# Patient Record
Sex: FEMALE | Race: WHITE | NOT HISPANIC OR LATINO | Employment: FULL TIME | URBAN - METROPOLITAN AREA
[De-identification: names, ages, dates, MRNs, and addresses within clinical notes are randomized per-mention and may not be internally consistent; named-entity substitution may affect disease eponyms.]

---

## 2022-10-24 ENCOUNTER — TRANSFERRED RECORDS (OUTPATIENT)
Dept: HEALTH INFORMATION MANAGEMENT | Facility: CLINIC | Age: 32
End: 2022-10-24

## 2022-11-18 ENCOUNTER — TRANSFERRED RECORDS (OUTPATIENT)
Dept: HEALTH INFORMATION MANAGEMENT | Facility: CLINIC | Age: 32
End: 2022-11-18

## 2022-11-18 LAB — EJECTION FRACTION: 57 %

## 2022-12-19 ENCOUNTER — TRANSFERRED RECORDS (OUTPATIENT)
Dept: HEALTH INFORMATION MANAGEMENT | Facility: CLINIC | Age: 32
End: 2022-12-19

## 2023-01-04 ENCOUNTER — MEDICAL CORRESPONDENCE (OUTPATIENT)
Dept: HEALTH INFORMATION MANAGEMENT | Facility: CLINIC | Age: 33
End: 2023-01-04

## 2023-01-04 ENCOUNTER — TRANSFERRED RECORDS (OUTPATIENT)
Dept: HEALTH INFORMATION MANAGEMENT | Facility: CLINIC | Age: 33
End: 2023-01-04
Payer: COMMERCIAL

## 2023-01-04 LAB
CHOLESTEROL (EXTERNAL): 117 MG/DL (ref 0–200)
CREATININE (EXTERNAL): 0.77 MG/DL (ref 0.6–1.2)
GFR ESTIMATED (EXTERNAL): 88 ML/MIN/1.73M2
GFR ESTIMATED (IF AFRICAN AMERICAN) (EXTERNAL): >90 ML/MIN/1.73M2
GLUCOSE (EXTERNAL): 78 MG/DL (ref 70–105)
HDLC SERPL-MCNC: 43 MG/DL (ref 40–60)
INR (EXTERNAL): 1.2 (ref 0.8–1.1)
LDL CHOLESTEROL (EXTERNAL): 55 MG/DL (ref 0–99)
POTASSIUM (EXTERNAL): 3.9 MEQ/L (ref 3.5–5.1)
TRIGLYCERIDES (EXTERNAL): 95 MG/DL (ref 24–149)

## 2023-01-05 ENCOUNTER — TELEPHONE (OUTPATIENT)
Dept: CARDIOLOGY | Facility: CLINIC | Age: 33
End: 2023-01-05

## 2023-01-05 ENCOUNTER — TRANSCRIBE ORDERS (OUTPATIENT)
Dept: OTHER | Age: 33
End: 2023-01-05

## 2023-01-05 DIAGNOSIS — I27.21 PULMONARY ARTERIAL HYPERTENSION (H): Primary | ICD-10-CM

## 2023-01-05 NOTE — TELEPHONE ENCOUNTER
[facility] Call Center    Phone Message    May a detailed message be left on voicemail: yes     Reason for Call: Appointment Intake    Referring Provider Name: Dr Shirley Chandler  Diagnosis and/or Symptoms: Per referral - Pulmonary Hypertension to see Dr Frazier. Please review and reach out to patient to coordinate.     Action Taken: Message routed to:  [facility] Cardio     Travel Screening: Not Applicable

## 2023-01-06 ENCOUNTER — TRANSFERRED RECORDS (OUTPATIENT)
Dept: HEALTH INFORMATION MANAGEMENT | Facility: CLINIC | Age: 33
End: 2023-01-06

## 2023-01-06 NOTE — TELEPHONE ENCOUNTER
Called patient and spot on hold on 1/24 virtually with Dr Carroll for new PH. Intake to update and schedule appointment. [place] is aware of [place] location for [place] visit.     Ora Do RN on 1/6/2023 at 4:44 PM

## 2023-01-11 ENCOUNTER — PRE VISIT (OUTPATIENT)
Dept: CARDIOLOGY | Facility: CLINIC | Age: 33
End: 2023-01-11

## 2023-01-11 ENCOUNTER — DOCUMENTATION ONLY (OUTPATIENT)
Dept: CARDIOLOGY | Facility: CLINIC | Age: 33
End: 2023-01-11
Payer: COMMERCIAL

## 2023-01-11 NOTE — TELEPHONE ENCOUNTER
Patient Name: Chelsea Bejarano Age: 32 year old, female, 1990 MRN: [id number]   Referring Provider:  Dr. Kirk Sainz ND Appt:  1/17/23       PH Medications:  NA      Patient History: Pt has a history of HTN, Hypothyroidism    Pt referred from Emeli PETERS. Pt had edema and dyspnea following her 3rd childbirth.     Workup testing completed: RHC, VQ, Echo, labs. Images requested on 1/11/23      Recent Testing:       Date Test Epic Media/Scan Care Everywhere    1/5/23 RHC           RA   8        PA:    55      PVR:  9.9                     RV             PAW:   8    COI:  5.5 []  [x]   []     1/5/23 Echo           RVSP    63                     LVEF  57%                      RV                       []  [x]   []     1/6/23 V/Q Scan []   [x]   []     1/4/23 Misc: Labs  []   [x]   []         []   []   []

## 2023-01-11 NOTE — PROGRESS NOTES
DIAGNOSIS: N/A   DATE REFERRAL RECEIVED: 1/11/23   NOTES STATUS DETAILS   LABS   Scanned    TESTS     EKG   Scanned 12.19.22 [facility]   STRESS TEST N/A    6 MINUTE WALK TEST N/A    SLEEP STUDY/Overnight Oximetry  N/A    PULMONARY FUNCTION TEST N/A    IMAGES      ECHO   In process 11.18.22 [facility]   RIGHT HEART CATH   PACS 1.4.23 [facility]   ANGIOGRAM N/A    ULTRASOUND (liver) N/A    CT/CTA (chest) N/A    V/Q Scan N/A      Action 1.11.23 8:49 AM JUAN   Action Taken Faxed request to [facility] for more records and images [phone]     Action 1.19.23 8:23 AM JUAN   Action Taken Missing ECHO images. Faxed request for ECHO [phone]

## 2023-01-24 ENCOUNTER — VIRTUAL VISIT (OUTPATIENT)
Dept: CARDIOLOGY | Facility: CLINIC | Age: 33
End: 2023-01-24
Attending: INTERNAL MEDICINE
Payer: COMMERCIAL

## 2023-01-24 DIAGNOSIS — I27.21 PULMONARY ARTERIAL HYPERTENSION (H): ICD-10-CM

## 2023-01-24 DIAGNOSIS — R06.02 SOB (SHORTNESS OF BREATH): Primary | ICD-10-CM

## 2023-01-24 PROCEDURE — 99205 OFFICE O/P NEW HI 60 MIN: CPT | Mod: 95 | Performed by: INTERNAL MEDICINE

## 2023-01-24 RX ORDER — ALBUTEROL SULFATE 90 UG/1
1 AEROSOL, METERED RESPIRATORY (INHALATION) EVERY 4 HOURS PRN
COMMUNITY
Start: 2022-08-19 | End: 2023-08-06

## 2023-01-24 RX ORDER — ALBUTEROL SULFATE 0.83 MG/ML
SOLUTION RESPIRATORY (INHALATION)
COMMUNITY
Start: 2022-10-24 | End: 2023-08-06

## 2023-01-24 RX ORDER — LIDOCAINE 40 MG/G
CREAM TOPICAL
Status: CANCELLED | OUTPATIENT
Start: 2023-01-24

## 2023-01-24 RX ORDER — DILTIAZEM HCL 60 MG
60 TABLET ORAL 2 TIMES DAILY
Status: ON HOLD | COMMUNITY
Start: 2023-01-05 | End: 2023-02-18

## 2023-01-24 NOTE — NURSING NOTE
Chief Complaint   Patient presents with     Consult       Patient confirms medications and allergies are accurate via patients echeck in completion, and or denies any changes since last reviewed/verified.     Simi Vance, Virtual Facilitator

## 2023-01-24 NOTE — PROGRESS NOTES
Chelsea is a 32 year old who is being evaluated via a billable video visit.  Video not performed secondary to lack of HIPPA compliant space.

## 2023-01-24 NOTE — PATIENT INSTRUCTIONS
Medication Changes:   - None    Patient Instructions:  1. Continue staying active and eat a heart healthy diet.    2. Please keep current list of medications with you at all times.    3. Remember to weigh yourself daily after voiding and before you consume any food or beverages and log the numbers.  If you have gained 2 pounds overnight or 5 pounds in a week contact us immediately for medication adjustments or further instructions.    4. **Please call us immediately if you have any syncope (fainting or passing out), chest pain, edema (swelling or weight gain), or decline in your functional status (general decline in how you are feeling).    5. Patients on Remodulin (treprostinil) or Veletri (epoprostenol): Please make sure that you have your backup pump and supplies with you at all times, your mixing instructions, and contact information for your specialty pharmacy.    Follow up Appointment Information:  - We will call you tomorrow to schedule a chest CT and Right Heart Catheterization (with labs) for February   - Follow up with Dr. Kumar or Dr. Frazier after testing  - Will resume following with Dr. Dalton in March when he returns from vacation    Pre-procedure instructions - Right hear catheterization  Patient Education    Your arrival time is TBD.  Location is [address] Waiting Room  Please plan on being at the hospital all day.  At any time, emergencies and/or urgent cases may come up which could delay the start of your procedure.    Pre-procedure instructions - Right heart catheterization  Nothing to eat for 6 hours   You may have clear liquids up until the time of your procedure  You can take your morning medications (except diabetic and blood thinners) with sips of water  We recommend you arrange for a ride to drop you off and pick you up, in the instance, you are unable to drive home, however you should be able to  function as you normally would after the procedure    We are located on the third floor of the [facility] on the [facility].  Our address is     [address] [place] [address] on 3rd Floor   [place] [phone]      Thank you for allowing us to be a part of your care here at the [facility]    If you have questions or concerns please contact us at:    Ora Do RN, BSN      Nurse Coordinator         Pulmonary Hypertension       [facility]     (Phone)[phone]         JESUS Carlos   (Prior Authorizations)   ()  Clinic   Clinic   Pulmonary Hypertension   Pulmonary Hypertension  [facility]  (P)[phone]    (P) [phone]  (F) [phone]          ** Please note that you will NOT receive a reminder call regarding your scheduled testing, reminder calls are for provider appointments only.  If you are scheduled for testing within the [facility] system you may receive a call regarding pre-registration for billing purposes only.**     Support Group:  Pulmonary Hypertension Association  Https://www.phassociation.org/  **Look at the Events Tab** They even have Support Groups that you can call into    [facility] PH Support Group  Second Saturday of the Month from 1-3 PM   Location: [address] [place] [id number]  Leader: Martina Nichole  Phone: [phone]  Email: [email or web address]     Great Videos about Pulmonary Hypertension!!  Scan ME!    Website: [email or web address]

## 2023-01-24 NOTE — NURSING NOTE
Orders placed and staff message sent to [facility] for follow up. Mary Guerrero RN on 1/24/2023 at 9:23 AM

## 2023-01-24 NOTE — LETTER
2023      RE: Chelsea Flower  [id number]  [place] [address]       Dear Colleague,    Thank you for the opportunity to participate in the care of your patient, Chelsea Flower, at the [facility]. Please see a copy of my visit note below.    Chelsea is a 32 year old who is being evaluated via a billable video visit.  Video not performed secondary to lack of HIPPA compliant space.      Alex Carroll M.D.  Cardiovascular Medicine  [phone]  Telephone visit x 60 minutes (no HIPPA compliant space available)        Kirk Newman M.D.  Shirley Chandler M.D.    Dear Doctors:    Thank you for allowing us to visit with your patient, Chelsea Flower, for evaluation of pulmonary hypertension of uncertain etiology though provocative laboratory testing with elevated CLINTON, +DS-DNA, elevated hemoglobin.    Problem List  1. Exertional shortness of breath   2. Pulmonary hypertension x 33. Childbirth x 3 by   3. Hypertension treated with recently started diltiazem  4. Hypothyroidism    Discussion Plan:  The patient clearly has at least moderate pulmonary arterial hypertension that appears to be idiopathic.  We discussed the nature of pulmonary hypertension, various treatment modalities, and further diagnostic evaluation including inhaled NO.      Suggest:   1. repeat right heart catheterization with inhaled NO to assess vaso-esponsiveness  2. Live US to assess for intrinsic liver disease  3. Weight loss  4. Seek approval for two drug therapy for PAH   5. Probably switch from diltiazem to losartan to control systemic blood pressure  6. Incremental laboratories in addition to standard include HgbA1C, IL-6, Free T4/TSH, collagen vascular  7. Evaluation for sleep disordered breathing  History    32 year old female interviewed by telephone for evaluation of pulmonary hypertension with mean PA 55, ventricular mechanical  remodeling as shown by echocardiogram, and no evidence or history of pulmonary emboli.    BMI: 30 height 5.3. weight 179    The patient has symptoms of exertional dyspnea starting shortly after her last pregnancy.  She has has progressive shortness of breath and exercise tolerance but no definite exertional syncope though does become somewhat lightheaded with moderate exertion for her age.  She has no history of drug or alcohol usage, pulmonary emboli, liver disease, congenital heart disease, myeloproliferative syndrome, family history of premature death or pulmonary hypertension, chemical exposure, thyroid dysfunction.  She does take diltiazem.     Constitutional: weight loss, fever, chills, night sweats  HEENT: without visual changes, swallow difficulties  Pulmonary: with shortness of breath, cough, wheeze, hemoptysis  Cardiac: without chest pain, MALAVE, PND, orthopnea, edema, palpitation, pre-syncope, syncope,  GI: without diarrhea, constipation, jaundice, melena, GERD, hematemesis  : without frequency, urgency, dysuria, hematuria  Skin: rash, bruise, open lesions  Neuro: without TIA, focal neurologic complaints, seizure, trauma  Ortho: without pain, swelling, mobility impairment  Endocrine: diabetes, thyroid, heat/cold intolerance, polyuria, polyphagia, change bowel habits.  Sleep:no ASAD, periodic breathing, fatigue      Allergies: none  ETOH: none  Smoking: none  Family history: negative  Surgery: C-Sections (first child presented breach    Right heart catheterization  RA 8, PCP 8 PA pressure PA 62/48 mean 55      Echocardiogram      VQ Lung Scan      Meds  Current Outpatient Medications   Medication     albuterol (PROAIR HFA/PROVENTIL HFA/VENTOLIN HFA) 108 (90 Base) MCG/ACT inhaler     albuterol (PROVENTIL) (2.5 MG/3ML) 0.083% neb solution     diltiazem (CARDIZEM) 60 MG tablet     No current facility-administered medications for this visit.       Labs    Outside laboratories reviewed and abnormal include:  hemoglobin 18, , , CLINTON +, DS-DNA +        Please do not hesitate to contact me if you have any questions/concerns.     Sincerely,     Alex Carroll MD

## 2023-01-26 NOTE — PROGRESS NOTES
Alex Carroll M.D.  Cardiovascular Medicine  [phone]  Telephone visit x 60 minutes (no HIPPA compliant space available)        Kirk Newman M.D.  Shirley Chandler M.D.    Dear Doctors:    Thank you for allowing us to visit with your patient, Chelsea Flower, for evaluation of pulmonary hypertension of uncertain etiology though provocative laboratory testing with elevated CLINTON, +DS-DNA, elevated hemoglobin.    Problem List  1. Exertional shortness of breath   2. Pulmonary hypertension x 33. Childbirth x 3 by   3. Hypertension treated with recently started diltiazem  4. Hypothyroidism    Discussion Plan:  The patient clearly has at least moderate pulmonary arterial hypertension that appears to be idiopathic.  We discussed the nature of pulmonary hypertension, various treatment modalities, and further diagnostic evaluation including inhaled NO.      Suggest:   1. repeat right heart catheterization with inhaled NO to assess vaso-esponsiveness  2. Live US to assess for intrinsic liver disease  3. Weight loss  4. Seek approval for two drug therapy for PAH   5. Probably switch from diltiazem to losartan to control systemic blood pressure  6. Incremental laboratories in addition to standard include HgbA1C, IL-6, Free T4/TSH, collagen vascular  7. Evaluation for sleep disordered breathing  History    32 year old female interviewed by telephone for evaluation of pulmonary hypertension with mean PA 55, ventricular mechanical remodeling as shown by echocardiogram, and no evidence or history of pulmonary emboli.    BMI: 30 height 5.3. weight 179    The patient has symptoms of exertional dyspnea starting shortly after her last pregnancy.  She has has progressive shortness of breath and exercise tolerance but no definite exertional syncope though does become somewhat lightheaded with moderate exertion for her age.  She has no history of drug or alcohol usage, pulmonary emboli, liver disease, congenital heart  disease, myeloproliferative syndrome, family history of premature death or pulmonary hypertension, chemical exposure, thyroid dysfunction.  She does take diltiazem.     Constitutional: weight loss, fever, chills, night sweats  HEENT: without visual changes, swallow difficulties  Pulmonary: with shortness of breath, cough, wheeze, hemoptysis  Cardiac: without chest pain, MALAVE, PND, orthopnea, edema, palpitation, pre-syncope, syncope,  GI: without diarrhea, constipation, jaundice, melena, GERD, hematemesis  : without frequency, urgency, dysuria, hematuria  Skin: rash, bruise, open lesions  Neuro: without TIA, focal neurologic complaints, seizure, trauma  Ortho: without pain, swelling, mobility impairment  Endocrine: diabetes, thyroid, heat/cold intolerance, polyuria, polyphagia, change bowel habits.  Sleep:no ASAD, periodic breathing, fatigue      Allergies: none  ETOH: none  Smoking: none  Family history: negative  Surgery: C-Sections (first child presented breach    Right heart catheterization  RA 8, PCP 8 PA pressure PA 62/48 mean 55      Echocardiogram      VQ Lung Scan      Meds  Current Outpatient Medications   Medication     albuterol (PROAIR HFA/PROVENTIL HFA/VENTOLIN HFA) 108 (90 Base) MCG/ACT inhaler     albuterol (PROVENTIL) (2.5 MG/3ML) 0.083% neb solution     diltiazem (CARDIZEM) 60 MG tablet     No current facility-administered medications for this visit.       Labs    Outside laboratories reviewed and abnormal include: hemoglobin 18, , , CLINTON +, DS-DNA +

## 2023-02-03 ENCOUNTER — TELEPHONE (OUTPATIENT)
Dept: CARDIOLOGY | Facility: CLINIC | Age: 33
End: 2023-02-03
Payer: COMMERCIAL

## 2023-02-03 NOTE — TELEPHONE ENCOUNTER
Patient called to report the following:  since VV with Dr. Carroll patient is having worsening  with exertion during the day and difficulty sleeping through the night. She is waking up with dyspnea. Dizziness/nausea/occasional vomiting. She has had to miss a few days of work. Spotty vision with the dizziness, however denies syncope.    Called to notify Dr. Carroll for further plan for worsening s.s. Patient should be admitted to [facility] on 23. Patient should have testing completed RHC with vasodilator study during admission for further management.     Staff message sent to attending team, scheduled admission completed.         Ora Do RN on 2/3/2023 at 11:05 AM

## 2023-02-06 RX ORDER — MAGNESIUM HYDROXIDE/ALUMINUM HYDROXICE/SIMETHICONE 120; 1200; 1200 MG/30ML; MG/30ML; MG/30ML
30 SUSPENSION ORAL EVERY 4 HOURS PRN
Status: CANCELLED | OUTPATIENT
Start: 2023-02-06

## 2023-02-06 RX ORDER — LIDOCAINE 40 MG/G
CREAM TOPICAL
Status: CANCELLED | OUTPATIENT
Start: 2023-02-06

## 2023-02-06 RX ORDER — ACETAMINOPHEN 650 MG/1
650 SUPPOSITORY RECTAL EVERY 4 HOURS PRN
Status: CANCELLED | OUTPATIENT
Start: 2023-02-06

## 2023-02-06 RX ORDER — ENOXAPARIN SODIUM 100 MG/ML
40 INJECTION SUBCUTANEOUS EVERY 24 HOURS
Status: CANCELLED | OUTPATIENT
Start: 2023-02-06

## 2023-02-06 RX ORDER — AMOXICILLIN 250 MG
2 CAPSULE ORAL 2 TIMES DAILY
Status: CANCELLED | OUTPATIENT
Start: 2023-02-06

## 2023-02-06 RX ORDER — AMOXICILLIN 250 MG
1 CAPSULE ORAL 2 TIMES DAILY
Status: CANCELLED | OUTPATIENT
Start: 2023-02-06

## 2023-02-06 RX ORDER — ACETAMINOPHEN 325 MG/1
650 TABLET ORAL EVERY 4 HOURS PRN
Status: CANCELLED | OUTPATIENT
Start: 2023-02-06

## 2023-02-06 RX ORDER — POLYETHYLENE GLYCOL 3350 17 G/17G
17 POWDER, FOR SOLUTION ORAL DAILY PRN
Status: CANCELLED | OUTPATIENT
Start: 2023-02-06

## 2023-02-06 NOTE — H&P
[facility]    Cardiology History and Physical - Cardiology         Date of Admission:  2/7/2023    Assessment & Plan: HVSL    Ms Chelsea Flower is a 32 year old lady with a history of obesity and hypothyroidism who presents for pulmonary hypertension evaluation in the setting of progressive dyspnea. The patient is hemodynamically stable, on room air, and in no distress.    #Pulmonary Hypertension  Workup to date includes RHC with RA 8, PA 62/48/55, PCWP 8; V/Q scan with low probability of PE; and TTE with LVEF 57% and PASP estimated 63 with no comment on RV function (all performed at OSH, not in Care Everywhere).   The patient's outpatient pulmonary hypertension provider, Dr Carroll, recommended admission for RHC with Vasodilator study to further evaluate her pulmonary hypertension.   In terms of the etiology of her pulmonary hypertension, the patient has no history or evidence of cardiac disease to suggest WHO group II and a distant history of reactive airways disease but no apparent severe lung diseases to suggest WHO group III. Additionally, her V/Q scan is not suggestive of a group IV etiology. She has a positive CLINTON and DS-DNA however without additional clinical evidence of connective tissue or autoimmune disease but this raises the possibility of PAH vs other WHO group V etiology.   The patient remains hemodynamically stable and on room air. She is not currently on any pulmonary hypertension medications.   - Case request for RHC with vasodilator study  - Complete TTE  - CMP and BNP    #Hypothyroidism  -F/U TSH with reflex    Diet: Regular diet, 2L restriction NPO at [place] for RHC with vasodilator tomorrow   PPX: LMWH, hold for procedure once scheduled   GI: Pantoprazole  BM: PRN  Code: FCFT    ______________________________________________________________________    Chief Complaint   Dyspnea    History is obtained from the patient    History of Present  Illness   Chelsea Flower is a 32 year old female with a history of asthma and obesity who presents for inpatient evaluation for pHTN.     The patient developed onset of dyspnea on exertion and lower extremity edema within one year following delivery of her 3rd child by  section. She had stable exercise capacity of one block walking and intermittent lower extremity edema that did not require diuretics. Her symptoms prompted a virtual evaluation by the [facility] pulmonary hypertension clinic and she was recommended for inpatient workup. The patient noted progression in her symptoms and was referred for earlier admission and evaluation.       Past Medical History    No past medical history on file.    Past Surgical History   No past surgical history on file.    Prior to Admission Medications   Prior to Admission Medications   Prescriptions Last Dose Informant Patient Reported? Taking?   albuterol (PROAIR HFA/PROVENTIL HFA/VENTOLIN HFA) 108 (90 Base) MCG/ACT inhaler   Yes No   albuterol (PROVENTIL) (2.5 MG/3ML) 0.083% neb solution   Yes No   Sig: INHALE 1 VIAL VIA NEBULIZATION EVERY 6 HOURS AS NEEDED FOR WHEEZING   diltiazem (CARDIZEM) 60 MG tablet   Yes No      Facility-Administered Medications: None        Review of Systems    The 10 point Review of Systems is negative other than noted in the HPI or here.      Physical Exam   Vital Signs: Temp: 97.6  F (36.4  C) Temp src: Oral BP: (!) 147/98 Pulse: 103   Resp: 18 SpO2: 92 % O2 Device: None (Room air)    Weight: 0 lbs 0 oz  Constitutional: awake, alert, cooperative, no apparent distress, and appears stated age  Eyes: sclera clear, conjunctiva normal  Respiratory: No increased work of breathing, good air exchange, no wheezing  Cardiovascular: Normal apical impulse, regular rate and rhythm  GI: soft, non-distended, non-tender, no masses palpated  Skin: no bruising or bleeding  Neurologic: Awake, alert, oriented to name, place and time.  Cranial nerves II-XII are  grossly intact.        Medical Decision Making       Please see A&P for additional details of medical decision making.      Data      I have reviewed today's vital signs, notes, medications, labs and imaging.  I have also seen and examined the patient and agree with the findings and plan as outlined above.  Pt is 31 yo WF with hx of pulmonary hypertension (probable WHO Group III) who has had progressive symptoms and difficulty climbing stairs, showering, vacuuming and other household duties.  Denies hemoptysis, fevers, chills, NS or CP.  Does report MALAVE (feet), SOB, orthopnea and PND.  Denies syncopal episodes.  On exam pt with right sided S3 and holosystolic murmer at LSB.  Assessment/Plan:  Pt with pulm htn.  Plan to obtain TTE and then RHC/vasodilation challenge.  Pending these results we will consider additional therapies including iv remodulin.      Eliel Mendoza MD, PhD  Professor, Heart Failure and Cardiac Transplantation  [facility]

## 2023-02-07 ENCOUNTER — APPOINTMENT (OUTPATIENT)
Dept: CARDIOLOGY | Facility: CLINIC | Age: 33
End: 2023-02-07
Attending: STUDENT IN AN ORGANIZED HEALTH CARE EDUCATION/TRAINING PROGRAM
Payer: COMMERCIAL

## 2023-02-07 ENCOUNTER — HOSPITAL ENCOUNTER (INPATIENT)
Facility: CLINIC | Age: 33
LOS: 10 days | Discharge: HOME | End: 2023-02-18
Attending: INTERNAL MEDICINE | Admitting: INTERNAL MEDICINE
Payer: COMMERCIAL

## 2023-02-07 DIAGNOSIS — I27.20 PULMONARY HYPERTENSION (H): Primary | ICD-10-CM

## 2023-02-07 LAB
ALBUMIN SERPL BCG-MCNC: 3.9 G/DL (ref 3.5–5.2)
ALP SERPL-CCNC: 85 U/L (ref 35–104)
ALT SERPL W P-5'-P-CCNC: 24 U/L (ref 10–35)
ANION GAP SERPL CALCULATED.3IONS-SCNC: 14 MMOL/L (ref 7–15)
APTT PPP: 27 SECONDS (ref 22–38)
AST SERPL W P-5'-P-CCNC: 32 U/L (ref 10–35)
BASOPHILS # BLD AUTO: 0.1 10E3/UL (ref 0–0.2)
BASOPHILS NFR BLD AUTO: 1 %
BILIRUB DIRECT SERPL-MCNC: 0.25 MG/DL (ref 0–0.3)
BILIRUB SERPL-MCNC: 0.9 MG/DL
BUN SERPL-MCNC: 10 MG/DL (ref 6–20)
CALCIUM SERPL-MCNC: 9 MG/DL (ref 8.6–10)
CHLORIDE SERPL-SCNC: 106 MMOL/L (ref 98–107)
CREAT SERPL-MCNC: 0.71 MG/DL (ref 0.51–0.95)
DEPRECATED HCO3 PLAS-SCNC: 19 MMOL/L (ref 22–29)
EOSINOPHIL # BLD AUTO: 0.1 10E3/UL (ref 0–0.7)
EOSINOPHIL NFR BLD AUTO: 1 %
ERYTHROCYTE [DISTWIDTH] IN BLOOD BY AUTOMATED COUNT: 14.2 % (ref 10–15)
GFR SERPL CREATININE-BSD FRML MDRD: >90 ML/MIN/1.73M2
GLUCOSE SERPL-MCNC: 79 MG/DL (ref 70–99)
HCT VFR BLD AUTO: 47.2 % (ref 35–47)
HGB BLD-MCNC: 15.8 G/DL (ref 11.7–15.7)
IMM GRANULOCYTES # BLD: 0.1 10E3/UL
IMM GRANULOCYTES NFR BLD: 1 %
INR PPP: 1.22 (ref 0.85–1.15)
LVEF ECHO: NORMAL
LYMPHOCYTES # BLD AUTO: 1.9 10E3/UL (ref 0.8–5.3)
LYMPHOCYTES NFR BLD AUTO: 20 %
MCH RBC QN AUTO: 33.8 PG (ref 26.5–33)
MCHC RBC AUTO-ENTMCNC: 33.5 G/DL (ref 31.5–36.5)
MCV RBC AUTO: 101 FL (ref 78–100)
MONOCYTES # BLD AUTO: 0.6 10E3/UL (ref 0–1.3)
MONOCYTES NFR BLD AUTO: 6 %
NEUTROPHILS # BLD AUTO: 6.7 10E3/UL (ref 1.6–8.3)
NEUTROPHILS NFR BLD AUTO: 71 %
NRBC # BLD AUTO: 0 10E3/UL
NRBC BLD AUTO-RTO: 0 /100
PLATELET # BLD AUTO: 200 10E3/UL (ref 150–450)
POTASSIUM SERPL-SCNC: 3.9 MMOL/L (ref 3.4–5.3)
PROT SERPL-MCNC: 6.2 G/DL (ref 6.4–8.3)
RBC # BLD AUTO: 4.67 10E6/UL (ref 3.8–5.2)
SARS-COV-2 RNA RESP QL NAA+PROBE: NEGATIVE
SODIUM SERPL-SCNC: 139 MMOL/L (ref 136–145)
T4 FREE SERPL-MCNC: 1.26 NG/DL (ref 0.9–1.7)
TSH SERPL DL<=0.005 MIU/L-ACNC: 5.24 UIU/ML (ref 0.3–4.2)
WBC # BLD AUTO: 9.3 10E3/UL (ref 4–11)

## 2023-02-07 PROCEDURE — 86704 HEP B CORE ANTIBODY TOTAL: CPT | Performed by: STUDENT IN AN ORGANIZED HEALTH CARE EDUCATION/TRAINING PROGRAM

## 2023-02-07 PROCEDURE — 82248 BILIRUBIN DIRECT: CPT | Performed by: STUDENT IN AN ORGANIZED HEALTH CARE EDUCATION/TRAINING PROGRAM

## 2023-02-07 PROCEDURE — 999N000128 HC STATISTIC PERIPHERAL IV START W/O US GUIDANCE

## 2023-02-07 PROCEDURE — 84443 ASSAY THYROID STIM HORMONE: CPT | Performed by: STUDENT IN AN ORGANIZED HEALTH CARE EDUCATION/TRAINING PROGRAM

## 2023-02-07 PROCEDURE — 93005 ELECTROCARDIOGRAM TRACING: CPT

## 2023-02-07 PROCEDURE — 93306 TTE W/DOPPLER COMPLETE: CPT | Mod: 26 | Performed by: INTERNAL MEDICINE

## 2023-02-07 PROCEDURE — 999N000208 ECHOCARDIOGRAM COMPLETE

## 2023-02-07 PROCEDURE — 82374 ASSAY BLOOD CARBON DIOXIDE: CPT | Performed by: STUDENT IN AN ORGANIZED HEALTH CARE EDUCATION/TRAINING PROGRAM

## 2023-02-07 PROCEDURE — 250N000011 HC RX IP 250 OP 636

## 2023-02-07 PROCEDURE — 82310 ASSAY OF CALCIUM: CPT | Performed by: STUDENT IN AN ORGANIZED HEALTH CARE EDUCATION/TRAINING PROGRAM

## 2023-02-07 PROCEDURE — 83735 ASSAY OF MAGNESIUM: CPT | Performed by: INTERNAL MEDICINE

## 2023-02-07 PROCEDURE — 85025 COMPLETE CBC W/AUTO DIFF WBC: CPT | Performed by: STUDENT IN AN ORGANIZED HEALTH CARE EDUCATION/TRAINING PROGRAM

## 2023-02-07 PROCEDURE — 99222 1ST HOSP IP/OBS MODERATE 55: CPT | Mod: 25 | Performed by: INTERNAL MEDICINE

## 2023-02-07 PROCEDURE — 87389 HIV-1 AG W/HIV-1&-2 AB AG IA: CPT | Performed by: STUDENT IN AN ORGANIZED HEALTH CARE EDUCATION/TRAINING PROGRAM

## 2023-02-07 PROCEDURE — 87350 HEPATITIS BE AG IA: CPT | Performed by: STUDENT IN AN ORGANIZED HEALTH CARE EDUCATION/TRAINING PROGRAM

## 2023-02-07 PROCEDURE — 86803 HEPATITIS C AB TEST: CPT | Performed by: STUDENT IN AN ORGANIZED HEALTH CARE EDUCATION/TRAINING PROGRAM

## 2023-02-07 PROCEDURE — 84439 ASSAY OF FREE THYROXINE: CPT | Performed by: STUDENT IN AN ORGANIZED HEALTH CARE EDUCATION/TRAINING PROGRAM

## 2023-02-07 PROCEDURE — 85730 THROMBOPLASTIN TIME PARTIAL: CPT | Performed by: STUDENT IN AN ORGANIZED HEALTH CARE EDUCATION/TRAINING PROGRAM

## 2023-02-07 PROCEDURE — 255N000002 HC RX 255 OP 636: Performed by: INTERNAL MEDICINE

## 2023-02-07 PROCEDURE — 85610 PROTHROMBIN TIME: CPT | Performed by: STUDENT IN AN ORGANIZED HEALTH CARE EDUCATION/TRAINING PROGRAM

## 2023-02-07 PROCEDURE — 86706 HEP B SURFACE ANTIBODY: CPT | Performed by: STUDENT IN AN ORGANIZED HEALTH CARE EDUCATION/TRAINING PROGRAM

## 2023-02-07 PROCEDURE — 93010 ELECTROCARDIOGRAM REPORT: CPT | Performed by: INTERNAL MEDICINE

## 2023-02-07 PROCEDURE — U0003 INFECTIOUS AGENT DETECTION BY NUCLEIC ACID (DNA OR RNA); SEVERE ACUTE RESPIRATORY SYNDROME CORONAVIRUS 2 (SARS-COV-2) (CORONAVIRUS DISEASE [COVID-19]), AMPLIFIED PROBE TECHNIQUE, MAKING USE OF HIGH THROUGHPUT TECHNOLOGIES AS DESCRIBED BY CMS-2020-01-R: HCPCS | Performed by: STUDENT IN AN ORGANIZED HEALTH CARE EDUCATION/TRAINING PROGRAM

## 2023-02-07 PROCEDURE — 36415 COLL VENOUS BLD VENIPUNCTURE: CPT | Performed by: STUDENT IN AN ORGANIZED HEALTH CARE EDUCATION/TRAINING PROGRAM

## 2023-02-07 RX ORDER — ONDANSETRON 4 MG/1
4 TABLET, FILM COATED ORAL EVERY 6 HOURS PRN
Status: DISCONTINUED | OUTPATIENT
Start: 2023-02-07 | End: 2023-02-10

## 2023-02-07 RX ORDER — CHLORHEXIDINE GLUCONATE 4 %
1 LIQUID (ML) TOPICAL DAILY
Status: ON HOLD | COMMUNITY
End: 2024-06-15

## 2023-02-07 RX ORDER — ENOXAPARIN SODIUM 100 MG/ML
40 INJECTION SUBCUTANEOUS EVERY 24 HOURS
Status: DISCONTINUED | OUTPATIENT
Start: 2023-02-07 | End: 2023-02-18 | Stop reason: HOSPADM

## 2023-02-07 RX ORDER — ALBUTEROL SULFATE 90 UG/1
2 AEROSOL, METERED RESPIRATORY (INHALATION)
Status: DISCONTINUED | OUTPATIENT
Start: 2023-02-07 | End: 2023-02-18 | Stop reason: HOSPADM

## 2023-02-07 RX ORDER — PANTOPRAZOLE SODIUM 40 MG/1
40 TABLET, DELAYED RELEASE ORAL
Status: DISCONTINUED | OUTPATIENT
Start: 2023-02-08 | End: 2023-02-18 | Stop reason: HOSPADM

## 2023-02-07 RX ORDER — LIDOCAINE 40 MG/G
CREAM TOPICAL
Status: DISCONTINUED | OUTPATIENT
Start: 2023-02-07 | End: 2023-02-18 | Stop reason: HOSPADM

## 2023-02-07 RX ADMIN — ONDANSETRON HYDROCHLORIDE 4 MG: 4 TABLET, FILM COATED ORAL at 19:02

## 2023-02-07 RX ADMIN — HUMAN ALBUMIN MICROSPHERES AND PERFLUTREN 6 ML: 10; .22 INJECTION, SOLUTION INTRAVENOUS at 16:17

## 2023-02-07 ASSESSMENT — ACTIVITIES OF DAILY LIVING (ADL)
ADLS_ACUITY_SCORE: 25
ADLS_ACUITY_SCORE: 35
ADLS_ACUITY_SCORE: 25

## 2023-02-07 NOTE — PHARMACY-ADMISSION MEDICATION HISTORY
Admission Medication History Completed by Pharmacy    See [facility] Admission Navigator for allergy information, preferred outpatient pharmacy, prior to admission medications and immunization status.     Medication History Sources:     Patient, chart review    Changes made to PTA medication list (reason):    Added: multivitamin    Deleted: None    Changed: added directions to diltiazem     Additional Information:    None    Prior to Admission medications    Medication Sig Last Dose Taking? Auth Provider Long Term End Date   albuterol (PROAIR HFA/PROVENTIL HFA/VENTOLIN HFA) 108 (90 Base) MCG/ACT inhaler Inhale 1 puff into the lungs every 4 hours as needed for shortness of breath More than a month Yes Reported, Patient Yes    albuterol (PROVENTIL) (2.5 MG/3ML) 0.083% neb solution INHALE 1 VIAL VIA NEBULIZATION EVERY 6 HOURS AS NEEDED FOR WHEEZING More than a month Yes Reported, Patient Yes    diltiazem (CARDIZEM) 60 MG tablet Take 60 mg by mouth 2 times daily 2/7/2023 at 0430 Yes Reported, Patient Yes    Multiple Vitamins-Minerals (WOMENS MULTIVITAMIN) TABS Take 1 tablet by mouth daily 2/7/2023 at am Yes Unknown, Entered By History         Date completed: 02/07/23    Medication history completed by: Jacqui Patel RPH

## 2023-02-08 ENCOUNTER — TELEPHONE (OUTPATIENT)
Dept: CARDIOLOGY | Facility: CLINIC | Age: 33
End: 2023-02-08
Payer: COMMERCIAL

## 2023-02-08 PROBLEM — I27.20 PULMONARY HYPERTENSION (H): Status: ACTIVE | Noted: 2023-02-08

## 2023-02-08 LAB
ANION GAP SERPL CALCULATED.3IONS-SCNC: 9 MMOL/L (ref 7–15)
ATRIAL RATE - MUSE: 84 BPM
BASOPHILS # BLD AUTO: 0.1 10E3/UL (ref 0–0.2)
BASOPHILS NFR BLD AUTO: 1 %
BUN SERPL-MCNC: 10.9 MG/DL (ref 6–20)
CALCIUM SERPL-MCNC: 8.8 MG/DL (ref 8.6–10)
CHLORIDE SERPL-SCNC: 107 MMOL/L (ref 98–107)
CREAT SERPL-MCNC: 0.95 MG/DL (ref 0.51–0.95)
DEPRECATED HCO3 PLAS-SCNC: 24 MMOL/L (ref 22–29)
DIASTOLIC BLOOD PRESSURE - MUSE: NORMAL MMHG
EOSINOPHIL # BLD AUTO: 0.1 10E3/UL (ref 0–0.7)
EOSINOPHIL NFR BLD AUTO: 2 %
ERYTHROCYTE [DISTWIDTH] IN BLOOD BY AUTOMATED COUNT: 14.4 % (ref 10–15)
GFR SERPL CREATININE-BSD FRML MDRD: 82 ML/MIN/1.73M2
GLUCOSE SERPL-MCNC: 81 MG/DL (ref 70–99)
HCG INTACT+B SERPL-ACNC: <1 MIU/ML
HCT VFR BLD AUTO: 47.7 % (ref 35–47)
HGB BLD-MCNC: 14.3 G/DL (ref 11.7–15.7)
HGB BLD-MCNC: 15.5 G/DL (ref 11.7–15.7)
HGB BLD-MCNC: 15.7 G/DL (ref 11.7–15.7)
IMM GRANULOCYTES # BLD: 0.1 10E3/UL
IMM GRANULOCYTES NFR BLD: 1 %
INTERPRETATION ECG - MUSE: NORMAL
LYMPHOCYTES # BLD AUTO: 2.2 10E3/UL (ref 0.8–5.3)
LYMPHOCYTES NFR BLD AUTO: 24 %
MAGNESIUM SERPL-MCNC: 1.7 MG/DL (ref 1.7–2.3)
MAGNESIUM SERPL-MCNC: 1.8 MG/DL (ref 1.7–2.3)
MCH RBC QN AUTO: 34.4 PG (ref 26.5–33)
MCHC RBC AUTO-ENTMCNC: 32.9 G/DL (ref 31.5–36.5)
MCV RBC AUTO: 104 FL (ref 78–100)
MONOCYTES # BLD AUTO: 0.5 10E3/UL (ref 0–1.3)
MONOCYTES NFR BLD AUTO: 6 %
NEUTROPHILS # BLD AUTO: 6.1 10E3/UL (ref 1.6–8.3)
NEUTROPHILS NFR BLD AUTO: 66 %
NRBC # BLD AUTO: 0 10E3/UL
NRBC BLD AUTO-RTO: 0 /100
OXYHGB MFR BLDV: 49 % (ref 92–100)
OXYHGB MFR BLDV: 60 % (ref 92–100)
P AXIS - MUSE: 64 DEGREES
PLATELET # BLD AUTO: 196 10E3/UL (ref 150–450)
POTASSIUM SERPL-SCNC: 4.7 MMOL/L (ref 3.4–5.3)
PR INTERVAL - MUSE: 152 MS
QRS DURATION - MUSE: 96 MS
QT - MUSE: 382 MS
QTC - MUSE: 451 MS
R AXIS - MUSE: 156 DEGREES
RBC # BLD AUTO: 4.57 10E6/UL (ref 3.8–5.2)
SODIUM SERPL-SCNC: 140 MMOL/L (ref 136–145)
SYSTOLIC BLOOD PRESSURE - MUSE: NORMAL MMHG
T AXIS - MUSE: -37 DEGREES
VENTRICULAR RATE- MUSE: 84 BPM
WBC # BLD AUTO: 9 10E3/UL (ref 4–11)

## 2023-02-08 PROCEDURE — 82310 ASSAY OF CALCIUM: CPT | Performed by: STUDENT IN AN ORGANIZED HEALTH CARE EDUCATION/TRAINING PROGRAM

## 2023-02-08 PROCEDURE — 99233 SBSQ HOSP IP/OBS HIGH 50: CPT | Mod: 25 | Performed by: INTERNAL MEDICINE

## 2023-02-08 PROCEDURE — 93005 ELECTROCARDIOGRAM TRACING: CPT

## 2023-02-08 PROCEDURE — 82810 BLOOD GASES O2 SAT ONLY: CPT

## 2023-02-08 PROCEDURE — 250N000013 HC RX MED GY IP 250 OP 250 PS 637

## 2023-02-08 PROCEDURE — 93463 DRUG ADMIN & HEMODYNMIC MEAS: CPT

## 2023-02-08 PROCEDURE — 250N000009 HC RX 250: Performed by: STUDENT IN AN ORGANIZED HEALTH CARE EDUCATION/TRAINING PROGRAM

## 2023-02-08 PROCEDURE — 272N000450 HC KIT 4FR POWER PICC SINGLE LUMEN

## 2023-02-08 PROCEDURE — 93010 ELECTROCARDIOGRAM REPORT: CPT | Performed by: INTERNAL MEDICINE

## 2023-02-08 PROCEDURE — 99221 1ST HOSP IP/OBS SF/LOW 40: CPT | Mod: GC | Performed by: INTERNAL MEDICINE

## 2023-02-08 PROCEDURE — C1894 INTRO/SHEATH, NON-LASER: HCPCS | Performed by: INTERNAL MEDICINE

## 2023-02-08 PROCEDURE — 85018 HEMOGLOBIN: CPT

## 2023-02-08 PROCEDURE — 36415 COLL VENOUS BLD VENIPUNCTURE: CPT | Performed by: STUDENT IN AN ORGANIZED HEALTH CARE EDUCATION/TRAINING PROGRAM

## 2023-02-08 PROCEDURE — 84702 CHORIONIC GONADOTROPIN TEST: CPT | Performed by: INTERNAL MEDICINE

## 2023-02-08 PROCEDURE — 214N000001 HC R&B CCU UMMC

## 2023-02-08 PROCEDURE — 4A023N6 MEASUREMENT OF CARDIAC SAMPLING AND PRESSURE, RIGHT HEART, PERCUTANEOUS APPROACH: ICD-10-PCS | Performed by: INTERNAL MEDICINE

## 2023-02-08 PROCEDURE — 36569 INSJ PICC 5 YR+ W/O IMAGING: CPT

## 2023-02-08 PROCEDURE — B211YZZ FLUOROSCOPY OF MULTIPLE CORONARY ARTERIES USING OTHER CONTRAST: ICD-10-PCS | Performed by: INTERNAL MEDICINE

## 2023-02-08 PROCEDURE — 93451 RIGHT HEART CATH: CPT | Performed by: INTERNAL MEDICINE

## 2023-02-08 PROCEDURE — 83735 ASSAY OF MAGNESIUM: CPT | Performed by: STUDENT IN AN ORGANIZED HEALTH CARE EDUCATION/TRAINING PROGRAM

## 2023-02-08 PROCEDURE — 85025 COMPLETE CBC W/AUTO DIFF WBC: CPT | Performed by: STUDENT IN AN ORGANIZED HEALTH CARE EDUCATION/TRAINING PROGRAM

## 2023-02-08 PROCEDURE — 93463 DRUG ADMIN & HEMODYNMIC MEAS: CPT | Mod: GC | Performed by: INTERNAL MEDICINE

## 2023-02-08 PROCEDURE — 272N000001 HC OR GENERAL SUPPLY STERILE: Performed by: INTERNAL MEDICINE

## 2023-02-08 PROCEDURE — G0378 HOSPITAL OBSERVATION PER HR: HCPCS

## 2023-02-08 PROCEDURE — 93451 RIGHT HEART CATH: CPT | Mod: 26 | Performed by: INTERNAL MEDICINE

## 2023-02-08 PROCEDURE — 250N000009 HC RX 250: Performed by: INTERNAL MEDICINE

## 2023-02-08 RX ORDER — HEPARIN SODIUM,PORCINE 10 UNIT/ML
5-20 VIAL (ML) INTRAVENOUS EVERY 24 HOURS
Status: DISCONTINUED | OUTPATIENT
Start: 2023-02-08 | End: 2023-02-18 | Stop reason: HOSPADM

## 2023-02-08 RX ORDER — HEPARIN SODIUM,PORCINE 10 UNIT/ML
5-20 VIAL (ML) INTRAVENOUS
Status: DISCONTINUED | OUTPATIENT
Start: 2023-02-08 | End: 2023-02-18 | Stop reason: HOSPADM

## 2023-02-08 RX ORDER — ACETAMINOPHEN 325 MG/1
650 TABLET ORAL EVERY 4 HOURS PRN
Status: DISCONTINUED | OUTPATIENT
Start: 2023-02-08 | End: 2023-02-10

## 2023-02-08 RX ORDER — LIDOCAINE 40 MG/G
CREAM TOPICAL
Status: ACTIVE | OUTPATIENT
Start: 2023-02-08 | End: 2023-02-11

## 2023-02-08 RX ADMIN — ACETAMINOPHEN 650 MG: 325 TABLET ORAL at 17:04

## 2023-02-08 RX ADMIN — LIDOCAINE HYDROCHLORIDE 4 ML: 10 INJECTION, SOLUTION EPIDURAL; INFILTRATION; INTRACAUDAL; PERINEURAL at 19:03

## 2023-02-08 RX ADMIN — ACETAMINOPHEN 650 MG: 325 TABLET ORAL at 22:33

## 2023-02-08 ASSESSMENT — ACTIVITIES OF DAILY LIVING (ADL)
ADLS_ACUITY_SCORE: 29
ADLS_ACUITY_SCORE: 25
ADLS_ACUITY_SCORE: 29

## 2023-02-08 NOTE — UTILIZATION REVIEW
[facility]  Admission Status; Secondary Review Determination   Admission date:  2/7/2023  2:42 PM    Under the authority of the Utilization Management Committee, the utilization review process indicated a secondary review on the above patient. The review outcome is based on review of the medical records, discussions with staff, and applying clinical experience noted on the date of the review.     (x) Inpatient Status Appropriate - This patient's medical care is consistent with medical management for inpatient care and reasonable inpatient medical practice.     RATIONALE FOR DETERMINATION   32-year-old female with severe obesity and hypothyroidism had been experiencing progressive dyspnea and as an outpatient had an echocardiogram showing severe RV dysfunction with dilation so she was admitted to the hospital yesterday and plans are for right heart catheterization with vasodilator study.  She also has been undergoing some autoimmune evaluation and endocrine evaluation.  This patient is complicated, young with severe cardiopulmonary abnormalities, requires extensive evaluation, is at risk for rapid clinical deterioration, is expected to require a several day hospitalization, and is appropriate for inpatient hospitalization at the time of this review.  This recommendation was paged to the primary team.  The severity of illness, intensity of service provided, expected LOS and risk for adverse outcome make the care complex, high risk and appropriate for hospital admission.    At the time of admission with the information available to the attending physician more than 2 nights Hospital complex care was anticipated, based on patient risk of adverse outcome if treated as outpatient and complex care required.  Inpatient admission is appropriate based on the Medicare guidelines.      The information on this document is developed by the utilization review team in order for the business office to ensure compliance. This only denotes  the appropriateness of proper admission status and does not reflect the quality of care rendered.   The definitions of Inpatient Status and Observation Status used in making the determination above are those provided in the CMS Coverage Manual, Chapter 1 and Chapter 6, section 70.4.     Sincerely,   Yannick Carter DO MPH   Physician Advisor  Utilization Review  [facility]

## 2023-02-08 NOTE — PROGRESS NOTES
ICU End of Shift Summary. See flowsheets for vital signs and detailed assessment.    Changes this shift: Pt alert/oriented x4, denies pain. Remains in SR, on 3L NC with reports of dyspnea and dizziness on exertion. NPO at midnight for probable RHC.      Plan: RHC today

## 2023-02-08 NOTE — TELEPHONE ENCOUNTER
2/8/2023 5:05 PM -   Opsumit -     ----------------------------  Insurance: [employer]  BIN: [id number]  PCN: [id number]  ID#: [id number]  GRP#: [id number]         PA Initiation    Medication: Opsumit 10 mg - new start   Insurance Company: [employer] - Phone [phone] Fax [phone]  Pharmacy Filling the Rx: [facility] - [place], [place] - [address]  Filling Pharmacy Phone:    Filling Pharmacy Fax:    Start Date: 2/10/2023 VIA [facility] hospital, STACY:  [id number] W/ RHC DATED 2/7/23     Gilma ARGUETA CMA  Clinical   Cardiology/Pulmonary Hypertension   [facility]  PH: [phone]

## 2023-02-08 NOTE — PROGRESS NOTES
"Cardiology Progress Note       Changes Today:   Pending RHC today   Endocrinology consultation for subclinical hypothyroidism    Physical Exam   Temp: 97.5  F (36.4  C) Temp src: Oral BP: (!) 134/95 Pulse: 70   Resp: 12 SpO2: 96 % O2 Device: Nasal cannula Oxygen Delivery: 3 LPM    Vital Signs with Ranges  Temp:  [97.5  F (36.4  C)-98.4  F (36.9  C)] 97.5  F (36.4  C)  Pulse:  [] 70  Resp:  [12-18] 12  BP: (105-161)/() 134/95  SpO2:  [88 %-97 %] 96 %    Intake/Output    Intake/Output Summary (Last 24 hours) at 2/8/2023 0933  Last data filed at 2/8/2023 0700  Gross per 24 hour   Intake 600 ml   Output 1250 ml   Net -650 ml       Weight  Vitals:    02/07/23 1638   Weight: 76.7 kg (169 lb)       Resp: 12        - MEDICATION INSTRUCTIONS -           [Held by provider] enoxaparin ANTICOAGULANT  40 mg Subcutaneous Q24H     pantoprazole  40 mg Oral QAM AC     sodium chloride (PF)  3 mL Intracatheter Q8H        , Blood pressure (!) 134/95, pulse 70, temperature 97.5  F (36.4  C), temperature source Oral, resp. rate 12, height 1.6 m (5' 3\"), weight 76.7 kg (169 lb), SpO2 96 %.  Constitutional: awake, alert, cooperative, no apparent distress, and appears stated age  Eyes: sclera clear, conjunctiva normal  Respiratory: No increased work of breathing, good air exchange, no wheezing  Cardiovascular: Normal apical impulse, regular rate and rhythm  GI: soft, non-distended, non-tender, no masses palpated  Skin: no bruising or bleeding  Neurologic: Awake, alert, oriented to name, place and time.  Cranial nerves II-XII are grossly intact.       Data   Recent Labs   Lab Test 02/08/23  0715 02/07/23  1552    139   POTASSIUM 4.7 3.9   CHLORIDE 107 106   CO2 24 19*   ANIONGAP 9 14   GLC 81 79   BUN 10.9 10.0   CR 0.95 0.71   RIGO 8.8 9.0       Lab Results   Component Value Date    WBC 9.0 02/08/2023     Lab Results   Component Value Date    RBC 4.57 02/08/2023     Lab Results   Component Value Date    HGB 15.7 02/08/2023 "     Lab Results   Component Value Date    HCT 47.7 2023     No components found for: MCT  Lab Results   Component Value Date     2023     Lab Results   Component Value Date    MCH 34.4 2023     Lab Results   Component Value Date    MCHC 32.9 2023     Lab Results   Component Value Date    RDW 14.4 2023     Lab Results   Component Value Date     2023        Liver Function Studies -   Recent Labs   Lab Test 23  1552   PROTTOTAL 6.2*   ALBUMIN 3.9   BILITOTAL 0.9   ALKPHOS 85   AST 32   ALT 24       Venous Blood Gas  No lab results found in last 7 days.    Arterial Blood Gas  No lab results found in last 7 days.       Recent Results (from the past 24 hour(s))   Echo Complete   Result Value    LVEF  55-60%    Narrative    [id number]  [id number]  [id number]  [id number]^CASSIDY^ENRIKE     [facility]  Echocardiography Laboratory  [address] [place] [id number]     Name: RONALD LEBLANC  MRN: [id number]  : 1990  Study Date: 2023 03:23 PM  Age: 32 yrs  Gender: Female  Patient Location: [facility]  Reason For Study: Heart Failure  Ordering Physician: ENRIKE BENJAMIN  Performed By: Noreen Atkinson     BSA: 1.8 m2  Height: 63 in  Weight: 179 lb  BP: 147/98 mmHg  ______________________________________________________________________________  Procedure  Echocardiogram with two-dimensional, color and spectral Doppler performed.  Contrast Optison. Patient was given 6 ml mixture of 3 ml Optison and 6 ml  saline. 3 ml wasted.  ______________________________________________________________________________  Interpretation Summary  Left ventricular cavity size is small. Left ventricular function is normal.The  ejection fraction is 55-60%. Flattened septum is consistent with right  ventricular pressure and volume overload.     Severe right ventricular dilation is present. Global right ventricular  function is severely reduced.      Moderate to severe tricuspid insufficiency is present.     Estimated pulmonary artery systolic pressure is 72 mmHg. IVC diameter and  respiratory changes fall into an intermediate range suggesting an RA pressure  of 8 mmHg.     Trivial pericardial effusion is present.  ______________________________________________________________________________  Left Ventricle  Left ventricular function is normal.The ejection fraction is 55-60%. Left  ventricular cavity size is small. Flattened septum is consistent with right  ventricular pressure and volume overload.     Right Ventricle  Severe right ventricular dilation is present. Global right ventricular  function is severely reduced.     Atria  The left atrium appears normal. Severe right atrial enlargement is present.     Mitral Valve  The mitral valve is normal.     Aortic Valve  Aortic valve is normal in structure and function.     Tricuspid Valve  Moderate to severe tricuspid insufficiency is present. Estimated pulmonary  artery systolic pressure is 64 mmHg plus right atrial pressure.     Pulmonic Valve  Mild pulmonic insufficiency is present.     Vessels  IVC diameter and respiratory changes fall into an intermediate range  suggesting an RA pressure of 8 mmHg.     Pericardium  Trivial pericardial effusion is present.     Compared to Previous Study  There is no prior study for direct comparison.  ______________________________________________________________________________  MMode/2D Measurements & Calculations  IVSd: 0.68 cm  LVIDd: 4.1 cm  LVIDs: 2.8 cm  LVPWd: 0.69 cm  FS: 32.1 %  LV mass(C)d: 79.7 grams  LV mass(C)dI: 43.2 grams/m2  Ao root diam: 2.6 cm  asc Aorta Diam: 2.6 cm  LVOT diam: 2.1 cm  LVOT area: 3.4 cm2  LA Volume (BP): 18.9 ml  LA Volume Index (BP): 10.3 ml/m2     RWT: 0.33     Doppler Measurements & Calculations  MV E max gabino: 55.0 cm/sec  MV A max gabino: 41.4 cm/sec  MV E/A: 1.3  MV dec slope: 233.2 cm/sec2  MV dec time: 0.24 sec  PA acc time: 0.07  sec  TR max gabino: 399.4 cm/sec  TR max P.8 mmHg  RVSP(TR): 66.8 mmHg  E/E' av.1  Lateral E/e': 4.6  Medial E/e': 9.7     ______________________________________________________________________________  Report approved by: Cohn Ambrocio 2023 04:41 PM             Blood culture:  No results found for this or any previous visit.   Urine culture:  No results found for this or any previous visit.    Assessment & Plan    Ms Chelsea Flower is a 32 year old lady with a history of obesity and hypothyroidism who presents for pulmonary hypertension evaluation in the setting of progressive dyspnea. The patient is hemodynamically stable, on room air, and in no distress. Her TTE demonstrates that she has severe RV dysfunction and dilation. Her RHC with vasodilator study is pending for this afternoon     #Pulmonary Hypertension  Workup to date includes RHC with RA 8, PA 62/48/55, PCWP 8; V/Q scan with low probability of PE; and TTE with LVEF 57% and PASP estimated 63 with no comment on RV function (all performed at OSH, not in Care Everywhere).   The patient's outpatient pulmonary hypertension provider, Dr Carroll, recommended admission for RHC with Vasodilator study to further evaluate her pulmonary hypertension.   In terms of the etiology of her pulmonary hypertension, the patient has no history or evidence of cardiac disease to suggest WHO group II and a distant history of reactive airways disease but no apparent severe lung diseases to suggest WHO group III. Additionally, her V/Q scan is not suggestive of a group IV etiology. She has a positive CLINTON and DS-DNA however without additional clinical evidence of connective tissue or autoimmune disease but this raises the possibility of PAH vs other WHO group V etiology.   The patient remains hemodynamically stable and on room air. She is not currently on any pulmonary hypertension medications.   - Case request for RHC with vasodilator study  pending    #Hypothyroidism  -TSH 5.74 appreciate endocrinology consultation     Diet: Ok for breakfast this AM per Dr Frazier and Cath lab, NPO afterwards  PPX: LMWH, hold for procedure once scheduled   GI: Pantoprazole  BM: PRN  Code: FCFT    Jori Mcgrath MD  Cardiology Fellow  [phone]    I have reviewed today's vital signs, notes, medications, labs and imaging.  I have also seen and examined the patient and agree with the findings and plan as outlined above.  Pt with TTE revealing severe RV dilation and severe dysfn.  RHC revealed no response to vasodilator challenge and PVR 15 Wood Units.  Plan is to initiate iv remodulin and add tedalafil with digoxin and lasix.     Eliel Mendoza MD, PhD  Professor, Heart Failure and Cardiac Transplantation  [facility]

## 2023-02-08 NOTE — PROGRESS NOTES
Down to cathlab with transport via wheelchair at 1415.  Belongings packed up and at charge desk as pt is changing rooms while gone.   Pt aware

## 2023-02-08 NOTE — TELEPHONE ENCOUNTER
----- Message from Ora Do RN sent at 2/8/2023  3:23 PM CST -----  Regarding: Urgent inpatient PA  Patient is inpatient:     Group 1 PAH autoimmune  FC 4    We need a PA for   Opsumit  Tadalafil  Remodulin IV start

## 2023-02-08 NOTE — TELEPHONE ENCOUNTER
Date: 2/8/2023    Time of Call: 3:29 PM     Diagnosis:  Pulmonary hypertension     [ VORB ] Ordering provider: Dr. Frazier  Order:     Patient is inpatient: Medications will be started inpatient    Group 1 PAH autoimmune  FC 4    We need a PA for   Opsumit 10mg po daily  Tadalafil 20mg po daily  Remodulin IV start: Starting dose: 2 ng/kg/min?with titrations of?1-2 ng/kg/min?every?3-7 days     Order received by: Ora Do RN       Follow-up/additional notes: notified PH team for prior authorization needed. Inpatient team to update outpatient PH team.     Nurse will visit patient inpatient on 2/9/23 to obtain forms for medications

## 2023-02-08 NOTE — PROGRESS NOTES
Brief Social Work Progress Note    TRACY got a message (via sticky note on the TRACY desk on 6C) from the pt's bedside RN that the pt was needing a parking pass. TRACY called Cards 2 (*[id number]) at 1:23pm and asked if the pt has had her RHC yet and how long they're thinking the pt would stay. Per Cards 2, the pt hasn't had her RHC yet and pending those results, the pt will probably stay here for a couple of days. TRACY got the pt a weekly parking pass from the main Care Management office and TRACY met with the pt at bedside. TRACY gave the pt the parking pass and the pt thanked the SW for the pass.     ___________________    DALLIN Roche, LGSW  6C   [facility]  Phone: [phone]  Pager: [phone]

## 2023-02-08 NOTE — CONSULTS
"  Endocrinology Consult     Chelsea Flower MRN:[id number] YOB: 1990  Date of Admission:2/7/2023   Primary care provider: Kirk Aguilar     Reason for visit: pulmonary hypertension   Reason for Endocrine consult:  \" hypothyroidism\"    HPI:  Chelsea Flower is a 32 year old female with PMHx of obesity  who was admitted for pulmonary hypertension in the setting of progressive dyspnea. Endocrine was consulted for hypothyroidism.    Pt has chronic fatigue, weight gain, cold intolerance. She attributed it to her heart condition, starting since 1 year ago. She has 3 kids. She is not planning to get pregnant anymore.  She had heart cath done 1 month ago..    Relevant labs  2/7/23 TSH 5.24, FT4 1.26  No previous labs from care everywhere        ROS:  All 12 systems were reviewed and negative except as mentioned in HPI          Past Medical/Surgical History:       No past medical history on file.  No past surgical history on file.          Allergies:     No Known Allergies          PTA Meds:     Prior to Admission medications    Medication Sig Last Dose Taking? Auth Provider Long Term End Date   albuterol (PROAIR HFA/PROVENTIL HFA/VENTOLIN HFA) 108 (90 Base) MCG/ACT inhaler Inhale 1 puff into the lungs every 4 hours as needed for shortness of breath More than a month Yes Reported, Patient Yes    albuterol (PROVENTIL) (2.5 MG/3ML) 0.083% neb solution INHALE 1 VIAL VIA NEBULIZATION EVERY 6 HOURS AS NEEDED FOR WHEEZING More than a month Yes Reported, Patient Yes    diltiazem (CARDIZEM) 60 MG tablet Take 60 mg by mouth 2 times daily 2/7/2023 at 0430 Yes Reported, Patient Yes    Multiple Vitamins-Minerals (WOMENS MULTIVITAMIN) TABS Take 1 tablet by mouth daily 2/7/2023 at am Yes Unknown, Entered By History                Current Medications:     Current Facility-Administered Medications   Medication     albuterol (PROVENTIL HFA/VENTOLIN HFA) inhaler     [Held by provider] enoxaparin ANTICOAGULANT (LOVENOX) " "injection 40 mg     lidocaine (LMX4) cream     lidocaine 1 % 0.1-1 mL     medication instruction     ondansetron (ZOFRAN) tablet 4 mg     pantoprazole (PROTONIX) EC tablet 40 mg     sodium chloride (PF) 0.9% PF flush 3 mL     sodium chloride (PF) 0.9% PF flush 3 mL             Family History:     No family history on file.  Mother has thyroid disorder        Social History:     Social History     Tobacco Use     Smoking status: Not on file     Smokeless tobacco: Not on file   Substance Use Topics     Alcohol use: Not on file               Physical Exam:     BP (!) 134/95 (BP Location: Left arm)   Pulse 70   Temp 97.5  F (36.4  C) (Oral)   Resp 12   Ht 1.6 m (5' 3\")   Wt 76.7 kg (169 lb)   SpO2 96%   BMI 29.94 kg/m      General: NAD  HEENT: EOMI, nonicteric  Chest: Clear to auscultation bilaterally  Cardio: S1-S2 heard, no M/R/G  Abdomen: Soft, nontender, BS +  MSK: No pedal edema  Skin: No rash noted  Neuro: AAOx3, neuro exam grossly nonfocal  Psych: Calm        Labs:     Recent Results (from the past 24 hour(s))   Basic metabolic panel    Collection Time: 02/07/23  3:52 PM   Result Value Ref Range    Sodium 139 136 - 145 mmol/L    Potassium 3.9 3.4 - 5.3 mmol/L    Chloride 106 98 - 107 mmol/L    Carbon Dioxide (CO2) 19 (L) 22 - 29 mmol/L    Anion Gap 14 7 - 15 mmol/L    Urea Nitrogen 10.0 6.0 - 20.0 mg/dL    Creatinine 0.71 0.51 - 0.95 mg/dL    Calcium 9.0 8.6 - 10.0 mg/dL    Glucose 79 70 - 99 mg/dL    GFR Estimate >90 >60 mL/min/1.73m2   INR    Collection Time: 02/07/23  3:52 PM   Result Value Ref Range    INR 1.22 (H) 0.85 - 1.15   TSH with free T4 reflex    Collection Time: 02/07/23  3:52 PM   Result Value Ref Range    TSH 5.24 (H) 0.30 - 4.20 uIU/mL   Hepatic panel    Collection Time: 02/07/23  3:52 PM   Result Value Ref Range    Protein Total 6.2 (L) 6.4 - 8.3 g/dL    Albumin 3.9 3.5 - 5.2 g/dL    Bilirubin Total 0.9 <=1.2 mg/dL    Alkaline Phosphatase 85 35 - 104 U/L    AST 32 10 - 35 U/L    ALT 24 10 " - 35 U/L    Bilirubin Direct 0.25 0.00 - 0.30 mg/dL   Partial thromboplastin time    Collection Time: 02/07/23  3:52 PM   Result Value Ref Range    aPTT 27 22 - 38 Seconds   CBC with platelets and differential    Collection Time: 02/07/23  3:52 PM   Result Value Ref Range    WBC Count 9.3 4.0 - 11.0 10e3/uL    RBC Count 4.67 3.80 - 5.20 10e6/uL    Hemoglobin 15.8 (H) 11.7 - 15.7 g/dL    Hematocrit 47.2 (H) 35.0 - 47.0 %     (H) 78 - 100 fL    MCH 33.8 (H) 26.5 - 33.0 pg    MCHC 33.5 31.5 - 36.5 g/dL    RDW 14.2 10.0 - 15.0 %    Platelet Count 200 150 - 450 10e3/uL    % Neutrophils 71 %    % Lymphocytes 20 %    % Monocytes 6 %    % Eosinophils 1 %    % Basophils 1 %    % Immature Granulocytes 1 %    NRBCs per 100 WBC 0 <1 /100    Absolute Neutrophils 6.7 1.6 - 8.3 10e3/uL    Absolute Lymphocytes 1.9 0.8 - 5.3 10e3/uL    Absolute Monocytes 0.6 0.0 - 1.3 10e3/uL    Absolute Eosinophils 0.1 0.0 - 0.7 10e3/uL    Absolute Basophils 0.1 0.0 - 0.2 10e3/uL    Absolute Immature Granulocytes 0.1 <=0.4 10e3/uL    Absolute NRBCs 0.0 10e3/uL   T4 free    Collection Time: 02/07/23  3:52 PM   Result Value Ref Range    Free T4 1.26 0.90 - 1.70 ng/dL   Magnesium    Collection Time: 02/07/23  3:52 PM   Result Value Ref Range    Magnesium 1.7 1.7 - 2.3 mg/dL   Asymptomatic COVID-19 Virus (Coronavirus) by PCR Nose    Collection Time: 02/07/23  4:12 PM    Specimen: Nose; Swab   Result Value Ref Range    SARS CoV2 PCR Negative Negative   Echo Complete    Collection Time: 02/07/23  4:18 PM   Result Value Ref Range    LVEF  55-60%    EKG 12-lead, complete    Collection Time: 02/07/23  5:18 PM   Result Value Ref Range    Systolic Blood Pressure  mmHg    Diastolic Blood Pressure  mmHg    Ventricular Rate 84 BPM    Atrial Rate 84 BPM    KY Interval 152 ms    QRS Duration 96 ms     ms    QTc 451 ms    P Axis 64 degrees    R AXIS 156 degrees    T Axis -37 degrees    Interpretation ECG       Sinus rhythm  Possible Right ventricular  hypertrophy  Septal infarct , age undetermined  Possible Inferior infarct , age undetermined  T wave abnormality, consider anterolateral ischemia  Abnormal ECG  No previous ECGs available     Basic metabolic panel    Collection Time: 02/08/23  7:15 AM   Result Value Ref Range    Sodium 140 136 - 145 mmol/L    Potassium 4.7 3.4 - 5.3 mmol/L    Chloride 107 98 - 107 mmol/L    Carbon Dioxide (CO2) 24 22 - 29 mmol/L    Anion Gap 9 7 - 15 mmol/L    Urea Nitrogen 10.9 6.0 - 20.0 mg/dL    Creatinine 0.95 0.51 - 0.95 mg/dL    Calcium 8.8 8.6 - 10.0 mg/dL    Glucose 81 70 - 99 mg/dL    GFR Estimate 82 >60 mL/min/1.73m2   Magnesium    Collection Time: 02/08/23  7:15 AM   Result Value Ref Range    Magnesium 1.8 1.7 - 2.3 mg/dL   CBC with platelets and differential    Collection Time: 02/08/23  7:15 AM   Result Value Ref Range    WBC Count 9.0 4.0 - 11.0 10e3/uL    RBC Count 4.57 3.80 - 5.20 10e6/uL    Hemoglobin 15.7 11.7 - 15.7 g/dL    Hematocrit 47.7 (H) 35.0 - 47.0 %     (H) 78 - 100 fL    MCH 34.4 (H) 26.5 - 33.0 pg    MCHC 32.9 31.5 - 36.5 g/dL    RDW 14.4 10.0 - 15.0 %    Platelet Count 196 150 - 450 10e3/uL    % Neutrophils 66 %    % Lymphocytes 24 %    % Monocytes 6 %    % Eosinophils 2 %    % Basophils 1 %    % Immature Granulocytes 1 %    NRBCs per 100 WBC 0 <1 /100    Absolute Neutrophils 6.1 1.6 - 8.3 10e3/uL    Absolute Lymphocytes 2.2 0.8 - 5.3 10e3/uL    Absolute Monocytes 0.5 0.0 - 1.3 10e3/uL    Absolute Eosinophils 0.1 0.0 - 0.7 10e3/uL    Absolute Basophils 0.1 0.0 - 0.2 10e3/uL    Absolute Immature Granulocytes 0.1 <=0.4 10e3/uL    Absolute NRBCs 0.0 10e3/uL   EKG 12-lead, complete    Collection Time: 02/08/23  8:07 AM   Result Value Ref Range    Systolic Blood Pressure  mmHg    Diastolic Blood Pressure  mmHg    Ventricular Rate 64 BPM    Atrial Rate 64 BPM    UT Interval 158 ms    QRS Duration 92 ms     ms    QTc 439 ms    P Axis 63 degrees    R AXIS 155 degrees    T Axis -58 degrees     Interpretation ECG       Sinus rhythm  Possible Left atrial enlargement  Right axis deviation  Incomplete right bundle branch block  Right ventricular hypertrophy  T wave abnormality, consider inferior ischemia  T wave abnormality, consider anterolateral ischemia  Abnormal ECG  When compared with ECG of 07-FEB-2023 17:18, (unconfirmed)  Criteria for Septal infarct are no longer Present  No significant change was found                  Assessment and Plan:   1. Subclinical hypothyroidism  -normal FT4 with mildly elevated TSH indicated that the pt has subclinical hypothyroidism. Symptoms are not really clear if they are from the thyroid and likely due to her heart condition.  -This could be also due to nonthyroidal illness or iodine /contrast induced.  -We don't feel at this time the pt need thyroid hormone replacement.  -Do not feel that checking anti-TPO will change our opinion on replacing the thyroid hormone at this moment      PLAN:  -repeat TFT in 6-8 weeks during outpt    Endocrine team will sign off. Please reconsult if pt condition for any questions or pt deteriorate       Discussed with attending  Bc Ramachandran MD  Endocrine Fellow  Pager # [phone]

## 2023-02-08 NOTE — PLAN OF CARE
Neuro: A&Ox4.   Cardiac: Afebrile, VSS.   Respiratory: 3L NC, lung sounds clear, dyspnea on exertion  GI/: Voiding spontaneously. No BM this shift.  Diet/appetite: Tolerating regular diet . Denies nausea.   Activity: Up with standby assist    Pain: Denies   Skin: No new deficits noted.  Lines: L PIV, SL  Drains: none  Replacements: none    Plan: Pt had a right heart cath today. Getting PICC placed this evening. Continue with current POC. Report changes to primary team.

## 2023-02-08 NOTE — PROGRESS NOTES
Major Shift Events:  Plan for RHC    Neuro: MANJARREZ, A/Ox2  CV: SR, BP stable, afebirle  Respi: MALAVE, on 3L NC desats quickl without o2,   Gi/Gu: voidng spontaneously, no BM, did get small breakfast now NPO for RHC    Plan: RHC  For vital signs and complete assessments, please see documentation flowsheets.     Observation paper work given to patient, awaiting for doctor to give observation goals.

## 2023-02-08 NOTE — PROGRESS NOTES
Called from cath lab RN- per MD Frazier- pt will be an add-on RHC case later today and MD would like pt to eat a light breakfast so she is not dry later-   Order changed for pt to order meal and then pt will be NPO for procedure after eating breakfast.  Team aware

## 2023-02-08 NOTE — TELEPHONE ENCOUNTER
2/8/2023 4:46 PM -   Enrollment - FAXED to Jessenia @ [phone] @ 550 pm and forwarded to Karly Barton/ Pharmacist per her request.                Gilma ARGUETA CMA  Clinical   Cardiology/Pulmonary Hypertension   [facility]  PH: [phone]

## 2023-02-08 NOTE — PLAN OF CARE
Admission  D: Patient with progressive dyspnea and intermittent dizziness doing every day activities admitted for pulmonary hypertension workup.  I: Settled patient and completed admission profile.  A: AxOx4, pleasant, cooperative. Appropriately overwhelmed. Afebrile, SR. Supplemental oxygen given for O2 sats 88/89%. Electronic blood pressures normal to slightly elevated. Up ad jess but asked she calls for staff when OOB.  P: NPO after MN for RHC.    Goal Outcome Evaluation:

## 2023-02-09 ENCOUNTER — TELEPHONE (OUTPATIENT)
Dept: CARDIOLOGY | Facility: CLINIC | Age: 33
End: 2023-02-09
Payer: COMMERCIAL

## 2023-02-09 ENCOUNTER — MEDICAL CORRESPONDENCE (OUTPATIENT)
Dept: HEALTH INFORMATION MANAGEMENT | Facility: CLINIC | Age: 33
End: 2023-02-09
Payer: COMMERCIAL

## 2023-02-09 DIAGNOSIS — I27.20 PULMONARY HYPERTENSION (H): Primary | ICD-10-CM

## 2023-02-09 LAB
ANION GAP SERPL CALCULATED.3IONS-SCNC: 12 MMOL/L (ref 7–15)
BASOPHILS # BLD AUTO: 0.1 10E3/UL (ref 0–0.2)
BASOPHILS NFR BLD AUTO: 1 %
BUN SERPL-MCNC: 12.3 MG/DL (ref 6–20)
CALCIUM SERPL-MCNC: 8.7 MG/DL (ref 8.6–10)
CHLORIDE SERPL-SCNC: 105 MMOL/L (ref 98–107)
CREAT SERPL-MCNC: 0.71 MG/DL (ref 0.51–0.95)
DEPRECATED HCO3 PLAS-SCNC: 22 MMOL/L (ref 22–29)
EOSINOPHIL # BLD AUTO: 0.1 10E3/UL (ref 0–0.7)
EOSINOPHIL NFR BLD AUTO: 2 %
ERYTHROCYTE [DISTWIDTH] IN BLOOD BY AUTOMATED COUNT: 14 % (ref 10–15)
GFR SERPL CREATININE-BSD FRML MDRD: >90 ML/MIN/1.73M2
GLUCOSE SERPL-MCNC: 95 MG/DL (ref 70–99)
HBV CORE AB SERPL QL IA: NONREACTIVE
HBV E AG SERPL QL IA: NEGATIVE
HBV SURFACE AB SERPL IA-ACNC: 7.21 M[IU]/ML
HBV SURFACE AB SERPL IA-ACNC: NONREACTIVE M[IU]/ML
HCT VFR BLD AUTO: 46 % (ref 35–47)
HCV AB SERPL QL IA: NONREACTIVE
HGB BLD-MCNC: 14.8 G/DL (ref 11.7–15.7)
HIV 1+2 AB+HIV1 P24 AG SERPL QL IA: NONREACTIVE
IMM GRANULOCYTES # BLD: 0 10E3/UL
IMM GRANULOCYTES NFR BLD: 1 %
LYMPHOCYTES # BLD AUTO: 1.8 10E3/UL (ref 0.8–5.3)
LYMPHOCYTES NFR BLD AUTO: 25 %
MAGNESIUM SERPL-MCNC: 2 MG/DL (ref 1.7–2.3)
MCH RBC QN AUTO: 34.1 PG (ref 26.5–33)
MCHC RBC AUTO-ENTMCNC: 32.2 G/DL (ref 31.5–36.5)
MCV RBC AUTO: 106 FL (ref 78–100)
MONOCYTES # BLD AUTO: 0.5 10E3/UL (ref 0–1.3)
MONOCYTES NFR BLD AUTO: 7 %
NEUTROPHILS # BLD AUTO: 4.7 10E3/UL (ref 1.6–8.3)
NEUTROPHILS NFR BLD AUTO: 64 %
NRBC # BLD AUTO: 0 10E3/UL
NRBC BLD AUTO-RTO: 0 /100
PLATELET # BLD AUTO: 176 10E3/UL (ref 150–450)
POTASSIUM SERPL-SCNC: 4.2 MMOL/L (ref 3.4–5.3)
RBC # BLD AUTO: 4.34 10E6/UL (ref 3.8–5.2)
SODIUM SERPL-SCNC: 139 MMOL/L (ref 136–145)
WBC # BLD AUTO: 7.3 10E3/UL (ref 4–11)

## 2023-02-09 PROCEDURE — 214N000001 HC R&B CCU UMMC

## 2023-02-09 PROCEDURE — 250N000011 HC RX IP 250 OP 636: Performed by: STUDENT IN AN ORGANIZED HEALTH CARE EDUCATION/TRAINING PROGRAM

## 2023-02-09 PROCEDURE — 99232 SBSQ HOSP IP/OBS MODERATE 35: CPT | Mod: GC | Performed by: INTERNAL MEDICINE

## 2023-02-09 PROCEDURE — 250N000013 HC RX MED GY IP 250 OP 250 PS 637: Performed by: STUDENT IN AN ORGANIZED HEALTH CARE EDUCATION/TRAINING PROGRAM

## 2023-02-09 PROCEDURE — 36592 COLLECT BLOOD FROM PICC: CPT | Performed by: STUDENT IN AN ORGANIZED HEALTH CARE EDUCATION/TRAINING PROGRAM

## 2023-02-09 PROCEDURE — 80048 BASIC METABOLIC PNL TOTAL CA: CPT | Performed by: STUDENT IN AN ORGANIZED HEALTH CARE EDUCATION/TRAINING PROGRAM

## 2023-02-09 PROCEDURE — 85004 AUTOMATED DIFF WBC COUNT: CPT | Performed by: STUDENT IN AN ORGANIZED HEALTH CARE EDUCATION/TRAINING PROGRAM

## 2023-02-09 PROCEDURE — 272N000004 HC RX 272: Performed by: STUDENT IN AN ORGANIZED HEALTH CARE EDUCATION/TRAINING PROGRAM

## 2023-02-09 PROCEDURE — 250N000013 HC RX MED GY IP 250 OP 250 PS 637

## 2023-02-09 PROCEDURE — 83735 ASSAY OF MAGNESIUM: CPT | Performed by: STUDENT IN AN ORGANIZED HEALTH CARE EDUCATION/TRAINING PROGRAM

## 2023-02-09 RX ORDER — NALOXONE HYDROCHLORIDE 0.4 MG/ML
0.4 INJECTION, SOLUTION INTRAMUSCULAR; INTRAVENOUS; SUBCUTANEOUS
Status: DISCONTINUED | OUTPATIENT
Start: 2023-02-09 | End: 2023-02-18 | Stop reason: HOSPADM

## 2023-02-09 RX ORDER — FUROSEMIDE 20 MG
20 TABLET ORAL DAILY
Status: DISCONTINUED | OUTPATIENT
Start: 2023-02-09 | End: 2023-02-18 | Stop reason: HOSPADM

## 2023-02-09 RX ORDER — TRAMADOL HYDROCHLORIDE 50 MG/1
50 TABLET ORAL EVERY 8 HOURS PRN
Status: DISCONTINUED | OUTPATIENT
Start: 2023-02-09 | End: 2023-02-18 | Stop reason: HOSPADM

## 2023-02-09 RX ORDER — NALOXONE HYDROCHLORIDE 0.4 MG/ML
0.2 INJECTION, SOLUTION INTRAMUSCULAR; INTRAVENOUS; SUBCUTANEOUS
Status: DISCONTINUED | OUTPATIENT
Start: 2023-02-09 | End: 2023-02-18 | Stop reason: HOSPADM

## 2023-02-09 RX ORDER — DIGOXIN 125 MCG
125 TABLET ORAL DAILY
Status: DISCONTINUED | OUTPATIENT
Start: 2023-02-09 | End: 2023-02-18 | Stop reason: HOSPADM

## 2023-02-09 RX ORDER — GRANISETRON HYDROCHLORIDE 1 MG/ML
0.5 INJECTION INTRAVENOUS EVERY 12 HOURS
Status: DISCONTINUED | OUTPATIENT
Start: 2023-02-09 | End: 2023-02-11

## 2023-02-09 RX ORDER — TADALAFIL 20 MG/1
20 TABLET ORAL EVERY 24 HOURS
Status: DISCONTINUED | OUTPATIENT
Start: 2023-02-10 | End: 2023-02-15

## 2023-02-09 RX ADMIN — Medication 5 ML: at 06:01

## 2023-02-09 RX ADMIN — GRANISETRON HYDROCHLORIDE 0.5 MG: 1 INJECTION, SOLUTION INTRAVENOUS at 10:38

## 2023-02-09 RX ADMIN — TREPROSTINIL 2 NG/KG/MIN: 20 INJECTION, SOLUTION INTRAVENOUS; SUBCUTANEOUS at 11:12

## 2023-02-09 RX ADMIN — ENOXAPARIN SODIUM 40 MG: 40 INJECTION SUBCUTANEOUS at 19:36

## 2023-02-09 RX ADMIN — GRANISETRON HYDROCHLORIDE 0.5 MG: 1 INJECTION, SOLUTION INTRAVENOUS at 23:48

## 2023-02-09 RX ADMIN — FUROSEMIDE 20 MG: 20 TABLET ORAL at 08:50

## 2023-02-09 RX ADMIN — ACETAMINOPHEN 650 MG: 325 TABLET ORAL at 06:15

## 2023-02-09 RX ADMIN — ACETAMINOPHEN 650 MG: 325 TABLET ORAL at 19:36

## 2023-02-09 RX ADMIN — PANTOPRAZOLE SODIUM 40 MG: 40 TABLET, DELAYED RELEASE ORAL at 08:50

## 2023-02-09 RX ADMIN — DIGOXIN 125 MCG: 125 TABLET ORAL at 08:50

## 2023-02-09 ASSESSMENT — ACTIVITIES OF DAILY LIVING (ADL)
ADLS_ACUITY_SCORE: 25
ADLS_ACUITY_SCORE: 25
ADLS_ACUITY_SCORE: 29
ADLS_ACUITY_SCORE: 25
ADLS_ACUITY_SCORE: 25
ADLS_ACUITY_SCORE: 29
ADLS_ACUITY_SCORE: 25
ADLS_ACUITY_SCORE: 29
ADLS_ACUITY_SCORE: 25
ADLS_ACUITY_SCORE: 29
ADLS_ACUITY_SCORE: 25
ADLS_ACUITY_SCORE: 29

## 2023-02-09 NOTE — PROGRESS NOTES
6C Shift Note [id number]    Neuro: A&Ox4.   Cardiac: SR. VSS.   Respiratory: On 3L nasal cannula. SOB on exertion.  GI/: Adequate urine output. No BM overnight  Diet/appetite: Tolerating regular diet. Eating well.  Activity:  Standby assist, calls appropriately  Pain: Soreness at new PICC line site, PRN tylenol given with relief. Sore breasts, relieved by pumping breastmilk (electric pump and supplies in room).  Skin: No new deficits noted.  LDA's: R single lumen PICC SL. L PIV SL.    Plan: To start IV Remodulin today. Continue with POC. Notify primary team with changes.

## 2023-02-09 NOTE — PHARMACY-CONSULT NOTE
Parenteral Prostacyclin Therapy Initiation     This patient has been initiated on a continuous IV  Treprostinil (Remodulin) infusion for the treatment of pulmonary hypertension.  The patient's current prostacyclin dosing parameters are as follows:    1.  Treprostinil (Remodulin) Dosing Weight = 75.6 kg.  (Please note: the prostacyclin dosing weight for therapy initiation is the most recent actual body weight).  This weight will remain the dosing weight for the duration of therapy.    2.  Prostacyclin Concentration = 5 Micrograms/mL  3.  Prostacyclin Dose = start at 2 Nanograms/kg/min and titrate by 1 Nanograms/kg/min every 8 hours to a goal of 8 Nanograms/kg/min. ADDENDUM: service electing to change titration to every 12 hours.   4. Line access for prostacyclin administration: PICC in place    Karly Marin, Ellie, BCCP, BCPS

## 2023-02-09 NOTE — PROCEDURES
[facility]    Single Lumen PICC Placement    Date/Time: 2/8/2023 6:56 PM  Performed by: Kera Morris RN  Authorized by: Tg Heart MD   Indications: vascular access      UNIVERSAL PROTOCOL   Site Marked: Yes  Prior Images Obtained and Reviewed:  Yes  Required items: Required blood products, implants, devices and special equipment available    Patient identity confirmed:  Verbally with patient, arm band and hospital-assigned identification number  NA - No sedation, light sedation, or local anesthesia (lidocaine was given-local anesthesia)  Confirmation Checklist:  Patient's identity using two indicators, procedure was appropriate and matched the consent or emergent situation, correct equipment/implants were available and relevant allergies  Time out: Immediately prior to the procedure a time out was called    Universal Protocol: the Joint Commission Universal Protocol was followed    Preparation: Patient was prepped and draped in usual sterile fashion       ANESTHESIA    Anesthesia: Local infiltration  Local Anesthetic:  Lidocaine 1% without epinephrine  Anesthetic Total (mL):  4      SEDATION    Patient Sedated: No        Preparation: skin prepped with ChloraPrep  Skin prep agent: skin prep agent completely dried prior to procedure  Sterile barriers: maximum sterile barriers were used: cap, mask, sterile gown, sterile gloves, and large sterile sheet  Hand hygiene: hand hygiene performed prior to central venous catheter insertion  Type of line used: PICC  Catheter type: single lumen  Lumen type: power PICC and non-valved  Catheter size: 4 Fr  Brand: Bard  Lot number: [id number]  Placement method: venipuncture, MST, ultrasound and tip navigation system  Number of attempts: 1  Difficulty threading catheter: no  Successful placement: yes  Orientation: right    Location: basilic vein  Tip Location: SVC  Arm circumference: adults 10 cm  Extremity circumference:  27  Visible catheter length: 4  Total catheter length: 43  Dressing and securement: adhesive securement device, alcohol impregnated caps, blood cleaned with CHG, chlorhexidine disc applied, dressing applied, glue, site cleansed, securement device, statlock, sterile dressing applied and transparent dressing  Post procedure assessment: blood return through all ports, placement verified by 3CG technology and free fluid flow  PROCEDURE   Patient Tolerance:  Patient tolerated the procedure well with no immediate complicationsDescribe Procedure: PICC placement verified by beqom 3CG technology. PICC okay to use.   Disposal: sharps and needle count correct at the end of procedure, needles and guidewire disposed in sharps container

## 2023-02-09 NOTE — TELEPHONE ENCOUNTER
2/9/2023 5:57 PM -   Tadalafil 20 mg PAH - new Start     ----------------------------  Insurance: [employer]  BIN: [id number]  PCN: [id number]  ID#: [id number]  GRP#: [id number]        PA Initiation    Medication: Tadalalfil 20 mg PAH - new start   Insurance Company: [employer] - Phone [phone] Fax [phone]  Pharmacy Filling the Rx: [facility] [place] [place] - [address] [place] [address]  Filling Pharmacy Phone:    Filling Pharmacy Fax:    Start Date: 2/8/2023 via [facility], key : [id number] - W/ rhc **PENDING - DR Mcgrath needs to sign RHC.             Gilma ARGUETA CMA  Clinical   Cardiology/Pulmonary Hypertension   [facility]  PH: [phone]

## 2023-02-09 NOTE — PROGRESS NOTES
Care Coordinator  D: 2/8/23 note:   I have reviewed today's vital signs, notes, medications, labs and imaging.  I have also seen and examined the patient and agree with the findings and plan as outlined above.  Pt with TTE revealing severe RV dilation and severe dysfn.  RHC revealed no response to vasodilator challenge and PVR 15 Wood Units.  Plan is to initiate iv remodulin and add tedalafil with digoxin and lasix.      Eliel Mendoza MD, PhD  Professor, Heart Failure and Cardiac Transplantation  [facility]  I: Per chart review: Prior Auth for IV Remodulin and oral Opsumit and Tadalafil has been sent approx 3:30pm to [facility]. Wait for auth____.  A: RHC 2/8 and  PICC placement and begin IV Remodulin 2/9 @ 10am.  Dosing weight: 75.6 kg  Goal 8ng/kg/min  P: Wait for auth____[facility] to send RN for teaching and provide all supplies____.  I called [facility] RN conrado [phone] and left her a message, wait for her call back --requested RN teachor call main # [phone]____. Will follow.

## 2023-02-09 NOTE — PROGRESS NOTES
"Cardiology Progress Note       Changes Today:   -Continue increasing IV remodulin, to start tadalafil 20mg on 2/10/23  -Sending RF and CLINTON, if positive will have rheum eval for CTD    Physical Exam   Temp: 98  F (36.7  C) Temp src: Oral BP: 129/83 Pulse: 74   Resp: 16 SpO2: 94 % O2 Device: Nasal cannula Oxygen Delivery: 3 LPM    Vital Signs with Ranges  Temp:  [97.5  F (36.4  C)-98  F (36.7  C)] 98  F (36.7  C)  Pulse:  [61-77] 74  Resp:  [10-18] 16  BP: (121-138)/(80-95) 129/83  SpO2:  [85 %-97 %] 94 %    Intake/Output    Intake/Output Summary (Last 24 hours) at 2/9/2023 0828  Last data filed at 2/9/2023 0600  Gross per 24 hour   Intake 1480 ml   Output 1600 ml   Net -120 ml       Weight  Vitals:    02/07/23 1638 02/09/23 0615   Weight: 76.7 kg (169 lb) 75.6 kg (166 lb 11.2 oz)       Resp: 16        - MEDICATION INSTRUCTIONS -       - MEDICATION INSTRUCTIONS -       treprostinil (REMODULIN) intravenous infusion (LESS than or EQUAL to 100 mcg/mL)           digoxin  125 mcg Oral Daily     [Held by provider] enoxaparin ANTICOAGULANT  40 mg Subcutaneous Q24H     furosemide  20 mg Oral Daily     granisetron  0.5 mg Intravenous Q12H     heparin lock flush  5-20 mL Intracatheter Q24H     pantoprazole  40 mg Oral QAM AC     sodium chloride (PF)  10-40 mL Intracatheter Q8H     sodium chloride (PF)  3 mL Intracatheter Q8H        , Blood pressure 129/83, pulse 74, temperature 98  F (36.7  C), temperature source Oral, resp. rate 16, height 1.6 m (5' 3\"), weight 75.6 kg (166 lb 11.2 oz), SpO2 94 %.  Constitutional: awake, alert, cooperative, no apparent distress, and appears stated age  Eyes: sclera clear, conjunctiva normal  Respiratory: No increased work of breathing, good air exchange, no wheezing  Cardiovascular: Normal apical impulse, regular rate and rhythm  GI: soft, non-distended, non-tender, no masses palpated  Skin: no bruising or bleeding  Neurologic: Awake, alert, oriented to name, place and time.  Cranial nerves " II-XII are grossly intact.       Data   Recent Labs   Lab Test 02/09/23  0606 02/08/23  0715    140   POTASSIUM 4.2 4.7   CHLORIDE 105 107   CO2 22 24   ANIONGAP 12 9   GLC 95 81   BUN 12.3 10.9   CR 0.71 0.95   RIGO 8.7 8.8       Lab Results   Component Value Date    WBC 7.3 02/09/2023     Lab Results   Component Value Date    RBC 4.34 02/09/2023     Lab Results   Component Value Date    HGB 14.8 02/09/2023     Lab Results   Component Value Date    HCT 46.0 02/09/2023     No components found for: MCT  Lab Results   Component Value Date     02/09/2023     Lab Results   Component Value Date    MCH 34.1 02/09/2023     Lab Results   Component Value Date    MCHC 32.2 02/09/2023     Lab Results   Component Value Date    RDW 14.0 02/09/2023     Lab Results   Component Value Date     02/09/2023        Liver Function Studies -   Recent Labs   Lab Test 02/07/23  1552   PROTTOTAL 6.2*   ALBUMIN 3.9   BILITOTAL 0.9   ALKPHOS 85   AST 32   ALT 24       Venous Blood Gas  No lab results found in last 7 days.    Arterial Blood Gas  No lab results found in last 7 days.       No results found for this or any previous visit (from the past 24 hour(s)).    Blood culture:  No results found for this or any previous visit.   Urine culture:  No results found for this or any previous visit.    Assessment & Plan     Ms Chelsea Flower is a 32 year old lady with a history of obesity and hypothyroidism who presents for pulmonary hypertension evaluation in the setting of progressive dyspnea. The patient is hemodynamically stable, on room air, and in no distress. Her TTE demonstrates that she has severe RV dysfunction and dilation now s/p RHC with negative vasodilator study, now initiating IV remodulin and oral tadalafil for severe PAH.      #Pulmonary Hypertension  Workup to date includes RHC with RA 8, PA 62/48/55, PCWP 8; V/Q scan with low probability of PE; and TTE with LVEF 57% and PASP estimated 63 with no comment on  RV function (all performed at OSH, not in Care Everywhere).   The patient's outpatient pulmonary hypertension provider, Dr Carroll, recommended admission for RHC with Vasodilator study to further evaluate her pulmonary hypertension.   In terms of the etiology of her pulmonary hypertension, the patient has no history or evidence of cardiac disease to suggest WHO group II and a distant history of reactive airways disease but no apparent severe lung diseases to suggest WHO group III. Additionally, her V/Q scan is not suggestive of a group IV etiology. She has a positive CLINTON and DS-DNA however without additional clinical evidence of connective tissue or autoimmune disease but this raises the possibility of PAH vs other WHO group V etiology.   The patient remains hemodynamically stable and on room air. She is not currently on any pulmonary hypertension medications.   - Initiate IV Remodulin and uptitrate as tolerated  -Tadalafil 20mg to start 2/10/23  -F/U Repeat CLINTON and RF, if positive, will consult rheum for evaluation for possible CTD     #Hypothyroidism  -TSH 5.74 appreciate endocrinology consultation  -repeat TFT in 6-8 weeks during outpt     Diet: Regular  PPX: LMWH  GI: Pantoprazole  BM: PRN  Code: FCFT    Jori Mcgrath MD  Cardiology Fellow  [phone]    I have reviewed today's vital signs, notes, medications, labs and imaging.  I have also seen and examined the patient and agree with the findings and plan as outlined above.  Pt tolerating remodulin at lower dose.  Plan is to continue uptitration every 12 hrs pending symptoms.     Eliel Mendoza MD, PhD  Professor, Heart Failure and Cardiac Transplantation  [facility]    Eliel Mendoza MD, PhD  Professor, Heart Failure and Cardiac Transplantation  [facility]

## 2023-02-09 NOTE — PLAN OF CARE
Neuro: A&Ox4.   Cardiac: SR. VSS.   Respiratory: Sating >92% 3L NC. LS diminished in bases  GI/: Adequate urine output. No BM this shift  Diet/appetite: Tolerating regular diet. Fair appetite.  Activity:  SBA, up to bathroom and shower  Pain: mild intermittent headache, PRN tylenol available.  Skin: No new deficits noted.  LDA's:  RUE PICC - Remodulin infusing  Left PIV - SL    Plan: Continue with POC. Remodulin initiated at 11:15am at 2ng/kg/min, pt tolerating well. Next dose increase scheduled for midnight. Notify primary team with changes.

## 2023-02-09 NOTE — PROGRESS NOTES
"CLINICAL NUTRITION SERVICES - ASSESSMENT NOTE     Nutrition Prescription    RECOMMENDATIONS FOR MDs/PROVIDERS TO ORDER:  1. Multivitamin with minerals to help ensure micronutrient needs are met  2. Order kcal counts if eating less than 50% of meals.     Malnutrition Status:    Unable to determine due to pt sleeping during attempts x3    Recommendations already ordered by Registered Dietitian (RD):  Prn snack/supplement order    Future/Additional Recommendations:  1. Rec continue current diet, as ordered. Encourage high protein options. Prn oral supplements are available to provide additional kcals/protein.   2. Monitor need for a bowel regimen.   3. Consider checking folic acid lab and vitamin B12 status.      REASON FOR ASSESSMENT  Chelsea Flower is a/an 32 year old female assessed by the dietitian for malnutrition score of two or greater (Have you recently lost weight without trying?: unsure.    Have you been eating poorly because of a decreased appetite?: yes ).     NUTRITION/ADDITIONAL HISTORY  Pt not known to this service PTA.   Per OSH records, \"Appetite is maintained.\" Currently breast-feeding per provider note 2023. Allergy to cantaloupe.   Per H & P, \"History of obesity and hypothyroidism who presents for pulmonary hypertension evaluation in the setting of progressive dyspnea. The patient is hemodynamically stable, on room air, and in no distress. The patient developed onset of dyspnea on exertion and lower extremity edema within one year following delivery of her 3rd child by  section.\" Chronic fatigue. Pt was ordered to take, noting, multivitamin with minerals PTA.   Attempts x 3 to see pt. Pt was sleeping during all attempts.     CURRENT NUTRITION ORDERS  Diet: Regular (when not NPO for tests/procedures)  Intake/Tolerance: Tolerating diet. Per nursing flowsheets (% intake), pt consuming 75% with a good appetite 2/7 and 100% with a good appetite 2/8. "[employer]Touch (meal ordering system) " "indicates food/beverages sent 2/9 totaled 1724 kcals and 59 g protein.    LABS  Labs reviewed    MEDICATIONS  Medications reviewed    ANTHROPOMETRICS  Height: 160 cm (5' 3\")  Most Recent Weight: 75.6 kg (166 lb 11.2 oz)    IBW: 52.3 kg   BMI: Overweight BMI 25-29.9  Weight History: 85 kg (12/19/2022), 78 kg (1/5/2022) - Lack of wt history in EHR. Diuresis this admission. Overall, difficult to assess with changes in fluid status and diuresis.   Dosing Weight: 58 kg (adjusted, based on lowest wt so far this admission of 75.6 kg on 2/9)    ASSESSED NUTRITION NEEDS (for inpatient hospital stay)  Estimated Energy Needs: 1124-9180 kcals/day (25 - 30 kcals/kg) + 400 kcals/day =1514-5413 kcals/day    Justification: Increased needs for lactation  Estimated Protein Needs: 64-81 grams protein/day (1.1 - 1.4 grams of pro/kg)  Justification: Increased needs with cardiac status, lactating  Estimated Fluid Needs: 7567-3862+ mL/day (1+ mL/kcal)   Justification: Maintenance needs or per provider, pending fluid status and lactation    PHYSICAL FINDINGS/OTHER FINDINGS  See malnutrition section below.  Pain: Per RN 2/9, \"Sore breasts, relieved by pumping breastmilk (electric pump and supplies in room).\"  GI: Last stool on 2/6. Zero stools, on average, per Is/Os.     MALNUTRITION  % Intake: Unable to assess, pt sleeping during attempts x3  % Weight Loss: Difficult to assess with changes in fluid status and diuresis.   Subcutaneous Fat Loss: Unable to assess, pt sleeping during attempts x3  Muscle Loss: Unable to assess, pt sleeping during attempts x3  Fluid Accumulation/Edema: None noted  Malnutrition Diagnosis: Unable to determine due to pt sleeping during attempts x3    NUTRITION DIAGNOSIS  Predicted inadequate nutrient intake (protein-energy) related to increased needs and cardiac status along with report of decreased appetite on admit.     INTERVENTIONS  Implementation  Medical food supplement therapy: Placed prn snack/supplement " order. Pt may request snacks/oral supplements prn.   Modify composition of meals/snacks: Entered cantaloupe allergy.    Goals  Patient to consume % of nutritionally adequate meal trays TID, or the equivalent with supplements/snacks.     Monitoring/Evaluation  Progress toward goals will be monitored and evaluated per protocol.       Nutrition will continue to follow.     Liz Blount MS, RD, LD, [facility]   6C Pgr: [phone]

## 2023-02-10 LAB
ANION GAP SERPL CALCULATED.3IONS-SCNC: 10 MMOL/L (ref 7–15)
ATRIAL RATE - MUSE: 64 BPM
BASOPHILS # BLD AUTO: 0.1 10E3/UL (ref 0–0.2)
BASOPHILS NFR BLD AUTO: 1 %
BUN SERPL-MCNC: 10.3 MG/DL (ref 6–20)
CALCIUM SERPL-MCNC: 9 MG/DL (ref 8.6–10)
CHLORIDE SERPL-SCNC: 104 MMOL/L (ref 98–107)
CREAT SERPL-MCNC: 0.67 MG/DL (ref 0.51–0.95)
DEPRECATED HCO3 PLAS-SCNC: 25 MMOL/L (ref 22–29)
DIASTOLIC BLOOD PRESSURE - MUSE: NORMAL MMHG
EOSINOPHIL # BLD AUTO: 0.2 10E3/UL (ref 0–0.7)
EOSINOPHIL NFR BLD AUTO: 3 %
ERYTHROCYTE [DISTWIDTH] IN BLOOD BY AUTOMATED COUNT: 13.9 % (ref 10–15)
GFR SERPL CREATININE-BSD FRML MDRD: >90 ML/MIN/1.73M2
GLUCOSE SERPL-MCNC: 87 MG/DL (ref 70–99)
HCT VFR BLD AUTO: 46.1 % (ref 35–47)
HGB BLD-MCNC: 15.1 G/DL (ref 11.7–15.7)
IMM GRANULOCYTES # BLD: 0 10E3/UL
IMM GRANULOCYTES NFR BLD: 1 %
INTERPRETATION ECG - MUSE: NORMAL
LYMPHOCYTES # BLD AUTO: 2 10E3/UL (ref 0.8–5.3)
LYMPHOCYTES NFR BLD AUTO: 30 %
MAGNESIUM SERPL-MCNC: 1.7 MG/DL (ref 1.7–2.3)
MCH RBC QN AUTO: 33.9 PG (ref 26.5–33)
MCHC RBC AUTO-ENTMCNC: 32.8 G/DL (ref 31.5–36.5)
MCV RBC AUTO: 104 FL (ref 78–100)
MONOCYTES # BLD AUTO: 0.5 10E3/UL (ref 0–1.3)
MONOCYTES NFR BLD AUTO: 8 %
NEUTROPHILS # BLD AUTO: 3.9 10E3/UL (ref 1.6–8.3)
NEUTROPHILS NFR BLD AUTO: 57 %
NRBC # BLD AUTO: 0 10E3/UL
NRBC BLD AUTO-RTO: 0 /100
P AXIS - MUSE: 63 DEGREES
PLATELET # BLD AUTO: 170 10E3/UL (ref 150–450)
POTASSIUM SERPL-SCNC: 4.1 MMOL/L (ref 3.4–5.3)
PR INTERVAL - MUSE: 158 MS
QRS DURATION - MUSE: 92 MS
QT - MUSE: 426 MS
QTC - MUSE: 439 MS
R AXIS - MUSE: 155 DEGREES
RBC # BLD AUTO: 4.45 10E6/UL (ref 3.8–5.2)
RHEUMATOID FACT SER NEPH-ACNC: <7 IU/ML
SARS-COV-2 RNA RESP QL NAA+PROBE: NEGATIVE
SODIUM SERPL-SCNC: 139 MMOL/L (ref 136–145)
SYSTOLIC BLOOD PRESSURE - MUSE: NORMAL MMHG
T AXIS - MUSE: -58 DEGREES
VENTRICULAR RATE- MUSE: 64 BPM
WBC # BLD AUTO: 6.7 10E3/UL (ref 4–11)

## 2023-02-10 PROCEDURE — 86038 ANTINUCLEAR ANTIBODIES: CPT | Performed by: STUDENT IN AN ORGANIZED HEALTH CARE EDUCATION/TRAINING PROGRAM

## 2023-02-10 PROCEDURE — 214N000001 HC R&B CCU UMMC

## 2023-02-10 PROCEDURE — 250N000011 HC RX IP 250 OP 636: Performed by: STUDENT IN AN ORGANIZED HEALTH CARE EDUCATION/TRAINING PROGRAM

## 2023-02-10 PROCEDURE — 99233 SBSQ HOSP IP/OBS HIGH 50: CPT | Mod: GC | Performed by: INTERNAL MEDICINE

## 2023-02-10 PROCEDURE — 250N000011 HC RX IP 250 OP 636

## 2023-02-10 PROCEDURE — 83735 ASSAY OF MAGNESIUM: CPT | Performed by: STUDENT IN AN ORGANIZED HEALTH CARE EDUCATION/TRAINING PROGRAM

## 2023-02-10 PROCEDURE — 85025 COMPLETE CBC W/AUTO DIFF WBC: CPT | Performed by: STUDENT IN AN ORGANIZED HEALTH CARE EDUCATION/TRAINING PROGRAM

## 2023-02-10 PROCEDURE — 272N000004 HC RX 272: Performed by: STUDENT IN AN ORGANIZED HEALTH CARE EDUCATION/TRAINING PROGRAM

## 2023-02-10 PROCEDURE — 250N000013 HC RX MED GY IP 250 OP 250 PS 637: Performed by: STUDENT IN AN ORGANIZED HEALTH CARE EDUCATION/TRAINING PROGRAM

## 2023-02-10 PROCEDURE — 86431 RHEUMATOID FACTOR QUANT: CPT | Performed by: STUDENT IN AN ORGANIZED HEALTH CARE EDUCATION/TRAINING PROGRAM

## 2023-02-10 PROCEDURE — 36415 COLL VENOUS BLD VENIPUNCTURE: CPT | Performed by: STUDENT IN AN ORGANIZED HEALTH CARE EDUCATION/TRAINING PROGRAM

## 2023-02-10 PROCEDURE — 250N000013 HC RX MED GY IP 250 OP 250 PS 637

## 2023-02-10 PROCEDURE — U0005 INFEC AGEN DETEC AMPLI PROBE: HCPCS | Performed by: INTERNAL MEDICINE

## 2023-02-10 PROCEDURE — 80048 BASIC METABOLIC PNL TOTAL CA: CPT | Performed by: STUDENT IN AN ORGANIZED HEALTH CARE EDUCATION/TRAINING PROGRAM

## 2023-02-10 RX ORDER — HYDROMORPHONE HYDROCHLORIDE 1 MG/ML
0.5 INJECTION, SOLUTION INTRAMUSCULAR; INTRAVENOUS; SUBCUTANEOUS
Status: DISCONTINUED | OUTPATIENT
Start: 2023-02-10 | End: 2023-02-15

## 2023-02-10 RX ORDER — ACETAMINOPHEN 325 MG/1
975 TABLET ORAL EVERY 6 HOURS PRN
Status: DISCONTINUED | OUTPATIENT
Start: 2023-02-10 | End: 2023-02-15

## 2023-02-10 RX ORDER — PROCHLORPERAZINE MALEATE 5 MG
5 TABLET ORAL EVERY 6 HOURS PRN
Status: DISCONTINUED | OUTPATIENT
Start: 2023-02-10 | End: 2023-02-18 | Stop reason: HOSPADM

## 2023-02-10 RX ORDER — ONDANSETRON 2 MG/ML
4 INJECTION INTRAMUSCULAR; INTRAVENOUS EVERY 6 HOURS PRN
Status: DISCONTINUED | OUTPATIENT
Start: 2023-02-10 | End: 2023-02-10

## 2023-02-10 RX ORDER — PROCHLORPERAZINE 25 MG
25 SUPPOSITORY, RECTAL RECTAL EVERY 12 HOURS PRN
Status: DISCONTINUED | OUTPATIENT
Start: 2023-02-10 | End: 2023-02-18 | Stop reason: HOSPADM

## 2023-02-10 RX ADMIN — PROCHLORPERAZINE EDISYLATE 5 MG: 5 INJECTION INTRAMUSCULAR; INTRAVENOUS at 20:05

## 2023-02-10 RX ADMIN — ACETAMINOPHEN 650 MG: 325 TABLET ORAL at 10:51

## 2023-02-10 RX ADMIN — TREPROSTINIL 4 NG/KG/MIN: 20 INJECTION, SOLUTION INTRAVENOUS; SUBCUTANEOUS at 22:12

## 2023-02-10 RX ADMIN — GRANISETRON HYDROCHLORIDE 0.5 MG: 1 INJECTION, SOLUTION INTRAVENOUS at 10:56

## 2023-02-10 RX ADMIN — HYDROMORPHONE HYDROCHLORIDE 1 MG: 1 INJECTION, SOLUTION INTRAMUSCULAR; INTRAVENOUS; SUBCUTANEOUS at 11:25

## 2023-02-10 RX ADMIN — TADALAFIL 20 MG: 20 TABLET, FILM COATED ORAL at 08:27

## 2023-02-10 RX ADMIN — ENOXAPARIN SODIUM 40 MG: 40 INJECTION SUBCUTANEOUS at 20:07

## 2023-02-10 RX ADMIN — FUROSEMIDE 20 MG: 20 TABLET ORAL at 08:27

## 2023-02-10 RX ADMIN — PROCHLORPERAZINE EDISYLATE 5 MG: 5 INJECTION INTRAMUSCULAR; INTRAVENOUS at 14:09

## 2023-02-10 RX ADMIN — DIGOXIN 125 MCG: 125 TABLET ORAL at 08:26

## 2023-02-10 RX ADMIN — HYDROMORPHONE HYDROCHLORIDE 1 MG: 1 INJECTION, SOLUTION INTRAMUSCULAR; INTRAVENOUS; SUBCUTANEOUS at 20:05

## 2023-02-10 RX ADMIN — TREPROSTINIL 3 NG/KG/MIN: 20 INJECTION, SOLUTION INTRAVENOUS; SUBCUTANEOUS at 08:17

## 2023-02-10 RX ADMIN — PANTOPRAZOLE SODIUM 40 MG: 40 TABLET, DELAYED RELEASE ORAL at 08:27

## 2023-02-10 ASSESSMENT — ACTIVITIES OF DAILY LIVING (ADL)
ADLS_ACUITY_SCORE: 25

## 2023-02-10 NOTE — PROGRESS NOTES
Infection Prevention Medical Director Note:    This patient resides on a unit that has demonstrated an increased incidence of hospital-onset COVID-19. The response to this increase includes COVID testing of patients on the same unit that are not known to have had COVID-19 in the last 90 days. I am placing the order for this COVID test.    Please reach out to me with any questions.    Rosa Harley MD   of Medicine, Division of Infectious Diseases  Contact me on the [employer] Access raz or console  pgr [phone]

## 2023-02-10 NOTE — TELEPHONE ENCOUNTER
2/10/2023 8:48 AM -  Enrollment on hold for sig on RHC and ECHO dated 2/7/23, and progress note by Dr. Mendoza/ Cardiology dated 2/9/23 sent to [facility] @ [phone]                     Gilma ARGUETA CMA  Clinical   Cardiology/Pulmonary Hypertension   [facility]  PH: [phone]

## 2023-02-10 NOTE — PROGRESS NOTES
Care Coordinator  D/I: Wait for auth____[facility] for IV Remodulin.  P: Here until next week--need auth then [facility] RN will need to come teach pt.Tadalafil approved.  Will follow.

## 2023-02-10 NOTE — TELEPHONE ENCOUNTER
Demonstrated with patient inpatient setting the pumps for [employer] and discussed information on medications.   Patient is still breast feeding and therefor will not be started on opsumit at this time.   Plan to proceed with sildenafil and remodulin.   Pa still pending.     Ora Do RN on 2/10/2023 at 12:10 PM

## 2023-02-10 NOTE — PLAN OF CARE
D/I/A: Pt up ad jess in room and tolerating activity w/ mild MALAVE.  VSS, afebrile.  A+O x4 and making needs known.  Medicated for headache with Tylenol.  Tolerating Remodulin at this time.  Pleasant and cooperative.    P: Continue to monitor status.  Increase to Remodulin scheduled for MN w/ premeds.

## 2023-02-10 NOTE — PROGRESS NOTES
"Cardiology Progress Note       Changes Today:   Tolerating up titration well  Mild headaches, but otherwise doing well  Lactation consultation for breast feeding recommendations given new medication therapies    Physical Exam   Temp: 97.8  F (36.6  C) Temp src: Oral BP: 120/76 Pulse: 76   Resp: 16 SpO2: 95 % O2 Device: Nasal cannula Oxygen Delivery: 3 LPM    Vital Signs with Ranges  Temp:  [97.6  F (36.4  C)-98.3  F (36.8  C)] 97.8  F (36.6  C)  Pulse:  [66-84] 76  Resp:  [16-18] 16  BP: (117-132)/(76-89) 120/76  SpO2:  [94 %-97 %] 95 %      Intake/Output Summary (Last 24 hours) at 2/10/2023 0753  Last data filed at 2/10/2023 0659  Gross per 24 hour   Intake 1237.89 ml   Output 2900 ml   Net -1662.11 ml       Weight  Vitals:    02/07/23 1638 02/09/23 0615 02/10/23 0513   Weight: 76.7 kg (169 lb) 75.6 kg (166 lb 11.2 oz) 74.8 kg (165 lb)       Resp: 16        - MEDICATION INSTRUCTIONS -       - MEDICATION INSTRUCTIONS -       treprostinil (REMODULIN) intravenous infusion (LESS than or EQUAL to 100 mcg/mL) 3 ng/kg/min (02/10/23 0018)         digoxin  125 mcg Oral Daily     enoxaparin ANTICOAGULANT  40 mg Subcutaneous Q24H     furosemide  20 mg Oral Daily     granisetron  0.5 mg Intravenous Q12H     heparin lock flush  5-20 mL Intracatheter Q24H     pantoprazole  40 mg Oral QAM AC     sodium chloride (PF)  10-40 mL Intracatheter Q8H     sodium chloride (PF)  3 mL Intracatheter Q8H     tadalafil  20 mg Oral Q24H        , Blood pressure 120/76, pulse 76, temperature 97.8  F (36.6  C), temperature source Oral, resp. rate 16, height 1.6 m (5' 3\"), weight 74.8 kg (165 lb), SpO2 95 %.  Constitutional: awake, alert, cooperative, no apparent distress, and appears stated age  Eyes: sclera clear, conjunctiva normal  Respiratory: No increased work of breathing, good air exchange, no wheezing  Cardiovascular: Normal apical impulse, regular rate and rhythm  GI: soft, non-distended, non-tender, no masses palpated  Skin: no bruising " or bleeding  Neurologic: Awake, alert, oriented to name, place and time.  Cranial nerves II-XII are grossly intact.       Data   Recent Labs   Lab Test 02/10/23  0622 02/09/23  0606    139   POTASSIUM 4.1 4.2   CHLORIDE 104 105   CO2 25 22   ANIONGAP 10 12   GLC 87 95   BUN 10.3 12.3   CR 0.67 0.71   RIGO 9.0 8.7       Lab Results   Component Value Date    WBC 6.7 02/10/2023     Lab Results   Component Value Date    RBC 4.45 02/10/2023     Lab Results   Component Value Date    HGB 15.1 02/10/2023     Lab Results   Component Value Date    HCT 46.1 02/10/2023     No components found for: MCT  Lab Results   Component Value Date     02/10/2023     Lab Results   Component Value Date    MCH 33.9 02/10/2023     Lab Results   Component Value Date    MCHC 32.8 02/10/2023     Lab Results   Component Value Date    RDW 13.9 02/10/2023     Lab Results   Component Value Date     02/10/2023        Liver Function Studies -   Recent Labs   Lab Test 02/07/23  1552   PROTTOTAL 6.2*   ALBUMIN 3.9   BILITOTAL 0.9   ALKPHOS 85   AST 32   ALT 24       Venous Blood Gas  No lab results found in last 7 days.    Arterial Blood Gas  No lab results found in last 7 days.       No results found for this or any previous visit (from the past 24 hour(s)).    Blood culture:  No results found for this or any previous visit.   Urine culture:  No results found for this or any previous visit.    Assessment & Plan      Ms Chelsea Flower is a 32 year old lady with a history of obesity and hypothyroidism who presents for pulmonary hypertension evaluation in the setting of progressive dyspnea. The patient is hemodynamically stable, on room air, and in no distress. Her TTE demonstrates that she has severe RV dysfunction and dilation now s/p RHC with negative vasodilator study, now initiating IV remodulin and oral tadalafil for severe PAH.      #Pulmonary Hypertension  Workup to date includes RHC with RA 8, PA 62/48/55, PCWP 8; V/Q scan  with low probability of PE; and TTE with LVEF 57% and PASP estimated 63 with no comment on RV function (all performed at OSH, not in Care Everywhere).   The patient's outpatient pulmonary hypertension provider, Dr Carroll, recommended admission for RHC with Vasodilator study to further evaluate her pulmonary hypertension.   In terms of the etiology of her pulmonary hypertension, the patient has no history or evidence of cardiac disease to suggest WHO group II and a distant history of reactive airways disease but no apparent severe lung diseases to suggest WHO group III. Additionally, her V/Q scan is not suggestive of a group IV etiology. She has a positive CLINTON and DS-DNA however without additional clinical evidence of connective tissue or autoimmune disease but this raises the possibility of PAH vs other WHO group V etiology.   The patient remains hemodynamically stable and on room air. She is not currently on any pulmonary hypertension medications.   - Initiate IV Remodulin and uptitrate as tolerated  -Patient has been instructed to not breast feed while on this medication and will be pumping and dumping breast milk   -Tadalafil 20mg to start 2/10/23  -F/U Repeat CLINTON and RF, if positive, will consult rheum for evaluation for possible CTD     #Hypothyroidism  -TSH 5.74   -repeat TFT in 6-8 weeks during outpt      Diet: Regular  PPX: LMWH  GI: Pantoprazole  BM: PRN  Code: FCFT    This patient was seen and discussed with Dr. Falcon who agrees with the above assessment and plan.     Jori Mcgrath MD  Cardiology Fellow  [phone]

## 2023-02-10 NOTE — PLAN OF CARE
Temp: 97.8  F (36.6  C) Temp src: Oral BP: 120/76 Pulse: 76   Resp: 16 SpO2: 95 % O2 Device: Nasal cannula Oxygen Delivery: 3 LPM     Diagnosis: progressive dyspnea, pulmonary hypertension evaluation, remodulin initiation, severe RV dysfunction s/p RHC  History of obesity, hypothyroidism    Neuro: A&Ox 4, c/o 3/10 headache & mild dizziness, declined interventions  Cardiac: Tele in place, SR.   Respiratory: O2 sating >95% on 3L NC.  Vitals: VSS, afebrile  GI/: Denied nausea, no BM overnight, voiding adequately  Skin: Left PIV in place, SL. Right single lumen PICC in place infusing remodulin.   Mobility: Up independently to bathroom x2.   Rest: Slept well overnight.    Changed: remodulin up titrated per MAR  Running: remodulin @ 3 ng/kg/min    P: Continue to monitor and follow POC. Notify CARDS 2 with changes.

## 2023-02-10 NOTE — TELEPHONE ENCOUNTER
2/10/2023 12:46 PM -   Opsumit Approved:  2/10/23 - 2/10/24 - copy sent to Gillian/ELIANA ARGUETA CMA  Clinical   Cardiology/Pulmonary Hypertension   [facility]  PH: [phone]

## 2023-02-10 NOTE — TELEPHONE ENCOUNTER
2/10/2023 1:05 PM -   Tadalafil APPROVED:   2/10/23 - 2/10/24    Prior Authorization Approval    Authorization Effective Date: 2/10/2023  Authorization Expiration Date: 2/10/2024  Medication: Tadalalfil 20 mg PAH - new start   Approved Dose/Quantity: 60/30  Reference #:     Insurance Company: [employer] - Phone [phone] Fax [phone]  Expected CoPay:       CoPay Card Available:      Foundation Assistance Needed:    Which Pharmacy is filling the prescription (Not needed for infusion/clinic administered):      Updated Snapshot, added to PA Calendar, Routed to PH Nursing :  Ora Do RN to send rx to local pharmacy or [facility] if pt prefers.                Gilma ARGUETA CMA  Clinical   Cardiology/Pulmonary Hypertension   [facility]  PH: [phone]

## 2023-02-11 ENCOUNTER — APPOINTMENT (OUTPATIENT)
Dept: GENERAL RADIOLOGY | Facility: CLINIC | Age: 33
End: 2023-02-11
Payer: COMMERCIAL

## 2023-02-11 LAB
ANION GAP SERPL CALCULATED.3IONS-SCNC: 13 MMOL/L (ref 7–15)
BASOPHILS # BLD AUTO: 0.1 10E3/UL (ref 0–0.2)
BASOPHILS NFR BLD AUTO: 1 %
BUN SERPL-MCNC: 8.2 MG/DL (ref 6–20)
CALCIUM SERPL-MCNC: 8.9 MG/DL (ref 8.6–10)
CHLORIDE SERPL-SCNC: 100 MMOL/L (ref 98–107)
CREAT SERPL-MCNC: 0.62 MG/DL (ref 0.51–0.95)
DEPRECATED HCO3 PLAS-SCNC: 24 MMOL/L (ref 22–29)
EOSINOPHIL # BLD AUTO: 0.1 10E3/UL (ref 0–0.7)
EOSINOPHIL NFR BLD AUTO: 1 %
ERYTHROCYTE [DISTWIDTH] IN BLOOD BY AUTOMATED COUNT: 13.6 % (ref 10–15)
GFR SERPL CREATININE-BSD FRML MDRD: >90 ML/MIN/1.73M2
GLUCOSE SERPL-MCNC: 92 MG/DL (ref 70–99)
HCT VFR BLD AUTO: 50.7 % (ref 35–47)
HGB BLD-MCNC: 17 G/DL (ref 11.7–15.7)
IMM GRANULOCYTES # BLD: 0.1 10E3/UL
IMM GRANULOCYTES NFR BLD: 1 %
LYMPHOCYTES # BLD AUTO: 1.5 10E3/UL (ref 0.8–5.3)
LYMPHOCYTES NFR BLD AUTO: 14 %
MAGNESIUM SERPL-MCNC: 2 MG/DL (ref 1.7–2.3)
MCH RBC QN AUTO: 34.1 PG (ref 26.5–33)
MCHC RBC AUTO-ENTMCNC: 33.5 G/DL (ref 31.5–36.5)
MCV RBC AUTO: 102 FL (ref 78–100)
MONOCYTES # BLD AUTO: 0.7 10E3/UL (ref 0–1.3)
MONOCYTES NFR BLD AUTO: 6 %
NEUTROPHILS # BLD AUTO: 8.4 10E3/UL (ref 1.6–8.3)
NEUTROPHILS NFR BLD AUTO: 77 %
NRBC # BLD AUTO: 0 10E3/UL
NRBC BLD AUTO-RTO: 0 /100
PLATELET # BLD AUTO: 185 10E3/UL (ref 150–450)
POTASSIUM SERPL-SCNC: 4.7 MMOL/L (ref 3.4–5.3)
RBC # BLD AUTO: 4.98 10E6/UL (ref 3.8–5.2)
SODIUM SERPL-SCNC: 137 MMOL/L (ref 136–145)
WBC # BLD AUTO: 10.8 10E3/UL (ref 4–11)

## 2023-02-11 PROCEDURE — 272N000004 HC RX 272

## 2023-02-11 PROCEDURE — 250N000013 HC RX MED GY IP 250 OP 250 PS 637

## 2023-02-11 PROCEDURE — 71045 X-RAY EXAM CHEST 1 VIEW: CPT | Mod: 26 | Performed by: STUDENT IN AN ORGANIZED HEALTH CARE EDUCATION/TRAINING PROGRAM

## 2023-02-11 PROCEDURE — 36415 COLL VENOUS BLD VENIPUNCTURE: CPT | Performed by: STUDENT IN AN ORGANIZED HEALTH CARE EDUCATION/TRAINING PROGRAM

## 2023-02-11 PROCEDURE — 83735 ASSAY OF MAGNESIUM: CPT | Performed by: STUDENT IN AN ORGANIZED HEALTH CARE EDUCATION/TRAINING PROGRAM

## 2023-02-11 PROCEDURE — 250N000013 HC RX MED GY IP 250 OP 250 PS 637: Performed by: STUDENT IN AN ORGANIZED HEALTH CARE EDUCATION/TRAINING PROGRAM

## 2023-02-11 PROCEDURE — 250N000011 HC RX IP 250 OP 636: Performed by: STUDENT IN AN ORGANIZED HEALTH CARE EDUCATION/TRAINING PROGRAM

## 2023-02-11 PROCEDURE — 80048 BASIC METABOLIC PNL TOTAL CA: CPT | Performed by: STUDENT IN AN ORGANIZED HEALTH CARE EDUCATION/TRAINING PROGRAM

## 2023-02-11 PROCEDURE — 272N000004 HC RX 272: Performed by: STUDENT IN AN ORGANIZED HEALTH CARE EDUCATION/TRAINING PROGRAM

## 2023-02-11 PROCEDURE — 250N000011 HC RX IP 250 OP 636

## 2023-02-11 PROCEDURE — 99233 SBSQ HOSP IP/OBS HIGH 50: CPT | Mod: GC | Performed by: INTERNAL MEDICINE

## 2023-02-11 PROCEDURE — 71045 X-RAY EXAM CHEST 1 VIEW: CPT

## 2023-02-11 PROCEDURE — 85025 COMPLETE CBC W/AUTO DIFF WBC: CPT | Performed by: STUDENT IN AN ORGANIZED HEALTH CARE EDUCATION/TRAINING PROGRAM

## 2023-02-11 PROCEDURE — 999N000128 HC STATISTIC PERIPHERAL IV START W/O US GUIDANCE

## 2023-02-11 PROCEDURE — 214N000001 HC R&B CCU UMMC

## 2023-02-11 RX ORDER — AMOXICILLIN 250 MG
1 CAPSULE ORAL
Status: DISCONTINUED | OUTPATIENT
Start: 2023-02-11 | End: 2023-02-18 | Stop reason: HOSPADM

## 2023-02-11 RX ORDER — GRANISETRON HYDROCHLORIDE 1 MG/ML
0.5 INJECTION INTRAVENOUS EVERY 12 HOURS
Status: DISCONTINUED | OUTPATIENT
Start: 2023-02-11 | End: 2023-02-15

## 2023-02-11 RX ORDER — POLYETHYLENE GLYCOL 3350 17 G/17G
17 POWDER, FOR SOLUTION ORAL DAILY PRN
Status: DISCONTINUED | OUTPATIENT
Start: 2023-02-11 | End: 2023-02-18 | Stop reason: HOSPADM

## 2023-02-11 RX ADMIN — GRANISETRON HYDROCHLORIDE 0.5 MG: 1 INJECTION, SOLUTION INTRAVENOUS at 17:31

## 2023-02-11 RX ADMIN — TREPROSTINIL 4 NG/KG/MIN: 20 INJECTION, SOLUTION INTRAVENOUS; SUBCUTANEOUS at 11:42

## 2023-02-11 RX ADMIN — PROCHLORPERAZINE EDISYLATE 5 MG: 5 INJECTION INTRAMUSCULAR; INTRAVENOUS at 21:54

## 2023-02-11 RX ADMIN — PROCHLORPERAZINE EDISYLATE 5 MG: 5 INJECTION INTRAMUSCULAR; INTRAVENOUS at 12:56

## 2023-02-11 RX ADMIN — TREPROSTINIL 5 NG/KG/MIN: 20 INJECTION, SOLUTION INTRAVENOUS; SUBCUTANEOUS at 23:33

## 2023-02-11 RX ADMIN — HYDROMORPHONE HYDROCHLORIDE 1 MG: 1 INJECTION, SOLUTION INTRAMUSCULAR; INTRAVENOUS; SUBCUTANEOUS at 14:11

## 2023-02-11 RX ADMIN — TADALAFIL 20 MG: 20 TABLET, FILM COATED ORAL at 07:48

## 2023-02-11 RX ADMIN — HYDROMORPHONE HYDROCHLORIDE 1 MG: 1 INJECTION, SOLUTION INTRAMUSCULAR; INTRAVENOUS; SUBCUTANEOUS at 09:46

## 2023-02-11 RX ADMIN — PANTOPRAZOLE SODIUM 40 MG: 40 TABLET, DELAYED RELEASE ORAL at 07:45

## 2023-02-11 RX ADMIN — FUROSEMIDE 20 MG: 20 TABLET ORAL at 07:45

## 2023-02-11 RX ADMIN — HYDROMORPHONE HYDROCHLORIDE 1 MG: 1 INJECTION, SOLUTION INTRAMUSCULAR; INTRAVENOUS; SUBCUTANEOUS at 21:54

## 2023-02-11 RX ADMIN — DIGOXIN 125 MCG: 125 TABLET ORAL at 07:45

## 2023-02-11 RX ADMIN — SENNOSIDES AND DOCUSATE SODIUM 1 TABLET: 50; 8.6 TABLET ORAL at 09:55

## 2023-02-11 RX ADMIN — ENOXAPARIN SODIUM 40 MG: 40 INJECTION SUBCUTANEOUS at 20:43

## 2023-02-11 ASSESSMENT — ACTIVITIES OF DAILY LIVING (ADL)
ADLS_ACUITY_SCORE: 25

## 2023-02-11 NOTE — PLAN OF CARE
D: Pt admitted 2/7 for pulmonary hypertension evaluation in the setting of progressive dyspnea. Started on IV Remodulin and oral Tadalafil.      I: Monitored vitals and assessed pt status. Performed care and interventions as charted.      A: Pt alert, appropriate. Slept well.     Tele - SR with ITW (unchanged from recent ECGs). BP stable. On 4L NP. Attempted to titrate O2 but desat with minimal movement or when in deep rest. Pt reports SOBOE.     On IV Remodulin via Rt SL PICC at 4ng/kg/min. Due to persistent O2 needs, night Cards team paged for direction on Remodulin titration at midnight (as directed by day RN). Order to hold titration for tonight and have team R/A in a.m. Scheduled for q12h titration - next due at 1200 with goal rate of 8ng/kg/min. Pt continues to have intermittent nauseous - PRN compazine given x1 and Kytril ordered prior to increasing Remodulin dose. Nausea worse with movement. No vomiting. Headache also persists but improved - given Dilaudid PRN x1. Dizziness with activity. Standby assist.     P: Continue to monitor pt status and report changes to care team. Titrate Remodulin as tolerated.

## 2023-02-11 NOTE — PLAN OF CARE
D: presents for pulmonary hypertension evaluation in the setting of progressive dyspnea. Initiated IV remodulin and oral tadalafil for severe PAH.      PMHx:obesity and hypothyroidism      I: Monitored vitals and assessed pt status. Encouraged activity.      Changed: Still experiencing N/V and headache . Compazine and dilaudid PRNs. Notified team that pt still on 4L at 1130 with symptoms. Team chose to not increase remodulin at noon. At 1300 patient still reporting nausea and slight headache but O2 needs decreased to 3L. Patient receiving pump education today. Patient given senna hasnt had BM since Monday. Large emesis 1420. 200 mL. Patient when sleeping needs 4L. Spoke with team about concerns put in chest Xray to eliminate other potential issues. Xray looks fine. Spoke with team. Plan to titrate at 6PM daily 1 ng/kg  IV Access: PIV and PICC  Running:  remodulin @5 ng/kg  PRN: compazine and dilaudid.  Tele: SR 60s-70s  O2: 4L NC  Mobility: Independent/ SBA (calls to go to bathroom under observation when she takes the dilaudid. Otherwise Independent in room)  Skin: WDL. Right neck site from [facility]     A:  A&Ox4. VSS. Afebrile. Nausea and vomitted x1 this AM. Of off nausea and headache using PRNs. Able to decrease O2 when awake to 3L but whenever napping or sleeping pt needs 4L. Patient reports mild dizziness on and off. Overall patient looks slightly better and more awake today. Gets up for BM in bathroom and urine. No BM yet. Calls approp. Patient had some toast this AM and ordered lunch (first meal in a day and a half).      P: Continue to monitor pt status and report changes to treatment team. will receive additional education on pump tomorrow. Titrate at 6PM daily . Report to team if increased O2 is needed

## 2023-02-12 ENCOUNTER — HEALTH MAINTENANCE LETTER (OUTPATIENT)
Age: 33
End: 2023-02-12

## 2023-02-12 LAB
ANION GAP SERPL CALCULATED.3IONS-SCNC: 12 MMOL/L (ref 7–15)
BASOPHILS # BLD AUTO: 0.1 10E3/UL (ref 0–0.2)
BASOPHILS NFR BLD AUTO: 1 %
BUN SERPL-MCNC: 9.6 MG/DL (ref 6–20)
CALCIUM SERPL-MCNC: 8.8 MG/DL (ref 8.6–10)
CHLORIDE SERPL-SCNC: 97 MMOL/L (ref 98–107)
CREAT SERPL-MCNC: 0.59 MG/DL (ref 0.51–0.95)
DEPRECATED HCO3 PLAS-SCNC: 24 MMOL/L (ref 22–29)
EOSINOPHIL # BLD AUTO: 0.1 10E3/UL (ref 0–0.7)
EOSINOPHIL NFR BLD AUTO: 1 %
ERYTHROCYTE [DISTWIDTH] IN BLOOD BY AUTOMATED COUNT: 13.6 % (ref 10–15)
GFR SERPL CREATININE-BSD FRML MDRD: >90 ML/MIN/1.73M2
GLUCOSE SERPL-MCNC: 77 MG/DL (ref 70–99)
HCT VFR BLD AUTO: 49.7 % (ref 35–47)
HGB BLD-MCNC: 16.4 G/DL (ref 11.7–15.7)
IMM GRANULOCYTES # BLD: 0.1 10E3/UL
IMM GRANULOCYTES NFR BLD: 1 %
LYMPHOCYTES # BLD AUTO: 1.6 10E3/UL (ref 0.8–5.3)
LYMPHOCYTES NFR BLD AUTO: 17 %
MAGNESIUM SERPL-MCNC: 1.9 MG/DL (ref 1.7–2.3)
MCH RBC QN AUTO: 33.7 PG (ref 26.5–33)
MCHC RBC AUTO-ENTMCNC: 33 G/DL (ref 31.5–36.5)
MCV RBC AUTO: 102 FL (ref 78–100)
MONOCYTES # BLD AUTO: 0.7 10E3/UL (ref 0–1.3)
MONOCYTES NFR BLD AUTO: 7 %
NEUTROPHILS # BLD AUTO: 7.1 10E3/UL (ref 1.6–8.3)
NEUTROPHILS NFR BLD AUTO: 73 %
NRBC # BLD AUTO: 0 10E3/UL
NRBC BLD AUTO-RTO: 0 /100
PLATELET # BLD AUTO: 165 10E3/UL (ref 150–450)
POTASSIUM SERPL-SCNC: 4 MMOL/L (ref 3.4–5.3)
RBC # BLD AUTO: 4.86 10E6/UL (ref 3.8–5.2)
SODIUM SERPL-SCNC: 133 MMOL/L (ref 136–145)
WBC # BLD AUTO: 9.6 10E3/UL (ref 4–11)

## 2023-02-12 PROCEDURE — 250N000013 HC RX MED GY IP 250 OP 250 PS 637: Performed by: STUDENT IN AN ORGANIZED HEALTH CARE EDUCATION/TRAINING PROGRAM

## 2023-02-12 PROCEDURE — 80048 BASIC METABOLIC PNL TOTAL CA: CPT | Performed by: STUDENT IN AN ORGANIZED HEALTH CARE EDUCATION/TRAINING PROGRAM

## 2023-02-12 PROCEDURE — 250N000013 HC RX MED GY IP 250 OP 250 PS 637

## 2023-02-12 PROCEDURE — 83735 ASSAY OF MAGNESIUM: CPT | Performed by: STUDENT IN AN ORGANIZED HEALTH CARE EDUCATION/TRAINING PROGRAM

## 2023-02-12 PROCEDURE — 250N000011 HC RX IP 250 OP 636: Performed by: STUDENT IN AN ORGANIZED HEALTH CARE EDUCATION/TRAINING PROGRAM

## 2023-02-12 PROCEDURE — 250N000011 HC RX IP 250 OP 636

## 2023-02-12 PROCEDURE — 99233 SBSQ HOSP IP/OBS HIGH 50: CPT | Mod: GC | Performed by: INTERNAL MEDICINE

## 2023-02-12 PROCEDURE — 272N000004 HC RX 272

## 2023-02-12 PROCEDURE — 85025 COMPLETE CBC W/AUTO DIFF WBC: CPT | Performed by: STUDENT IN AN ORGANIZED HEALTH CARE EDUCATION/TRAINING PROGRAM

## 2023-02-12 PROCEDURE — 36415 COLL VENOUS BLD VENIPUNCTURE: CPT | Performed by: STUDENT IN AN ORGANIZED HEALTH CARE EDUCATION/TRAINING PROGRAM

## 2023-02-12 PROCEDURE — 214N000001 HC R&B CCU UMMC

## 2023-02-12 RX ADMIN — ACETAMINOPHEN 975 MG: 325 TABLET ORAL at 10:53

## 2023-02-12 RX ADMIN — HYDROMORPHONE HYDROCHLORIDE 1 MG: 1 INJECTION, SOLUTION INTRAMUSCULAR; INTRAVENOUS; SUBCUTANEOUS at 06:49

## 2023-02-12 RX ADMIN — ENOXAPARIN SODIUM 40 MG: 40 INJECTION SUBCUTANEOUS at 19:47

## 2023-02-12 RX ADMIN — GRANISETRON HYDROCHLORIDE 0.5 MG: 1 INJECTION, SOLUTION INTRAVENOUS at 06:23

## 2023-02-12 RX ADMIN — GRANISETRON HYDROCHLORIDE 0.5 MG: 1 INJECTION, SOLUTION INTRAVENOUS at 17:23

## 2023-02-12 RX ADMIN — DIGOXIN 125 MCG: 125 TABLET ORAL at 08:48

## 2023-02-12 RX ADMIN — TREPROSTINIL 6 NG/KG/MIN: 20 INJECTION, SOLUTION INTRAVENOUS; SUBCUTANEOUS at 20:03

## 2023-02-12 RX ADMIN — SENNOSIDES AND DOCUSATE SODIUM 1 TABLET: 50; 8.6 TABLET ORAL at 19:58

## 2023-02-12 RX ADMIN — POLYETHYLENE GLYCOL 3350 17 G: 17 POWDER, FOR SOLUTION ORAL at 15:33

## 2023-02-12 RX ADMIN — HYDROMORPHONE HYDROCHLORIDE 1 MG: 1 INJECTION, SOLUTION INTRAMUSCULAR; INTRAVENOUS; SUBCUTANEOUS at 19:48

## 2023-02-12 RX ADMIN — TREPROSTINIL 5 NG/KG/MIN: 20 INJECTION, SOLUTION INTRAVENOUS; SUBCUTANEOUS at 10:49

## 2023-02-12 RX ADMIN — PANTOPRAZOLE SODIUM 40 MG: 40 TABLET, DELAYED RELEASE ORAL at 08:48

## 2023-02-12 RX ADMIN — FUROSEMIDE 20 MG: 20 TABLET ORAL at 08:48

## 2023-02-12 RX ADMIN — TREPROSTINIL 6 NG/KG/MIN: 20 INJECTION, SOLUTION INTRAVENOUS; SUBCUTANEOUS at 17:54

## 2023-02-12 RX ADMIN — PROCHLORPERAZINE EDISYLATE 5 MG: 5 INJECTION INTRAMUSCULAR; INTRAVENOUS at 16:19

## 2023-02-12 RX ADMIN — HYDROMORPHONE HYDROCHLORIDE 1 MG: 1 INJECTION, SOLUTION INTRAMUSCULAR; INTRAVENOUS; SUBCUTANEOUS at 16:14

## 2023-02-12 RX ADMIN — TADALAFIL 20 MG: 20 TABLET, FILM COATED ORAL at 08:48

## 2023-02-12 ASSESSMENT — ACTIVITIES OF DAILY LIVING (ADL)
ADLS_ACUITY_SCORE: 25

## 2023-02-12 NOTE — PROGRESS NOTES
Cardiology Progress Note     Changes Today:   -Increase Remodulin 1ng/kg/min q24h @ 1800  -Can decreased back to 4 if worsening hypoxia    Subjective:  Care team notes reviewed. Patient notes headache and nausea well controlled with dilaudid and compazine. Has been hypoxic and unable to wean oxygen requirement less than 4L.Discussed breast feeding while on Remodulin and patient states she is starting to wean down on pumping, believes she will be done breast feeding at this point. No other concerns to address with the team today.     Physical Exam   Temp: 97.9  F (36.6  C) Temp src: Oral BP: 122/83 Pulse: 96   Resp: 18 SpO2: 95 % O2 Device: Nasal cannula Oxygen Delivery: 4 LPM    Vital Signs with Ranges  Temp:  [97.9  F (36.6  C)-98.8  F (37.1  C)] 97.9  F (36.6  C)  Pulse:  [72-96] 96  Resp:  [16-18] 18  BP: (115-123)/(64-83) 122/83  SpO2:  [93 %-95 %] 95 %    Intake/Output    Intake/Output Summary (Last 24 hours) at 2/11/2023 1828  Last data filed at 2/11/2023 1400  Gross per 24 hour   Intake 96.29 ml   Output 200 ml   Net -103.71 ml       Weight  Vitals:    02/07/23 1638 02/09/23 0615 02/10/23 0513 02/11/23 0500   Weight: 76.7 kg (169 lb) 75.6 kg (166 lb 11.2 oz) 74.8 kg (165 lb) 72.2 kg (159 lb 2.8 oz)    02/11/23 0644   Weight: 71.4 kg (157 lb 4.8 oz)     Resp: 18      - MEDICATION INSTRUCTIONS -       - MEDICATION INSTRUCTIONS -       treprostinil (REMODULIN) intravenous infusion (LESS than or EQUAL to 100 mcg/mL) 5 ng/kg/min (02/11/23 1801)         digoxin  125 mcg Oral Daily     enoxaparin ANTICOAGULANT  40 mg Subcutaneous Q24H     furosemide  20 mg Oral Daily     granisetron  0.5 mg Intravenous Q12H     heparin lock flush  5-20 mL Intracatheter Q24H     pantoprazole  40 mg Oral QAM AC     sodium chloride (PF)  10-40 mL Intracatheter Q8H     sodium chloride (PF)  3 mL Intracatheter Q8H     tadalafil  20 mg Oral Q24H        , Blood pressure 122/83, pulse 96, temperature 97.9  F (36.6  C), temperature source  "Oral, resp. rate 18, height 1.6 m (5' 3\"), weight 71.4 kg (157 lb 4.8 oz), SpO2 95 %.     Constitutional: awake, alert, cooperative, no apparent distress, and appears stated age  Eyes: EOMI, conjunctiva normal  Respiratory: Satting appropriately on 4L NC.  GI: soft, non-distended  Skin: no bruising or bleeding  Neurologic: Awake, alert, oriented to name, place and time.  Cranial nerves II-XII are grossly intact.       Data   Recent Labs   Lab Test 02/11/23  0655 02/10/23  0622    139   POTASSIUM 4.7 4.1   CHLORIDE 100 104   CO2 24 25   ANIONGAP 13 10   GLC 92 87   BUN 8.2 10.3   CR 0.62 0.67   RIGO 8.9 9.0       Lab Results   Component Value Date    WBC 10.8 02/11/2023     Lab Results   Component Value Date    RBC 4.98 02/11/2023     Lab Results   Component Value Date    HGB 17.0 02/11/2023     Lab Results   Component Value Date    HCT 50.7 02/11/2023     No components found for: MCT  Lab Results   Component Value Date     02/11/2023     Lab Results   Component Value Date    MCH 34.1 02/11/2023     Lab Results   Component Value Date    MCHC 33.5 02/11/2023     Lab Results   Component Value Date    RDW 13.6 02/11/2023     Lab Results   Component Value Date     02/11/2023        Liver Function Studies -   Recent Labs   Lab Test 02/07/23  1552   PROTTOTAL 6.2*   ALBUMIN 3.9   BILITOTAL 0.9   ALKPHOS 85   AST 32   ALT 24       Recent Results (from the past 24 hour(s))   XR Chest Port 1 View    Impression    RESIDENT PRELIMINARY INTERPRETATION  Impression: Cardiomegaly with central and basilar predominant mixed  opacities most compatible with pulmonary edema.       Assessment & Plan       Ms Chelsea Flower is a 32 year old lady with a history of obesity and hypothyroidism who presents for pulmonary hypertension evaluation in the setting of progressive dyspnea. The patient is hemodynamically stable, on room air, and in no distress. Her TTE demonstrates that she has severe RV dysfunction and " dilation now s/p RHC with negative vasodilator study, now initiating IV remodulin and oral tadalafil for severe PAH.      #Pulmonary Hypertension  Workup to date includes RHC with RA 8, PA 62/48/55, PCWP 8; V/Q scan with low probability of PE; and TTE with LVEF 57% and PASP estimated 63 with no comment on RV function (all performed at OSH, not in Care Everywhere).   The patient's outpatient pulmonary hypertension provider, Dr Carroll, recommended admission for RHC with Vasodilator study to further evaluate her pulmonary hypertension.   In terms of the etiology of her pulmonary hypertension, the patient has no history or evidence of cardiac disease to suggest WHO group II and a distant history of reactive airways disease but no apparent severe lung diseases to suggest WHO group III. Additionally, her V/Q scan is not suggestive of a group IV etiology. She has a positive CLINTON and DS-DNA however without additional clinical evidence of connective tissue or autoimmune disease but this raises the possibility of PAH vs other WHO group V etiology.   Patient has been having significant side effects following initiation of Remodulin including hypoxia, headaches, nausea, and vomiting. Patient currently requiring 4L NC to maintain O2 saturation > 90%.   - Continue to increase Remodulin 1ng/kg/min q24h   -Kytril 30 minutes prior to uptitration   -Headaches: tylenol 975mg q6h PRN, dilaudid 0.5-1mg q3h PRN   -Nausea: Compazine PRN  -Continue Tadalafil 20mg daily   -F/U Repeat CLINTON    -RF negative   -If CLINTON positive, will consult rheum for evaluation for possible CTD  -Patient has been recommended to not breast feed while on this medication and will be pumping and dumping breast milk      #Hypothyroidism  -TSH 5.74   -repeat TFT in 6-8 weeks during outpt      Diet: Regular  PPX: LMWH  Code: Full Code    This patient was seen and discussed with Dr. Falcon who agrees with the above assessment and plan.     Maryjo Mckeon MD  Internal  Medicine PGY-1

## 2023-02-12 NOTE — PROGRESS NOTES
Cardiology Progress Note     Changes Today:   - Increase Remodulin to 6ng/kg/min at 6pm  - Consult for Arreola placement Tuesday  - Health Psychology consult    Subjective:  Care team notes reviewed. No events overnight. Remodulin increased at 6pm. Patient endorses headache and nausea with increase both well controlled with PRNs. States headache persists between medication doses, has bene stable since starting on medication. No worsening shortness of breath. Oxygen requirements stable. No other concerns to address with the team today.     Physical Exam   Temp: 98.1  F (36.7  C) Temp src: Oral BP: 118/67 Pulse: 91   Resp: 15 SpO2: 92 % O2 Device: Nasal cannula with humidification Oxygen Delivery: 4 LPM    Vital Signs with Ranges  Temp:  [97.9  F (36.6  C)-98.8  F (37.1  C)] 98.1  F (36.7  C)  Pulse:  [73-96] 91  Resp:  [15-18] 15  BP: (112-124)/(61-83) 118/67  SpO2:  [92 %-97 %] 92 %    Intake/Output    Intake/Output Summary (Last 24 hours) at 2/12/2023 0727  Last data filed at 2/12/2023 0659  Gross per 24 hour   Intake 98.92 ml   Output 200 ml   Net -101.08 ml       Weight  Vitals:    02/09/23 0615 02/10/23 0513 02/11/23 0500 02/11/23 0644   Weight: 75.6 kg (166 lb 11.2 oz) 74.8 kg (165 lb) 72.2 kg (159 lb 2.8 oz) 71.4 kg (157 lb 4.8 oz)    02/12/23 0400   Weight: 70.7 kg (155 lb 12.8 oz)       Resp: 15        - MEDICATION INSTRUCTIONS -       - MEDICATION INSTRUCTIONS -       treprostinil (REMODULIN) intravenous infusion (LESS than or EQUAL to 100 mcg/mL) 5 ng/kg/min (02/11/23 2333)         digoxin  125 mcg Oral Daily     enoxaparin ANTICOAGULANT  40 mg Subcutaneous Q24H     furosemide  20 mg Oral Daily     granisetron  0.5 mg Intravenous Q12H     heparin lock flush  5-20 mL Intracatheter Q24H     pantoprazole  40 mg Oral QAM AC     sodium chloride (PF)  10-40 mL Intracatheter Q8H     sodium chloride (PF)  3 mL Intracatheter Q8H     tadalafil  20 mg Oral Q24H        , Blood pressure 118/67, pulse 91, temperature  "98.1  F (36.7  C), temperature source Oral, resp. rate 15, height 1.6 m (5' 3\"), weight 70.7 kg (155 lb 12.8 oz), SpO2 92 %.     Constitutional: awake, alert, cooperative, no apparent distress, and appears stated age. Laying in bed comfortably.  Eyes: EOMI, conjunctiva normal  Respiratory: Satting appropriately on 4L NC. Lungs clear to auscultation bilaterally.   GI: soft, non-distended  Skin: no bruising or bleeding  Neurologic: Awake, alert, oriented to name, place and time.  No focal deficits    Data   Recent Labs   Lab Test 02/11/23  0655 02/10/23  0622    139   POTASSIUM 4.7 4.1   CHLORIDE 100 104   CO2 24 25   ANIONGAP 13 10   GLC 92 87   BUN 8.2 10.3   CR 0.62 0.67   RIGO 8.9 9.0       Lab Results   Component Value Date    WBC 9.6 02/12/2023     Lab Results   Component Value Date    RBC 4.86 02/12/2023     Lab Results   Component Value Date    HGB 16.4 02/12/2023     Lab Results   Component Value Date    HCT 49.7 02/12/2023     No components found for: MCT  Lab Results   Component Value Date     02/12/2023     Lab Results   Component Value Date    MCH 33.7 02/12/2023     Lab Results   Component Value Date    MCHC 33.0 02/12/2023     Lab Results   Component Value Date    RDW 13.6 02/12/2023     Lab Results   Component Value Date     02/12/2023        Liver Function Studies -   Recent Labs   Lab Test 02/07/23  1552   PROTTOTAL 6.2*   ALBUMIN 3.9   BILITOTAL 0.9   ALKPHOS 85   AST 32   ALT 24     Recent Results (from the past 24 hour(s))   XR Chest Port 1 View    Narrative    Exam: XR CHEST PORT 1 VIEW, 2/11/2023 3:51 PM    Indication: hypoxia    Comparison: Chest x-ray 1/6/2023    Findings:     Portal semiupright frontal radiograph of the chest. Right upper  extremity PICC line tip projects over the low SVC. Cardiomegaly.  Basilar predominant hazy mixed opacities, no consolidation,  pneumothorax or pleural effusion. Pulmonary vascularity appears within  normal. Left lung apex is not " included      Impression    Impression: Enlarged cardiac silhouette without central and basilar  predominant mixed opacities most compatible with pulmonary edema  versus atelectasis/infection.    I have personally reviewed the examination and initial interpretation  and I agree with the findings.    YO HILARIO MD         SYSTEM ID:  [id number]     Assessment & Plan       Ms Chelsea Flower is a 32 year old lady with a history of obesity and hypothyroidism who presents for pulmonary hypertension evaluation in the setting of progressive dyspnea. The patient is hemodynamically stable, on room air, and in no distress. Her TTE demonstrates that she has severe RV dysfunction and dilation now s/p RHC with negative vasodilator study, now initiating IV remodulin and oral tadalafil for severe PAH.      #Pulmonary Hypertension  Workup to date includes RHC with RA 8, PA 62/48/55, PCWP 8; V/Q scan with low probability of PE; and TTE with LVEF 57% and PASP estimated 63 with no comment on RV function (all performed at OSH, not in Care Everywhere).   The patient's outpatient pulmonary hypertension provider, Dr Carroll, recommended admission for RHC with Vasodilator study to further evaluate her pulmonary hypertension.   In terms of the etiology of her pulmonary hypertension, the patient has no history or evidence of cardiac disease to suggest WHO group II and a distant history of reactive airways disease but no apparent severe lung diseases to suggest WHO group III. Additionally, her V/Q scan is not suggestive of a group IV etiology. She has a positive CLINTON and DS-DNA however without additional clinical evidence of connective tissue or autoimmune disease but this raises the possibility of PAH vs other WHO group V etiology.   Patient has been having significant side effects following initiation of Remodulin including hypoxia, headaches, nausea, and vomiting. Patient currently requiring 4L NC to maintain O2 saturation > 90%.    - Continue to increase Remodulin 1ng/kg/min q24h   -Kytril 30 minutes prior to uptitration   -Headaches: tylenol 975mg q6h PRN, dilaudid 0.5-1mg q3h PRN   -Nausea: Compazine PRN  -Continue Tadalafil 20mg daily   -F/U Repeat CLINTON    -RF negative   -If CLINTON positive, will consult rheum for evaluation for possible CTD  -Patient has been recommended to not breast feed while on this medication and will be pumping and dumping breast milk   -Health Psychology Consult     #Hypothyroidism  -TSH 5.74   -repeat TFT in 6-8 weeks during outpt      Diet: Regular  PPX: LMWH  Code: Full Code    This patient was seen and discussed with Dr. Falcon who agrees with the above assessment and plan.     Maryjo Mckeon MD  Internal Medicine PGY-1

## 2023-02-12 NOTE — PLAN OF CARE
D: presents for pulmonary hypertension evaluation in the setting of progressive dyspnea. Initiated IV remodulin and oral tadalafil for severe PAH.      PMHx:obesity and hypothyroidism      I: Monitored vitals and assessed pt status. Encouraged activity.      Changed: increased remodulin per order  IV Access: PIV x 1; PICC single   Running:  remodulin at 6 ng/kg/min  PRN:dilaudid and compazine and miralax (no BM yet)  Tele: SR  O2: 4L NC  Mobility: Independent   Skin: right neck incision from RHC     A: A&Ox4. VSS. Afebrile. on off nausea and headaches treated with PRNS. Ate breakfast and lunch reported increased appetite. Feeling better today. Stil needing 4L when napping or sleeping. Unable to titrate to 3L since on/off napping throughout the day.         P: Continue to monitor pt status and report changes to treatment team.  Arreola placed on Tuesday,       Per

## 2023-02-12 NOTE — PLAN OF CARE
D: Pt admitted 2/7 for pulmonary hypertension evaluation in the setting of progressive dyspnea. Started on IV Remodulin and oral Tadalafil.      I: Monitored vitals and assessed pt status. Performed care and interventions as charted.      A: Pt pleasant, appropriate. Slept well. Tele - SR/ITW. HR 70s.     On Remodulin at 5ng/kg/min - increase q24h at 1800 by 1ng/kg/min. Goal of 8. Continues to experience S&S. Intermittent nausea - PRN compazine x1 and scheduled Kytril given. No vomiting. Headache persists - Dilaudid PRN given x2. Dizziness with standing/ ambulation. Pt remains on 4L NP - unable to titrate while sleeping. Pt got bloody nose this a.m. after blowing nose. Self-limiting. Nasal cannula changed to humidification.     Lt. PIV/ Rt. SL PICC.     Standby assist d/t dizziness. Stable on feet. No BM since 2/6 - on senna.     P: Continue to monitor pt status and report changes to care team. Titrate Remodulin as tolerated.

## 2023-02-13 LAB
ANION GAP SERPL CALCULATED.3IONS-SCNC: 12 MMOL/L (ref 7–15)
BASOPHILS # BLD AUTO: 0.1 10E3/UL (ref 0–0.2)
BASOPHILS NFR BLD AUTO: 1 %
BUN SERPL-MCNC: 8.1 MG/DL (ref 6–20)
CALCIUM SERPL-MCNC: 9.5 MG/DL (ref 8.6–10)
CHLORIDE SERPL-SCNC: 97 MMOL/L (ref 98–107)
CREAT SERPL-MCNC: 0.72 MG/DL (ref 0.51–0.95)
DEPRECATED HCO3 PLAS-SCNC: 30 MMOL/L (ref 22–29)
DIGOXIN SERPL-MCNC: 0.5 NG/ML (ref 0.6–2)
EOSINOPHIL # BLD AUTO: 0.1 10E3/UL (ref 0–0.7)
EOSINOPHIL NFR BLD AUTO: 2 %
ERYTHROCYTE [DISTWIDTH] IN BLOOD BY AUTOMATED COUNT: 13.6 % (ref 10–15)
GFR SERPL CREATININE-BSD FRML MDRD: >90 ML/MIN/1.73M2
GLUCOSE SERPL-MCNC: 80 MG/DL (ref 70–99)
HCT VFR BLD AUTO: 51.5 % (ref 35–47)
HGB BLD-MCNC: 16.5 G/DL (ref 11.7–15.7)
IMM GRANULOCYTES # BLD: 0 10E3/UL
IMM GRANULOCYTES NFR BLD: 0 %
LYMPHOCYTES # BLD AUTO: 1.7 10E3/UL (ref 0.8–5.3)
LYMPHOCYTES NFR BLD AUTO: 23 %
MAGNESIUM SERPL-MCNC: 1.8 MG/DL (ref 1.7–2.3)
MCH RBC QN AUTO: 33.2 PG (ref 26.5–33)
MCHC RBC AUTO-ENTMCNC: 32 G/DL (ref 31.5–36.5)
MCV RBC AUTO: 104 FL (ref 78–100)
MONOCYTES # BLD AUTO: 0.7 10E3/UL (ref 0–1.3)
MONOCYTES NFR BLD AUTO: 9 %
NEUTROPHILS # BLD AUTO: 4.8 10E3/UL (ref 1.6–8.3)
NEUTROPHILS NFR BLD AUTO: 65 %
NRBC # BLD AUTO: 0 10E3/UL
NRBC BLD AUTO-RTO: 0 /100
PLATELET # BLD AUTO: 160 10E3/UL (ref 150–450)
POTASSIUM SERPL-SCNC: 4.1 MMOL/L (ref 3.4–5.3)
RBC # BLD AUTO: 4.97 10E6/UL (ref 3.8–5.2)
SODIUM SERPL-SCNC: 139 MMOL/L (ref 136–145)
WBC # BLD AUTO: 7.4 10E3/UL (ref 4–11)

## 2023-02-13 PROCEDURE — 250N000013 HC RX MED GY IP 250 OP 250 PS 637: Performed by: STUDENT IN AN ORGANIZED HEALTH CARE EDUCATION/TRAINING PROGRAM

## 2023-02-13 PROCEDURE — 93005 ELECTROCARDIOGRAM TRACING: CPT

## 2023-02-13 PROCEDURE — 272N000004 HC RX 272: Performed by: INTERNAL MEDICINE

## 2023-02-13 PROCEDURE — 99233 SBSQ HOSP IP/OBS HIGH 50: CPT | Mod: GC | Performed by: INTERNAL MEDICINE

## 2023-02-13 PROCEDURE — 250N000011 HC RX IP 250 OP 636: Performed by: INTERNAL MEDICINE

## 2023-02-13 PROCEDURE — 272N000004 HC RX 272

## 2023-02-13 PROCEDURE — 93010 ELECTROCARDIOGRAM REPORT: CPT | Performed by: INTERNAL MEDICINE

## 2023-02-13 PROCEDURE — 85025 COMPLETE CBC W/AUTO DIFF WBC: CPT | Performed by: STUDENT IN AN ORGANIZED HEALTH CARE EDUCATION/TRAINING PROGRAM

## 2023-02-13 PROCEDURE — 250N000011 HC RX IP 250 OP 636

## 2023-02-13 PROCEDURE — 250N000013 HC RX MED GY IP 250 OP 250 PS 637

## 2023-02-13 PROCEDURE — 36415 COLL VENOUS BLD VENIPUNCTURE: CPT | Performed by: STUDENT IN AN ORGANIZED HEALTH CARE EDUCATION/TRAINING PROGRAM

## 2023-02-13 PROCEDURE — 80162 ASSAY OF DIGOXIN TOTAL: CPT | Performed by: STUDENT IN AN ORGANIZED HEALTH CARE EDUCATION/TRAINING PROGRAM

## 2023-02-13 PROCEDURE — 214N000001 HC R&B CCU UMMC

## 2023-02-13 PROCEDURE — 80048 BASIC METABOLIC PNL TOTAL CA: CPT | Performed by: STUDENT IN AN ORGANIZED HEALTH CARE EDUCATION/TRAINING PROGRAM

## 2023-02-13 PROCEDURE — 83735 ASSAY OF MAGNESIUM: CPT | Performed by: STUDENT IN AN ORGANIZED HEALTH CARE EDUCATION/TRAINING PROGRAM

## 2023-02-13 RX ORDER — TADALAFIL 20 MG/1
20 TABLET ORAL DAILY
Qty: 30 TABLET | Refills: 11 | Status: SHIPPED | OUTPATIENT
Start: 2023-02-13 | End: 2023-02-13

## 2023-02-13 RX ORDER — TADALAFIL 20 MG/1
20 TABLET ORAL DAILY
Qty: 90 TABLET | Refills: 3 | Status: SHIPPED | OUTPATIENT
Start: 2023-02-13 | End: 2023-02-18

## 2023-02-13 RX ORDER — TADALAFIL 20 MG/1
20 TABLET ORAL DAILY
Qty: 30 TABLET | Refills: 11 | Status: SHIPPED | OUTPATIENT
Start: 2023-02-13 | End: 2023-02-13 | Stop reason: ALTCHOICE

## 2023-02-13 RX ADMIN — ACETAMINOPHEN 975 MG: 325 TABLET ORAL at 12:54

## 2023-02-13 RX ADMIN — PROCHLORPERAZINE EDISYLATE 5 MG: 5 INJECTION INTRAMUSCULAR; INTRAVENOUS at 21:34

## 2023-02-13 RX ADMIN — TREPROSTINIL 6 NG/KG/MIN: 20 INJECTION, SOLUTION INTRAVENOUS; SUBCUTANEOUS at 12:55

## 2023-02-13 RX ADMIN — HYDROMORPHONE HYDROCHLORIDE 1 MG: 1 INJECTION, SOLUTION INTRAMUSCULAR; INTRAVENOUS; SUBCUTANEOUS at 02:12

## 2023-02-13 RX ADMIN — SENNOSIDES AND DOCUSATE SODIUM 1 TABLET: 50; 8.6 TABLET ORAL at 08:45

## 2023-02-13 RX ADMIN — PROCHLORPERAZINE EDISYLATE 5 MG: 5 INJECTION INTRAMUSCULAR; INTRAVENOUS at 02:12

## 2023-02-13 RX ADMIN — PANTOPRAZOLE SODIUM 40 MG: 40 TABLET, DELAYED RELEASE ORAL at 08:45

## 2023-02-13 RX ADMIN — PROCHLORPERAZINE EDISYLATE 5 MG: 5 INJECTION INTRAMUSCULAR; INTRAVENOUS at 08:54

## 2023-02-13 RX ADMIN — HYDROMORPHONE HYDROCHLORIDE 1 MG: 1 INJECTION, SOLUTION INTRAMUSCULAR; INTRAVENOUS; SUBCUTANEOUS at 10:23

## 2023-02-13 RX ADMIN — TADALAFIL 20 MG: 20 TABLET, FILM COATED ORAL at 08:46

## 2023-02-13 RX ADMIN — FUROSEMIDE 20 MG: 20 TABLET ORAL at 08:45

## 2023-02-13 RX ADMIN — PROCHLORPERAZINE EDISYLATE 5 MG: 5 INJECTION INTRAMUSCULAR; INTRAVENOUS at 15:18

## 2023-02-13 RX ADMIN — TREPROSTINIL 7 NG/KG/MIN: 20 INJECTION, SOLUTION INTRAVENOUS; SUBCUTANEOUS at 17:44

## 2023-02-13 RX ADMIN — HYDROMORPHONE HYDROCHLORIDE 0.5 MG: 1 INJECTION, SOLUTION INTRAMUSCULAR; INTRAVENOUS; SUBCUTANEOUS at 18:46

## 2023-02-13 RX ADMIN — TREPROSTINIL 6 NG/KG/MIN: 20 INJECTION, SOLUTION INTRAVENOUS; SUBCUTANEOUS at 04:36

## 2023-02-13 RX ADMIN — HYDROMORPHONE HYDROCHLORIDE 1 MG: 1 INJECTION, SOLUTION INTRAMUSCULAR; INTRAVENOUS; SUBCUTANEOUS at 21:28

## 2023-02-13 RX ADMIN — GRANISETRON HYDROCHLORIDE 0.5 MG: 1 INJECTION, SOLUTION INTRAVENOUS at 17:21

## 2023-02-13 RX ADMIN — GRANISETRON HYDROCHLORIDE 0.5 MG: 1 INJECTION, SOLUTION INTRAVENOUS at 04:38

## 2023-02-13 RX ADMIN — HYDROMORPHONE HYDROCHLORIDE 0.5 MG: 1 INJECTION, SOLUTION INTRAMUSCULAR; INTRAVENOUS; SUBCUTANEOUS at 15:26

## 2023-02-13 RX ADMIN — DIGOXIN 125 MCG: 125 TABLET ORAL at 08:45

## 2023-02-13 ASSESSMENT — ACTIVITIES OF DAILY LIVING (ADL)
ADLS_ACUITY_SCORE: 25
DEPENDENT_IADLS:: INDEPENDENT
ADLS_ACUITY_SCORE: 25

## 2023-02-13 NOTE — CONSULTS
Care Management Initial Consult    General Information  Assessment completed with: Patient, VM-chart review,    Type of CM/SW Visit: Initial Assessment    Primary Care Provider verified and updated as needed: Yes   Readmission within the last 30 days:        Reason for Consult: discharge planning  Advance Care Planning: Advance Care Planning Reviewed: no concerns identified        Communication Assessment  Patient's communication style: spoken language (English or Bilingual)    Hearing Difficulty or Deaf: no   Wear Glasses or Blind: yes    Cognitive  Cognitive/Neuro/Behavioral: WDL  Level of Consciousness: alert  Arousal Level: opens eyes spontaneously  Orientation: oriented x 4  Mood/Behavior: calm, cooperative  Best Language: 0 - No aphasia  Speech: clear, spontaneous, logical    Living Environment:   People in home: spouse, child(thom), dependent  5  Current living Arrangements: house      Able to return to prior arrangements: yes       Family/Social Support:  Care provided by: self  Provides care for:    Marital Status:   , Parent(s)          Description of Support System: Supportive, Involved    Support Assessment: Adequate family and caregiver support    Current Resources:   Patient receiving home care services: No     Community Resources: None  Equipment currently used at home: none  Supplies currently used at home: None    Employment/Financial:  Financial Concerns: No concerns identified     Functional Status:  Prior to admission patient needed assistance:   Dependent ADLs:: Independent  Dependent IADLs:: Independent    Additional Information:  Pt with history of hypothyroidism and obesity who was admitted for pulmonary hypertension workup. Pt now initiating IV remodulin and oral tadalafil for severe PAH.    This writer received update from ISHA Nielsen with [facility] (Ph: [phone]) that she just met with pt for second teaching session.  Per Gia, pt doing well with education, will  need at least one additional training session prior to discharge. Gia plans to come tomorrow. Gia also stated that pt has not been financially cleared for IV remodulin therapy yet. She is requesting this be expedited. Pt having guy placed tomorrow and continuing to up titrate remodulin as able (goal of 8 ng/kg). Tentative plan for discharge Weds if pt tolerating med, drug approved and teaching completed.     Pt will likely need home oxygen arranged for discharge. Will need to confirm pt's physical home address as only PO box listed. Pt requesting to nap at this time. Will follow up tomorrow and coordinate home oxygen testing with bedside RN.     CC will continue to monitor patient's medical condition and progress towards discharge.  Therese Yoo RN BSN  6C Unit Care Coordinator  Phone number: [phone]  Pager: [phone]

## 2023-02-13 NOTE — PLAN OF CARE
Temp: 98.6  F (37  C) Temp src: Oral BP: 124/78 Pulse: 84   Resp: 16 SpO2: 94 % O2 Device: Nasal cannula Oxygen Delivery: 3 LPM     D: Pt admitted 2/7 for pulmonary hypertension evaluation in the setting of progressive dyspnea. Started on IV Remodulin and oral Tadalafil.    I/A: Chelsea (she/her) is A/Ox4. Tele in place, SR (short run of aflutter around 1045). VSS on 1-3L NC this shift. Required 3 following dilaudid admin). R single lumen PICC infusing remodulin @ 6ng/kg/min and L PIV SL in place. Compazine and dilaudid given x1 for nausea and pain. Up SBA/independent.     P: Continue to monitor and follow POC. Plan to titrate up to 7ng/kg/min @ 1800. Notify Cards 2 with changes.     Claudia De Jesus RN

## 2023-02-13 NOTE — TELEPHONE ENCOUNTER
RX sent to local pharmacy. Called to inquire co-pay. Local pharmacy is requiring a PA. This is on file as approved. Reviewed PA approval and [facility]     [facility] Co-pay cost is 542$    Called to update patient to apply for [employer] Website for non-insurance based medications cost of 13$ for a 90 day supply. Medication must be ordered as Tadalafil (cialis). RX sent to pharmacy    Ora Do RN on 2/13/2023 at 11:06 AM

## 2023-02-13 NOTE — PLAN OF CARE
D: Pt admitted 2/7 for pulmonary hypertension evaluation in the setting of progressive dyspnea. Started on IV Remodulin and oral Tadalafil.      I: Monitored vitals and assessed pt status. Performed care and interventions as charted.      A: Pt alert, appropriate. Slept well. No voiced concerns overnight.     Tele - SR/ITW. HR 60-80. VSS. Pt remains on 4L NP. Denies dyspnea.     Pt on Remodulin via SL RT PICC at 6ng/kg/min. Titration q24h by 1 at 1800. Goal 8ng/kg/min. Pt continues to report headache and nausea - PRN meds with effective relief. Received Dilaudid x2 and Compazine x1.    Independent/ standby assist. No BM x1 week - receiving PRN Senna & Miralax.     P: Continue to monitor pt status and report changes to care team. Titrate Remodulin as tolerated. Elba planned for Tuesday.

## 2023-02-13 NOTE — PROGRESS NOTES
Cardiology Progress Note     Changes Today:   - Continue Remodulin 6ng/kg/min  - Increase to 7ng/kg/min at 6pm  - Arreola placement tomorrow    Subjective:  Care team notes reviewed. Patient noted to have episode of SVT this morning while laying in bed. Endorsed palpitations at this time. Denies shortness of breath, chest pain. Returned to NSR without intervention. Notes headache and nausea at time of Remodulin increased however symptoms resolved with PRNs. Plan to continue increase Remodulin at 6p tonight and have Arreola placed tomorrow. No other concerns to address with the team.     Physical Exam   Temp: 98.1  F (36.7  C) Temp src: Oral BP: 116/72 Pulse: 84   Resp: 16 SpO2: 97 % O2 Device: Nasal cannula Oxygen Delivery: 3 LPM    Vital Signs with Ranges  Temp:  [97.5  F (36.4  C)-98.6  F (37  C)] 98.1  F (36.7  C)  Pulse:  [65-92] 84  Resp:  [16-18] 16  BP: (107-124)/(62-78) 116/72  SpO2:  [92 %-97 %] 97 %    Weight  Vitals:    02/10/23 0513 02/11/23 0500 02/11/23 0644 02/12/23 0400   Weight: 74.8 kg (165 lb) 72.2 kg (159 lb 2.8 oz) 71.4 kg (157 lb 4.8 oz) 70.7 kg (155 lb 12.8 oz)    02/13/23 0007   Weight: 70.5 kg (155 lb 6.4 oz)       Resp: 16        - MEDICATION INSTRUCTIONS -       - MEDICATION INSTRUCTIONS -       treprostinil (REMODULIN) intravenous infusion (LESS than or EQUAL to 100 mcg/mL)       treprostinil (REMODULIN) intravenous infusion (LESS than or EQUAL to 100 mcg/mL) 6 ng/kg/min (02/13/23 1255)         digoxin  125 mcg Oral Daily     [Held by provider] enoxaparin ANTICOAGULANT  40 mg Subcutaneous Q24H     furosemide  20 mg Oral Daily     granisetron  0.5 mg Intravenous Q12H     heparin lock flush  5-20 mL Intracatheter Q24H     pantoprazole  40 mg Oral QAM AC     sodium chloride (PF)  10-40 mL Intracatheter Q8H     sodium chloride (PF)  3 mL Intracatheter Q8H     tadalafil  20 mg Oral Q24H       , Blood pressure 116/72, pulse 84, temperature 98.1  F (36.7  C), temperature source Oral, resp. rate  "16, height 1.6 m (5' 3\"), weight 70.5 kg (155 lb 6.4 oz), SpO2 97 %.     Constitutional: awake, alert, cooperative, no apparent distress, and appears stated age. Laying in bed comfortably.  Eyes: EOMI, conjunctiva normal  Respiratory: Satting appropriately on 3L NC. Lungs clear to auscultation bilaterally.   GI: soft, non-distended  Skin: no bruising or bleeding  Neurologic: Awake, alert, oriented to name, place and time.  No focal deficits      Data   Recent Labs   Lab Test 02/13/23  0545 02/12/23  0641    133*   POTASSIUM 4.1 4.0   CHLORIDE 97* 97*   CO2 30* 24   ANIONGAP 12 12   GLC 80 77   BUN 8.1 9.6   CR 0.72 0.59   RIGO 9.5 8.8       Lab Results   Component Value Date    WBC 7.4 02/13/2023     Lab Results   Component Value Date    RBC 4.97 02/13/2023     Lab Results   Component Value Date    HGB 16.5 02/13/2023     Lab Results   Component Value Date    HCT 51.5 02/13/2023     No components found for: MCT  Lab Results   Component Value Date     02/13/2023     Lab Results   Component Value Date    MCH 33.2 02/13/2023     Lab Results   Component Value Date    MCHC 32.0 02/13/2023     Lab Results   Component Value Date    RDW 13.6 02/13/2023     Lab Results   Component Value Date     02/13/2023        Liver Function Studies -   Recent Labs   Lab Test 02/07/23  1552   PROTTOTAL 6.2*   ALBUMIN 3.9   BILITOTAL 0.9   ALKPHOS 85   AST 32   ALT 24     Assessment & Plan          Ms Chelsea Flower is a 32 year old lady with a history of obesity and hypothyroidism who presents for pulmonary hypertension evaluation in the setting of progressive dyspnea. The patient is hemodynamically stable, on room air, and in no distress. Her TTE demonstrates that she has severe RV dysfunction and dilation now s/p RHC with negative vasodilator study, now initiating IV remodulin and oral tadalafil for severe PAH.      #Pulmonary Hypertension  Workup to date includes RHC with RA 8, PA 62/48/55, PCWP 8; V/Q scan with " low probability of PE; and TTE with LVEF 57% and PASP estimated 63 with no comment on RV function (all performed at OSH, not in Care Everywhere).   The patient's outpatient pulmonary hypertension provider, Dr Carroll, recommended admission for RHC with Vasodilator study to further evaluate her pulmonary hypertension.   In terms of the etiology of her pulmonary hypertension, the patient has no history or evidence of cardiac disease to suggest WHO group II and a distant history of reactive airways disease but no apparent severe lung diseases to suggest WHO group III. Additionally, her V/Q scan is not suggestive of a group IV etiology. She has a positive CLINTON and DS-DNA however without additional clinical evidence of connective tissue or autoimmune disease but this raises the possibility of PAH vs other WHO group V etiology.   Patient has been having significant side effects following initiation of Remodulin including hypoxia, headaches, nausea, and vomiting. Patient currently requiring 3L NC to maintain O2 saturation > 90%.   - Continue to increase Remodulin 1ng/kg/min q24h   -Kytril 30 minutes prior to uptitration   -Headaches: tylenol 975mg q6h PRN, dilaudid 0.5-1mg q3h PRN   -Nausea: Compazine PRN  -Continue Tadalafil 20mg daily   -F/U Repeat CLINTON    -RF negative   -If CLINTON positive, will consult rheum for evaluation for possible CTD  -Patient has been recommended to not breast feed while on this medication and will be pumping and dumping breast milk   -Health Psychology Consult     #Hypothyroidism  -TSH 5.74   -repeat TFT in 6-8 weeks during outpt      Diet: Regular  PPX: LMWH  Code: Full Code    This patient was seen and discussed with Dr. Falcon who agrees with the above assessment and plan.     Maryjo Mckeon MD  Internal Medicine PGY-1

## 2023-02-13 NOTE — PROGRESS NOTES
CLINICAL NUTRITION SERVICES    Checking oral intake.     Diet: Regular. Has a prn snack/supplement order.   Intake: Per nursing staff, increasing appetite (fair appetite prior). Has had some nausea. Per discussion with pt, eating less than normal. Has snacks on her bedside table and is thinking about eating. States she is sleeping a lot. Her RNs are making her toast in the morning, and pt states that has been helping for her appetite and nausea.     INTERVENTIONS:  Implementation:  Medical food supplement therapy, Modify composition of meals/snacks: Discussed potential snacks to order and gave oral supplement list (explained and gave ideas). Pt may request these prn. Discussed nutrition interventions to help cope with nausea.     Follow up/Monitoring:  Will continue to follow pt.    Liz Blount, MS, RD, LD, [facility]   6C Pgr: [phone]

## 2023-02-13 NOTE — LACTATION NOTE
Consult for: Medication and pumping questions    Met with Chelsea over the phone. She shares that her daughter is almost 2 and that she and her  have made the decision to start the weaning process while Chelsea is in the hospital.    Chelsea has been pumping and dumping due to Remodulin therapy. She was encouraged to review her medications with her daughter's pediatrician to determine safety with breastfeeding while going through the weaning process as needed. Her pediatrician will be able to review the medications she is taking in the context of any growth or health concerns based on her daughter's medical history.    Chelsea noted some breast discomfort a few days ago when she went a longer time between pumping. This has resolved. I reviewed tips for weaning with her and encouraged her to start slow. Some women are able to drop 1 pumping per day while others may need to give their bodies a few days to adjust between dropping a pumping/feeding.    Chelsea was encouraged to call with any questions or concerns while she is in the hospital. A Lactation Consultant can be reached on Ascom *[phone] during the day shift M,T,W,F this week or she can leave a voicemail at [phone] and we'll follow up on our next scheduled shift.         Elena Collazo RN, IBCLC  Lactation Consultant  Ascom: [phone]  Office: [phone]

## 2023-02-13 NOTE — CONSULTS
"    Interventional Radiology Consult Service Note    Patient is on IR schedule tomorrow, 2/14 for a tunneled single lumen central venous catheter placement for Remodulin therapy.  She is a nursing mother, but has been told to pump and dump due to Remodulin therapy.   Labs WNL for procedure.   NPO at midnight 2/14 for sedation    Consent will be done prior to procedure.     Please contact the IR charge RN at [phone] for estimated time of procedure.     This is a 32 year old female with new diagnosis of pulmonary HTN with progressive dyspnea which developed after giving birth to her 3rd child via C section recently.    Vitals:   /75 (BP Location: Left arm)   Pulse 65   Temp 97.5  F (36.4  C) (Oral)   Resp 16   Ht 1.6 m (5' 3\")   Wt 70.5 kg (155 lb 6.4 oz)   SpO2 94%   BMI 27.53 kg/m      Pertinent Labs:     Lab Results   Component Value Date    WBC 7.4 02/13/2023    WBC 9.6 02/12/2023    WBC 10.8 02/11/2023       Lab Results   Component Value Date    HGB 16.5 02/13/2023    HGB 16.4 02/12/2023    HGB 17.0 02/11/2023       Lab Results   Component Value Date     02/13/2023     02/12/2023     02/11/2023       Lab Results   Component Value Date    INR 1.22 (H) 02/07/2023    INR 1.2 (H) 01/04/2023    PTT 27 02/07/2023       Lab Results   Component Value Date    POTASSIUM 4.1 02/13/2023        Medhat Vásquez PA-C  Interventional Radiology  Pager: [phone]  "

## 2023-02-13 NOTE — PHARMACY-CONSULT NOTE
Parenteral Prostacyclin Therapy Concentration Change    This patient was initiated on a continuous IV prostacyclin infusion Treprostinil (Remodulin) for the treatment of pulmonary hypertension.       The dose is being actively titrated up by 1 ng/kg/min every 24 hours.     The patient is currently receiving a dose of 6 ng/kg/min.     The current concentration ordered is 5 mcg/mL, resulting in a rate of 5.44 mL/hr.      To better accommodate the current dose and future titrations, the concentration of the syringes needs to be changed.     The pharmacist is updating the Treprostinil (Remodulin) order to a concentration of 10 mcg/mL.     New syringes will be dispensed by central pharmacy and any old syringes will be removed from the unit.   The pharmacist communicated with the patient's nurse regarding this plan. The nurse is aware of the process for changing over to a new concentration as outlined in our Parenteral Prostacyclin Policy.    Jenifer Evans RPH on 2/13/2023 at 11:47 AM

## 2023-02-14 ENCOUNTER — APPOINTMENT (OUTPATIENT)
Dept: INTERVENTIONAL RADIOLOGY/VASCULAR | Facility: CLINIC | Age: 33
End: 2023-02-14
Attending: PHYSICIAN ASSISTANT
Payer: COMMERCIAL

## 2023-02-14 LAB
ANA PAT SER IF-IMP: ABNORMAL
ANA SER QL IF: POSITIVE
ANA TITR SER IF: ABNORMAL {TITER}
ANION GAP SERPL CALCULATED.3IONS-SCNC: 12 MMOL/L (ref 7–15)
ATRIAL RATE - MUSE: 74 BPM
BASOPHILS # BLD AUTO: 0.1 10E3/UL (ref 0–0.2)
BASOPHILS NFR BLD AUTO: 1 %
BUN SERPL-MCNC: 9.4 MG/DL (ref 6–20)
CALCIUM SERPL-MCNC: 9.2 MG/DL (ref 8.6–10)
CHLORIDE SERPL-SCNC: 100 MMOL/L (ref 98–107)
CREAT SERPL-MCNC: 0.63 MG/DL (ref 0.51–0.95)
DEPRECATED HCO3 PLAS-SCNC: 25 MMOL/L (ref 22–29)
DIASTOLIC BLOOD PRESSURE - MUSE: NORMAL MMHG
EOSINOPHIL # BLD AUTO: 0.1 10E3/UL (ref 0–0.7)
EOSINOPHIL NFR BLD AUTO: 2 %
ERYTHROCYTE [DISTWIDTH] IN BLOOD BY AUTOMATED COUNT: 13.5 % (ref 10–15)
GFR SERPL CREATININE-BSD FRML MDRD: >90 ML/MIN/1.73M2
GLUCOSE SERPL-MCNC: 86 MG/DL (ref 70–99)
HCT VFR BLD AUTO: 49.7 % (ref 35–47)
HGB BLD-MCNC: 16.6 G/DL (ref 11.7–15.7)
IMM GRANULOCYTES # BLD: 0 10E3/UL
IMM GRANULOCYTES NFR BLD: 1 %
INTERPRETATION ECG - MUSE: NORMAL
LYMPHOCYTES # BLD AUTO: 1.8 10E3/UL (ref 0.8–5.3)
LYMPHOCYTES NFR BLD AUTO: 23 %
MAGNESIUM SERPL-MCNC: 1.8 MG/DL (ref 1.7–2.3)
MCH RBC QN AUTO: 34.2 PG (ref 26.5–33)
MCHC RBC AUTO-ENTMCNC: 33.4 G/DL (ref 31.5–36.5)
MCV RBC AUTO: 102 FL (ref 78–100)
MONOCYTES # BLD AUTO: 0.6 10E3/UL (ref 0–1.3)
MONOCYTES NFR BLD AUTO: 8 %
NEUTROPHILS # BLD AUTO: 5.3 10E3/UL (ref 1.6–8.3)
NEUTROPHILS NFR BLD AUTO: 65 %
NRBC # BLD AUTO: 0 10E3/UL
NRBC BLD AUTO-RTO: 0 /100
P AXIS - MUSE: 38 DEGREES
PLATELET # BLD AUTO: 143 10E3/UL (ref 150–450)
POTASSIUM SERPL-SCNC: 4 MMOL/L (ref 3.4–5.3)
PR INTERVAL - MUSE: 134 MS
QRS DURATION - MUSE: 92 MS
QT - MUSE: 370 MS
QTC - MUSE: 410 MS
R AXIS - MUSE: 164 DEGREES
RBC # BLD AUTO: 4.86 10E6/UL (ref 3.8–5.2)
SODIUM SERPL-SCNC: 137 MMOL/L (ref 136–145)
SYSTOLIC BLOOD PRESSURE - MUSE: NORMAL MMHG
T AXIS - MUSE: -51 DEGREES
VENTRICULAR RATE- MUSE: 74 BPM
WBC # BLD AUTO: 8 10E3/UL (ref 4–11)

## 2023-02-14 PROCEDURE — 36415 COLL VENOUS BLD VENIPUNCTURE: CPT | Performed by: STUDENT IN AN ORGANIZED HEALTH CARE EDUCATION/TRAINING PROGRAM

## 2023-02-14 PROCEDURE — 250N000013 HC RX MED GY IP 250 OP 250 PS 637: Performed by: STUDENT IN AN ORGANIZED HEALTH CARE EDUCATION/TRAINING PROGRAM

## 2023-02-14 PROCEDURE — 999N000248 HC STATISTIC IV INSERT WITH US BY RN

## 2023-02-14 PROCEDURE — 250N000009 HC RX 250

## 2023-02-14 PROCEDURE — 80048 BASIC METABOLIC PNL TOTAL CA: CPT | Performed by: STUDENT IN AN ORGANIZED HEALTH CARE EDUCATION/TRAINING PROGRAM

## 2023-02-14 PROCEDURE — 250N000011 HC RX IP 250 OP 636: Performed by: STUDENT IN AN ORGANIZED HEALTH CARE EDUCATION/TRAINING PROGRAM

## 2023-02-14 PROCEDURE — 272N000602 HC WOUND GLUE CR1

## 2023-02-14 PROCEDURE — 272N000504 HC NEEDLE CR4

## 2023-02-14 PROCEDURE — 250N000013 HC RX MED GY IP 250 OP 250 PS 637

## 2023-02-14 PROCEDURE — 214N000001 HC R&B CCU UMMC

## 2023-02-14 PROCEDURE — 85025 COMPLETE CBC W/AUTO DIFF WBC: CPT | Performed by: STUDENT IN AN ORGANIZED HEALTH CARE EDUCATION/TRAINING PROGRAM

## 2023-02-14 PROCEDURE — 250N000011 HC RX IP 250 OP 636: Performed by: PHYSICIAN ASSISTANT

## 2023-02-14 PROCEDURE — 250N000011 HC RX IP 250 OP 636

## 2023-02-14 PROCEDURE — 77001 FLUOROGUIDE FOR VEIN DEVICE: CPT | Mod: 26 | Performed by: PHYSICIAN ASSISTANT

## 2023-02-14 PROCEDURE — 36558 INSERT TUNNELED CV CATH: CPT | Performed by: PHYSICIAN ASSISTANT

## 2023-02-14 PROCEDURE — 83735 ASSAY OF MAGNESIUM: CPT | Performed by: STUDENT IN AN ORGANIZED HEALTH CARE EDUCATION/TRAINING PROGRAM

## 2023-02-14 PROCEDURE — 99152 MOD SED SAME PHYS/QHP 5/>YRS: CPT | Performed by: PHYSICIAN ASSISTANT

## 2023-02-14 PROCEDURE — C1769 GUIDE WIRE: HCPCS

## 2023-02-14 PROCEDURE — C1751 CATH, INF, PER/CENT/MIDLINE: HCPCS

## 2023-02-14 PROCEDURE — 76937 US GUIDE VASCULAR ACCESS: CPT | Mod: 26 | Performed by: PHYSICIAN ASSISTANT

## 2023-02-14 PROCEDURE — 272N000004 HC RX 272: Performed by: INTERNAL MEDICINE

## 2023-02-14 PROCEDURE — 250N000011 HC RX IP 250 OP 636: Performed by: INTERNAL MEDICINE

## 2023-02-14 PROCEDURE — 99152 MOD SED SAME PHYS/QHP 5/>YRS: CPT

## 2023-02-14 PROCEDURE — 99233 SBSQ HOSP IP/OBS HIGH 50: CPT | Mod: GC | Performed by: INTERNAL MEDICINE

## 2023-02-14 RX ORDER — FENTANYL CITRATE 50 UG/ML
25-50 INJECTION, SOLUTION INTRAMUSCULAR; INTRAVENOUS EVERY 5 MIN PRN
Status: DISCONTINUED | OUTPATIENT
Start: 2023-02-14 | End: 2023-02-14

## 2023-02-14 RX ORDER — NALOXONE HYDROCHLORIDE 0.4 MG/ML
0.4 INJECTION, SOLUTION INTRAMUSCULAR; INTRAVENOUS; SUBCUTANEOUS
Status: DISCONTINUED | OUTPATIENT
Start: 2023-02-14 | End: 2023-02-14

## 2023-02-14 RX ORDER — HEPARIN SODIUM,PORCINE 10 UNIT/ML
5-20 VIAL (ML) INTRAVENOUS EVERY 24 HOURS
Status: DISCONTINUED | OUTPATIENT
Start: 2023-02-14 | End: 2023-02-18 | Stop reason: HOSPADM

## 2023-02-14 RX ORDER — HEPARIN SODIUM (PORCINE) LOCK FLUSH IV SOLN 100 UNIT/ML 100 UNIT/ML
5 SOLUTION INTRAVENOUS
Status: COMPLETED | OUTPATIENT
Start: 2023-02-14 | End: 2023-02-14

## 2023-02-14 RX ORDER — HEPARIN SODIUM,PORCINE 10 UNIT/ML
5-20 VIAL (ML) INTRAVENOUS
Status: DISCONTINUED | OUTPATIENT
Start: 2023-02-14 | End: 2023-02-18 | Stop reason: HOSPADM

## 2023-02-14 RX ORDER — NALOXONE HYDROCHLORIDE 0.4 MG/ML
0.2 INJECTION, SOLUTION INTRAMUSCULAR; INTRAVENOUS; SUBCUTANEOUS
Status: DISCONTINUED | OUTPATIENT
Start: 2023-02-14 | End: 2023-02-14

## 2023-02-14 RX ORDER — CEFAZOLIN SODIUM 2 G/100ML
2 INJECTION, SOLUTION INTRAVENOUS
Status: COMPLETED | OUTPATIENT
Start: 2023-02-14 | End: 2023-02-14

## 2023-02-14 RX ORDER — FLUMAZENIL 0.1 MG/ML
0.2 INJECTION, SOLUTION INTRAVENOUS
Status: DISCONTINUED | OUTPATIENT
Start: 2023-02-14 | End: 2023-02-14

## 2023-02-14 RX ADMIN — DIGOXIN 125 MCG: 125 TABLET ORAL at 08:46

## 2023-02-14 RX ADMIN — HYDROMORPHONE HYDROCHLORIDE 0.5 MG: 1 INJECTION, SOLUTION INTRAMUSCULAR; INTRAVENOUS; SUBCUTANEOUS at 22:36

## 2023-02-14 RX ADMIN — LIDOCAINE HYDROCHLORIDE 8 ML: 10 INJECTION, SOLUTION EPIDURAL; INFILTRATION; INTRACAUDAL; PERINEURAL at 14:23

## 2023-02-14 RX ADMIN — TREPROSTINIL 7 NG/KG/MIN: 20 INJECTION, SOLUTION INTRAVENOUS; SUBCUTANEOUS at 07:17

## 2023-02-14 RX ADMIN — PANTOPRAZOLE SODIUM 40 MG: 40 TABLET, DELAYED RELEASE ORAL at 08:46

## 2023-02-14 RX ADMIN — MIDAZOLAM HYDROCHLORIDE 1.5 MG: 1 INJECTION, SOLUTION INTRAMUSCULAR; INTRAVENOUS at 14:23

## 2023-02-14 RX ADMIN — HYDROMORPHONE HYDROCHLORIDE 0.5 MG: 1 INJECTION, SOLUTION INTRAMUSCULAR; INTRAVENOUS; SUBCUTANEOUS at 10:46

## 2023-02-14 RX ADMIN — Medication 5 ML: at 08:47

## 2023-02-14 RX ADMIN — SODIUM CHLORIDE, PRESERVATIVE FREE 1 ML: 5 INJECTION INTRAVENOUS at 14:26

## 2023-02-14 RX ADMIN — GRANISETRON HYDROCHLORIDE 0.5 MG: 1 INJECTION, SOLUTION INTRAVENOUS at 05:20

## 2023-02-14 RX ADMIN — PROCHLORPERAZINE EDISYLATE 5 MG: 5 INJECTION INTRAMUSCULAR; INTRAVENOUS at 22:35

## 2023-02-14 RX ADMIN — GRANISETRON HYDROCHLORIDE 0.5 MG: 1 INJECTION, SOLUTION INTRAVENOUS at 17:30

## 2023-02-14 RX ADMIN — FUROSEMIDE 20 MG: 20 TABLET ORAL at 08:46

## 2023-02-14 RX ADMIN — TADALAFIL 20 MG: 20 TABLET, FILM COATED ORAL at 10:41

## 2023-02-14 RX ADMIN — HYDROMORPHONE HYDROCHLORIDE 0.5 MG: 1 INJECTION, SOLUTION INTRAMUSCULAR; INTRAVENOUS; SUBCUTANEOUS at 15:07

## 2023-02-14 RX ADMIN — FENTANYL CITRATE 75 MCG: 50 INJECTION, SOLUTION INTRAMUSCULAR; INTRAVENOUS at 14:23

## 2023-02-14 RX ADMIN — SENNOSIDES AND DOCUSATE SODIUM 1 TABLET: 50; 8.6 TABLET ORAL at 17:36

## 2023-02-14 RX ADMIN — HYDROMORPHONE HYDROCHLORIDE 0.5 MG: 1 INJECTION, SOLUTION INTRAMUSCULAR; INTRAVENOUS; SUBCUTANEOUS at 01:35

## 2023-02-14 RX ADMIN — TREPROSTINIL 8 NG/KG/MIN: 20 INJECTION, SOLUTION INTRAVENOUS; SUBCUTANEOUS at 21:22

## 2023-02-14 RX ADMIN — HYDROMORPHONE HYDROCHLORIDE 0.5 MG: 1 INJECTION, SOLUTION INTRAMUSCULAR; INTRAVENOUS; SUBCUTANEOUS at 19:36

## 2023-02-14 RX ADMIN — TREPROSTINIL 8 NG/KG/MIN: 20 INJECTION, SOLUTION INTRAVENOUS; SUBCUTANEOUS at 17:57

## 2023-02-14 RX ADMIN — ENOXAPARIN SODIUM 40 MG: 40 INJECTION SUBCUTANEOUS at 20:10

## 2023-02-14 RX ADMIN — HYDROMORPHONE HYDROCHLORIDE 0.5 MG: 1 INJECTION, SOLUTION INTRAMUSCULAR; INTRAVENOUS; SUBCUTANEOUS at 05:30

## 2023-02-14 RX ADMIN — CEFAZOLIN SODIUM 2 G: 2 INJECTION, SOLUTION INTRAVENOUS at 14:22

## 2023-02-14 RX ADMIN — POLYETHYLENE GLYCOL 3350 17 G: 17 POWDER, FOR SOLUTION ORAL at 15:54

## 2023-02-14 ASSESSMENT — ACTIVITIES OF DAILY LIVING (ADL)
ADLS_ACUITY_SCORE: 25
ADLS_ACUITY_SCORE: 30
ADLS_ACUITY_SCORE: 31
ADLS_ACUITY_SCORE: 31
ADLS_ACUITY_SCORE: 25
ADLS_ACUITY_SCORE: 25
ADLS_ACUITY_SCORE: 31
ADLS_ACUITY_SCORE: 31
ADLS_ACUITY_SCORE: 25
ADLS_ACUITY_SCORE: 31
ADLS_ACUITY_SCORE: 30
ADLS_ACUITY_SCORE: 31

## 2023-02-14 NOTE — PROGRESS NOTES
Care Management Follow Up    Length of Stay (days): 6    Expected Discharge Date: 02/15/2023     Concerns to be Addressed: Discharge planning  Patient plan of care discussed at interdisciplinary rounds: Yes    Anticipated Discharge Disposition: Home     Anticipated Discharge Services: Home Infusion  Anticipated Discharge DME: Home Oxygen    Patient/family educated on Medicare website which has current facility and service quality ratings: No  Education Provided on the Discharge Plan: Yes  Patient/Family in Agreement with the Plan: Yes    Additional Information:  Per MD, pt will likely still be medically ready for discharge tomorrow. Plan to titrate up to goal remodulin rate later today. Plan for guy placement this afternoon. MD placed orders and face to face for home oxygen. Discussed with bedside RN who will complete home oxygen sat test today.     Met with pt to update on discharge planning, still awaiting insurance auth for home IV remodulin. Starting home oxygen arrangements. Pt feels teaching is going well with [facility] and does not currently have any concerns about managing home IV remodulin once ready for discharge.     Pt's family will come pick her up once she is ready for discharge.     Confirmed with per her physical home address:  [address] [place] [address]  Phone: [phone]  Laci Mary Fabio: [phone]  Discussed with Mary who will start looking for a home oxygen supplier near pt's home in [place].    [facility]  Gia RN: [phone]    1211: Received update from Gia with [facility] that they have still not received prior auth for pt's remodulin. Since pt is having guy placed this afternoon she will plan to come to the hospital tomorrow morning for education. She has already communicated this with pt. Gia stated she will update this writer tomorrow morning. Updated team that discharge will likely need to be Thursday at the soonest since prior  auth still pending.    Received update form Mary with  Home [facility] that [facility] services pt's home area. She will send all documentation once completed.     CC will continue to monitor patient's medical condition and progress towards discharge.  Therese Yoo RN BSN  6C Unit Care Coordinator  Phone number: [phone]  Pager: [phone]

## 2023-02-14 NOTE — PROGRESS NOTES
"Cardiology Progress Note     Changes Today:   - Continue Remodulin 7 ng/kg/min  - Increase to 8ng/kg/min at 6pm  - Arreola placement today. Awaiting insurance approval. Tentative discharge Thursday if approval.  - Home oxygen 3 liters ordered    Subjective:  No complaints. Patient feeling well.      Intake/Output Summary (Last 24 hours) at 2/14/2023 1506  Last data filed at 2/14/2023 0659  Gross per 24 hour   Intake 413.62 ml   Output --   Net 413.62 ml         Physical Exam   Temp: 97.7  F (36.5  C) Temp src: Oral BP: 123/79 Pulse: 80   Resp: 11 SpO2: 94 % O2 Device: Nasal cannula Oxygen Delivery: 3 LPM    Vital Signs with Ranges  Temp:  [97.7  F (36.5  C)-98.8  F (37.1  C)] 97.7  F (36.5  C)  Pulse:  [] 80  Resp:  [10-19] 11  BP: (108-130)/(65-80) 123/79  Cuff Mean (mmHg):  [77-90] 90  SpO2:  [93 %-98 %] 94 %    Weight  Vitals:    02/11/23 0500 02/11/23 0644 02/12/23 0400 02/13/23 0007   Weight: 72.2 kg (159 lb 2.8 oz) 71.4 kg (157 lb 4.8 oz) 70.7 kg (155 lb 12.8 oz) 70.5 kg (155 lb 6.4 oz)    02/14/23 0524   Weight: 70.2 kg (154 lb 12.8 oz)       Resp: 11        - MEDICATION INSTRUCTIONS -       - MEDICATION INSTRUCTIONS -       sodium chloride 0.9%       treprostinil (REMODULIN) intravenous infusion (LESS than or EQUAL to 100 mcg/mL) 7 ng/kg/min (02/14/23 0717)         digoxin  125 mcg Oral Daily     [Held by provider] enoxaparin ANTICOAGULANT  40 mg Subcutaneous Q24H     furosemide  20 mg Oral Daily     granisetron  0.5 mg Intravenous Q12H     heparin lock flush  5-20 mL Intracatheter Q24H     pantoprazole  40 mg Oral QAM AC     sodium chloride (PF)  10-40 mL Intracatheter Q8H     sodium chloride (PF)  3 mL Intracatheter Q8H     tadalafil  20 mg Oral Q24H       , Blood pressure 123/79, pulse 80, temperature 97.7  F (36.5  C), temperature source Oral, resp. rate 11, height 1.6 m (5' 3\"), weight 70.2 kg (154 lb 12.8 oz), SpO2 94 %.     Constitutional: awake, alert, cooperative, no apparent distress, and " appears stated age. Laying in bed comfortably.  Eyes: EOMI, conjunctiva normal  Respiratory: Satting appropriately on 3L NC. Lungs clear to auscultation bilaterally.   GI: soft, non-distended  Skin: no bruising or bleeding  Neurologic: Awake, alert, oriented to name, place and time.  No focal deficits      Data    Lab Results   Component Value Date    WBC 8.0 02/14/2023    HGB 16.6 (H) 02/14/2023    HCT 49.7 (H) 02/14/2023     (L) 02/14/2023     02/14/2023    POTASSIUM 4.0 02/14/2023    CHLORIDE 100 02/14/2023    CO2 25 02/14/2023    BUN 9.4 02/14/2023    CR 0.63 02/14/2023    GLC 86 02/14/2023    AST 32 02/07/2023    ALT 24 02/07/2023    ALKPHOS 85 02/07/2023    BILITOTAL 0.9 02/07/2023    INR 1.22 (H) 02/07/2023           Assessment & Plan          Ms Chelsea Flower is a 32 year old lady with a history of obesity and hypothyroidism who presents for pulmonary hypertension evaluation in the setting of progressive dyspnea. The patient is hemodynamically stable, on room air, and in no distress. Her TTE demonstrates that she has severe RV dysfunction and dilation now s/p RHC with negative vasodilator study, now initiating IV remodulin and oral tadalafil for severe PAH.      #Pulmonary Hypertension  Workup to date includes RHC with RA 8, PA 62/48/55, PCWP 8; V/Q scan with low probability of PE; and TTE with LVEF 57% and PASP estimated 63 with no comment on RV function (all performed at OSH, not in Care Everywhere).   The patient's outpatient pulmonary hypertension provider, Dr Carroll, recommended admission for RHC with Vasodilator study to further evaluate her pulmonary hypertension.   In terms of the etiology of her pulmonary hypertension, the patient has no history or evidence of cardiac disease to suggest WHO group II and a distant history of reactive airways disease but no apparent severe lung diseases to suggest WHO group III. Additionally, her V/Q scan is not suggestive of a group IV etiology.  She has a positive CLINTON and DS-DNA however without additional clinical evidence of connective tissue or autoimmune disease but this raises the possibility of PAH vs other WHO group V etiology.   Patient has been having significant side effects following initiation of Remodulin including hypoxia, headaches, nausea, and vomiting. Patient currently requiring 3L NC to maintain O2 saturation > 90%.   - Continue to increase Remodulin 1ng/kg/min q24h up to 8 ng/kg/min   -Kytril 30 minutes prior to uptitration   -Headaches: tylenol 975mg q6h PRN, dilaudid 0.5-1mg q3h PRN   -Nausea: Compazine PRN  -Continue Tadalafil 20mg daily   -F/U Repeat CLINTON    -RF negative   -If CLINTON positive, will consult rheum for evaluation for possible CTD  -Patient has been recommended to not breast feed while on this medication and will be pumping and dumping breast milk   -Health Psychology Consult     #Hypothyroidism  -TSH 5.74   -repeat TFT in 6-8 weeks during outpt      Diet: Regular  PPX: LMWH  Code: Full Code    This patient was seen and discussed with Dr. Falcon who agrees with the above assessment and plan.     Manjeet Milian MD  Cardiovascular disease fellow  [facility]  [phone]  02/14/2023 3:16 PM

## 2023-02-14 NOTE — PLAN OF CARE
Pt  is a 32 year old lady with a history of obesity and hypothyroidism who presents for pulmonary hypertension evaluation in the setting of progressive dyspnea. The patient is hemodynamically stable, on room air, and in no distress. Her TTE demonstrates that she has severe RV dysfunction and dilation now s/p RHC with negative vasodilator study, now initiating IV remodulin and oral tadalafil for severe PAH.   Shift: 15:00-23:30  Neuro: A&O x 4  Resp: Lungs diminished, sats 90s on 3lnc. Remodulin increased to 7 ng/kg/min. Tolerating well with antinausea and pain meds.(Headache)  CV: VSS, NSR.  GI/: Ate supper tonight, tolerated well. Voided urine x 1 in the BR.  Mobility: Up independently to the BR.  Plan: NPO after MN for guy placement tomorrow. Increase remodulin to 8 ng/kg/min tomorrow stephanie at 6 PM.

## 2023-02-14 NOTE — PLAN OF CARE
Patient has been assessed for Home Oxygen needs. Oxygen readings:    *Pulse oximetry (SpO2) = 82% on room air at rest while awake.    *SpO2 improved to 94% on 2 liters/minute at rest.    *SpO2 = 80% on room air during activity/with exercise.    *SpO2 improved to 92% on 4 liters/minute during activity/with exercise.

## 2023-02-14 NOTE — PRE-PROCEDURE
GENERAL PRE-PROCEDURE:   Procedure:  IR CVC placement  Date/Time:  2/14/2023 1:31 PM    Written consent obtained?: Yes    Risks and benefits: Risks, benefits and alternatives were discussed    Consent given by:  Patient  Patient states understanding of procedure being performed: Yes    Patient's understanding of procedure matches consent: Yes    Procedure consent matches procedure scheduled: Yes    Expected level of sedation:  Moderate  Appropriately NPO:  Yes  ASA Class:  2  Mallampati  :  Grade 1- soft palate, uvula, tonsillar pillars, and posterior pharyngeal wall visible  Lungs:  Lungs clear with good breath sounds bilaterally  Heart:  Normal heart sounds and rate  History & Physical reviewed:  History and physical reviewed and no updates needed  Statement of review:  I have reviewed the lab findings, diagnostic data, medications, and the plan for sedation

## 2023-02-14 NOTE — PROGRESS NOTES
Patient Name: Chelsea Flower  Medical Record Number: [id number]  Today's Date: 2/14/2023    Procedure: Tunneled line placement.  Proceduralist: VENTURA [facility].  Pathology present: n/a    Procedure Start: 1350  Procedure end: 1430  Sedation medications administered: Fentanyl:75 mcg Versed:1.5mg     Priming Volume: 0.48    Report given to: JULIANNA, RN  : n/a    Other Notes: Pt arrived to IR room 2 from . Consent reviewed. Pt denies any questions or concerns regarding procedure. Pt positioned supine and monitored per protocol.  Pt went into SVT briefly after advancement of catheter, resolved after 3 minutes, pt was asymptomatic. Pt tolerated procedure without any other noted complications. Pt transferred back to .    Olga Lidia Nazario RN

## 2023-02-14 NOTE — PLAN OF CARE
Hours of care: 1900 - 0700    Temp: 98.3  F (36.8  C) Temp src: Oral BP: 108/65 Pulse: 80   Resp: 16 SpO2: 93 % O2 Device: Nasal cannula Oxygen Delivery: 3 LPM    History of obesity, hypothyroidism, asthma. Admitted for pulmonary artery hypertension with progressive dyspnea.    Pt is A&O x4. VSS. On 3L via nasal cannula.  R single lumen PICC infusing Remodulin @ 7ng/kg/min. Tirtating to 8 @ 6PM. Pt continues to experience side effects from medication, intermittent nausea and persistent headaches. Rates pain 5/10. IV Dilaudid 0.5 mg and IV Kytril 0.5 mg given. Pt states last BM was 2/6/2023, bowel sounds are hypoactive. Encouraged fluid intake. Pt is up independently. NPO after midnight for guy placement today. Weight taken. Will continue plan of care.

## 2023-02-14 NOTE — PROGRESS NOTES
Brief Social Work Progress Note      In morning discharge rounds, the Cards 2 provider reported that the pt will probably discharge home today but was requesting another parking pass. SW got the pt a weekly parking pass from the main Care Management office and gave the pass to the pt at bedside. Pt thanked the SW.       ___________________    DALLIN Roche, LGSW  6C   [facility]  Phone: [phone]  Pager: [phone]

## 2023-02-14 NOTE — PROGRESS NOTES
Hours of Care:  2300 - 0700    Temp:  [97.5  F (36.4  C)-98.6  F (37  C)] 98.5  F (36.9  C)  Pulse:  [65-84] 80  Resp:  [16-18] 18  BP: (107-124)/(65-78) 108/65  Cuff Mean (mmHg):  [77] 77  SpO2:  [93 %-97 %] 93 %     D: Ms Chelsea Flower is a 32 year old lady with a history of obesity and hypothyroidism who presents for pulmonary hypertension evaluation in the setting of progressive dyspnea. The patient is hemodynamically stable, on room air, and in no distress. Her TTE demonstrates that she has severe RV dysfunction and dilation now s/p RHC with negative vasodilator study, now initiating IV remodulin and oral tadalafil for severe PAH.     I: Monitored vitals and assessed pt status.     Running: Remodulin 7 ng/kg/min  PRN: Hydromorphone IVP x2  Tele: NSR  O2: 3 L O2 via nasal cannula  Mobility: Independent    A: Neuro: A/O x4.  Call light appropriate.  Able to make needs known.  Respiratory:  On 3 L humidified oxygen.  Lung sounds diminished.  Denies shortness of breath at rest.  Removes oxygen while ambulating to bathroom.  O2 sats decreasing into low 70s ambulating on room air.  Cardiac: VSS.  Enoxaparin on hold.  GI: Last BM 2/6.  Patient reporting constipation.  Bowel sounds hypoactive.  Abdomen soft and non tender.  Reports decrease in gas production.  Received PRN senna-s.  Encouraging activity, PRN bowel medications, and fluids.  Reports occasional nausea. Has PRN compazine orders.  Receives Kytril IVP Q12H  : Urinating adequate amounts of clear, yellow urine.  Patient not collecting urine.  Skin:  See PCS for assessment and treatment of wounds and surgical incisions.  LDA:  22 G PIV LFA.  Single lumen PICC LUE  Drips:  Remodulin  Electrolytes: RN managed Mg and K.  Lab pending.  Pain: Reporting 3-5/10 headache.  PRN hydromorphone IVP administered x2 which was effective per pt report  Isolation:  Standard Precautions  Tests/procedures: None performed overnight.  Diet: NPO after midnight.  Otherwise  regular diet  Sleep:  No sleep disturbances noted or reported.  Social:  Patient starting to wean child from breastfeeding.      P: Continue to monitor Pt status and report changes to Cards 2.  Plan for Hickmann placement today.  Plan to increase Remodulin at 1800 to 8 ng/kg/min (goal rate).  Plan for continued teaching from [facility].  Anticipated discharge home on oxygen on 2/15 if tolerating Remodulin and all home orders approved.

## 2023-02-14 NOTE — TELEPHONE ENCOUNTER
Followed up with [facility] today    Laci Martinez is reaching out to benefits team for updates. They are waiting for confirmed dosing to pursue PA. Discharge dose was given to Michelle on 2/14/23 tentative 8ng/k/min.     Ora Do RN on 2/14/2023 at 1:43 PM

## 2023-02-14 NOTE — PROGRESS NOTES
Oxygen Documentation  I certify that this patient, Chelsea Flower has been under my care (or a nurse practitioner or physican's assistant working with me). This is the face-to-face encounter for oxygen medical necessity.      At the time of this encounter supplemental oxygen is reasonable and necessary and is expected to improve the patient's condition in a home setting.       Patient has continued oxygen desaturation due to Pulmonary Hypertension I27.20.    If portability is ordered, is the patient mobile within the home? yes    Manjeet Milian MD  Cardiovascular disease fellow  [facility]  [phone]  02/14/2023 11:36 AM

## 2023-02-14 NOTE — PROCEDURES
[facility]    Procedure: IR Procedure Note    Date/Time: 2/14/2023 2:39 PM  Performed by: Marshal Benson PA-C  Authorized by: Marshal Benson PA-C       UNIVERSAL PROTOCOL   Site Marked: NA  Prior Images Obtained and Reviewed:  Yes  Required items: Required blood products, implants, devices and special equipment available    Patient identity confirmed:  Verbally with patient, arm band, provided demographic data and hospital-assigned identification number  Patient was reevaluated immediately before administering moderate or deep sedation or anesthesia  Confirmation Checklist:  Patient's identity using two indicators, relevant allergies, procedure was appropriate and matched the consent or emergent situation and correct equipment/implants were available  Time out: Immediately prior to the procedure a time out was called    Universal Protocol: the Joint Commission Universal Protocol was followed    Preparation: Patient was prepped and draped in usual sterile fashion    ESBL (mL):  1     ANESTHESIA    Anesthesia: Local infiltration  Local Anesthetic:  Lidocaine 1% without epinephrine  Anesthetic Total (mL):  8      SEDATION  Patient Sedated: Yes    Sedation Type:  Moderate (conscious) sedation  Sedation:  Fentanyl and midazolam  Vital signs: Vital signs monitored during sedation    See dictated procedure note for full details.  Findings: Image guided placement of right internal jugular, 5 Fr., 25 cm., single lumen tunneled central venous catheter. Catheter ready for use. Priming volume measured to be 0.48 ml.    Specimens: none    Complications: None    Condition: Stable    Plan: Follow up per primary team. Return to IR for catheter removal when indicated.      PROCEDURE    Patient Tolerance:  Patient tolerated the procedure well with no immediate complications  Length of time physician/provider present for 1:1 monitoring during sedation: 30

## 2023-02-15 ENCOUNTER — TELEPHONE (OUTPATIENT)
Dept: CARDIOLOGY | Facility: CLINIC | Age: 33
End: 2023-02-15
Payer: COMMERCIAL

## 2023-02-15 LAB
ANION GAP SERPL CALCULATED.3IONS-SCNC: 10 MMOL/L (ref 7–15)
BASOPHILS # BLD AUTO: 0.1 10E3/UL (ref 0–0.2)
BASOPHILS NFR BLD AUTO: 1 %
BUN SERPL-MCNC: 9.8 MG/DL (ref 6–20)
CALCIUM SERPL-MCNC: 9.1 MG/DL (ref 8.6–10)
CHLORIDE SERPL-SCNC: 99 MMOL/L (ref 98–107)
CREAT SERPL-MCNC: 0.63 MG/DL (ref 0.51–0.95)
DEPRECATED HCO3 PLAS-SCNC: 29 MMOL/L (ref 22–29)
EOSINOPHIL # BLD AUTO: 0.2 10E3/UL (ref 0–0.7)
EOSINOPHIL NFR BLD AUTO: 2 %
ERYTHROCYTE [DISTWIDTH] IN BLOOD BY AUTOMATED COUNT: 13.3 % (ref 10–15)
GFR SERPL CREATININE-BSD FRML MDRD: >90 ML/MIN/1.73M2
GLUCOSE SERPL-MCNC: 86 MG/DL (ref 70–99)
HCT VFR BLD AUTO: 49.5 % (ref 35–47)
HGB BLD-MCNC: 16.7 G/DL (ref 11.7–15.7)
IMM GRANULOCYTES # BLD: 0.1 10E3/UL
IMM GRANULOCYTES NFR BLD: 1 %
LYMPHOCYTES # BLD AUTO: 2 10E3/UL (ref 0.8–5.3)
LYMPHOCYTES NFR BLD AUTO: 24 %
MAGNESIUM SERPL-MCNC: 1.9 MG/DL (ref 1.7–2.3)
MCH RBC QN AUTO: 34.1 PG (ref 26.5–33)
MCHC RBC AUTO-ENTMCNC: 33.7 G/DL (ref 31.5–36.5)
MCV RBC AUTO: 101 FL (ref 78–100)
MONOCYTES # BLD AUTO: 0.6 10E3/UL (ref 0–1.3)
MONOCYTES NFR BLD AUTO: 8 %
NEUTROPHILS # BLD AUTO: 5.2 10E3/UL (ref 1.6–8.3)
NEUTROPHILS NFR BLD AUTO: 64 %
NRBC # BLD AUTO: 0 10E3/UL
NRBC BLD AUTO-RTO: 0 /100
PLATELET # BLD AUTO: 158 10E3/UL (ref 150–450)
POTASSIUM SERPL-SCNC: 3.8 MMOL/L (ref 3.4–5.3)
RBC # BLD AUTO: 4.9 10E6/UL (ref 3.8–5.2)
SARS-COV-2 RNA RESP QL NAA+PROBE: NEGATIVE
SODIUM SERPL-SCNC: 138 MMOL/L (ref 136–145)
WBC # BLD AUTO: 8.1 10E3/UL (ref 4–11)

## 2023-02-15 PROCEDURE — 36415 COLL VENOUS BLD VENIPUNCTURE: CPT | Performed by: STUDENT IN AN ORGANIZED HEALTH CARE EDUCATION/TRAINING PROGRAM

## 2023-02-15 PROCEDURE — 272N000004 HC RX 272: Performed by: INTERNAL MEDICINE

## 2023-02-15 PROCEDURE — 250N000011 HC RX IP 250 OP 636: Performed by: STUDENT IN AN ORGANIZED HEALTH CARE EDUCATION/TRAINING PROGRAM

## 2023-02-15 PROCEDURE — 250N000013 HC RX MED GY IP 250 OP 250 PS 637

## 2023-02-15 PROCEDURE — U0003 INFECTIOUS AGENT DETECTION BY NUCLEIC ACID (DNA OR RNA); SEVERE ACUTE RESPIRATORY SYNDROME CORONAVIRUS 2 (SARS-COV-2) (CORONAVIRUS DISEASE [COVID-19]), AMPLIFIED PROBE TECHNIQUE, MAKING USE OF HIGH THROUGHPUT TECHNOLOGIES AS DESCRIBED BY CMS-2020-01-R: HCPCS | Performed by: INTERNAL MEDICINE

## 2023-02-15 PROCEDURE — 250N000013 HC RX MED GY IP 250 OP 250 PS 637: Performed by: STUDENT IN AN ORGANIZED HEALTH CARE EDUCATION/TRAINING PROGRAM

## 2023-02-15 PROCEDURE — 80048 BASIC METABOLIC PNL TOTAL CA: CPT | Performed by: STUDENT IN AN ORGANIZED HEALTH CARE EDUCATION/TRAINING PROGRAM

## 2023-02-15 PROCEDURE — 250N000011 HC RX IP 250 OP 636: Performed by: INTERNAL MEDICINE

## 2023-02-15 PROCEDURE — 99233 SBSQ HOSP IP/OBS HIGH 50: CPT | Mod: 24 | Performed by: INTERNAL MEDICINE

## 2023-02-15 PROCEDURE — 85014 HEMATOCRIT: CPT | Performed by: STUDENT IN AN ORGANIZED HEALTH CARE EDUCATION/TRAINING PROGRAM

## 2023-02-15 PROCEDURE — 999N000044 HC STATISTIC CVC DRESSING CHANGE

## 2023-02-15 PROCEDURE — 214N000001 HC R&B CCU UMMC

## 2023-02-15 PROCEDURE — 83735 ASSAY OF MAGNESIUM: CPT | Performed by: STUDENT IN AN ORGANIZED HEALTH CARE EDUCATION/TRAINING PROGRAM

## 2023-02-15 PROCEDURE — 250N000011 HC RX IP 250 OP 636: Performed by: PHYSICIAN ASSISTANT

## 2023-02-15 RX ORDER — OXYCODONE HYDROCHLORIDE 5 MG/1
5 TABLET ORAL EVERY 4 HOURS PRN
Status: DISCONTINUED | OUTPATIENT
Start: 2023-02-15 | End: 2023-02-16

## 2023-02-15 RX ORDER — TADALAFIL 20 MG/1
20 TABLET ORAL ONCE
Status: COMPLETED | OUTPATIENT
Start: 2023-02-15 | End: 2023-02-15

## 2023-02-15 RX ORDER — ACETAMINOPHEN 325 MG/1
975 TABLET ORAL EVERY 6 HOURS
Status: DISCONTINUED | OUTPATIENT
Start: 2023-02-15 | End: 2023-02-18 | Stop reason: HOSPADM

## 2023-02-15 RX ORDER — TADALAFIL 20 MG/1
40 TABLET ORAL EVERY 24 HOURS
Status: DISCONTINUED | OUTPATIENT
Start: 2023-02-16 | End: 2023-02-18 | Stop reason: HOSPADM

## 2023-02-15 RX ADMIN — POLYETHYLENE GLYCOL 3350 17 G: 17 POWDER, FOR SOLUTION ORAL at 08:38

## 2023-02-15 RX ADMIN — OXYCODONE HYDROCHLORIDE 5 MG: 5 TABLET ORAL at 13:28

## 2023-02-15 RX ADMIN — OXYCODONE HYDROCHLORIDE 5 MG: 5 TABLET ORAL at 20:18

## 2023-02-15 RX ADMIN — SENNOSIDES AND DOCUSATE SODIUM 1 TABLET: 50; 8.6 TABLET ORAL at 08:39

## 2023-02-15 RX ADMIN — Medication 5 ML: at 13:30

## 2023-02-15 RX ADMIN — PROCHLORPERAZINE MALEATE 5 MG: 5 TABLET ORAL at 13:37

## 2023-02-15 RX ADMIN — TRAMADOL HYDROCHLORIDE 50 MG: 50 TABLET, COATED ORAL at 00:32

## 2023-02-15 RX ADMIN — ACETAMINOPHEN 975 MG: 325 TABLET, FILM COATED ORAL at 21:34

## 2023-02-15 RX ADMIN — ACETAMINOPHEN 975 MG: 325 TABLET ORAL at 00:32

## 2023-02-15 RX ADMIN — TRAMADOL HYDROCHLORIDE 50 MG: 50 TABLET, COATED ORAL at 10:46

## 2023-02-15 RX ADMIN — TADALAFIL 20 MG: 20 TABLET, FILM COATED ORAL at 13:28

## 2023-02-15 RX ADMIN — ACETAMINOPHEN 975 MG: 325 TABLET, FILM COATED ORAL at 16:36

## 2023-02-15 RX ADMIN — TREPROSTINIL 8 NG/KG/MIN: 20 INJECTION, SOLUTION INTRAVENOUS; SUBCUTANEOUS at 22:11

## 2023-02-15 RX ADMIN — FUROSEMIDE 20 MG: 20 TABLET ORAL at 08:39

## 2023-02-15 RX ADMIN — TREPROSTINIL 8 NG/KG/MIN: 20 INJECTION, SOLUTION INTRAVENOUS; SUBCUTANEOUS at 10:11

## 2023-02-15 RX ADMIN — Medication 5 ML: at 07:20

## 2023-02-15 RX ADMIN — ENOXAPARIN SODIUM 40 MG: 40 INJECTION SUBCUTANEOUS at 20:18

## 2023-02-15 RX ADMIN — ACETAMINOPHEN 975 MG: 325 TABLET, FILM COATED ORAL at 08:39

## 2023-02-15 RX ADMIN — TADALAFIL 20 MG: 20 TABLET, FILM COATED ORAL at 08:39

## 2023-02-15 RX ADMIN — DIGOXIN 125 MCG: 125 TABLET ORAL at 08:39

## 2023-02-15 RX ADMIN — Medication 5 ML: at 16:36

## 2023-02-15 RX ADMIN — PANTOPRAZOLE SODIUM 40 MG: 40 TABLET, DELAYED RELEASE ORAL at 08:39

## 2023-02-15 ASSESSMENT — ACTIVITIES OF DAILY LIVING (ADL)
ADLS_ACUITY_SCORE: 30
ADLS_ACUITY_SCORE: 29
ADLS_ACUITY_SCORE: 29
ADLS_ACUITY_SCORE: 30
ADLS_ACUITY_SCORE: 31
ADLS_ACUITY_SCORE: 28
ADLS_ACUITY_SCORE: 30
ADLS_ACUITY_SCORE: 28
ADLS_ACUITY_SCORE: 29
ADLS_ACUITY_SCORE: 30

## 2023-02-15 NOTE — TELEPHONE ENCOUNTER
"Communication with [facility] saida Martinez is below     2/10/23   Documentation for IV teprostinil submitted to [facility] onfollowing signing of RHC and testing.  Urgent PA    -  Clarification of IV or subcutaneous requested from [facility]  Clarification of IV for discharge planning.  Future transition to subcutaneous based on patient tolerance. Preference of Brand medication.   -------------------------------------------------------------------------------------------  2/13/23   - Dosing weight and medication titration requested from [facility]. Dosing goal at discharge of 8ng/kg/min  with an increase post-discharge of 1-2ng/kg/min as tolerated with a dosing weight of 75.6 kg     From: \"Michelle Patiño\" <>   Date: 02/13/2023 at 12:43 pm   To: \"ora zheng\" <>,\"Gilma Delcid\" <>,\"\" <>,\"Mary Guerrero\" <>   Subject: RE: securemail new Remodulin IV referral BC [id number]     Thank you Ora!     Msg has been relayed to our clinicians and benefits team  --------------------------------------------------------------------------------------------------------------------------  2/14/23:   - update requested from Michelle cintron at 1308    - From: \"Michelle Patiño\" <[email or web address]>   Date: 02/14/2023 at 02:17 pm   To: \"ora zheng\" <[email or web address]>   Subject: RE: securearies new Remodulin IV referral BC [id number]     Ezio Payan,     Not yet, for Remoduiln - I reached out to the pharmacists to confirm dosing earlier, and I also r eached out to benefits team for updates, but they are waiting for the clinicians to confirm dosing to  pursue PA.     I will let you know the updates once I hear back,     Tadalafil is ready, and Opsumit - We have not received referral from [facility] yet,     I will keep you updated  ---------------------------------------------------------------------------------------------------------------------  2/15/23:    -  VO called in again for dosing as requested by saida at 12:29pm    - From: " "\"Michelle Patiño\" <[email or web address]>   Date: 02/15/2023 at 03:01 pm   To: \"margarita zheng\" <[email or web address]>     Ezio Payan!   We are working on PA, per benefits rep :     our PA RPH to assist with CMM pa request for brand Remodulin. Pending response. Will need to  provide medical necessity for them to approve brand over preferred generic.     Would generic Treprostinil be OK  for now since pt is on IV? And she will be discharged on Remodulin   IV correct?     The Rep sent me this msg, see below, generic is preferred.    -  Message response: Generic treprostinil is okay per MD. We will plan to transition in future to SQ and  can work on that in future with brand    - From: \"Michelle Patiño\" <[email or web address]>   Date: 02/15/2023 at 03:24 pm   To: \"margarita zheng\" <[email or web address]>    Subject: RE: securemail new Remodulin IV referral BC [id number]     Perfect, thank you! I let the BV team know, he said, just to give you a heads up:     The other issue with the generic is I don t know how much of the specific NDC we carry that goes with  the generic Copay assistance. That could cause a problem down the road too. Tt does not mean we  won t have enough, that s just something to be aware     - Requested a call back to clarify message from saida, direct number given.     -  From: \"Michelle Patiño\" <[email or web address]>   Date: 02/15/2023 at 04:57 pm   To: \"margarita zheng\" <[email or web address]>,\"Mary Guerrero\"  <[email or web address]>,\"Batsheva Guardado\"  <[email or web address]>,\"Gilma Delcid ([facility])\"  <[email or web address]>   Subject: **URGENT ** securemail new Remodulin IV referral BC [id number]     Hello,   Please see attached updates RX request - for Flolan diluent which we normally use for generic  Treprostinil.     Regarding PA  -  I got this msg from BV team, the fastest way to do it would be to call " the plan at [phone] and do verbal PA which would expedite it, please see the msg below from Benefit Verification  team :   [facility] reviewed CMM pa request answered all questions they could with provided information. Additional  information is still needed. Please see attached CMM.   Thank you!     Michelle Chec [facility]  Practice Liaison  Pulmonary Arterial Hypertension   P   F   [address] [address]     - Called to clarify with liason which questions were unable to be answered by [facility] for prior authorization  As our PA team normally does not need to complete the prior authorization under medical benefits and    This is completed by the  pharmacy. Liason unable to answer what questions were unable to be    Answered    -  Called insurance plan @ [phone] Questions answered: Answers provided   1: Is the prior authorization for IV or subcutaneous:    Answer:  IV   2: What is the preferred product:     Answer: Remodulin for future transition to subcutaneous route. If insurance prefers generic this    is okay at this time per MD   3: What is the dx:    Answer: I27.0 Pulmonary hypertension. This was given on the referral form   4: Is the medication being prescribed by pulmolologist or cardiologist:     Answer: Cardiologist   5: Is the patient receiving therapy:     Answer: Yes patient is inpatient   6: Is patient being billed through medical or pharmacy benefits:     Answer: Unsure of hospitalization billing. However IV medication is billed through medical    Benefit   7: Is patient receiving benefit from the medication:     Answer: Yes the patient has experienced improvement in PH symptoms   8: Is this for an urgent appeal:     Answer: Yes this is an urgent appeal as of 2/10/23 with submission of documentation please   prioritize and expedite review    -  Called liason and left a voicemail to inquire regarding questioning from insurance  plan and why this was  Not able to be provided by [facility] for prior authorization.     Ora Do RN on 2/15/2023 at 5:39 PM    ------------------------------------------------------------------------------------------------------------  2/16/23     Patient approved for generic treprostinil. Staff message sent to inpatient pharmacist.   Ora Do RN on 2/16/2023 at 7:31 AM

## 2023-02-15 NOTE — PROGRESS NOTES
Care Management Follow Up    Length of Stay (days): 7    Expected Discharge Date: 02/16/2023, pending remodulin approval     Concerns to be Addressed: Discharge planning      Patient plan of care discussed at interdisciplinary rounds: Yes    Anticipated Discharge Disposition: Home     Anticipated Discharge Services: Home Infusion  Anticipated Discharge DME: Home Oxygen     Patient/family educated on Medicare website which has current facility and service quality ratings: No  Education Provided on the Discharge Plan: Yes  Patient/Family in Agreement with the Plan: Yes    Additional Information:  Pt had guy placed yesterday and is now at goal rate for IV remodulin. Per team, medically ready for discharge once infusion and oxygen confirmed.    This writer spoke with Gia [facility] nurse. She met with pt this morning for additional teaching session and stated pt is now independent, ready for discharge from a teaching perspective. She will teach pt's  once home. Gia stated they are still awaiting prior auth approval. If obtained before 5pm today, she can work on delivery to the hospital tomorrow. Gia stated she will call this writer if she receives any updates this afternoon.    Of note, pt's , father in law and children will all be coming to pick her up and they have about at least an 8 hr drive to get here. Can stay a night in a hotel if needed.    [facility] (IV remodulin)  Gia RN: [phone]    This writer messaged  Home Medical liasonMary to follow up on referral to  Respiratory for home oxygen. She is going to call them now to follow up.    1600: Met with pt who stated she has not received any calls today regarding insurance auth for IV remodulin. [facility] will always do a start of care call with once auth is approved. Pt hopeful med will get approved tomorrow as she is looking forward to getting home.     Respiratory ([facility])  Phone: [phone]  (ask for intake liason,  Jodie)  This writer called Jodie who confirmed they have received all home oxygen documentation. She said they are just running the insurance and then will update this writer. Update Jodie that discharge will likely be late tomorrow, more likely Friday at this point.    CC will continue to monitor patient's medical condition and progress towards discharge.  Therese Yoo RN BSN  6C Unit Care Coordinator  Phone number: [phone]  Pager: [phone]

## 2023-02-15 NOTE — PROGRESS NOTES
Infection Prevention Medical Director Note:     This patient resides on a unit that has demonstrated an increased incidence of hospital-onset COVID-19. The response to this increase includes COVID testing of patients on the same unit that are not known to have had COVID-19 in the last 90 days.   The baseline test for this patient was negative on 2/10, which prompts re-testing 5-7 days from baseline test. I have placed the COVID re-test.     Please reach out to me with any questions.     Rosa Harley MD   of Medicine, Division of Infectious Diseases  Contact me on the [employer] raz or console  [facility] [phone]

## 2023-02-15 NOTE — PLAN OF CARE
Status: Admitted on 02/07/23 for initiation of IV remodulin and oral tadalafil for severe PAH.    Neuro: A/Ox4.  Cardiac: SR. VSS.   Respiratory: O2 sats stable on 3L  GI/: Voiding adequate, no BM- may need suppository  Diet/appetite: Regular diet  Activity:  UAL  Pain: HA from Remodulin, IV dilaudid last PM, Tylenol and Ultram overnight.   Skin: Intact, no new deficits noted.  LDA's: PICC, Arreola for Remodulin 8ng/kg/min    Plan: Awaiting insurance approval for Remodulin. Discharge possibly Thursday. Continue with POC. Notify primary team with changes.

## 2023-02-15 NOTE — TELEPHONE ENCOUNTER
[facility] Call Center    Phone Message    May a detailed message be left on voicemail: yes     Reason for Call: Other: Please return phone call to Pharmacy, Michelle called and said she was speaking to Ora and she was disconnected     Action Taken: Other: Cardiology    Travel Screening: Not Applicable     Thank you!  [facility]

## 2023-02-15 NOTE — PROGRESS NOTES
D-Admitted on 02/07/23 for initiation of IV remodulin and oral tadalafil for severe PAH. Arreola catheter placed in IR today for home remodulin infusion. C/o HA. No bowel movement for several days.  I-Remodulin increased to goal dose of 8ng/kg/min at 1800 per order. IV dilaudid for headache per pt request. Discussed correlation between narcotics and constipation. Pt verbalized that tylenol and ultram have not been effective for HA. Senna and miralax given for constipation.  A-Pt had emesis immediately after receiving IV dilaudid. No further nausea. Dilaudid effective in decreasing HA. Continue with current poc. Notify provider of any concerns/changes.

## 2023-02-15 NOTE — PROGRESS NOTES
Cardiology Progress Note    Changes Today:   -Continue Remodulin at 8ng/kg/hr  -Increase Tadalafil to 40mg daily  -Switch PRNs to PO  -Discharge home pending insurance approval     Subjective:  Care team notes reviewed. No events overnight. Patient states final titration went well. Endorses some headache this morning but overall improved. Some tenderness at site of Arreola placement. No other concerns to address with the team.    Physical Exam   Temp: 97.6  F (36.4  C) Temp src: Oral BP: 109/77 Pulse: 80   Resp: 20 SpO2: 95 % O2 Device: Nasal cannula Oxygen Delivery: 2 LPM    Vital Signs with Ranges  Temp:  [97.6  F (36.4  C)-98.8  F (37.1  C)] 97.6  F (36.4  C)  Pulse:  [] 80  Resp:  [10-20] 20  BP: (109-130)/(52-82) 109/77  Cuff Mean (mmHg):  [81-95] 86  SpO2:  [94 %-98 %] 95 %    Intake/Output Summary (Last 24 hours) at 2/15/2023 0834  Last data filed at 2/15/2023 0600  Gross per 24 hour   Intake 867.58 ml   Output --   Net 867.58 ml     Weight  Vitals:    02/11/23 0644 02/12/23 0400 02/13/23 0007 02/14/23 0524   Weight: 71.4 kg (157 lb 4.8 oz) 70.7 kg (155 lb 12.8 oz) 70.5 kg (155 lb 6.4 oz) 70.2 kg (154 lb 12.8 oz)    02/15/23 0453   Weight: 70.5 kg (155 lb 6.4 oz)       Resp: 20        - MEDICATION INSTRUCTIONS -       - MEDICATION INSTRUCTIONS -       treprostinil (REMODULIN) intravenous infusion (LESS than or EQUAL to 100 mcg/mL) 8 ng/kg/min (02/15/23 0018)         acetaminophen  975 mg Oral Q6H     digoxin  125 mcg Oral Daily     enoxaparin ANTICOAGULANT  40 mg Subcutaneous Q24H     furosemide  20 mg Oral Daily     heparin lock flush  5-20 mL Intracatheter Q24H     heparin lock flush  5-20 mL Intracatheter Q24H     pantoprazole  40 mg Oral QAM AC     sodium chloride (PF)  10-40 mL Intracatheter Q8H     sodium chloride (PF)  10-40 mL Intracatheter Q8H     sodium chloride (PF)  3 mL Intracatheter Q8H     tadalafil  20 mg Oral Q24H        , Blood pressure 109/77, pulse 80, temperature 97.6  F (36.4  C),  "temperature source Oral, resp. rate 20, height 1.6 m (5' 3\"), weight 70.5 kg (155 lb 6.4 oz), SpO2 95 %.     Constitutional: awake, alert, cooperative, no apparent distress, and appears stated age  Eyes: EOMI, no conjunctivale sclera  Respiratory: Breathing comfortable on 3L  Chest: Arreola dressing clear, dry, intact. No surrounding erythema or tenderness to palpation  GI: soft, non-distended  Skin: no bruising or bleeding  Neurologic: Awake, alert, oriented to name, place and time.  No focal deficits.      Data   Recent Labs   Lab Test 02/15/23  0722 02/14/23  0647    137   POTASSIUM 3.8 4.0   CHLORIDE 99 100   CO2 29 25   ANIONGAP 10 12   GLC 86 86   BUN 9.8 9.4   CR 0.63 0.63   RIGO 9.1 9.2       Lab Results   Component Value Date    WBC 8.1 02/15/2023     Lab Results   Component Value Date    RBC 4.90 02/15/2023     Lab Results   Component Value Date    HGB 16.7 02/15/2023     Lab Results   Component Value Date    HCT 49.5 02/15/2023     No components found for: MCT  Lab Results   Component Value Date     02/15/2023     Lab Results   Component Value Date    MCH 34.1 02/15/2023     Lab Results   Component Value Date    MCHC 33.7 02/15/2023     Lab Results   Component Value Date    RDW 13.3 02/15/2023     Lab Results   Component Value Date     02/15/2023        Liver Function Studies -   Recent Labs   Lab Test 02/07/23  1552   PROTTOTAL 6.2*   ALBUMIN 3.9   BILITOTAL 0.9   ALKPHOS 85   AST 32   ALT 24     Assessment & Plan       Ms Chelsea Flower is a 32 year old lady with a history of obesity and hypothyroidism who presents for pulmonary hypertension evaluation in the setting of progressive dyspnea. The patient is hemodynamically stable, on room air, and in no distress. Her TTE demonstrates that she has severe RV dysfunction and dilation now s/p RHC with negative vasodilator study, now initiating IV remodulin and oral tadalafil for severe PAH.      #Pulmonary Hypertension  Workup to date " includes RHC with RA 8, PA 62/48/55, PCWP 8; V/Q scan with low probability of PE; and TTE with LVEF 57% and PASP estimated 63 with no comment on RV function (all performed at OSH, not in Care Everywhere).   The patient's outpatient pulmonary hypertension provider, Dr Carroll, recommended admission for RHC with Vasodilator study to further evaluate her pulmonary hypertension.   In terms of the etiology of her pulmonary hypertension, the patient has no history or evidence of cardiac disease to suggest WHO group II and a distant history of reactive airways disease but no apparent severe lung diseases to suggest WHO group III. Additionally, her V/Q scan is not suggestive of a group IV etiology. She has a positive CLINTON and DS-DNA however without additional clinical evidence of connective tissue or autoimmune disease but this raises the possibility of PAH vs other WHO group V etiology.   Patient has been having significant side effects following initiation of Remodulin including hypoxia, headaches, nausea, and vomiting. Patient currently requiring 3L NC to maintain O2 saturation > 90%.   - Continue Remodulin  At 8ng/kg/min q24h   -Headaches: tylenol 975mg q6h PRN, oxycodone 5mg q8h   -Nausea: Compazine PRN  -Increase Tadalafil 40mg daily   -F/U Repeat CLINTON    -RF negative   -If CLINTON positive, will consider consult rheum for evaluation for possible CTD  -Patient has been recommended to not breast feed while on this medication and is weaning breastfeeding   -Health Psychology Consult     #Hypothyroidism  -TSH 5.74   -repeat TFT in 6-8 weeks during outpt      Diet: Regular  PPX: LMWH  Code: Full Code    This patient was seen and discussed with Dr. Falcon who agrees with the above assessment and plan.     Maryjo Mckeon MD  Internal Medicine PGY-1

## 2023-02-16 LAB
ANION GAP SERPL CALCULATED.3IONS-SCNC: 11 MMOL/L (ref 7–15)
BASOPHILS # BLD AUTO: 0.1 10E3/UL (ref 0–0.2)
BASOPHILS NFR BLD AUTO: 1 %
BUN SERPL-MCNC: 10.1 MG/DL (ref 6–20)
CALCIUM SERPL-MCNC: 9.2 MG/DL (ref 8.6–10)
CHLORIDE SERPL-SCNC: 101 MMOL/L (ref 98–107)
CREAT SERPL-MCNC: 0.56 MG/DL (ref 0.51–0.95)
DEPRECATED HCO3 PLAS-SCNC: 26 MMOL/L (ref 22–29)
EOSINOPHIL # BLD AUTO: 0.1 10E3/UL (ref 0–0.7)
EOSINOPHIL NFR BLD AUTO: 1 %
ERYTHROCYTE [DISTWIDTH] IN BLOOD BY AUTOMATED COUNT: 13.4 % (ref 10–15)
GFR SERPL CREATININE-BSD FRML MDRD: >90 ML/MIN/1.73M2
GLUCOSE SERPL-MCNC: 86 MG/DL (ref 70–99)
HCT VFR BLD AUTO: 48.9 % (ref 35–47)
HGB BLD-MCNC: 16.3 G/DL (ref 11.7–15.7)
IMM GRANULOCYTES # BLD: 0.1 10E3/UL
IMM GRANULOCYTES NFR BLD: 1 %
LYMPHOCYTES # BLD AUTO: 1.5 10E3/UL (ref 0.8–5.3)
LYMPHOCYTES NFR BLD AUTO: 17 %
MAGNESIUM SERPL-MCNC: 1.7 MG/DL (ref 1.7–2.3)
MCH RBC QN AUTO: 33.7 PG (ref 26.5–33)
MCHC RBC AUTO-ENTMCNC: 33.3 G/DL (ref 31.5–36.5)
MCV RBC AUTO: 101 FL (ref 78–100)
MONOCYTES # BLD AUTO: 0.5 10E3/UL (ref 0–1.3)
MONOCYTES NFR BLD AUTO: 6 %
NEUTROPHILS # BLD AUTO: 6.8 10E3/UL (ref 1.6–8.3)
NEUTROPHILS NFR BLD AUTO: 74 %
NRBC # BLD AUTO: 0 10E3/UL
NRBC BLD AUTO-RTO: 0 /100
PLATELET # BLD AUTO: 150 10E3/UL (ref 150–450)
POTASSIUM SERPL-SCNC: 4.3 MMOL/L (ref 3.4–5.3)
RBC # BLD AUTO: 4.83 10E6/UL (ref 3.8–5.2)
SODIUM SERPL-SCNC: 138 MMOL/L (ref 136–145)
WBC # BLD AUTO: 9 10E3/UL (ref 4–11)

## 2023-02-16 PROCEDURE — 272N000004 HC RX 272: Performed by: INTERNAL MEDICINE

## 2023-02-16 PROCEDURE — 250N000013 HC RX MED GY IP 250 OP 250 PS 637: Performed by: STUDENT IN AN ORGANIZED HEALTH CARE EDUCATION/TRAINING PROGRAM

## 2023-02-16 PROCEDURE — 36415 COLL VENOUS BLD VENIPUNCTURE: CPT | Performed by: STUDENT IN AN ORGANIZED HEALTH CARE EDUCATION/TRAINING PROGRAM

## 2023-02-16 PROCEDURE — 83735 ASSAY OF MAGNESIUM: CPT | Performed by: STUDENT IN AN ORGANIZED HEALTH CARE EDUCATION/TRAINING PROGRAM

## 2023-02-16 PROCEDURE — 80048 BASIC METABOLIC PNL TOTAL CA: CPT | Performed by: STUDENT IN AN ORGANIZED HEALTH CARE EDUCATION/TRAINING PROGRAM

## 2023-02-16 PROCEDURE — 214N000001 HC R&B CCU UMMC

## 2023-02-16 PROCEDURE — 85025 COMPLETE CBC W/AUTO DIFF WBC: CPT | Performed by: STUDENT IN AN ORGANIZED HEALTH CARE EDUCATION/TRAINING PROGRAM

## 2023-02-16 PROCEDURE — 250N000013 HC RX MED GY IP 250 OP 250 PS 637

## 2023-02-16 PROCEDURE — 99233 SBSQ HOSP IP/OBS HIGH 50: CPT | Mod: 24 | Performed by: INTERNAL MEDICINE

## 2023-02-16 PROCEDURE — 250N000011 HC RX IP 250 OP 636: Performed by: STUDENT IN AN ORGANIZED HEALTH CARE EDUCATION/TRAINING PROGRAM

## 2023-02-16 PROCEDURE — 250N000011 HC RX IP 250 OP 636: Performed by: INTERNAL MEDICINE

## 2023-02-16 RX ADMIN — ACETAMINOPHEN 975 MG: 325 TABLET, FILM COATED ORAL at 15:52

## 2023-02-16 RX ADMIN — ACETAMINOPHEN 975 MG: 325 TABLET, FILM COATED ORAL at 02:28

## 2023-02-16 RX ADMIN — TADALAFIL 40 MG: 20 TABLET, FILM COATED ORAL at 08:49

## 2023-02-16 RX ADMIN — DIGOXIN 125 MCG: 125 TABLET ORAL at 08:50

## 2023-02-16 RX ADMIN — ACETAMINOPHEN 975 MG: 325 TABLET, FILM COATED ORAL at 22:57

## 2023-02-16 RX ADMIN — FUROSEMIDE 20 MG: 20 TABLET ORAL at 08:51

## 2023-02-16 RX ADMIN — PANTOPRAZOLE SODIUM 40 MG: 40 TABLET, DELAYED RELEASE ORAL at 08:50

## 2023-02-16 RX ADMIN — ENOXAPARIN SODIUM 40 MG: 40 INJECTION SUBCUTANEOUS at 19:51

## 2023-02-16 RX ADMIN — TRAMADOL HYDROCHLORIDE 50 MG: 50 TABLET, COATED ORAL at 19:50

## 2023-02-16 RX ADMIN — TREPROSTINIL 8 NG/KG/MIN: 20 INJECTION, SOLUTION INTRAVENOUS; SUBCUTANEOUS at 23:07

## 2023-02-16 RX ADMIN — Medication 5 ML: at 07:36

## 2023-02-16 RX ADMIN — ACETAMINOPHEN 975 MG: 325 TABLET, FILM COATED ORAL at 08:49

## 2023-02-16 RX ADMIN — POLYETHYLENE GLYCOL 3350 17 G: 17 POWDER, FOR SOLUTION ORAL at 06:42

## 2023-02-16 RX ADMIN — OXYCODONE HYDROCHLORIDE 5 MG: 5 TABLET ORAL at 06:42

## 2023-02-16 RX ADMIN — TREPROSTINIL 8 NG/KG/MIN: 20 INJECTION, SOLUTION INTRAVENOUS; SUBCUTANEOUS at 10:34

## 2023-02-16 ASSESSMENT — ACTIVITIES OF DAILY LIVING (ADL)
ADLS_ACUITY_SCORE: 29
ADLS_ACUITY_SCORE: 28
ADLS_ACUITY_SCORE: 29
ADLS_ACUITY_SCORE: 29
ADLS_ACUITY_SCORE: 28
ADLS_ACUITY_SCORE: 29
ADLS_ACUITY_SCORE: 28
ADLS_ACUITY_SCORE: 29
ADLS_ACUITY_SCORE: 29
ADLS_ACUITY_SCORE: 28

## 2023-02-16 NOTE — TELEPHONE ENCOUNTER
Called HUB as more insurance information was needed.   Insurance information update.   Ora Do RN on 2/16/2023 at 11:29 AM

## 2023-02-16 NOTE — PLAN OF CARE
D: Pt admitted 2/7 for pulmonary hypertension evaluation in the setting of progressive dyspnea. Started on IV Remodulin and oral Tadalafil.      I: Monitored vitals and assessed pt status. Performed care and interventions as charted.      A: Pt alert, appropriate. Slept well. No voiced concerns.     Tele - SR/ITW. VSS. On 4L NP - stable. On Remodulin at goal rate of 8ng/kg/min via Rt Arreola. Headache and nausea persist - improving. PRN meds effective.    Independent/SBA.     P: Continue to monitor pt status and report changes to care team. Plan for discharge home this week - awaiting insurance approval.

## 2023-02-16 NOTE — PROGRESS NOTES
Cardiology Progress Note       Changes Today:   - Remodulin improved  - Plan for discharge tomorrow    Subjective:  Care team notes reviewed. No events overnight. Patient feels well this morning. Denies shortness of breath. States nausea has improved. Still endorsing headaches, worse when lying flat. Excited to go home and see family. No other concerns to address with the team this morning.     Physical Exam   Temp: 97.7  F (36.5  C) Temp src: Oral BP: 109/72 Pulse: 98   Resp: 20 SpO2: 92 % O2 Device: Nasal cannula Oxygen Delivery: 4 LPM    Vital Signs with Ranges  Temp:  [97.4  F (36.3  C)-98.3  F (36.8  C)] 97.7  F (36.5  C)  Pulse:  [77-98] 98  Resp:  [18-20] 20  BP: (109-128)/(67-86) 109/72  Cuff Mean (mmHg):  [84] 84  SpO2:  [92 %-96 %] 92 %    Intake/Output Summary (Last 24 hours) at 2/16/2023 1002  Last data filed at 2/16/2023 0659  Gross per 24 hour   Intake 519.73 ml   Output --   Net 519.73 ml       Weight  Vitals:    02/12/23 0400 02/13/23 0007 02/14/23 0524 02/15/23 0453   Weight: 70.7 kg (155 lb 12.8 oz) 70.5 kg (155 lb 6.4 oz) 70.2 kg (154 lb 12.8 oz) 70.5 kg (155 lb 6.4 oz)    02/16/23 0228   Weight: 70.7 kg (155 lb 14.4 oz)       Resp: 20        - MEDICATION INSTRUCTIONS -       - MEDICATION INSTRUCTIONS -       treprostinil (REMODULIN) intravenous infusion (LESS than or EQUAL to 100 mcg/mL) 8 ng/kg/min (02/15/23 2211)         acetaminophen  975 mg Oral Q6H     digoxin  125 mcg Oral Daily     enoxaparin ANTICOAGULANT  40 mg Subcutaneous Q24H     furosemide  20 mg Oral Daily     heparin lock flush  5-20 mL Intracatheter Q24H     heparin lock flush  5-20 mL Intracatheter Q24H     pantoprazole  40 mg Oral QAM AC     sodium chloride (PF)  10-40 mL Intracatheter Q8H     sodium chloride (PF)  10-40 mL Intracatheter Q8H     sodium chloride (PF)  3 mL Intracatheter Q8H     tadalafil  40 mg Oral Q24H        , Blood pressure 109/72, pulse 98, temperature 97.7  F (36.5  C), temperature source Oral, resp. rate  "20, height 1.6 m (5' 3\"), weight 70.7 kg (155 lb 14.4 oz), SpO2 92 %.     Constitutional: awake, alert, cooperative, no apparent distress, and appears stated age  Eyes: EOMI, no conjunctivale sclera  Respiratory: Breathing comfortable on 3L  Chest: Arreola dressing clear, dry, intact. No surrounding erythema or tenderness to palpation  GI: soft, non-distended  Skin: no bruising or bleeding  Neurologic: Awake, alert, oriented to name, place and time.  No focal deficits.       Data   Recent Labs   Lab Test 02/16/23  0618 02/15/23  0722    138   POTASSIUM 4.3 3.8   CHLORIDE 101 99   CO2 26 29   ANIONGAP 11 10   GLC 86 86   BUN 10.1 9.8   CR 0.56 0.63   RIGO 9.2 9.1       Lab Results   Component Value Date    WBC 9.0 02/16/2023     Lab Results   Component Value Date    RBC 4.83 02/16/2023     Lab Results   Component Value Date    HGB 16.3 02/16/2023     Lab Results   Component Value Date    HCT 48.9 02/16/2023     No components found for: MCT  Lab Results   Component Value Date     02/16/2023     Lab Results   Component Value Date    MCH 33.7 02/16/2023     Lab Results   Component Value Date    MCHC 33.3 02/16/2023     Lab Results   Component Value Date    RDW 13.4 02/16/2023     Lab Results   Component Value Date     02/16/2023        Liver Function Studies -   Recent Labs   Lab Test 02/07/23  1552   PROTTOTAL 6.2*   ALBUMIN 3.9   BILITOTAL 0.9   ALKPHOS 85   AST 32   ALT 24     Assessment & Plan       Ms Chelsea Flower is a 32 year old lady with a history of obesity and hypothyroidism who presents for pulmonary hypertension evaluation in the setting of progressive dyspnea. The patient is hemodynamically stable, on room air, and in no distress. Her TTE demonstrates that she has severe RV dysfunction and dilation now s/p RHC with negative vasodilator study, now initiating IV remodulin and oral tadalafil for severe PAH.      #Pulmonary Hypertension  Workup to date includes RHC with LUNA MCKEON " 62/48/55, PCWP 8; V/Q scan with low probability of PE; and TTE with LVEF 57% and PASP estimated 63 with no comment on RV function (all performed at OSH, not in Care Everywhere).   The patient's outpatient pulmonary hypertension provider, Dr Carroll, recommended admission for RHC with Vasodilator study to further evaluate her pulmonary hypertension.   In terms of the etiology of her pulmonary hypertension, the patient has no history or evidence of cardiac disease to suggest WHO group II and a distant history of reactive airways disease but no apparent severe lung diseases to suggest WHO group III. Additionally, her V/Q scan is not suggestive of a group IV etiology. She has a positive CLINTON and DS-DNA however without additional clinical evidence of connective tissue or autoimmune disease but this raises the possibility of PAH vs other WHO group V etiology.   Patient has been having significant side effects following initiation of Remodulin including hypoxia, headaches, nausea, and vomiting. Patient currently requiring 3L NC to maintain O2 saturation > 90%.   - Continue Remodulin  At 8ng/kg/min q24h   -Headaches: tylenol 975mg q6h PRN   -Nausea: Compazine PRN  -Continue Tadalafil 40mg daily   -F/U Repeat CLINTON    -RF negative   -If CLINTON positive, will consider consult rheum for evaluation for possible CTD  -Patient has been recommended to not breast feed while on this medication and is weaning breastfeeding   -Health Psychology Consult     #Hypothyroidism  -TSH 5.74   -repeat TFT in 6-8 weeks during outpt      Diet: Regular  PPX: LMWH  Code: Full Code    This patient was seen and discussed with Dr. Falcon who agrees with the above assessment and plan.     Maryjo Mckeon MD  Internal Medicine PGY-1

## 2023-02-16 NOTE — TELEPHONE ENCOUNTER
Approval for Generic Teprostinil as of 2/15/23- 2/15/24.   [facility] notified and requested a call for anything else that is needed. Patient updated.   Ora Do RN on 2/16/2023 at 11:47 AM

## 2023-02-16 NOTE — CONSULTS
Health Psychology          Patsy Clarke, Ph.D., LP [phone]  Brittni Franco, Ph.D., LP [phone]  Maryjo Coker, Ph.D., LP [phone]  Paz Young, Ph.D., , ABPP [phone]  Manny Avendaño, Ph.D., , ABPP [phone]  Amy Jean, Ph.D., LP [phone]  Josi Schrader, Ph.D., , ABPP [phone]    [facility], 3rd Floor  [address] [place] [address]       Inpatient Health Psychology Consultation     Date of Service: 02/16/23   Time in: 2:02 PM  Time out: 2:26 PM    BACKGROUND: Per EMR: Chelsea Flower is a 32 year old female with a history of obesity and hypothyroidism who presents for pulmonary hypertension evaluation in the setting of progressive dyspnea. The patient is hemodynamically stable, on room air, and in no distress. Her TTE demonstrates that she has severe RV dysfunction and dilation now s/p RHC with negative vasodilator study, now initiating IV remodulin and oral tadalafil for severe PAH.     Health psychology consulted for assistance in coping with diagnosis and change in medical status, including going home on oxygen.    SUBJECTIVE: Entered patient's room and introduced myself, the health psychology service, and reason for consult. Patient acknowledges that it has been somewhat stressful and anxiety-provoking to adjust to the change in her medical status. She is concerned about the impact her medical condition will have on her family and she worries about becoming a burden. Assisted patient in processing her thoughts and emotions about the change in her medical status and provided psychoeducation about normative adjustment reactions. She endorses passive thoughts of death being a relief, but denies active SI, plans, or intent. Also educated patient about symptoms of depression and anxiety to monitor. Gave patient an educational handout about coping with depression and a journal for documenting gratitude  practice as a coping strategy as well. Discussed follow-up plans and recommended patient consider outpatient psychotherapy with a health psychologist to assist with adjustment to change in medical status post discharge. Will send patient information via [employer]. Patient expressed appreciation for the visit today and stated that it was helpful.     OBJECTIVE:   Mental Status Exam:   Appearance: Appropriate   Eye Contact: Good    Orientation: Yes, x4  Behavior/relationship to provider/demeanor: Cooperative, Engaged and Pleasant  Motor Activity: normal or unremarkable  Mood (subjective report): Depressed, Anxious  Affect (objective appearance): Appropriate/mood congruent  Speech Rate: Normal  Speech Volume: Normal  Speech Articulation: Normal  Speech Coherence: Normal  Speech Spontaneity: Normal  Thought Content: no suicidal/homicidal ideation, plans, or intent, endorses passive thoughts of death being a relief, endorses depressive and anxious cognitions  Thought Process (associations): Logical, Linear and Goal directed  Thought Process (rate): Normal  Abnormal Perception: None  Attention/Concentration: Normal  Memory: Appears grossly intact  Fund of Knowledge: Appears within normal limits  Abstraction:  Normal  Insight:  Good  Judgment:  good    ASSESSMENT: Patient with PMH significant for recent diagnosis of PAH who is going home on oxygen and who would likely benefit from ongoing support with her adjustment to the change in her medical status.      DIAGNOSES:       Anxiety    Depressed mood    PLAN AND RECOMMENDATIONS:    1. Patient will consider outpatient psychotherapy for additional support once she is discharged. I will send patient information via [employer] regarding scheduling with a therapist near her home location in [place].   2. Recommend encouraging patient to review and use the educational handouts about coping with depression provided to her today  3. Please feel free to call or re-consult if urgent concerns  arise or additional assistance is needed.     Josi Schrader, Ph.D., , [facility]  Clinical Health Psychologist  Phone: [phone]   Pager: [phone]  2/16/2023 2:32 PM

## 2023-02-16 NOTE — SUMMARY OF CARE
Patient has been assessed for Home Oxygen needs. Oxygen readings:    *Pulse oximetry (SpO2) = 91% on room air at rest while awake.    *SpO2 improved to 94% on 1liters/minute at rest.    *SpO2 = 87% on room air during activity/with exercise.    *SpO2 improved to 92% on 2liters/minute during activity/with exercise.

## 2023-02-17 LAB
ANION GAP SERPL CALCULATED.3IONS-SCNC: 11 MMOL/L (ref 7–15)
BASOPHILS # BLD AUTO: 0.1 10E3/UL (ref 0–0.2)
BASOPHILS NFR BLD AUTO: 1 %
BUN SERPL-MCNC: 8.2 MG/DL (ref 6–20)
CALCIUM SERPL-MCNC: 9.2 MG/DL (ref 8.6–10)
CHLORIDE SERPL-SCNC: 101 MMOL/L (ref 98–107)
CREAT SERPL-MCNC: 0.59 MG/DL (ref 0.51–0.95)
DEPRECATED HCO3 PLAS-SCNC: 27 MMOL/L (ref 22–29)
DIGOXIN SERPL-MCNC: 0.5 NG/ML (ref 0.6–2)
EOSINOPHIL # BLD AUTO: 0.1 10E3/UL (ref 0–0.7)
EOSINOPHIL NFR BLD AUTO: 2 %
ERYTHROCYTE [DISTWIDTH] IN BLOOD BY AUTOMATED COUNT: 13.4 % (ref 10–15)
GFR SERPL CREATININE-BSD FRML MDRD: >90 ML/MIN/1.73M2
GLUCOSE SERPL-MCNC: 77 MG/DL (ref 70–99)
HCT VFR BLD AUTO: 50.3 % (ref 35–47)
HGB BLD-MCNC: 16.6 G/DL (ref 11.7–15.7)
IMM GRANULOCYTES # BLD: 0 10E3/UL
IMM GRANULOCYTES NFR BLD: 1 %
LYMPHOCYTES # BLD AUTO: 1.9 10E3/UL (ref 0.8–5.3)
LYMPHOCYTES NFR BLD AUTO: 26 %
MAGNESIUM SERPL-MCNC: 1.9 MG/DL (ref 1.7–2.3)
MCH RBC QN AUTO: 33.6 PG (ref 26.5–33)
MCHC RBC AUTO-ENTMCNC: 33 G/DL (ref 31.5–36.5)
MCV RBC AUTO: 102 FL (ref 78–100)
MONOCYTES # BLD AUTO: 0.5 10E3/UL (ref 0–1.3)
MONOCYTES NFR BLD AUTO: 8 %
NEUTROPHILS # BLD AUTO: 4.5 10E3/UL (ref 1.6–8.3)
NEUTROPHILS NFR BLD AUTO: 62 %
NRBC # BLD AUTO: 0 10E3/UL
NRBC BLD AUTO-RTO: 0 /100
PLATELET # BLD AUTO: 171 10E3/UL (ref 150–450)
POTASSIUM SERPL-SCNC: 3.9 MMOL/L (ref 3.4–5.3)
RBC # BLD AUTO: 4.94 10E6/UL (ref 3.8–5.2)
SODIUM SERPL-SCNC: 139 MMOL/L (ref 136–145)
WBC # BLD AUTO: 7.2 10E3/UL (ref 4–11)

## 2023-02-17 PROCEDURE — 214N000001 HC R&B CCU UMMC

## 2023-02-17 PROCEDURE — 90832 PSYTX W PT 30 MINUTES: CPT | Performed by: PSYCHOLOGIST

## 2023-02-17 PROCEDURE — 99232 SBSQ HOSP IP/OBS MODERATE 35: CPT | Mod: 24 | Performed by: STUDENT IN AN ORGANIZED HEALTH CARE EDUCATION/TRAINING PROGRAM

## 2023-02-17 PROCEDURE — 80162 ASSAY OF DIGOXIN TOTAL: CPT | Performed by: INTERNAL MEDICINE

## 2023-02-17 PROCEDURE — 250N000013 HC RX MED GY IP 250 OP 250 PS 637: Performed by: STUDENT IN AN ORGANIZED HEALTH CARE EDUCATION/TRAINING PROGRAM

## 2023-02-17 PROCEDURE — 250N000011 HC RX IP 250 OP 636: Performed by: STUDENT IN AN ORGANIZED HEALTH CARE EDUCATION/TRAINING PROGRAM

## 2023-02-17 PROCEDURE — 272N000004 HC RX 272: Performed by: INTERNAL MEDICINE

## 2023-02-17 PROCEDURE — 250N000013 HC RX MED GY IP 250 OP 250 PS 637

## 2023-02-17 PROCEDURE — 36592 COLLECT BLOOD FROM PICC: CPT | Performed by: STUDENT IN AN ORGANIZED HEALTH CARE EDUCATION/TRAINING PROGRAM

## 2023-02-17 PROCEDURE — 80048 BASIC METABOLIC PNL TOTAL CA: CPT | Performed by: STUDENT IN AN ORGANIZED HEALTH CARE EDUCATION/TRAINING PROGRAM

## 2023-02-17 PROCEDURE — 250N000011 HC RX IP 250 OP 636: Performed by: PHYSICIAN ASSISTANT

## 2023-02-17 PROCEDURE — 83735 ASSAY OF MAGNESIUM: CPT | Performed by: STUDENT IN AN ORGANIZED HEALTH CARE EDUCATION/TRAINING PROGRAM

## 2023-02-17 PROCEDURE — 85025 COMPLETE CBC W/AUTO DIFF WBC: CPT | Performed by: STUDENT IN AN ORGANIZED HEALTH CARE EDUCATION/TRAINING PROGRAM

## 2023-02-17 PROCEDURE — 250N000011 HC RX IP 250 OP 636: Performed by: INTERNAL MEDICINE

## 2023-02-17 RX ADMIN — TREPROSTINIL 8 NG/KG/MIN: 20 INJECTION, SOLUTION INTRAVENOUS; SUBCUTANEOUS at 12:40

## 2023-02-17 RX ADMIN — TADALAFIL 40 MG: 20 TABLET, FILM COATED ORAL at 08:19

## 2023-02-17 RX ADMIN — ACETAMINOPHEN 975 MG: 325 TABLET, FILM COATED ORAL at 12:48

## 2023-02-17 RX ADMIN — PANTOPRAZOLE SODIUM 40 MG: 40 TABLET, DELAYED RELEASE ORAL at 08:20

## 2023-02-17 RX ADMIN — Medication 5 ML: at 08:19

## 2023-02-17 RX ADMIN — ACETAMINOPHEN 975 MG: 325 TABLET, FILM COATED ORAL at 17:48

## 2023-02-17 RX ADMIN — TRAMADOL HYDROCHLORIDE 50 MG: 50 TABLET, COATED ORAL at 12:48

## 2023-02-17 RX ADMIN — FUROSEMIDE 20 MG: 20 TABLET ORAL at 08:19

## 2023-02-17 RX ADMIN — TRAMADOL HYDROCHLORIDE 50 MG: 50 TABLET, COATED ORAL at 21:21

## 2023-02-17 RX ADMIN — TRAMADOL HYDROCHLORIDE 50 MG: 50 TABLET, COATED ORAL at 04:23

## 2023-02-17 RX ADMIN — Medication 5 ML: at 06:13

## 2023-02-17 RX ADMIN — ACETAMINOPHEN 975 MG: 325 TABLET, FILM COATED ORAL at 06:32

## 2023-02-17 RX ADMIN — ENOXAPARIN SODIUM 40 MG: 40 INJECTION SUBCUTANEOUS at 21:03

## 2023-02-17 RX ADMIN — DIGOXIN 125 MCG: 125 TABLET ORAL at 08:20

## 2023-02-17 ASSESSMENT — ACTIVITIES OF DAILY LIVING (ADL)
ADLS_ACUITY_SCORE: 25
ADLS_ACUITY_SCORE: 25
ADLS_ACUITY_SCORE: 29
ADLS_ACUITY_SCORE: 25
ADLS_ACUITY_SCORE: 29
ADLS_ACUITY_SCORE: 25
ADLS_ACUITY_SCORE: 29

## 2023-02-17 NOTE — PROGRESS NOTES
Cardiology Progress Note    Subjective:  Care team notes reviewed. No events overnight. Feeling well this AM, looking forward to going home soon. Breathing improved. Headaches ongoing but tolerable with tylenol alone. No chest pain. Down to 1L oxygen this AM. Awaiting delivery of equipment for home infusion which will keep pt in hospital today.     Physical Exam   Temp: 97.5  F (36.4  C) Temp src: Oral BP: 122/73 Pulse: 76   Resp: 18 SpO2: 92 % O2 Device: Nasal cannula Oxygen Delivery: 1 LPM    Vital Signs with Ranges  Temp:  [97.5  F (36.4  C)-98.9  F (37.2  C)] 97.5  F (36.4  C)  Pulse:  [68-76] 76  Resp:  [16-20] 18  BP: (108-122)/(61-81) 122/73  Cuff Mean (mmHg):  [87] 87  SpO2:  [91 %-97 %] 92 %      Intake/Output Summary (Last 24 hours) at 2/17/2023 1455  Last data filed at 2/17/2023 1000  Gross per 24 hour   Intake 1187.18 ml   Output --   Net 1187.18 ml         Weight  Vitals:    02/13/23 0007 02/14/23 0524 02/15/23 0453 02/16/23 0228   Weight: 70.5 kg (155 lb 6.4 oz) 70.2 kg (154 lb 12.8 oz) 70.5 kg (155 lb 6.4 oz) 70.7 kg (155 lb 14.4 oz)    02/17/23 0415   Weight: 70.4 kg (155 lb 3.2 oz)       Resp: 18        - MEDICATION INSTRUCTIONS - 3.65 mL/hr at 02/16/23 0832     - MEDICATION INSTRUCTIONS -       treprostinil (REMODULIN) intravenous infusion (LESS than or EQUAL to 100 mcg/mL) 8 ng/kg/min (02/17/23 1240)         acetaminophen  975 mg Oral Q6H     digoxin  125 mcg Oral Daily     enoxaparin ANTICOAGULANT  40 mg Subcutaneous Q24H     furosemide  20 mg Oral Daily     heparin lock flush  5-20 mL Intracatheter Q24H     heparin lock flush  5-20 mL Intracatheter Q24H     pantoprazole  40 mg Oral QAM AC     sodium chloride (PF)  10-40 mL Intracatheter Q8H     sodium chloride (PF)  10-40 mL Intracatheter Q8H     sodium chloride (PF)  3 mL Intracatheter Q8H     tadalafil  40 mg Oral Q24H        , Blood pressure 122/73, pulse 76, temperature 97.5  F (36.4  C), temperature source Oral, resp. rate 18, height 1.6 m  "(5' 3\"), weight 70.4 kg (155 lb 3.2 oz), SpO2 92 %.     Constitutional: awake, alert, cooperative, no apparent distress, and appears stated age  Eyes: EOMI, no conjunctivale sclera  Respiratory: Breathing comfortable on 1L  Chest: Arreola dressing clear, dry, intact. No surrounding erythema or tenderness to palpation  GI: soft, non-distended  Skin: no bruising or bleeding  Neurologic: Awake, alert, oriented to name, place and time.  No focal deficits.       Data   Recent Labs   Lab Test 02/16/23  0618 02/15/23  0722    138   POTASSIUM 4.3 3.8   CHLORIDE 101 99   CO2 26 29   ANIONGAP 11 10   GLC 86 86   BUN 10.1 9.8   CR 0.56 0.63   RIGO 9.2 9.1       Lab Results   Component Value Date    WBC 9.0 02/16/2023     Lab Results   Component Value Date    RBC 4.83 02/16/2023     Lab Results   Component Value Date    HGB 16.3 02/16/2023     Lab Results   Component Value Date    HCT 48.9 02/16/2023     No components found for: MCT  Lab Results   Component Value Date     02/16/2023     Lab Results   Component Value Date    MCH 33.7 02/16/2023     Lab Results   Component Value Date    MCHC 33.3 02/16/2023     Lab Results   Component Value Date    RDW 13.4 02/16/2023     Lab Results   Component Value Date     02/16/2023        Liver Function Studies -   Recent Labs   Lab Test 02/07/23  1552   PROTTOTAL 6.2*   ALBUMIN 3.9   BILITOTAL 0.9   ALKPHOS 85   AST 32   ALT 24     Assessment & Plan       Ms Chelsea Flower is a 32 year old lady with a history of obesity and hypothyroidism who presents for pulmonary hypertension evaluation in the setting of progressive dyspnea. The patient is hemodynamically stable, on room air, and in no distress. Her TTE demonstrates that she has severe RV dysfunction and dilation now s/p RHC with negative vasodilator study, now initiating IV remodulin and oral tadalafil for severe PAH.     Changes today:  - Plan for discharge tomorrow pending equipment set-up for home Remodulin " infusion     #Pulmonary Hypertension  Workup to date includes RHC with RA 8, PA 62/48/55, PCWP 8; V/Q scan with low probability of PE; and TTE with LVEF 57% and PASP estimated 63 with no comment on RV function (all performed at OSH, not in Care Everywhere).   The patient's outpatient pulmonary hypertension provider, Dr Carroll, recommended admission for RHC with Vasodilator study to further evaluate her pulmonary hypertension.   In terms of the etiology of her pulmonary hypertension, the patient has no history or evidence of cardiac disease to suggest WHO group II and a distant history of reactive airways disease but no apparent severe lung diseases to suggest WHO group III. Additionally, her V/Q scan is not suggestive of a group IV etiology. She has a positive CLINTON and DS-DNA however without additional clinical evidence of connective tissue or autoimmune disease but this raises the possibility of PAH vs other WHO group V etiology.   Patient has been having significant side effects following initiation of Remodulin including hypoxia, headaches, nausea, and vomiting. Patient currently requiring 3L NC to maintain O2 saturation > 90%.   - Continue Remodulin infusion at 8ng/kg/min   -Arranging home infusion/health, plan for discharge 2/18   -Headaches: tylenol 975mg q6h PRN   -Nausea: Compazine PRN  -Continue Tadalafil 40mg daily   -Started digoxin this admit (125 mcg daily), stopped diltiazem  -CLINTON + 1:320, discussed with rheum and given speckled pattern they do not have concerns for connective tissue disease absent other signs/sxs.  -Patient has been recommended to not breast feed while on this medication and is weaning breastfeeding   -Health Psychology consult  -Home O2 arranged     #Hypothyroidism  -TSH 5.74   -repeat TSH in 6-8 weeks as outpt      Diet: Regular  PPX: LMWH  Code: Full Code    This patient was seen and discussed with Dr. Fiorella Mckeon who agrees with the above assessment and plan.     Amber  MD Evens  Internal Medicine PGY-3

## 2023-02-17 NOTE — PLAN OF CARE
D/I/A: Pt resting and watching TV.  A+O x4 and making needs known.  VSS, afebrile.  Mild to moderate headache, effectively managed with Tylenol and Ultram.  Pleasant and cooperative.  Tolerating Remodulin infusion; eager for discharge to home in AM.  P: Continue to monitor status.

## 2023-02-17 NOTE — PLAN OF CARE
"D: admitted 2/7 for pulmonary hypertension evaluation w/ progressive dyspnea    I: Monitored vitals and assessed pt status.   Changed: Remodulin syringe/tubing changed  Running: Remodulin 8 ng/kg/min  PRN: Tramadol for headache    A: A/o x4, VSS. Headache. Sinus rhythm rates in 70's. 1L NC at rest, 2-3L w/ activity. Home O2 in room. Voiding adequately, no BM over shift. R Arreola infusing Remodulin @ 8 ng/kg/min. R SL PICC heparin locked. Cap changed w/ blood return. Up ad jess in the room. Headache managed w/ schedule Tylenol and PRN Tramadol.     Vitals: /73   Pulse 76   Temp 97.5  F (36.4  C) (Oral)   Resp 18   Ht 1.6 m (5' 3\")   Wt 70.4 kg (155 lb 3.2 oz)   SpO2 92%   BMI 27.49 kg/m       P: Continue to monitor Pt status and report any significant changes to treatment team. Discharge 2/18 in the AM, delayed d/t home pump delivery being delayed.     "

## 2023-02-17 NOTE — PROGRESS NOTES
Care Management Follow Up    Length of Stay (days): 9    Expected Discharge Date: 02/18/2023   [facility] RN to be here at 0900.  Pt would like to leave afterwards asap.     Concerns to be Addressed:       Patient plan of care discussed at interdisciplinary rounds: Yes    Anticipated Discharge Disposition: Home     Anticipated Discharge Services:  [facility] RN Gia Ph: [phone] called me this morning and informed me her supply shipment will NOT get here until 7-8pm this evening.  Gia will plan to be here at 0900 tomorrow morning for final hook up and teach.  Pt aware--her spouse is here in a hotel.    Anticipated Discharge DME:  Portable O2 is in pt's room: 2 tanks from  [facility]. Informed pt she will need to call them on her way home tomorrow, to set her up at home 2/18 and she will.  Jodie from  resp called me [phone] #1 and said  will bring a portable concentrator to pt's room this evening by 7pm and take the 2 portable tanks back. Pt is to call their  [place] on his cell [phone] when she gets home: added to orders.      Education Provided on the Discharge Plan:  Yes  Patient/Family in Agreement with the Plan:Yes        Additional Information:  I met with pt in her room approx 3:20pm, explained my role and she is very pleasant.      Elena Sommers RN

## 2023-02-17 NOTE — PLAN OF CARE
Status: Pt admitted 2/7 for pulmonary hypertension evaluation in the setting of progressive dyspnea. Started on IV Remodulin and oral Tadalafil.     Neuro: A/Ox4.  Cardiac:  SR.  VSS.   Respiratory: O2 sats stable on 1L NC 92%, more with ambulation  GI/: Voiding adequate, BM yesterday  Diet/appetite: Reg diet  Activity:  UAL  Pain: Tylenol and Ultram for HA.  Skin: Intact, no new deficits noted.  LDA's: PICC HL'd, CVC for Remodulin at 8mg/kg/min goal.     Plan: Possible discharge. Education with  at noon. Continue with POC. Notify primary team with changes.

## 2023-02-18 VITALS
RESPIRATION RATE: 16 BRPM | TEMPERATURE: 97.8 F | OXYGEN SATURATION: 92 % | WEIGHT: 155.2 LBS | HEART RATE: 72 BPM | SYSTOLIC BLOOD PRESSURE: 117 MMHG | HEIGHT: 63 IN | DIASTOLIC BLOOD PRESSURE: 73 MMHG | BODY MASS INDEX: 27.5 KG/M2

## 2023-02-18 LAB
ANION GAP SERPL CALCULATED.3IONS-SCNC: 11 MMOL/L (ref 7–15)
BASOPHILS # BLD AUTO: 0.1 10E3/UL (ref 0–0.2)
BASOPHILS NFR BLD AUTO: 1 %
BUN SERPL-MCNC: 10.2 MG/DL (ref 6–20)
CALCIUM SERPL-MCNC: 9.2 MG/DL (ref 8.6–10)
CHLORIDE SERPL-SCNC: 103 MMOL/L (ref 98–107)
CREAT SERPL-MCNC: 0.54 MG/DL (ref 0.51–0.95)
DEPRECATED HCO3 PLAS-SCNC: 25 MMOL/L (ref 22–29)
EOSINOPHIL # BLD AUTO: 0.2 10E3/UL (ref 0–0.7)
EOSINOPHIL NFR BLD AUTO: 2 %
ERYTHROCYTE [DISTWIDTH] IN BLOOD BY AUTOMATED COUNT: 13.2 % (ref 10–15)
GFR SERPL CREATININE-BSD FRML MDRD: >90 ML/MIN/1.73M2
GLUCOSE SERPL-MCNC: 86 MG/DL (ref 70–99)
HCT VFR BLD AUTO: 48.5 % (ref 35–47)
HGB BLD-MCNC: 16.3 G/DL (ref 11.7–15.7)
IMM GRANULOCYTES # BLD: 0.1 10E3/UL
IMM GRANULOCYTES NFR BLD: 1 %
LYMPHOCYTES # BLD AUTO: 1.8 10E3/UL (ref 0.8–5.3)
LYMPHOCYTES NFR BLD AUTO: 25 %
MAGNESIUM SERPL-MCNC: 1.9 MG/DL (ref 1.7–2.3)
MCH RBC QN AUTO: 34 PG (ref 26.5–33)
MCHC RBC AUTO-ENTMCNC: 33.6 G/DL (ref 31.5–36.5)
MCV RBC AUTO: 101 FL (ref 78–100)
MONOCYTES # BLD AUTO: 0.5 10E3/UL (ref 0–1.3)
MONOCYTES NFR BLD AUTO: 7 %
NEUTROPHILS # BLD AUTO: 4.5 10E3/UL (ref 1.6–8.3)
NEUTROPHILS NFR BLD AUTO: 64 %
NRBC # BLD AUTO: 0 10E3/UL
NRBC BLD AUTO-RTO: 0 /100
PLATELET # BLD AUTO: 160 10E3/UL (ref 150–450)
POTASSIUM SERPL-SCNC: 4.1 MMOL/L (ref 3.4–5.3)
RBC # BLD AUTO: 4.79 10E6/UL (ref 3.8–5.2)
SODIUM SERPL-SCNC: 139 MMOL/L (ref 136–145)
WBC # BLD AUTO: 7 10E3/UL (ref 4–11)

## 2023-02-18 PROCEDURE — 85004 AUTOMATED DIFF WBC COUNT: CPT | Performed by: STUDENT IN AN ORGANIZED HEALTH CARE EDUCATION/TRAINING PROGRAM

## 2023-02-18 PROCEDURE — 99239 HOSP IP/OBS DSCHRG MGMT >30: CPT | Mod: 24 | Performed by: STUDENT IN AN ORGANIZED HEALTH CARE EDUCATION/TRAINING PROGRAM

## 2023-02-18 PROCEDURE — 250N000013 HC RX MED GY IP 250 OP 250 PS 637: Performed by: STUDENT IN AN ORGANIZED HEALTH CARE EDUCATION/TRAINING PROGRAM

## 2023-02-18 PROCEDURE — 82310 ASSAY OF CALCIUM: CPT | Performed by: STUDENT IN AN ORGANIZED HEALTH CARE EDUCATION/TRAINING PROGRAM

## 2023-02-18 PROCEDURE — 83735 ASSAY OF MAGNESIUM: CPT | Performed by: STUDENT IN AN ORGANIZED HEALTH CARE EDUCATION/TRAINING PROGRAM

## 2023-02-18 PROCEDURE — 250N000013 HC RX MED GY IP 250 OP 250 PS 637

## 2023-02-18 PROCEDURE — 250N000011 HC RX IP 250 OP 636: Performed by: INTERNAL MEDICINE

## 2023-02-18 PROCEDURE — 36415 COLL VENOUS BLD VENIPUNCTURE: CPT | Performed by: STUDENT IN AN ORGANIZED HEALTH CARE EDUCATION/TRAINING PROGRAM

## 2023-02-18 PROCEDURE — 250N000011 HC RX IP 250 OP 636: Performed by: STUDENT IN AN ORGANIZED HEALTH CARE EDUCATION/TRAINING PROGRAM

## 2023-02-18 PROCEDURE — 272N000004 HC RX 272: Performed by: INTERNAL MEDICINE

## 2023-02-18 RX ORDER — PROCHLORPERAZINE MALEATE 5 MG
5 TABLET ORAL EVERY 6 HOURS PRN
Qty: 30 TABLET | Refills: 0 | Status: SHIPPED | OUTPATIENT
Start: 2023-02-18 | End: 2023-08-06

## 2023-02-18 RX ORDER — ACETAMINOPHEN 325 MG/1
975 TABLET ORAL EVERY 6 HOURS
Qty: 80 TABLET | Refills: 0 | Status: SHIPPED | OUTPATIENT
Start: 2023-02-18

## 2023-02-18 RX ORDER — DIGOXIN 125 MCG
125 TABLET ORAL DAILY
Qty: 90 TABLET | Refills: 0 | Status: SHIPPED | OUTPATIENT
Start: 2023-02-19 | End: 2023-03-20

## 2023-02-18 RX ORDER — TADALAFIL 20 MG/1
40 TABLET ORAL EVERY 24 HOURS
Qty: 90 TABLET | Refills: 0 | Status: SHIPPED | OUTPATIENT
Start: 2023-02-19 | End: 2023-08-06

## 2023-02-18 RX ORDER — PANTOPRAZOLE SODIUM 40 MG/1
40 TABLET, DELAYED RELEASE ORAL
Qty: 90 TABLET | Refills: 0 | Status: SHIPPED | OUTPATIENT
Start: 2023-02-19 | End: 2023-03-20

## 2023-02-18 RX ORDER — FUROSEMIDE 20 MG
20 TABLET ORAL DAILY
Qty: 90 TABLET | Refills: 0 | Status: SHIPPED | OUTPATIENT
Start: 2023-02-19 | End: 2023-03-20

## 2023-02-18 RX ADMIN — DIGOXIN 125 MCG: 125 TABLET ORAL at 08:22

## 2023-02-18 RX ADMIN — TRAMADOL HYDROCHLORIDE 50 MG: 50 TABLET, COATED ORAL at 07:53

## 2023-02-18 RX ADMIN — ACETAMINOPHEN 975 MG: 325 TABLET, FILM COATED ORAL at 00:06

## 2023-02-18 RX ADMIN — FUROSEMIDE 20 MG: 20 TABLET ORAL at 08:22

## 2023-02-18 RX ADMIN — PROCHLORPERAZINE MALEATE 5 MG: 5 TABLET ORAL at 07:53

## 2023-02-18 RX ADMIN — TADALAFIL 40 MG: 20 TABLET, FILM COATED ORAL at 08:21

## 2023-02-18 RX ADMIN — PANTOPRAZOLE SODIUM 40 MG: 40 TABLET, DELAYED RELEASE ORAL at 08:22

## 2023-02-18 RX ADMIN — ACETAMINOPHEN 975 MG: 325 TABLET, FILM COATED ORAL at 06:36

## 2023-02-18 RX ADMIN — TREPROSTINIL 8 NG/KG/MIN: 20 INJECTION, SOLUTION INTRAVENOUS; SUBCUTANEOUS at 01:12

## 2023-02-18 RX ADMIN — Medication 5 ML: at 07:08

## 2023-02-18 ASSESSMENT — ACTIVITIES OF DAILY LIVING (ADL)
ADLS_ACUITY_SCORE: 25

## 2023-02-18 NOTE — PLAN OF CARE
Presents for pulmonary hypertension evaluation in the setting of progressive dyspnea. PMHx of obesity and hypothyroidism      Code status: Full Code    Team: Cards 2     Neuro: AOx4, Calm and cooperative, no deficits  Cardiac/Tele: SR, normotensive, afebrile, other VSS  Respiratory: 1L Nasal canula. Sats > 95%. LS clear and dim on the bases. No SOB noted at rest  GI/: Voids independently. BS audible. LBM (2/17)   Diet/Appetite: Regular diet  Skin: No overt deficits  Endocrine: WDL  LDAs: Single lumen PICC saline locked and Single lumen Arreola infusing Remodulin at 8 ng/kg/min  Activity: Up ad jess  Pain: Endorses a consant headache. Tramadol x1 and Tylenol x2 given     Plan: Pt discharging today with home Remodulin

## 2023-02-18 NOTE — PROGRESS NOTES
DISCHARGE   Discharged to: Home  Via: Automobile  Accompanied by: Family  Discharge Instructions: diet, activity, medications, follow up appointments, when to call the MD, and what to watchout for (i.e. s/s of infection, increasing SOB, palpitations, chest pain,)  Prescriptions: To be filled by discharge pharmacy per pt's request; medication list reviewed & sent with pt  Follow Up Appointments: arranged; information given  Belongings: All sent with pt  IV: out  Telemetry: off  Pt exhibits understanding of above discharge instructions; all questions answered.  Discharge Paperwork: sent with patient    Notified patient: follow with primary within a week.  Patient took oxygen tank and concentrator. Patient took Remodulin supplies.

## 2023-02-18 NOTE — DISCHARGE SUMMARY
[facility]    Cardiology Discharge Summary- Cardiology    Date of Admission:  2/7/2023  Date of Discharge:  2/18/2023  Discharging Provider: Fiorella Mckeon MD    Discharge Diagnoses   Pulmonary Artery Hypertension    Follow-ups Needed After Discharge   Follow-up Appointments     Follow Up and recommended labs and tests      Follow up with primary care provider, Kirk Aguilar, within 2 weeks, for   hospital follow- up. Repeat TSH in 6-8 weeks following discharge.    Follow up with Dr. Carroll on 3/8/23 at 3:30pm to evaluate medication   change and for hospital follow- up.           Discharge Disposition   Discharged to home  Condition at discharge: Stable    Hospital Course      Ms Chelsea Flower is a 32 year old lady with PMH of hypothyroidism who presents for pulmonary hypertension evaluation in the setting of progressive dyspnea. Patient underwent RHC no 2 /8/23 which confirmed diagnosis of pulmonary hypertension. She was started on Remodulin and Tadalafil for management. Hospital course was complicated by headaches and nausea during uptitration of Remodulin that were controlled with Compazine and Dilaudid. She was uptitrated to goal dose and had formal instruction on administration of Remodulin. Once equipment had been delivered and medications had insurance approval she was stable for discharge.    #Pulmonary Hypertension  RHC on 2/8/23 demonstrated RA 8, PA 62/48/55, PCWP 8; V/Q scan with low probability of PE; and TTE with LVEF 57% and PASP estimated 63 with no comment on RV function (all performed at OSH, not in Care Everywhere). Unclear etiology of pulmonary hypertension as she has no history or evidence of cardiac disease WHO group II and a distant history of reactive airways disease but no apparent severe lung diseases to suggest WHO group III. Additionally, her V/Q scan is not suggestive of a group IV etiology. She has a positive CLINTON and DS-DNA  however without additional clinical evidence of connective tissue or autoimmune disease but this raises the possibility of PAH vs other WHO group V etiology.   -Remodulin infusion at 8ng/kg/min              -Headaches: tylenol 975mg q6h PRN              -Nausea: Compazine 5mg PRN  -Tadalafil 40mg daily   - Digoxin this admit 125 mcg daily  -Health Psychology consult  -Continue supplemental oxygen to maintain O2 > 90%  -Follow up with Dr. Carroll on 3/8/23 at 3:30pm     #Hypothyroidism  -TSH 5.74   -repeat TSH in 6-8 weeks as outpt     Consultations This Hospital Stay   NURSING TO CONSULT FOR VASCULAR ACCESS CARE IP CONSULT  ENDOCRINE NON-DIABETES ADULT IP CONSULT  VASCULAR ACCESS FOR PICC PLACEMENT ADULT IP CONSULT  PHARMACY IP CONSULT  PHARMACY IP CONSULT  LACTATION IP CONSULT  PHARMACY IP CONSULT  NURSING TO CONSULT FOR VASCULAR ACCESS CARE IP CONSULT  PSYCHOLOGY ADULT IP CONSULT  INTERVENTIONAL RADIOLOGY ADULT/PEDS IP CONSULT  INTERVENTIONAL RADIOLOGY ADULT/PEDS IP CONSULT  CARE MANAGEMENT / SOCIAL WORK IP CONSULT  NURSING TO CONSULT FOR VASCULAR ACCESS CARE IP CONSULT  SMOKING CESSATION PROGRAM IP CONSULT  SMOKING CESSATION PROGRAM IP CONSULT    Code Status   Full Code      Maryjo Mckeon MD  [facility]  ______________________________________________________________________    Physical Exam   Vital Signs: Temp: 97.8  F (36.6  C) Temp src: Oral BP: 124/77 Pulse: 71   Resp: 16 SpO2: 94 % O2 Device: Nasal cannula Oxygen Delivery: 1 LPM  Weight: 155 lbs 3.2 oz    Physical Exam  Constitutional:       General: She is not in acute distress.  HENT:      Head: Normocephalic and atraumatic.   Eyes:      Extraocular Movements: Extraocular movements intact.      Conjunctiva/sclera: Conjunctivae normal.   Cardiovascular:      Rate and Rhythm: Normal rate and regular rhythm.   Pulmonary:      Effort: Pulmonary effort is normal.      Breath sounds: Normal breath sounds.       Comments: Breathing comfortably on 1L  Abdominal:      General: There is no distension.      Palpations: Abdomen is soft.   Musculoskeletal:      Right lower leg: No edema.      Left lower leg: No edema.   Skin:     General: Skin is warm and dry.   Neurological:      General: No focal deficit present.      Mental Status: She is alert. Mental status is at baseline.   Psychiatric:         Mood and Affect: Mood normal.         Behavior: Behavior normal.          Primary Care Physician   Kirk Aguilar    Discharge Orders      Home Infusion Referral      Activity    Your activity upon discharge: activity as tolerated     Reason for your hospital stay    You were admitted to the hospital for worsening shortness of breath. You underwent a right heart catheterization which confirmed the diagnosis of pulmonary hypertension. You were started on Remodulin and Tadalafil for management of your pulmonary hypertension. You stayed in the hospital until you reached your goal dose of Remodulin. You experienced headaches and nausea with your new treatment but these were controlled with medications. You were started on supplemental oxygen.    Upon discharge, please continue to take your Remodulin and tadalafil as instructed. Please continue to use supplemental oxygen to keep you oxygen saturation above 90%.      If you have any worsening symptoms of shortness of breath, are requiring higher levels of oxygen, have any concerns with your Remodulin infusion or [place], please report to your local ED for transfer to [facility] for additional workup and management.     Follow Up and recommended labs and tests    Follow up with primary care provider, Kirk Aguilar, within 2 weeks, for hospital follow- up. Repeat TSH in 6-8 weeks following discharge.    Follow up with Dr. Carroll on 3/8/23 at 3:30pm to evaluate medication change and for hospital follow- up.     Oxygen Adult/Peds    Oxygen Documentation  I certify that this patient, Chelsea Flower  has been under my care (or a nurse practitioner or physican's assistant working with me). This is the face-to-face encounter for oxygen medical necessity.      At the time of this encounter supplemental oxygen is reasonable and necessary and is expected to improve the patient's condition in a home setting.       Patient has continued oxygen desaturation due to Pulmonary Hypertension I27.20.    If portability is ordered, is the patient mobile within the home? Yes    [place],  [facility] ([facility])  Phone: [phone] [place]  When you have arrived home on 2/18/23 2/17 Portable concentrator delivered     Diet    Follow this diet upon discharge: Regular Diet Adult       Significant Results and Procedures   Most Recent 3 CBC's:Recent Labs   Lab Test 02/18/23  0709 02/17/23  0616 02/16/23 0618   WBC 7.0 7.2 9.0   HGB 16.3* 16.6* 16.3*   * 102* 101*    171 150     Most Recent 3 BMP's:Recent Labs   Lab Test 02/18/23  0709 02/17/23  0616 02/16/23  0618    139 138   POTASSIUM 4.1 3.9 4.3   CHLORIDE 103 101 101   CO2 25 27 26   BUN 10.2 8.2 10.1   CR 0.54 0.59 0.56   ANIONGAP 11 11 11   RIGO 9.2 9.2 9.2   GLC 86 77 86     Most Recent TSH and T4:Recent Labs   Lab Test 02/07/23  1552   TSH 5.24*   T4 1.26       Discharge Medications   Current Discharge Medication List      START taking these medications    Details   acetaminophen (TYLENOL) 325 MG tablet Take 3 tablets (975 mg) by mouth every 6 hours  Qty: 80 tablet, Refills: 0    Associated Diagnoses: Pulmonary hypertension (H)      digoxin (LANOXIN) 125 MCG tablet Take 1 tablet (125 mcg) by mouth daily  Qty: 90 tablet, Refills: 0    Associated Diagnoses: Pulmonary hypertension (H)      furosemide (LASIX) 20 MG tablet Take 1 tablet (20 mg) by mouth daily  Qty: 90 tablet, Refills: 0    Associated Diagnoses: Pulmonary hypertension (H)      pantoprazole (PROTONIX) 40 MG EC tablet Take 1 tablet (40 mg) by mouth daily before breakfast  Qty: 90  tablet, Refills: 0    Associated Diagnoses: Pulmonary hypertension (H)      prochlorperazine (COMPAZINE) 5 MG tablet Take 1 tablet (5 mg) by mouth every 6 hours as needed for nausea or vomiting  Qty: 30 tablet, Refills: 0    Associated Diagnoses: Pulmonary hypertension (H)      tadalafil (CIALIS) 20 MG tablet Take 2 tablets (40 mg) by mouth every 24 hours  Qty: 90 tablet, Refills: 0    Associated Diagnoses: Pulmonary hypertension (H)      treprostinil (REMODULIN) 10 mcg/mL in glycine diluent 50 mL infusion Inject 604.8 ng/min into the vein continuous    Associated Diagnoses: Pulmonary hypertension (H)         CONTINUE these medications which have NOT CHANGED    Details   albuterol (PROAIR HFA/PROVENTIL HFA/VENTOLIN HFA) 108 (90 Base) MCG/ACT inhaler Inhale 1 puff into the lungs every 4 hours as needed for shortness of breath      albuterol (PROVENTIL) (2.5 MG/3ML) 0.083% neb solution INHALE 1 VIAL VIA NEBULIZATION EVERY 6 HOURS AS NEEDED FOR WHEEZING      Multiple Vitamins-Minerals (WOMENS MULTIVITAMIN) TABS Take 1 tablet by mouth daily         STOP taking these medications       diltiazem (CARDIZEM) 60 MG tablet Comments:   Reason for Stopping:             Allergies   Allergies   Allergen Reactions     Other Food Allergy      Cantaloupe

## 2023-02-18 NOTE — PROGRESS NOTES
"Care Coordinator  D/I: pt has signed discharge orders.  A: ready to discharge to home  P\" I called bedside RN: howard [id number] and he verified that [facility] RN Gia is here @ 0900 for final teach/hook up and the portable o2 concentrator is in her room.  "

## 2023-02-20 ENCOUNTER — PATIENT OUTREACH (OUTPATIENT)
Dept: CARE COORDINATION | Facility: CLINIC | Age: 33
End: 2023-02-20
Payer: COMMERCIAL

## 2023-02-20 ENCOUNTER — TELEPHONE (OUTPATIENT)
Dept: CARDIOLOGY | Facility: CLINIC | Age: 33
End: 2023-02-20
Payer: COMMERCIAL

## 2023-02-20 NOTE — TELEPHONE ENCOUNTER
Patient is at home. Doing well on treprostinil. Has a follow-up with SP nurse today at home for new mix. Patient has a follow-up scheduled. Will call in the interim for any concerns.     Ora Do RN on 2/20/2023 at 10:59 AM

## 2023-02-20 NOTE — PROGRESS NOTES
[facility]    Background: Transitional Care Management program identified per system criteria and reviewed by [facility] team for possible outreach.    Assessment: Upon chart review, CCR Team member will not proceed with patient outreach related to this episode of Transitional Care Management program due to reason below:    Patient has active communication with a nurse, provider or care team for reason of post-hospital follow up plan.  Outreach call by CCR team not indicated to minimize duplicative efforts.     Plan: Transitional Care Management episode addressed appropriately per reason noted above.      Crystal Juarez RN  [facility]    *Connected Care Resource Team does NOT follow patient ongoing. Referrals are identified based on internal discharge reports and the outreach is to ensure patient has an understanding of their discharge instructions.

## 2023-03-06 NOTE — PROGRESS NOTES
Alex Carroll M.D.  Cardiovascular Medicine  [phone]    Telephone visit with patient's permission x 30 minutes      Kirk Newman M.D.  Shirley Chandler M.D.    Dear Doctors:    Thank you for allowing us to visit with your patient, Chelsea Flower, for evaluation of pulmonary hypertension of uncertain etiology though provocative laboratory testing with elevated CLINTON, +DS-DNA, elevated hemoglobin.    Problem List  1. Severe PAH  2. Severely reduced right ventricular function  3. Oxygen dependent respiratory failure       History    32 year old female interviewed by telephone for evaluation of pulmonary hypertension with mean PA 55, ventricular mechanical remodeling as shown by echocardiogram, and no evidence or history of pulmonary emboli; subsequent to her discharge from [facility] for evaluation and initiation of therapy with parenteral prostacyclin.  Interim history unremarkable for syncope, pre-syncope, right heart failure, hemoptysis.  Titration without complication.  Patient is on only two drugs but has Opsumit at homel    BMI: 30 height 5.3. weight 179    The patient has symptoms of exertional dyspnea starting shortly after her last pregnancy.  She has has progressive shortness of breath and exercise tolerance but no definite exertional syncope though does become somewhat lightheaded with moderate exertion for her age.  She has no history of drug or alcohol usage, pulmonary emboli, liver disease, congenital heart disease, myeloproliferative syndrome, family history of premature death or pulmonary hypertension, chemical exposure, thyroid dysfunction.  She does take diltiazem.     Constitutional: weight loss, fever, chills, night sweats  HEENT: without visual changes, swallow difficulties  Pulmonary: with shortness of breath, cough, wheeze, hemoptysis  Cardiac: without chest pain, MALAVE, PND, orthopnea, edema, palpitation, pre-syncope, syncope,  GI: without diarrhea, constipation, jaundice, melena,  GERD, hematemesis  : without frequency, urgency, dysuria, hematuria  Skin: rash, bruise, open lesions  Neuro: without TIA, focal neurologic complaints, seizure, trauma  Ortho: without pain, swelling, mobility impairment  Endocrine: diabetes, thyroid, heat/cold intolerance, polyuria, polyphagia, change bowel habits.  Sleep:no ASAD, periodic breathing, fatigue      Allergies: none  ETOH: none  Smoking: none  Family history: negative  Surgery: C-Sections (first child presented breach    Right heart catheterization  RA 8, PCP 8 PA pressure PA 62/48 mean 55      Echocardiogram  Name: RONALD LEBLANC  MRN: [id number]  : 1990  Study Date: 2023 03:23 PM  Age: 32 yrs  Gender: Female  [place] [facility]  Reason For Study: Heart Failure  Ordering Physician: ENRIKE BENJAMIN  Performed By: Noreen Atkinson     BSA: 1.8 m2  Height: 63 in  Weight: 179 lb  BP: 147/98 mmHg  ______________________________________________________________________________  Procedure  Echocardiogram with two-dimensional, color and spectral Doppler performed.  Contrast Optison. Patient was given 6 ml mixture of 3 ml Optison and 6 ml  saline. 3 ml wasted.  ______________________________________________________________________________  Interpretation Summary  Left ventricular cavity size is small. Left ventricular function is normal.The  ejection fraction is 55-60%. Flattened septum is consistent with right  ventricular pressure and volume overload.     Severe right ventricular dilation is present. Global right ventricular  function is severely reduced.     Moderate to severe tricuspid insufficiency is present.     Estimated pulmonary artery systolic pressure is 72 mmHg. IVC diameter and  respiratory changes fall into an intermediate range suggesting an RA pressure  of 8 mmHg.     Trivial pericardial effusion is present.  ______________________________________________________________________________  Left Ventricle  Left  ventricular function is normal.The ejection fraction is 55-60%. Left  ventricular cavity size is small. Flattened septum is consistent with right  ventricular pressure and volume overload.     Right Ventricle  Severe right ventricular dilation is present. Global right ventricular  function is severely reduced.     Atria  The left atrium appears normal. Severe right atrial enlargement is present.     Mitral Valve  The mitral valve is normal.     Aortic Valve  Aortic valve is normal in structure and function.     Tricuspid Valve  Moderate to severe tricuspid insufficiency is present. Estimated pulmonary  artery systolic pressure is 64 mmHg plus right atrial pressure.     Pulmonic Valve  Mild pulmonic insufficiency is present.     Vessels  IVC diameter and respiratory changes fall into an intermediate range  suggesting an RA pressure of 8 mmHg.     Pericardium  Trivial pericardial effusion is present.     Compared to Previous Study  There is no prior study for direct comparison.  ______________________________________________________________________________  MMode/2D Measurements & Calculations  IVSd: 0.68 cm  LVIDd: 4.1 cm  LVIDs: 2.8 cm  LVPWd: 0.69 cm  FS: 32.1 %  LV mass(C)d: 79.7 grams  LV mass(C)dI: 43.2 grams/m2  Ao root diam: 2.6 cm  asc Aorta Diam: 2.6 cm  LVOT diam: 2.1 cm  LVOT area: 3.4 cm2  LA Volume (BP): 18.9 ml  LA Volume Index (BP): 10.3 ml/m2     RWT: 0.33     Doppler Measurements & Calculations  MV E max gabino: 55.0 cm/sec  MV A max gabino: 41.4 cm/sec  MV E/A: 1.3  MV dec slope: 233.2 cm/sec2  MV dec time: 0.24 sec  PA acc time: 0.07 sec  TR max gabino: 399.4 cm/sec  TR max P.8 mmHg  RVSP(TR): 66.8 mmHg  E/E' av.1  Lateral E/e': 4.6  Medial E/e': 9.7                  VQ Lung Scan      Meds    Current Outpatient Medications   Medication     acetaminophen (TYLENOL) 325 MG tablet     albuterol (PROAIR HFA/PROVENTIL HFA/VENTOLIN HFA) 108 (90 Base) MCG/ACT inhaler     albuterol (PROVENTIL) (2.5 MG/3ML)  0.083% neb solution     digoxin (LANOXIN) 125 MCG tablet     furosemide (LASIX) 20 MG tablet     Multiple Vitamins-Minerals (WOMENS MULTIVITAMIN) TABS     pantoprazole (PROTONIX) 40 MG EC tablet     prochlorperazine (COMPAZINE) 5 MG tablet     tadalafil (CIALIS) 20 MG tablet     treprostinil (REMODULIN) 10 mcg/mL in glycine diluent 50 mL infusion     No current facility-administered medications for this visit.         Labs    Outside laboratories reviewed and abnormal include: hemoglobin 18, , , CLINTON +, DS-DNA +     Latest Reference Range & Units 02/17/23 06:16 02/18/23 07:09   Sodium 136 - 145 mmol/L 139 139   Potassium 3.4 - 5.3 mmol/L 3.9 4.1   Chloride 98 - 107 mmol/L 101 103   Carbon Dioxide (CO2) 22 - 29 mmol/L 27 25   Urea Nitrogen 6.0 - 20.0 mg/dL 8.2 10.2   Creatinine 0.51 - 0.95 mg/dL 0.59 0.54   GFR Estimate >60 mL/min/1.73m2 >90 >90   Calcium 8.6 - 10.0 mg/dL 9.2 9.2   Anion Gap 7 - 15 mmol/L 11 11   Magnesium 1.7 - 2.3 mg/dL 1.9 1.9   Glucose 70 - 99 mg/dL 77 86   WBC 4.0 - 11.0 10e3/uL 7.2 7.0   Hemoglobin 11.7 - 15.7 g/dL 16.6 (H) 16.3 (H)   Hematocrit 35.0 - 47.0 % 50.3 (H) 48.5 (H)   Platelet Count 150 - 450 10e3/uL 171 160   RBC Count 3.80 - 5.20 10e6/uL 4.94 4.79   MCV 78 - 100 fL 102 (H) 101 (H)   MCH 26.5 - 33.0 pg 33.6 (H) 34.0 (H)   MCHC 31.5 - 36.5 g/dL 33.0 33.6   RDW 10.0 - 15.0 % 13.4 13.2   % Neutrophils % 62 64   % Lymphocytes % 26 25   % Monocytes % 8 7   % Eosinophils % 2 2   % Basophils % 1 1   Absolute Basophils 0.0 - 0.2 10e3/uL 0.1 0.1   Absolute Eosinophils 0.0 - 0.7 10e3/uL 0.1 0.2   Absolute Immature Granulocytes <=0.4 10e3/uL 0.0 0.1   Absolute Lymphocytes 0.8 - 5.3 10e3/uL 1.9 1.8   Absolute Monocytes 0.0 - 1.3 10e3/uL 0.5 0.5   % Immature Granulocytes % 1 1   Absolute Neutrophils 1.6 - 8.3 10e3/uL 4.5 4.5   Absolute NRBCs 10e3/uL 0.0 0.0   NRBCs per 100 WBC <1 /100 0 0   (H): Data is abnormally high    Catheterization:    Severe pulmonary arterial  hypertension    Severely reduced cardiac index with elevated right-sided filling pressure    Normal left-sided filling pressure    No response to acute vasoreactivity testing with inhaled nitric oxide         Pressures Phase: Baseline     Time Systolic (mmHg) Diastolic (mmHg) Mean (mmHg) A Wave (mmHg) V Wave (mmHg) EDP (mmHg) Max dp/dt (mmHg/sec) HR (bpm) Content (mL/dL) SAT (%)   RA Pressures  2:50 PM   12    12    16      65        RV Pressures  2:51 PM 72        14     69        PA Pressures  2:52 PM 74    34    50        64        PCW Pressures  2:52 PM   10        68        Art Pressures  2:34     92         87          Pressures Phase: Nitric Oxide     Time Systolic (mmHg) Diastolic (mmHg) Mean (mmHg) A Wave (mmHg) V Wave (mmHg) EDP (mmHg) Max dp/dt (mmHg/sec) HR (bpm) Content (mL/dL) SAT (%)   RA Pressures  3:10 PM   12    21    17      71        PA Pressures  3:08 PM 70    30    42        70        PCW Pressures  3:08 PM   10        75        AO Pressures  3:17     92    106        69          Blood Flow Results Phase: Baseline     Time Results  Indexed Values (L/min/m2)   QP  2:30 PM 2.54 L/min    1.41      QS  2:30 PM 2.54 L/min    1.41        Blood Flow Results Phase: Nitric Oxide     Time Results  Indexed Values (L/min/m2)   QP  3:03 PM 2.9 L/min    1.61      QS  3:03 PM 2.9 L/min    1.61        Blood Oximetry Phase: Baseline     Time Hb  SAT(%)  PO2  Content (mL/dL) PA Sat (%)   PA  2:30 PM  48.5 %      48.5      Art  2:30 PM  86 %     18.13         Blood Oximetry Phase: Nitric Oxide     Time Hb  SAT(%)  PO2  Content (mL/dL) PA Sat (%)   PA  3:03 PM  60.4 %      60.4      Art  3:03 PM  96 %     18.67         Cardiac Output Phase: Baseline     Time TDCO (L/min) TDCI (L/min/m2) Eugenia C.O. (L/min) Eugenia C.I. (L/min/m2) Eugenia HR (bpm)   Cardiac Output Results  2:30 PM 2.93    1.63    2.54    1.41         2:54 PM 2.93            Cardiac Output Phase: Nitric Oxide     Time TDCO (L/min) TDCI  (L/min/m2) Eugenia C.O. (L/min) Eugenia C.I. (L/min/m2) Eugenia HR (bpm)   Cardiac Output Results  3:03 PM 3.13    1.74    2.9    1.61         3:10 PM 3.13            Resistance Results Phase: Baseline     Time PVR  SVR  TPR  TVR  PVR/SVR  TPR/TVR    Resistance Results (Metric)  2:30 PM 1258.19 dsc-5    3019.66 dsc-5    1572.74 dsc-5    3397.12 dsc-5    0.42    0.46      Resistance Results (Wood)  2:30 PM 15.73 JENKINS    37.76 JENKINS    19.66 JENKINS    42.47 JENKINS    0.42    0.46        Resistance Results Phase: Nitric Oxide     Time PVR  SVR  TPR  TVR  PVR/SVR  TPR/TVR    Resistance Results (Metric)  3:03 .58 dsc-5    2589.63 dsc-5    1157.07 dsc-5    2920.22 dsc-5    0.34    0.4      Resistance Results (Wood)  3:03 PM 11.02 JENKINS    32.38 JENKINS    14.47 JENKINS    36.51 JENKINS    0.34    0.4        Stoke Volume Results Phase: Baseline     Time RVSW (gm*m) LVSW (gm*m) RVSW-I (gm*m/m2) LVSW-I (gm*m/m2)   Stroke Work Results  2:30 PM 20.53     11.4         Stoke Volume Results Phase: Nitric Oxide     Time RVSW (gm*m) LVSW (gm*m) RVSW-I (gm*m/m2) LVSW-I (gm*m/m2)   Stroke Work Results  3:03 PM 16.92    54.93    9.4

## 2023-03-08 ENCOUNTER — VIRTUAL VISIT (OUTPATIENT)
Dept: CARDIOLOGY | Facility: CLINIC | Age: 33
End: 2023-03-08
Attending: INTERNAL MEDICINE
Payer: COMMERCIAL

## 2023-03-08 DIAGNOSIS — R06.09 DOE (DYSPNEA ON EXERTION): ICD-10-CM

## 2023-03-08 DIAGNOSIS — I27.20 PULMONARY HYPERTENSION (H): Primary | ICD-10-CM

## 2023-03-08 PROCEDURE — 99214 OFFICE O/P EST MOD 30 MIN: CPT | Mod: 95 | Performed by: INTERNAL MEDICINE

## 2023-03-08 ASSESSMENT — ENCOUNTER SYMPTOMS
FEVER: 0
WEIGHT GAIN: 0
DECREASED APPETITE: 0
WEIGHT LOSS: 0
FATIGUE: 0
CHILLS: 0
NIGHT SWEATS: 0
POLYPHAGIA: 0

## 2023-03-08 NOTE — PROGRESS NOTES
Onel Pro M.D.  tel:[phone]  [address] [facility], [place] [address]        Alex Carroll M.D.  Cardiovascular Medicine  [phone]  Telephone visit x 30mminutes (no HIPPA compliant space available)  I also called Dr. Pro to introduce patient.        Kirk Nweman M.D.  Shirley Chandler M.D.    Dear Doctors:    Thank you for allowing us to visit with your patient, Chelsea Flower, for evaluation of pulmonary hypertension of uncertain etiology though provocative laboratory testing with elevated CLINTON, +DS-DNA, elevated hemoglobin.    Problem List  1. Exertional shortness of breath   2. Pulmonary hypertension x 33.  3. Childbirth x 3 by   4 Hypertension treated with recently started diltiazem  5.Hypothyroidism  6. Severe right ventricular dysfunction  7. Severe pulmonary arterial hypertension  8. Initiation of parenteral prostacyclin  9. Oxygen dependence    Discussion Plan:  Right heart catheterization demonstrated severe PAH while imaging demonstrated severe RV dysfunction The patient has been started on parenteral prostacyclin via implanted Arreola catheter and tadalafil.  She has Opsumit at home but has not started it.  She remains on oxygen 2L/minute     Suggest:   1. Please make arrangements to see Dr. Pro in [place]  2. Continue titration of remodulin but hold from next dosage increase x 1 week while we add Opsumit 10mgs per day  3. Continue oxygen therapy  4. Return to see me in [place] in the end of April (repeat echocardiogram, 6 minute walk, laboratories)  5. Call immediately if increasing shortness of breath, sign of catheter site or systemic infection, exertional pre-syncope or syncope, increasing weight or edema      History    32 year old female interviewed by telephone for follow-up of severe pulmonary arterial hypertension and RV dysfunction recently discharged from [facility] following evaluation and initiation of therapy for apparent idiopathic, pulmonary  hypertension.    BMI: 30 height 5.3. weight 179    Previously  patient has symptoms of exertional dyspnea starting shortly after her last pregnancy.  She has has progressive shortness of breath and exercise tolerance but no definite exertional syncope though does become somewhat lightheaded with moderate exertion for her age.  She has no history of drug or alcohol usage, pulmonary emboli, liver disease, congenital heart disease, myeloproliferative syndrome, family history of premature death or pulmonary hypertension, chemical exposure, thyroid dysfunction.  She had taken diltiazem.     Since discharge and return home to [place], she is feeling improved with less shortness of breath  She is utilizing nasal oxygen at low flow.  She has no interim history of pre-syncope, syncope, signs of right heart failure or catheter infection or malfunction.     Answers for HPI/ROS submitted by the patient on 3/8/2023  General Symptoms: Yes  Skin Symptoms: No  HENT Symptoms: No  EYE SYMPTOMS: No  HEART SYMPTOMS: No  LUNG SYMPTOMS: No  INTESTINAL SYMPTOMS: No  URINARY SYMPTOMS: No  GYNECOLOGIC SYMPTOMS: No  BREAST SYMPTOMS: No  SKELETAL SYMPTOMS: No  BLOOD SYMPTOMS: No  NERVOUS SYSTEM SYMPTOMS: No  MENTAL HEALTH SYMPTOMS: No  Fever: No  Loss of appetite: No  Weight loss: No  Weight gain: No  Fatigue: No  Night sweats: No  Chills: No  Increased stress: Yes  Excessive hunger: No  Confusion: Yes          Allergies: none  ETOH: none  Smoking: none  Family history: negative  Surgery: C-Sections (first child presented breach    Right heart catheterization  RA 8, PCP 8 PA pressure PA 62/48 mean 55      Echocardiogram       Name: RONALD LEBLANC  MRN: [id number]  : 1990  Study Date: 2023 03:23 PM  Age: 32 yrs  Gender: Female  Patient Location: [facility]  Reason For Study: Heart Failure  Ordering Physician: ENRIKE BENJAMIN  Performed By: Noreen Atkinson     BSA: 1.8 m2  Height: 63 in  Weight: 179 lb  BP: 147/98  mmHg  ______________________________________________________________________________  Procedure  Echocardiogram with two-dimensional, color and spectral Doppler performed.  Contrast Optison. Patient was given 6 ml mixture of 3 ml Optison and 6 ml  saline. 3 ml wasted.  ______________________________________________________________________________  Interpretation Summary  Left ventricular cavity size is small. Left ventricular function is normal.The  ejection fraction is 55-60%. Flattened septum is consistent with right  ventricular pressure and volume overload.     Severe right ventricular dilation is present. Global right ventricular  function is severely reduced.     Moderate to severe tricuspid insufficiency is present.     Estimated pulmonary artery systolic pressure is 72 mmHg. IVC diameter and  respiratory changes fall into an intermediate range suggesting an RA pressure  of 8 mmHg.     Trivial pericardial effusion is present.  ______________________________________________________________________________  Left Ventricle  Left ventricular function is normal.The ejection fraction is 55-60%. Left  ventricular cavity size is small. Flattened septum is consistent with right  ventricular pressure and volume overload.     Right Ventricle  Severe right ventricular dilation is present. Global right ventricular  function is severely reduced.     Atria  The left atrium appears normal. Severe right atrial enlargement is present.     Mitral Valve  The mitral valve is normal.     Aortic Valve  Aortic valve is normal in structure and function.     Tricuspid Valve  Moderate to severe tricuspid insufficiency is present. Estimated pulmonary  artery systolic pressure is 64 mmHg plus right atrial pressure.     Pulmonic Valve  Mild pulmonic insufficiency is present.     Vessels  IVC diameter and respiratory changes fall into an intermediate range  suggesting an RA pressure of 8 mmHg.     Pericardium  Trivial pericardial effusion  is present.     Compared to Previous Study  There is no prior study for direct comparison.  ______________________________________________________________________________  MMode/2D Measurements & Calculations  IVSd: 0.68 cm  LVIDd: 4.1 cm  LVIDs: 2.8 cm  LVPWd: 0.69 cm  FS: 32.1 %  LV mass(C)d: 79.7 grams  LV mass(C)dI: 43.2 grams/m2  Ao root diam: 2.6 cm  asc Aorta Diam: 2.6 cm  LVOT diam: 2.1 cm  LVOT area: 3.4 cm2  LA Volume (BP): 18.9 ml  LA Volume Index (BP): 10.3 ml/m2     RWT: 0.33     Doppler Measurements & Calculations  MV E max gabino: 55.0 cm/sec  MV A max gabino: 41.4 cm/sec  MV E/A: 1.3  MV dec slope: 233.2 cm/sec2  MV dec time: 0.24 sec  PA acc time: 0.07 sec  TR max gabino: 399.4 cm/sec  TR max P.8 mmHg  RVSP(TR): 66.8 mmHg  E/E' av.1  Lateral E/e': 4.6  Medial E/e': 9.7                  VQ Lung Scan      Meds    Current Outpatient Medications   Medication     acetaminophen (TYLENOL) 325 MG tablet     albuterol (PROAIR HFA/PROVENTIL HFA/VENTOLIN HFA) 108 (90 Base) MCG/ACT inhaler     albuterol (PROVENTIL) (2.5 MG/3ML) 0.083% neb solution     digoxin (LANOXIN) 125 MCG tablet     furosemide (LASIX) 20 MG tablet     Multiple Vitamins-Minerals (WOMENS MULTIVITAMIN) TABS     pantoprazole (PROTONIX) 40 MG EC tablet     prochlorperazine (COMPAZINE) 5 MG tablet     tadalafil (CIALIS) 20 MG tablet     treprostinil (REMODULIN) 10 mcg/mL in glycine diluent 50 mL infusion     No current facility-administered medications for this visit.         Labs    Outside laboratories reviewed and abnormal include: hemoglobin 18, , , CLINTON +, DS-DNA +     Latest Reference Range & Units 23 06:16 23 07:09   Sodium 136 - 145 mmol/L 139 139   Potassium 3.4 - 5.3 mmol/L 3.9 4.1   Chloride 98 - 107 mmol/L 101 103   Carbon Dioxide (CO2) 22 - 29 mmol/L 27 25   Urea Nitrogen 6.0 - 20.0 mg/dL 8.2 10.2   Creatinine 0.51 - 0.95 mg/dL 0.59 0.54   GFR Estimate >60 mL/min/1.73m2 >90 >90   Calcium 8.6 - 10.0 mg/dL 9.2  9.2   Anion Gap 7 - 15 mmol/L 11 11   Magnesium 1.7 - 2.3 mg/dL 1.9 1.9   Glucose 70 - 99 mg/dL 77 86   WBC 4.0 - 11.0 10e3/uL 7.2 7.0   Hemoglobin 11.7 - 15.7 g/dL 16.6 (H) 16.3 (H)   Hematocrit 35.0 - 47.0 % 50.3 (H) 48.5 (H)   Platelet Count 150 - 450 10e3/uL 171 160   RBC Count 3.80 - 5.20 10e6/uL 4.94 4.79   MCV 78 - 100 fL 102 (H) 101 (H)   MCH 26.5 - 33.0 pg 33.6 (H) 34.0 (H)   MCHC 31.5 - 36.5 g/dL 33.0 33.6   RDW 10.0 - 15.0 % 13.4 13.2   % Neutrophils % 62 64   % Lymphocytes % 26 25   % Monocytes % 8 7   % Eosinophils % 2 2   % Basophils % 1 1   Absolute Basophils 0.0 - 0.2 10e3/uL 0.1 0.1   Absolute Eosinophils 0.0 - 0.7 10e3/uL 0.1 0.2   Absolute Immature Granulocytes <=0.4 10e3/uL 0.0 0.1   Absolute Lymphocytes 0.8 - 5.3 10e3/uL 1.9 1.8   Absolute Monocytes 0.0 - 1.3 10e3/uL 0.5 0.5   % Immature Granulocytes % 1 1   Absolute Neutrophils 1.6 - 8.3 10e3/uL 4.5 4.5   Absolute NRBCs 10e3/uL 0.0 0.0   NRBCs per 100 WBC <1 /100 0 0   (H): Data is abnormally high    Catheterization:    Severe pulmonary arterial hypertension    Severely reduced cardiac index with elevated right-sided filling pressure    Normal left-sided filling pressure    No response to acute vasoreactivity testing with inhaled nitric oxide         Pressures Phase: Baseline     Time Systolic (mmHg) Diastolic (mmHg) Mean (mmHg) A Wave (mmHg) V Wave (mmHg) EDP (mmHg) Max dp/dt (mmHg/sec) HR (bpm) Content (mL/dL) SAT (%)   RA Pressures  2:50 PM   12    12    16      65        RV Pressures  2:51 PM 72        14     69        PA Pressures  2:52 PM 74    34    50        64        PCW Pressures  2:52 PM   10        68        Art Pressures  2:34     92         87          Pressures Phase: Nitric Oxide     Time Systolic (mmHg) Diastolic (mmHg) Mean (mmHg) A Wave (mmHg) V Wave (mmHg) EDP (mmHg) Max dp/dt (mmHg/sec) HR (bpm) Content (mL/dL) SAT (%)   RA Pressures  3:10 PM   12    21    17      71        PA Pressures  3:08 PM 70    30    42         70        PCW Pressures  3:08 PM   10        75        AO Pressures  3:17     92    106        69          Blood Flow Results Phase: Baseline     Time Results  Indexed Values (L/min/m2)   QP  2:30 PM 2.54 L/min    1.41      QS  2:30 PM 2.54 L/min    1.41        Blood Flow Results Phase: Nitric Oxide     Time Results  Indexed Values (L/min/m2)   QP  3:03 PM 2.9 L/min    1.61      QS  3:03 PM 2.9 L/min    1.61        Blood Oximetry Phase: Baseline     Time Hb  SAT(%)  PO2  Content (mL/dL) PA Sat (%)   PA  2:30 PM  48.5 %      48.5      Art  2:30 PM  86 %     18.13         Blood Oximetry Phase: Nitric Oxide     Time Hb  SAT(%)  PO2  Content (mL/dL) PA Sat (%)   PA  3:03 PM  60.4 %      60.4      Art  3:03 PM  96 %     18.67         Cardiac Output Phase: Baseline     Time TDCO (L/min) TDCI (L/min/m2) Eugenia C.O. (L/min) Eugenia C.I. (L/min/m2) Eugenia HR (bpm)   Cardiac Output Results  2:30 PM 2.93    1.63    2.54    1.41         2:54 PM 2.93            Cardiac Output Phase: Nitric Oxide     Time TDCO (L/min) TDCI (L/min/m2) Eugenia C.O. (L/min) Eugenia C.I. (L/min/m2) Eugenia HR (bpm)   Cardiac Output Results  3:03 PM 3.13    1.74    2.9    1.61         3:10 PM 3.13            Resistance Results Phase: Baseline     Time PVR  SVR  TPR  TVR  PVR/SVR  TPR/TVR    Resistance Results (Metric)  2:30 PM 1258.19 dsc-5    3019.66 dsc-5    1572.74 dsc-5    3397.12 dsc-5    0.42    0.46      Resistance Results (Wood)  2:30 PM 15.73 JENKINS    37.76 JENKINS    19.66 JENKINS    42.47 JENKINS    0.42    0.46        Resistance Results Phase: Nitric Oxide     Time PVR  SVR  TPR  TVR  PVR/SVR  TPR/TVR    Resistance Results (Metric)  3:03 .58 dsc-5    2589.63 dsc-5    1157.07 dsc-5    2920.22 dsc-5    0.34    0.4      Resistance Results (Wood)  3:03 PM 11.02 JENKINS    32.38 JENKINS    14.47 JENKINS    36.51 JENKINS    0.34    0.4        Stoke Volume Results Phase: Baseline     Time RVSW (gm*m) LVSW (gm*m) RVSW-I (gm*m/m2) LVSW-I (gm*m/m2)   Stroke Work Results  2:30 PM 20.53      11.4         Stoke Volume Results Phase: Nitric Oxide     Time RVSW (gm*m) LVSW (gm*m) RVSW-I (gm*m/m2) LVSW-I (gm*m/m2)   Stroke Work Results  3:03 PM 16.92    54.93    9.4         VQ Lung Scan   Low probability of emboli    CC: Dr. Pro in [facility]

## 2023-03-08 NOTE — LETTER
3/8/2023      RE: Chelsea Flower  [address]  [place] [id number]       Dear Colleague,    Thank you for the opportunity to participate in the care of your patient, Chelsea Flower, at the [facility]. Please see a copy of my visit note below.        Alex Carroll M.D.  Cardiovascular Medicine  [phone]    Telephone visit with patient's permission x 30 minutes      Kirk Newman M.D.  Shirley Chandler M.D.    Dear Doctors:     Thank you for allowing us to visit with your patient, Chelsea Flower, for evaluation of pulmonary hypertension of uncertain etiology though provocative laboratory testing with elevated CLINTON, +DS-DNA, elevated hemoglobin.    Problem List  1. Severe PAH  2. Severely reduced right ventricular function  3. Oxygen dependent respiratory failure       History    32 year old female interviewed by telephone for evaluation of pulmonary hypertension with mean PA 55, ventricular mechanical remodeling as shown by echocardiogram, and no evidence or history of pulmonary emboli; subsequent to her discharge from [facility] for evaluation and initiation of therapy with parenteral prostacyclin.  Interim history unremarkable for syncope, pre-syncope, right heart failure, hemoptysis.  Titration without complication.  Patient is on only two drugs but has Opsumit at [place]    BMI: 30 height 5.3. weight 179    The patient has symptoms of exertional dyspnea starting shortly after her last pregnancy.  She has has progressive shortness of breath and exercise tolerance but no definite exertional syncope though does become somewhat lightheaded with moderate exertion for her age.  She has no history of drug or alcohol usage, pulmonary emboli, liver disease, congenital heart disease, myeloproliferative syndrome, family history of premature death or pulmonary hypertension, chemical exposure, thyroid dysfunction.  She does take  diltiazem.     Constitutional: weight loss, fever, chills, night sweats  HEENT: without visual changes, swallow difficulties  Pulmonary: with shortness of breath, cough, wheeze, hemoptysis  Cardiac: without chest pain, MALAVE, PND, orthopnea, edema, palpitation, pre-syncope, syncope,  GI: without diarrhea, constipation, jaundice, melena, GERD, hematemesis  : without frequency, urgency, dysuria, hematuria  Skin: rash, bruise, open lesions  Neuro: without TIA, focal neurologic complaints, seizure, trauma  Ortho: without pain, swelling, mobility impairment  Endocrine: diabetes, thyroid, heat/cold intolerance, polyuria, polyphagia, change bowel habits.  Sleep:no ASAD, periodic breathing, fatigue      Allergies: none  ETOH: none  Smoking: none  Family history: negative  Surgery: C-Sections (first child presented breach    Right heart catheterization  RA 8, PCP 8 PA pressure PA 62/48 mean 55      Echocardiogram  Name: RONALD LEBLANC  MRN: [id number]  : 1990  Study Date: 2023 03:23 PM  Age: 32 yrs  Gender: Female  [place] [facility] [place]  Reason For Study: Heart Failure  Ordering Physician: ENRIKE BENJAMIN  Performed By: Noreen Atkinson     BSA: 1.8 m2  Height: 63 in  Weight: 179 lb  BP: 147/98 mmHg  ______________________________________________________________________________  Procedure  Echocardiogram with two-dimensional, color and spectral Doppler performed.  Contrast Optison. Patient was given 6 ml mixture of 3 ml Optison and 6 ml  saline. 3 ml wasted.  ______________________________________________________________________________  Interpretation Summary  Left ventricular cavity size is small. Left ventricular function is normal.The  ejection fraction is 55-60%. Flattened septum is consistent with right  ventricular pressure and volume overload.     Severe right ventricular dilation is present. Global right ventricular  function is severely reduced.     Moderate to severe tricuspid insufficiency  is present.     Estimated pulmonary artery systolic pressure is 72 mmHg. IVC diameter and  respiratory changes fall into an intermediate range suggesting an RA pressure  of 8 mmHg.     Trivial pericardial effusion is present.  ______________________________________________________________________________  Left Ventricle  Left ventricular function is normal.The ejection fraction is 55-60%. Left  ventricular cavity size is small. Flattened septum is consistent with right  ventricular pressure and volume overload.     Right Ventricle  Severe right ventricular dilation is present. Global right ventricular  function is severely reduced.     Atria  The left atrium appears normal. Severe right atrial enlargement is present.     Mitral Valve  The mitral valve is normal.     Aortic Valve  Aortic valve is normal in structure and function.     Tricuspid Valve  Moderate to severe tricuspid insufficiency is present. Estimated pulmonary  artery systolic pressure is 64 mmHg plus right atrial pressure.     Pulmonic Valve  Mild pulmonic insufficiency is present.     Vessels  IVC diameter and respiratory changes fall into an intermediate range  suggesting an RA pressure of 8 mmHg.     Pericardium  Trivial pericardial effusion is present.     Compared to Previous Study  There is no prior study for direct comparison.  ______________________________________________________________________________  MMode/2D Measurements & Calculations  IVSd: 0.68 cm  LVIDd: 4.1 cm  LVIDs: 2.8 cm  LVPWd: 0.69 cm  FS: 32.1 %  LV mass(C)d: 79.7 grams  LV mass(C)dI: 43.2 grams/m2  Ao root diam: 2.6 cm  asc Aorta Diam: 2.6 cm  LVOT diam: 2.1 cm  LVOT area: 3.4 cm2  LA Volume (BP): 18.9 ml  LA Volume Index (BP): 10.3 ml/m2     RWT: 0.33     Doppler Measurements & Calculations  MV E max gabino: 55.0 cm/sec  MV A max gabino: 41.4 cm/sec  MV E/A: 1.3  MV dec slope: 233.2 cm/sec2  MV dec time: 0.24 sec  PA acc time: 0.07 sec  TR max gabino: 399.4 cm/sec  TR max P.8  mmHg  RVSP(TR): 66.8 mmHg  E/E' av.1  Lateral E/e': 4.6  Medial E/e': 9.7                  VQ Lung Scan      Meds    Current Outpatient Medications   Medication     acetaminophen (TYLENOL) 325 MG tablet     albuterol (PROAIR HFA/PROVENTIL HFA/VENTOLIN HFA) 108 (90 Base) MCG/ACT inhaler     albuterol (PROVENTIL) (2.5 MG/3ML) 0.083% neb solution     digoxin (LANOXIN) 125 MCG tablet     furosemide (LASIX) 20 MG tablet     Multiple Vitamins-Minerals (WOMENS MULTIVITAMIN) TABS     pantoprazole (PROTONIX) 40 MG EC tablet     prochlorperazine (COMPAZINE) 5 MG tablet     tadalafil (CIALIS) 20 MG tablet     treprostinil (REMODULIN) 10 mcg/mL in glycine diluent 50 mL infusion     No current facility-administered medications for this visit.         Labs    Outside laboratories reviewed and abnormal include: hemoglobin 18, , , CLINTON +, DS-DNA +     Latest Reference Range & Units 23 06:16 23 07:09   Sodium 136 - 145 mmol/L 139 139   Potassium 3.4 - 5.3 mmol/L 3.9 4.1   Chloride 98 - 107 mmol/L 101 103   Carbon Dioxide (CO2) 22 - 29 mmol/L 27 25   Urea Nitrogen 6.0 - 20.0 mg/dL 8.2 10.2   Creatinine 0.51 - 0.95 mg/dL 0.59 0.54   GFR Estimate >60 mL/min/1.73m2 >90 >90   Calcium 8.6 - 10.0 mg/dL 9.2 9.2   Anion Gap 7 - 15 mmol/L 11 11   Magnesium 1.7 - 2.3 mg/dL 1.9 1.9   Glucose 70 - 99 mg/dL 77 86   WBC 4.0 - 11.0 10e3/uL 7.2 7.0   Hemoglobin 11.7 - 15.7 g/dL 16.6 (H) 16.3 (H)   Hematocrit 35.0 - 47.0 % 50.3 (H) 48.5 (H)   Platelet Count 150 - 450 10e3/uL 171 160   RBC Count 3.80 - 5.20 10e6/uL 4.94 4.79   MCV 78 - 100 fL 102 (H) 101 (H)   MCH 26.5 - 33.0 pg 33.6 (H) 34.0 (H)   MCHC 31.5 - 36.5 g/dL 33.0 33.6   RDW 10.0 - 15.0 % 13.4 13.2   % Neutrophils % 62 64   % Lymphocytes % 26 25   % Monocytes % 8 7   % Eosinophils % 2 2   % Basophils % 1 1   Absolute Basophils 0.0 - 0.2 10e3/uL 0.1 0.1   Absolute Eosinophils 0.0 - 0.7 10e3/uL 0.1 0.2   Absolute Immature Granulocytes <=0.4 10e3/uL 0.0 0.1    Absolute Lymphocytes 0.8 - 5.3 10e3/uL 1.9 1.8   Absolute Monocytes 0.0 - 1.3 10e3/uL 0.5 0.5   % Immature Granulocytes % 1 1   Absolute Neutrophils 1.6 - 8.3 10e3/uL 4.5 4.5   Absolute NRBCs 10e3/uL 0.0 0.0   NRBCs per 100 WBC <1 /100 0 0   (H): Data is abnormally high    Catheterization:    Severe pulmonary arterial hypertension    Severely reduced cardiac index with elevated right-sided filling pressure    Normal left-sided filling pressure    No response to acute vasoreactivity testing with inhaled nitric oxide         Pressures Phase: Baseline     Time Systolic (mmHg) Diastolic (mmHg) Mean (mmHg) A Wave (mmHg) V Wave (mmHg) EDP (mmHg) Max dp/dt (mmHg/sec) HR (bpm) Content (mL/dL) SAT (%)   RA Pressures  2:50 PM   12    12    16      65        RV Pressures  2:51 PM 72        14     69        PA Pressures  2:52 PM 74    34    50        64        PCW Pressures  2:52 PM   10        68        Art Pressures  2:34     92         87          Pressures Phase: Nitric Oxide     Time Systolic (mmHg) Diastolic (mmHg) Mean (mmHg) A Wave (mmHg) V Wave (mmHg) EDP (mmHg) Max dp/dt (mmHg/sec) HR (bpm) Content (mL/dL) SAT (%)   RA Pressures  3:10 PM   12    21    17      71        PA Pressures  3:08 PM 70    30    42        70        PCW Pressures  3:08 PM   10        75        AO Pressures  3:17     92    106        69          Blood Flow Results Phase: Baseline     Time Results  Indexed Values (L/min/m2)   QP  2:30 PM 2.54 L/min    1.41      QS  2:30 PM 2.54 L/min    1.41        Blood Flow Results Phase: Nitric Oxide     Time Results  Indexed Values (L/min/m2)   QP  3:03 PM 2.9 L/min    1.61      QS  3:03 PM 2.9 L/min    1.61        Blood Oximetry Phase: Baseline     Time Hb  SAT(%)  PO2  Content (mL/dL) PA Sat (%)   PA  2:30 PM  48.5 %      48.5      Art  2:30 PM  86 %     18.13         Blood Oximetry Phase: Nitric Oxide     Time Hb  SAT(%)  PO2  Content (mL/dL) PA Sat (%)   PA  3:03 PM  60.4 %      60.4      Art   3:03 PM  96 %     18.67         Cardiac Output Phase: Baseline     Time TDCO (L/min) TDCI (L/min/m2) Eugenia C.O. (L/min) Eugenia C.I. (L/min/m2) Eugenia HR (bpm)   Cardiac Output Results  2:30 PM 2.93    1.63    2.54    1.41         2:54 PM 2.93            Cardiac Output Phase: Nitric Oxide     Time TDCO (L/min) TDCI (L/min/m2) Eugenia C.O. (L/min) Eugenia C.I. (L/min/m2) Eugenia HR (bpm)   Cardiac Output Results  3:03 PM 3.13    1.74    2.9    1.61         3:10 PM 3.13            Resistance Results Phase: Baseline     Time PVR  SVR  TPR  TVR  PVR/SVR  TPR/TVR    Resistance Results (Metric)  2:30 PM 1258.19 dsc-5    3019.66 dsc-5    1572.74 dsc-5    3397.12 dsc-5    0.42    0.46      Resistance Results (Wood)  2:30 PM 15.73 JENKINS    37.76 JENKINS    19.66 JENKINS    42.47 JENKINS    0.42    0.46        Resistance Results Phase: Nitric Oxide     Time PVR  SVR  TPR  TVR  PVR/SVR  TPR/TVR    Resistance Results (Metric)  3:03 .58 dsc-5    2589.63 dsc-5    1157.07 dsc-5    2920.22 dsc-5    0.34    0.4      Resistance Results (Wood)  3:03 PM 11.02 JENKINS    32.38 JENKINS    14.47 JENKINS    36.51 JENKINS    0.34    0.4        Stoke Volume Results Phase: Baseline     Time RVSW (gm*m) LVSW (gm*m) RVSW-I (gm*m/m2) LVSW-I (gm*m/m2)   Stroke Work Results  2:30 PM 20.53     11.4         Stoke Volume Results Phase: Nitric Oxide     Time RVSW (gm*m) LVSW (gm*m) RVSW-I (gm*m/m2) LVSW-I (gm*m/m2)   Stroke Work Results  3:03 PM 16.92    54.93    9.4             Onel Pro M.D.  tel:[phone]  [address] [place] [id number]        Alex Pritzker, M.D.  Cardiovascular Medicine  [phone]  Telephone visit x 30mminutes (no HIPPA compliant space available)  I also called Dr. Pro to introduce patient.        Kirk Newman M.D.  Shirley Chandler M.D.    Dear Doctors:    Thank you for allowing us to visit with your patient, Chelsea Flower, for evaluation of pulmonary hypertension of uncertain etiology though provocative laboratory testing with elevated CLINTON, +DS-DNA,  elevated hemoglobin.    Problem List  1. Exertional shortness of breath   2. Pulmonary hypertension x 33.  3. Childbirth x 3 by   4 Hypertension treated with recently started diltiazem  5.Hypothyroidism  6. Severe right ventricular dysfunction  7. Severe pulmonary arterial hypertension  8. Initiation of parenteral prostacyclin  9. Oxygen dependence    Discussion Plan:  Right heart catheterization demonstrated severe PAH while imaging demonstrated severe RV dysfunction The patient has been started on parenteral prostacyclin via implanted Arreola catheter and tadalafil.  She has Opsumit at home but has not started it.  She remains on oxygen 2L/minute     Suggest:   1. Please make arrangements to see Dr. Pro in [place]  2. Continue titration of remodulin but hold from next dosage increase x 1 week while we add Opsumit 10mgs per day  3. Continue oxygen therapy  4. Return to see me in [place] in the end of April (repeat echocardiogram, 6 minute walk, laboratories)  5. Call immediately if increasing shortness of breath, sign of catheter site or systemic infection, exertional pre-syncope or syncope, increasing weight or edema      History    32 year old female interviewed by telephone for follow-up of severe pulmonary arterial hypertension and RV dysfunction recently discharged from [facility] following evaluation and initiation of therapy for apparent idiopathic, pulmonary hypertension.    BMI: 30 height 5.3. weight 179    Previously  patient has symptoms of exertional dyspnea starting shortly after her last pregnancy.  She has has progressive shortness of breath and exercise tolerance but no definite exertional syncope though does become somewhat lightheaded with moderate exertion for her age.  She has no history of drug or alcohol usage, pulmonary emboli, liver disease, congenital heart disease, myeloproliferative syndrome, family history of premature death or pulmonary hypertension, chemical exposure,  thyroid dysfunction.  She had taken diltiazem.     Since discharge and return home to [place], she is feeling improved with less shortness of breath  She is utilizing nasal oxygen at low flow.  She has no interim history of pre-syncope, syncope, signs of right heart failure or catheter infection or malfunction.     Answers for HPI/ROS submitted by the patient on 3/8/2023  General Symptoms: Yes  Skin Symptoms: No  HENT Symptoms: No  EYE SYMPTOMS: No  HEART SYMPTOMS: No  LUNG SYMPTOMS: No  INTESTINAL SYMPTOMS: No  URINARY SYMPTOMS: No  GYNECOLOGIC SYMPTOMS: No  BREAST SYMPTOMS: No  SKELETAL SYMPTOMS: No  BLOOD SYMPTOMS: No  NERVOUS SYSTEM SYMPTOMS: No  MENTAL HEALTH SYMPTOMS: No  Fever: No  Loss of appetite: No  Weight loss: No  Weight gain: No  Fatigue: No  Night sweats: No  Chills: No  Increased stress: Yes  Excessive hunger: No  Confusion: Yes          Allergies: none  ETOH: none  Smoking: none  Family history: negative  Surgery: C-Sections (first child presented [facility]    Right heart catheterization  RA 8, PCP 8 PA pressure PA 62/48 mean 55      Echocardiogram       Name: RONALD LEBLANC  MRN: [id number]  : 1990  Study Date: 2023 03:23 PM  Age: 32 yrs  Gender: Female  [place] [facility] [place]  Reason For Study: Heart Failure  Ordering Physician: ENRIKE BENJAMIN  Performed By: Noreen Atkinson     BSA: 1.8 m2  Height: 63 in  Weight: 179 lb  BP: 147/98 mmHg  ______________________________________________________________________________  Procedure  Echocardiogram with two-dimensional, color and spectral Doppler performed.  Contrast Optison. Patient was given 6 ml mixture of 3 ml Optison and 6 ml  saline. 3 ml wasted.  ______________________________________________________________________________  Interpretation Summary  Left ventricular cavity size is small. Left ventricular function is normal.The  ejection fraction is 55-60%. Flattened septum is consistent with right  ventricular pressure and  volume overload.     Severe right ventricular dilation is present. Global right ventricular  function is severely reduced.     Moderate to severe tricuspid insufficiency is present.     Estimated pulmonary artery systolic pressure is 72 mmHg. IVC diameter and  respiratory changes fall into an intermediate range suggesting an RA pressure  of 8 mmHg.     Trivial pericardial effusion is present.  ______________________________________________________________________________  Left Ventricle  Left ventricular function is normal.The ejection fraction is 55-60%. Left  ventricular cavity size is small. Flattened septum is consistent with right  ventricular pressure and volume overload.     Right Ventricle  Severe right ventricular dilation is present. Global right ventricular  function is severely reduced.     Atria  The left atrium appears normal. Severe right atrial enlargement is present.     Mitral Valve  The mitral valve is normal.     Aortic Valve  Aortic valve is normal in structure and function.     Tricuspid Valve  Moderate to severe tricuspid insufficiency is present. Estimated pulmonary  artery systolic pressure is 64 mmHg plus right atrial pressure.     Pulmonic Valve  Mild pulmonic insufficiency is present.     Vessels  IVC diameter and respiratory changes fall into an intermediate range  suggesting an RA pressure of 8 mmHg.     Pericardium  Trivial pericardial effusion is present.     Compared to Previous Study  There is no prior study for direct comparison.  ______________________________________________________________________________  MMode/2D Measurements & Calculations  IVSd: 0.68 cm  LVIDd: 4.1 cm  LVIDs: 2.8 cm  LVPWd: 0.69 cm  FS: 32.1 %  LV mass(C)d: 79.7 grams  LV mass(C)dI: 43.2 grams/m2  Ao root diam: 2.6 cm  asc Aorta Diam: 2.6 cm  LVOT diam: 2.1 cm  LVOT area: 3.4 cm2  LA Volume (BP): 18.9 ml  LA Volume Index (BP): 10.3 ml/m2     RWT: 0.33     Doppler Measurements & Calculations  MV E max gabino:  55.0 cm/sec  MV A max gabino: 41.4 cm/sec  MV E/A: 1.3  MV dec slope: 233.2 cm/sec2  MV dec time: 0.24 sec  PA acc time: 0.07 sec  TR max gabino: 399.4 cm/sec  TR max P.8 mmHg  RVSP(TR): 66.8 mmHg  E/E' av.1  Lateral E/e': 4.6  Medial E/e': 9.7                  VQ Lung Scan      Meds    Current Outpatient Medications   Medication     acetaminophen (TYLENOL) 325 MG tablet     albuterol (PROAIR HFA/PROVENTIL HFA/VENTOLIN HFA) 108 (90 Base) MCG/ACT inhaler     albuterol (PROVENTIL) (2.5 MG/3ML) 0.083% neb solution     digoxin (LANOXIN) 125 MCG tablet     furosemide (LASIX) 20 MG tablet     Multiple Vitamins-Minerals (WOMENS MULTIVITAMIN) TABS     pantoprazole (PROTONIX) 40 MG EC tablet     prochlorperazine (COMPAZINE) 5 MG tablet     tadalafil (CIALIS) 20 MG tablet     treprostinil (REMODULIN) 10 mcg/mL in glycine diluent 50 mL infusion     No current facility-administered medications for this visit.         Labs    Outside laboratories reviewed and abnormal include: hemoglobin 18, , , CLINTON +, DS-DNA +     Latest Reference Range & Units 23 06:16 23 07:09   Sodium 136 - 145 mmol/L 139 139   Potassium 3.4 - 5.3 mmol/L 3.9 4.1   Chloride 98 - 107 mmol/L 101 103   Carbon Dioxide (CO2) 22 - 29 mmol/L 27 25   Urea Nitrogen 6.0 - 20.0 mg/dL 8.2 10.2   Creatinine 0.51 - 0.95 mg/dL 0.59 0.54   GFR Estimate >60 mL/min/1.73m2 >90 >90   Calcium 8.6 - 10.0 mg/dL 9.2 9.2   Anion Gap 7 - 15 mmol/L 11 11   Magnesium 1.7 - 2.3 mg/dL 1.9 1.9   Glucose 70 - 99 mg/dL 77 86   WBC 4.0 - 11.0 10e3/uL 7.2 7.0   Hemoglobin 11.7 - 15.7 g/dL 16.6 (H) 16.3 (H)   Hematocrit 35.0 - 47.0 % 50.3 (H) 48.5 (H)   Platelet Count 150 - 450 10e3/uL 171 160   RBC Count 3.80 - 5.20 10e6/uL 4.94 4.79   MCV 78 - 100 fL 102 (H) 101 (H)   MCH 26.5 - 33.0 pg 33.6 (H) 34.0 (H)   MCHC 31.5 - 36.5 g/dL 33.0 33.6   RDW 10.0 - 15.0 % 13.4 13.2   % Neutrophils % 62 64   % Lymphocytes % 26 25   % Monocytes % 8 7   % Eosinophils % 2 2   %  Basophils % 1 1   Absolute Basophils 0.0 - 0.2 10e3/uL 0.1 0.1   Absolute Eosinophils 0.0 - 0.7 10e3/uL 0.1 0.2   Absolute Immature Granulocytes <=0.4 10e3/uL 0.0 0.1   Absolute Lymphocytes 0.8 - 5.3 10e3/uL 1.9 1.8   Absolute Monocytes 0.0 - 1.3 10e3/uL 0.5 0.5   % Immature Granulocytes % 1 1   Absolute Neutrophils 1.6 - 8.3 10e3/uL 4.5 4.5   Absolute NRBCs 10e3/uL 0.0 0.0   NRBCs per 100 WBC <1 /100 0 0   (H): Data is abnormally high    Catheterization:    Severe pulmonary arterial hypertension    Severely reduced cardiac index with elevated right-sided filling pressure    Normal left-sided filling pressure    No response to acute vasoreactivity testing with inhaled nitric oxide         Pressures Phase: Baseline     Time Systolic (mmHg) Diastolic (mmHg) Mean (mmHg) A Wave (mmHg) V Wave (mmHg) EDP (mmHg) Max dp/dt (mmHg/sec) HR (bpm) Content (mL/dL) SAT (%)   RA Pressures  2:50 PM   12    12    16      65        RV Pressures  2:51 PM 72        14     69        PA Pressures  2:52 PM 74    34    50        64        PCW Pressures  2:52 PM   10        68        Art Pressures  2:34     92         87          Pressures Phase: Nitric Oxide     Time Systolic (mmHg) Diastolic (mmHg) Mean (mmHg) A Wave (mmHg) V Wave (mmHg) EDP (mmHg) Max dp/dt (mmHg/sec) HR (bpm) Content (mL/dL) SAT (%)   RA Pressures  3:10 PM   12    21    17      71        PA Pressures  3:08 PM 70    30    42        70        PCW Pressures  3:08 PM   10        75        AO Pressures  3:17     92    106        69          Blood Flow Results Phase: Baseline     Time Results  Indexed Values (L/min/m2)   QP  2:30 PM 2.54 L/min    1.41      QS  2:30 PM 2.54 L/min    1.41        Blood Flow Results Phase: Nitric Oxide     Time Results  Indexed Values (L/min/m2)   QP  3:03 PM 2.9 L/min    1.61      QS  3:03 PM 2.9 L/min    1.61        Blood Oximetry Phase: Baseline     Time Hb  SAT(%)  PO2  Content (mL/dL) PA Sat (%)   PA  2:30 PM  48.5 %      48.5       Art  2:30 PM  86 %     18.13         Blood Oximetry Phase: Nitric Oxide     Time Hb  SAT(%)  PO2  Content (mL/dL) PA Sat (%)   PA  3:03 PM  60.4 %      60.4      Art  3:03 PM  96 %     18.67         Cardiac Output Phase: Baseline     Time TDCO (L/min) TDCI (L/min/m2) Eugenia C.O. (L/min) Eugenia C.I. (L/min/m2) Eugenia HR (bpm)   Cardiac Output Results  2:30 PM 2.93    1.63    2.54    1.41         2:54 PM 2.93            Cardiac Output Phase: Nitric Oxide     Time TDCO (L/min) TDCI (L/min/m2) Eugenia C.O. (L/min) Eugenia C.I. (L/min/m2) Eugenia HR (bpm)   Cardiac Output Results  3:03 PM 3.13    1.74    2.9    1.61         3:10 PM 3.13            Resistance Results Phase: Baseline     Time PVR  SVR  TPR  TVR  PVR/SVR  TPR/TVR    Resistance Results (Metric)  2:30 PM 1258.19 dsc-5    3019.66 dsc-5    1572.74 dsc-5    3397.12 dsc-5    0.42    0.46      Resistance Results (Wood)  2:30 PM 15.73 JENKISN    37.76 JENKINS    19.66 JENKINS    42.47 JENKINS    0.42    0.46        Resistance Results Phase: Nitric Oxide     Time PVR  SVR  TPR  TVR  PVR/SVR  TPR/TVR    Resistance Results (Metric)  3:03 .58 dsc-5    2589.63 dsc-5    1157.07 dsc-5    2920.22 dsc-5    0.34    0.4      Resistance Results (Wood)  3:03 PM 11.02 JENKINS    32.38 JENKINS    14.47 JENKINS    36.51 JENKINS    0.34    0.4        Stoke Volume Results Phase: Baseline     Time RVSW (gm*m) LVSW (gm*m) RVSW-I (gm*m/m2) LVSW-I (gm*m/m2)   Stroke Work Results  2:30 PM 20.53     11.4         Stoke Volume Results Phase: Nitric Oxide     Time RVSW (gm*m) LVSW (gm*m) RVSW-I (gm*m/m2) LVSW-I (gm*m/m2)   Stroke Work Results  3:03 PM 16.92    54.93    9.4         VQ Lung Scan   Low probability of emboli    CC: Dr. Pro in [facility]

## 2023-03-08 NOTE — NURSING NOTE
Chief Complaint   Patient presents with     Follow Up     1 month hospital follow up, no updates per pt. Medications/allergies reviewed with pt, no changes per pt. No pt reported vitals today per pt.       Is the patient currently in the state of [place]? YES    Visit mode:VIDEO    If the visit is dropped, the patient can be reconnected by: VIDEO VISIT: Text to cell phone: [phone]    Will anyone else be joining the visit? NO      How would you like to obtain your AVS? MyChart    Are changes needed to the allergy or medication list? NO    Patient denies any changes since echeck-in regarding medication and allergies and states all information entered during echeck-in remains accurate.      Liberty Ibrahim, Visit Facilitator/MA.

## 2023-03-20 DIAGNOSIS — I27.20 PULMONARY HYPERTENSION (H): ICD-10-CM

## 2023-03-20 RX ORDER — FUROSEMIDE 20 MG
20 TABLET ORAL DAILY
Qty: 90 TABLET | Refills: 3 | Status: SHIPPED | OUTPATIENT
Start: 2023-03-20

## 2023-03-20 RX ORDER — DIGOXIN 125 MCG
125 TABLET ORAL DAILY
Qty: 90 TABLET | Refills: 3 | Status: SHIPPED | OUTPATIENT
Start: 2023-03-20 | End: 2024-03-07

## 2023-03-20 RX ORDER — PANTOPRAZOLE SODIUM 40 MG/1
40 TABLET, DELAYED RELEASE ORAL
Qty: 90 TABLET | Refills: 3 | Status: SHIPPED | OUTPATIENT
Start: 2023-03-20 | End: 2023-08-06

## 2023-03-21 ENCOUNTER — TELEPHONE (OUTPATIENT)
Dept: CARDIOLOGY | Facility: CLINIC | Age: 33
End: 2023-03-21
Payer: COMMERCIAL

## 2023-03-21 DIAGNOSIS — I27.20 PULMONARY HYPERTENSION (H): ICD-10-CM

## 2023-03-21 NOTE — TELEPHONE ENCOUNTER
Patient doing well on opsumit. Denies new S/S of concern. She is at  15ng/kg min of treprostilin.     Orders added for monthly HCG testing and patient will complete locally. Verbalized understanding. Staff message sent to schedulers for follow-up     Ora Do RN on 3/21/2023 at 11:38 AM

## 2023-03-24 NOTE — TELEPHONE ENCOUNTER
3/24/2023  @ 8:37 AM - Per RICARDO Gilbert - Jessenia needs to do does copay card screening for this pateint, she is currently on DIMPLE ARGUETA CMA- Prior Auths  Cardiology/Pulmonary Hypertension

## 2023-03-31 DIAGNOSIS — I27.20 PULMONARY HTN (H): Primary | ICD-10-CM

## 2023-04-19 DIAGNOSIS — I27.20 PULMONARY HYPERTENSION (H): ICD-10-CM

## 2023-04-26 ENCOUNTER — EXTERNAL ORDER RESULTS (OUTPATIENT)
Dept: CARDIOLOGY | Facility: CLINIC | Age: 33
End: 2023-04-26
Payer: COMMERCIAL

## 2023-04-26 LAB — HCG UR QL: NEGATIVE

## 2023-05-08 NOTE — PROGRESS NOTES
"Onel Pro M.D.  tel:[phone]  [address] [place] [id number]        Alex Carroll M.D.  Cardiovascular Medicine  [phone]  Kirk Newman M.D.  Shirley Chandler M.D.    Dear Doctors:    Thank you for allowing us to visit with your patient, Chelsea Flower, for evaluation of pulmonary hypertension of uncertain etiology though provocative laboratory testing with elevated CLINTON, +DS-DNA, elevated hemoglobin.    Problem List  1. Exertional shortness of breath   2. Pulmonary hypertension x 33.  3. Childbirth x 3 by   4 Hypertension treated with recently started diltiazem  5.Hypothyroidism  6. Severe right ventricular dysfunction  7. Severe pulmonary arterial hypertension  8. Initiation of parenteral prostacyclin  9. Oxygen dependence    Plan:  1. Titrate Remodulin up to 42 ng/kr/min over the next 6-8 weeks  2. RHC after  with echo, labs, 6MWT    History  33 yo F with a pmhx of severe idiopathic PAH c/b RV failure on remodulin, hypothyroidism, HTN who presents to clinic for follow up. She has severe idiopathic PAH confirmed on recent RHC at [facility]. She is currently managed on IV remodulin and tadalafil with the recent addition of opsumit. Is is chronic O2 dependent. Last seen on 3/8/23 virtually.     Today, the patient presents with her . She feels that her cardiopulmonary symptoms are somewhat improved overall. She is on stable 3L NC at rest and with exertion. She is able to do more than previously (e.g. shopping). No orthopnea. Slight BLE edema. Occasional chest tightness when ambulating or \"doing too much\" though this improves with rest. No fever or chills. No issues taking current PH meds. Her current birth control is a prior tubal ligation.     CURRENT PULMONARY HYPERTENSION REGIMEN:     PAH Rx: Remodulin 26 ng/kg/min, digoxin 125 mcg daily, tadalafil 40 mg daily, opsumit 10 mg daily     Diuretics: Lasix 20 mg daily     Oxygen: 3L NC     Anticoagulation: None   " "  Immunosuppression: None    Allergies: none  ETOH: none  Smoking: none  Family history: negative  Surgery: C-Sections (first child presented breach)    Objective:  /76 (BP Location: Right arm, Patient Position: Sitting, Cuff Size: Adult Regular)   Pulse 67   Ht 1.6 m (5' 3\")   Wt 78.6 kg (173 lb 3.2 oz)   SpO2 93%   BMI 30.68 kg/m       General: appears comfortable, alert and articulate  Head: normocephalic, atraumatic  Eyes: anicteric sclera, EOMI  Heart: regular, S1/S2, no murmur, gallop, rub, no JVD  Lungs: clear, no rales or wheezing  Abdomen: soft  Extremities: Trace BLE edema  Neurological: normal speech and affect, no gross motor deficits    Right heart catheterization  RA 8, PCP 8 PA pressure PA 62/48 mean 55      Echocardiogram    Name: RONALD LEBLANC  MRN: [id number]  : 1990  Study Date: 2023 03:23 PM  Age: 32 yrs  Gender: Female  Patient Location: [facility]  Reason For Study: Heart Failure  Ordering Physician: ENRIKE BENJAMIN  Performed By: Noreen Atkinson     BSA: 1.8 m2  Height: 63 in  Weight: 179 lb  BP: 147/98 mmHg  ______________________________________________________________________________  Procedure  Echocardiogram with two-dimensional, color and spectral Doppler performed.  Contrast Optison. Patient was given 6 ml mixture of 3 ml Optison and 6 ml  saline. 3 ml wasted.  ______________________________________________________________________________  Interpretation Summary  Left ventricular cavity size is small. Left ventricular function is normal.The  ejection fraction is 55-60%. Flattened septum is consistent with right  ventricular pressure and volume overload.     Severe right ventricular dilation is present. Global right ventricular  function is severely reduced.     Moderate to severe tricuspid insufficiency is present.     Estimated pulmonary artery systolic pressure is 72 mmHg. IVC diameter and  respiratory changes fall into an intermediate range suggesting an " RA pressure  of 8 mmHg.     Trivial pericardial effusion is present.  ______________________________________________________________________________  Left Ventricle  Left ventricular function is normal.The ejection fraction is 55-60%. Left  ventricular cavity size is small. Flattened septum is consistent with right  ventricular pressure and volume overload.     Right Ventricle  Severe right ventricular dilation is present. Global right ventricular  function is severely reduced.     Atria  The left atrium appears normal. Severe right atrial enlargement is present.     Mitral Valve  The mitral valve is normal.     Aortic Valve  Aortic valve is normal in structure and function.     Tricuspid Valve  Moderate to severe tricuspid insufficiency is present. Estimated pulmonary  artery systolic pressure is 64 mmHg plus right atrial pressure.     Pulmonic Valve  Mild pulmonic insufficiency is present.     Vessels  IVC diameter and respiratory changes fall into an intermediate range  suggesting an RA pressure of 8 mmHg.     Pericardium  Trivial pericardial effusion is present.     Compared to Previous Study  There is no prior study for direct comparison.  ______________________________________________________________________________  MMode/2D Measurements & Calculations  IVSd: 0.68 cm  LVIDd: 4.1 cm  LVIDs: 2.8 cm  LVPWd: 0.69 cm  FS: 32.1 %  LV mass(C)d: 79.7 grams  LV mass(C)dI: 43.2 grams/m2  Ao root diam: 2.6 cm  asc Aorta Diam: 2.6 cm  LVOT diam: 2.1 cm  LVOT area: 3.4 cm2  LA Volume (BP): 18.9 ml  LA Volume Index (BP): 10.3 ml/m2     RWT: 0.33     Doppler Measurements & Calculations  MV E max gabino: 55.0 cm/sec  MV A max gabino: 41.4 cm/sec  MV E/A: 1.3  MV dec slope: 233.2 cm/sec2  MV dec time: 0.24 sec  PA acc time: 0.07 sec  TR max gabino: 399.4 cm/sec  TR max P.8 mmHg  RVSP(TR): 66.8 mmHg  E/E' av.1  Lateral E/e': 4.6  Medial E/e': 9.7                  VQ Lung Scan      Meds    Current Outpatient Medications    Medication     macitentan (OPSUMIT) 10 MG tablet     acetaminophen (TYLENOL) 325 MG tablet     albuterol (PROAIR HFA/PROVENTIL HFA/VENTOLIN HFA) 108 (90 Base) MCG/ACT inhaler     albuterol (PROVENTIL) (2.5 MG/3ML) 0.083% neb solution     digoxin (LANOXIN) 125 MCG tablet     furosemide (LASIX) 20 MG tablet     Multiple Vitamins-Minerals (WOMENS MULTIVITAMIN) TABS     pantoprazole (PROTONIX) 40 MG EC tablet     prochlorperazine (COMPAZINE) 5 MG tablet     tadalafil (CIALIS) 20 MG tablet     treprostinil (REMODULIN) 10 mcg/mL in glycine diluent 50 mL infusion     No current facility-administered medications for this visit.         Labs    Outside laboratories reviewed and abnormal include: hemoglobin 18, , , CLINTON +, DS-DNA +     Latest Reference Range & Units 02/17/23 06:16 02/18/23 07:09   Sodium 136 - 145 mmol/L 139 139   Potassium 3.4 - 5.3 mmol/L 3.9 4.1   Chloride 98 - 107 mmol/L 101 103   Carbon Dioxide (CO2) 22 - 29 mmol/L 27 25   Urea Nitrogen 6.0 - 20.0 mg/dL 8.2 10.2   Creatinine 0.51 - 0.95 mg/dL 0.59 0.54   GFR Estimate >60 mL/min/1.73m2 >90 >90   Calcium 8.6 - 10.0 mg/dL 9.2 9.2   Anion Gap 7 - 15 mmol/L 11 11   Magnesium 1.7 - 2.3 mg/dL 1.9 1.9   Glucose 70 - 99 mg/dL 77 86   WBC 4.0 - 11.0 10e3/uL 7.2 7.0   Hemoglobin 11.7 - 15.7 g/dL 16.6 (H) 16.3 (H)   Hematocrit 35.0 - 47.0 % 50.3 (H) 48.5 (H)   Platelet Count 150 - 450 10e3/uL 171 160   RBC Count 3.80 - 5.20 10e6/uL 4.94 4.79   MCV 78 - 100 fL 102 (H) 101 (H)   MCH 26.5 - 33.0 pg 33.6 (H) 34.0 (H)   MCHC 31.5 - 36.5 g/dL 33.0 33.6   RDW 10.0 - 15.0 % 13.4 13.2   % Neutrophils % 62 64   % Lymphocytes % 26 25   % Monocytes % 8 7   % Eosinophils % 2 2   % Basophils % 1 1   Absolute Basophils 0.0 - 0.2 10e3/uL 0.1 0.1   Absolute Eosinophils 0.0 - 0.7 10e3/uL 0.1 0.2   Absolute Immature Granulocytes <=0.4 10e3/uL 0.0 0.1   Absolute Lymphocytes 0.8 - 5.3 10e3/uL 1.9 1.8   Absolute Monocytes 0.0 - 1.3 10e3/uL 0.5 0.5   % Immature Granulocytes  % 1 1   Absolute Neutrophils 1.6 - 8.3 10e3/uL 4.5 4.5   Absolute NRBCs 10e3/uL 0.0 0.0   NRBCs per 100 WBC <1 /100 0 0   (H): Data is abnormally high    Catheterization:    Severe pulmonary arterial hypertension    Severely reduced cardiac index with elevated right-sided filling pressure    Normal left-sided filling pressure    No response to acute vasoreactivity testing with inhaled nitric oxide         Pressures Phase: Baseline     Time Systolic (mmHg) Diastolic (mmHg) Mean (mmHg) A Wave (mmHg) V Wave (mmHg) EDP (mmHg) Max dp/dt (mmHg/sec) HR (bpm) Content (mL/dL) SAT (%)   RA Pressures  2:50 PM   12    12    16      65        RV Pressures  2:51 PM 72        14     69        PA Pressures  2:52 PM 74    34    50        64        PCW Pressures  2:52 PM   10        68        Art Pressures  2:34     92         87          Pressures Phase: Nitric Oxide     Time Systolic (mmHg) Diastolic (mmHg) Mean (mmHg) A Wave (mmHg) V Wave (mmHg) EDP (mmHg) Max dp/dt (mmHg/sec) HR (bpm) Content (mL/dL) SAT (%)   RA Pressures  3:10 PM   12    21    17      71        PA Pressures  3:08 PM 70    30    42        70        PCW Pressures  3:08 PM   10        75        AO Pressures  3:17     92    106        69          Blood Flow Results Phase: Baseline     Time Results  Indexed Values (L/min/m2)   QP  2:30 PM 2.54 L/min    1.41      QS  2:30 PM 2.54 L/min    1.41        Blood Flow Results Phase: Nitric Oxide     Time Results  Indexed Values (L/min/m2)   QP  3:03 PM 2.9 L/min    1.61      QS  3:03 PM 2.9 L/min    1.61        Blood Oximetry Phase: Baseline     Time Hb  SAT(%)  PO2  Content (mL/dL) PA Sat (%)   PA  2:30 PM  48.5 %      48.5      Art  2:30 PM  86 %     18.13         Blood Oximetry Phase: Nitric Oxide     Time Hb  SAT(%)  PO2  Content (mL/dL) PA Sat (%)   PA  3:03 PM  60.4 %      60.4      Art  3:03 PM  96 %     18.67         Cardiac Output Phase: Baseline     Time TDCO (L/min) TDCI (L/min/m2) Eugenia C.O. (L/min)  Eugenia C.I. (L/min/m2) Eugenia HR (bpm)   Cardiac Output Results  2:30 PM 2.93    1.63    2.54    1.41         2:54 PM 2.93            Cardiac Output Phase: Nitric Oxide     Time TDCO (L/min) TDCI (L/min/m2) Eugenia C.O. (L/min) Eugenia C.I. (L/min/m2) Eugenia HR (bpm)   Cardiac Output Results  3:03 PM 3.13    1.74    2.9    1.61         3:10 PM 3.13            Resistance Results Phase: Baseline     Time PVR  SVR  TPR  TVR  PVR/SVR  TPR/TVR    Resistance Results (Metric)  2:30 PM 1258.19 dsc-5    3019.66 dsc-5    1572.74 dsc-5    3397.12 dsc-5    0.42    0.46      Resistance Results (Wood)  2:30 PM 15.73 JENKINS    37.76 JENKINS    19.66 JENKINS    42.47 JENKINS    0.42    0.46        Resistance Results Phase: Nitric Oxide     Time PVR  SVR  TPR  TVR  PVR/SVR  TPR/TVR    Resistance Results (Metric)  3:03 .58 dsc-5    2589.63 dsc-5    1157.07 dsc-5    2920.22 dsc-5    0.34    0.4      Resistance Results (Wood)  3:03 PM 11.02 JENKINS    32.38 JENKINS    14.47 JENKINS    36.51 JENKINS    0.34    0.4        Stoke Volume Results Phase: Baseline     Time RVSW (gm*m) LVSW (gm*m) RVSW-I (gm*m/m2) LVSW-I (gm*m/m2)   Stroke Work Results  2:30 PM 20.53     11.4         Stoke Volume Results Phase: Nitric Oxide     Time RVSW (gm*m) LVSW (gm*m) RVSW-I (gm*m/m2) LVSW-I (gm*m/m2)   Stroke Work Results  3:03 PM 16.92    54.93    9.4         VQ Lung Scan   Low probability of emboli    CC: Dr. Pro in [facility]  Answers for HPI/ROS submitted by the patient on 5/9/2023  General Symptoms: No  Skin Symptoms: No  HENT Symptoms: No  EYE SYMPTOMS: No  HEART SYMPTOMS: Yes  LUNG SYMPTOMS: Yes  INTESTINAL SYMPTOMS: No  URINARY SYMPTOMS: No  GYNECOLOGIC SYMPTOMS: No  BREAST SYMPTOMS: No  SKELETAL SYMPTOMS: No  BLOOD SYMPTOMS: No  NERVOUS SYSTEM SYMPTOMS: No  MENTAL HEALTH SYMPTOMS: No  Chest pain or pressure: Yes  Fast or irregular heartbeat: No  Pain in legs with walking: No  Trouble breathing while lying down: No  Fingers or toes appear blue:  No  High blood pressure: No  Low blood pressure: No  Fainting: No  Murmurs: No  Pacemaker: No  Varicose veins: No  Edema or swelling: Yes  Wake up at night with shortness of breath: No  Light-headedness: No  Exercise intolerance: Yes  Cough: No  Sputum or phlegm: No  Coughing up blood: No  Difficulty breating or shortness of breath: No  Snoring: No  Wheezing: No  Difficulty breathing on exertion: Yes  Nighttime Cough: No  Difficulty breathing when lying flat: No

## 2023-05-09 ENCOUNTER — LAB (OUTPATIENT)
Dept: LAB | Facility: CLINIC | Age: 33
End: 2023-05-09
Payer: COMMERCIAL

## 2023-05-09 ENCOUNTER — TELEPHONE (OUTPATIENT)
Dept: CARDIOLOGY | Facility: CLINIC | Age: 33
End: 2023-05-09

## 2023-05-09 ENCOUNTER — OFFICE VISIT (OUTPATIENT)
Dept: CARDIOLOGY | Facility: CLINIC | Age: 33
End: 2023-05-09
Attending: INTERNAL MEDICINE
Payer: COMMERCIAL

## 2023-05-09 VITALS
OXYGEN SATURATION: 93 % | DIASTOLIC BLOOD PRESSURE: 76 MMHG | SYSTOLIC BLOOD PRESSURE: 122 MMHG | HEART RATE: 67 BPM | BODY MASS INDEX: 30.69 KG/M2 | HEIGHT: 63 IN | WEIGHT: 173.2 LBS

## 2023-05-09 DIAGNOSIS — I27.20 PULMONARY HTN (H): ICD-10-CM

## 2023-05-09 DIAGNOSIS — R06.09 DOE (DYSPNEA ON EXERTION): ICD-10-CM

## 2023-05-09 DIAGNOSIS — I27.20 PULMONARY HYPERTENSION (H): ICD-10-CM

## 2023-05-09 DIAGNOSIS — R06.02 SOB (SHORTNESS OF BREATH): Primary | ICD-10-CM

## 2023-05-09 LAB
6 MIN WALK (FT): 1085 FT
6 MIN WALK (M): 331 M
ALBUMIN SERPL BCG-MCNC: 4.3 G/DL (ref 3.5–5.2)
ALP SERPL-CCNC: 95 U/L (ref 35–104)
ALT SERPL W P-5'-P-CCNC: 14 U/L (ref 10–35)
ANION GAP SERPL CALCULATED.3IONS-SCNC: 9 MMOL/L (ref 7–15)
AST SERPL W P-5'-P-CCNC: 16 U/L (ref 10–35)
BILIRUB SERPL-MCNC: 0.3 MG/DL
BUN SERPL-MCNC: 11.4 MG/DL (ref 6–20)
CALCIUM SERPL-MCNC: 9.1 MG/DL (ref 8.6–10)
CHLORIDE SERPL-SCNC: 107 MMOL/L (ref 98–107)
CREAT SERPL-MCNC: 0.62 MG/DL (ref 0.51–0.95)
DEPRECATED HCO3 PLAS-SCNC: 24 MMOL/L (ref 22–29)
ERYTHROCYTE [DISTWIDTH] IN BLOOD BY AUTOMATED COUNT: 12.6 % (ref 10–15)
GFR SERPL CREATININE-BSD FRML MDRD: >90 ML/MIN/1.73M2
GLUCOSE SERPL-MCNC: 86 MG/DL (ref 70–99)
HCG SERPL QL: NEGATIVE
HCT VFR BLD AUTO: 37 % (ref 35–47)
HGB BLD-MCNC: 12.9 G/DL (ref 11.7–15.7)
LVEF ECHO: NORMAL
MCH RBC QN AUTO: 34.4 PG (ref 26.5–33)
MCHC RBC AUTO-ENTMCNC: 34.9 G/DL (ref 31.5–36.5)
MCV RBC AUTO: 99 FL (ref 78–100)
NT-PROBNP SERPL-MCNC: 195 PG/ML (ref 0–450)
PLATELET # BLD AUTO: 203 10E3/UL (ref 150–450)
POTASSIUM SERPL-SCNC: 3.8 MMOL/L (ref 3.4–5.3)
PROT SERPL-MCNC: 7 G/DL (ref 6.4–8.3)
RBC # BLD AUTO: 3.75 10E6/UL (ref 3.8–5.2)
SODIUM SERPL-SCNC: 140 MMOL/L (ref 136–145)
WBC # BLD AUTO: 7.1 10E3/UL (ref 4–11)

## 2023-05-09 PROCEDURE — 83880 ASSAY OF NATRIURETIC PEPTIDE: CPT | Performed by: PATHOLOGY

## 2023-05-09 PROCEDURE — 85027 COMPLETE CBC AUTOMATED: CPT | Performed by: PATHOLOGY

## 2023-05-09 PROCEDURE — 36415 COLL VENOUS BLD VENIPUNCTURE: CPT | Performed by: PATHOLOGY

## 2023-05-09 PROCEDURE — 99214 OFFICE O/P EST MOD 30 MIN: CPT | Mod: 25 | Performed by: INTERNAL MEDICINE

## 2023-05-09 PROCEDURE — 84703 CHORIONIC GONADOTROPIN ASSAY: CPT | Performed by: PATHOLOGY

## 2023-05-09 PROCEDURE — 94618 PULMONARY STRESS TESTING: CPT | Performed by: INTERNAL MEDICINE

## 2023-05-09 PROCEDURE — 93306 TTE W/DOPPLER COMPLETE: CPT | Performed by: INTERNAL MEDICINE

## 2023-05-09 PROCEDURE — 80053 COMPREHEN METABOLIC PANEL: CPT | Performed by: PATHOLOGY

## 2023-05-09 PROCEDURE — 99212 OFFICE O/P EST SF 10 MIN: CPT | Performed by: INTERNAL MEDICINE

## 2023-05-09 RX ORDER — LIDOCAINE 40 MG/G
CREAM TOPICAL
Status: CANCELLED | OUTPATIENT
Start: 2023-05-09

## 2023-05-09 ASSESSMENT — ENCOUNTER SYMPTOMS
SHORTNESS OF BREATH: 0
WHEEZING: 0
SLEEP DISTURBANCES DUE TO BREATHING: 0
HEMOPTYSIS: 0
DYSPNEA ON EXERTION: 1
SYNCOPE: 0
HYPOTENSION: 0
LEG PAIN: 0
PALPITATIONS: 0
HYPERTENSION: 0
EXERCISE INTOLERANCE: 1
POSTURAL DYSPNEA: 0
SPUTUM PRODUCTION: 0
COUGH: 0
ORTHOPNEA: 0
COUGH DISTURBING SLEEP: 0
LIGHT-HEADEDNESS: 0
SNORES LOUDLY: 0

## 2023-05-09 ASSESSMENT — PAIN SCALES - GENERAL: PAINLEVEL: NO PAIN (0)

## 2023-05-09 NOTE — PATIENT INSTRUCTIONS
Medication Changes:   Increasing Remodulin from 30ng/kg/min to 40ng/kg/min - over 6-8 weeks.      Patient Instructions:  1. Continue staying active and eat a heart healthy diet.    2. Please keep current list of medications with you at all times.    3. Remember to weigh yourself daily after voiding and before you consume any food or beverages and log the numbers.  If you have gained 2 pounds overnight or 5 pounds in a week contact us immediately for medication adjustments or further instructions.    4. **Please call us immediately if you have any syncope (fainting or passing out), chest pain, edema (swelling or weight gain), or decline in your functional status (general decline in how you are feeling).    5. Patients on Remodulin (treprostinil) or Veletri (epoprostenol): Please make sure that you have your backup pump and supplies with you at all times, your mixing instructions, and contact information for your specialty pharmacy.    Follow up Appointment Information:  Right heart catheterization with labs  6 minute walk test with titration, echo and follow-up the same day.    AFTER 7/15/23      Pre-procedure instructions - Right heart catheterization  Patient Education    Your arrival time is TBD.  Location is [address] Waiting Room  Please plan on being at the hospital all day.  At any time, emergencies and/or urgent cases may come up which could delay the start of your procedure.    Pre-procedure instructions - Right heart catheterization  No solid food for 8 hours prior  Nothing to drink 2 hours prior to arrival time  You can take your morning medications (except diabetic and blood thinners) with sips of water  We recommend you arrange for a ride to drop you off and pick you up, in the instance, you are unable to drive home, however you should be able to function as you normally would after the  procedure      Results:  Component      Latest Ref Rng 5/9/2023  7:55 AM   Sodium      136 - 145 mmol/L 140    Potassium      3.4 - 5.3 mmol/L 3.8    Chloride      98 - 107 mmol/L 107    Carbon Dioxide (CO2)      22 - 29 mmol/L 24    Anion Gap      7 - 15 mmol/L 9    Urea Nitrogen      6.0 - 20.0 mg/dL 11.4    Creatinine      0.51 - 0.95 mg/dL 0.62    Calcium      8.6 - 10.0 mg/dL 9.1    Glucose      70 - 99 mg/dL 86    Alkaline Phosphatase      35 - 104 U/L 95    AST      10 - 35 U/L 16    ALT      10 - 35 U/L 14    Protein Total      6.4 - 8.3 g/dL 7.0    Albumin      3.5 - 5.2 g/dL 4.3    Bilirubin Total      <=1.2 mg/dL 0.3    GFR Estimate      >60 mL/min/1.73m2 >90    WBC      4.0 - 11.0 10e3/uL 7.1    RBC Count      3.80 - 5.20 10e6/uL 3.75 (L)    Hemoglobin      11.7 - 15.7 g/dL 12.9    Hematocrit      35.0 - 47.0 % 37.0    MCV      78 - 100 fL 99    MCH      26.5 - 33.0 pg 34.4 (H)    MCHC      31.5 - 36.5 g/dL 34.9    RDW      10.0 - 15.0 % 12.6    Platelet Count      150 - 450 10e3/uL 203    N-Terminal Pro Bnp      0 - 450 pg/mL 195    HCG Qualitative Serum      Negative  Negative       Legend:  (L) Low  (H) High  We are located on the third floor [facility] the [facility].  Our address is     [address] [place] [address] [place] [address] [place] [address]      Thank you for allowing us to be a part of your care here at the [facility]    If you have questions or concerns please contact us at:    Ora Do RN, BSN      Nurse Coordinator         Pulmonary Hypertension       [facility]     (Phone)[phone]         JESUS Carlos   (Prior Authorizations)   ()  Clinic   Clinic   Pulmonary Hypertension   Pulmonary Hypertension  [facility]  (P)[phone]    (P) [phone]  (F) [phone]          ** Please note that you will NOT receive a reminder call regarding your scheduled testing, reminder calls are for provider appointments only.  If you are scheduled for testing within the [facility] system you may receive a call regarding pre-registration for billing purposes only.**     Support Group:  Pulmonary Hypertension Association  [email or web address].phassociation.org/  **Look at the Events Tab** They even have Support Groups that you can call into    [facility] PH Support Group  Second Saturday of the Month from 1-3 PM   Location: [address] [place] [id number]  Leader: Martina Nichole  Phone: [phone]  Email: [email or web address]     Great Videos about Pulmonary Hypertension!!  Scan ME!    [email or web address]: [email or web address]

## 2023-05-09 NOTE — NURSING NOTE
"Plan as patient understands:  Increasing Remodulin from 30ng/kg/min to 40ng/kg/min - over 6-8 weeks.      AFTER 7/15/23  Right heart catheterization with labs  6 minute walk test with and without oxygen, echo and follow-up the same day.      ========================    Reviewed Med list    Completed AVS    Follow-up orders placed    Marked chart \"Ready for Checkout\"      Patient verbalized understanding, agreed with plan and denied any further questions. Norman Ferrara RN on 5/9/2023 at 11:27 AM    "

## 2023-05-09 NOTE — LETTER
"2023      RE: Chelsea Flower  [address]  [place] [address]       Dear Colleague,    Thank you for the opportunity to participate in the care of your patient, Chelsea Flower, at the [facility] [place] at [facility]. Please see a copy of my visit note below.    Onel Pro M.D.  tel:[phone]  [address] , [place] [address]        Alex Carroll M.D.  Cardiovascular Medicine  [phone]  Kirk Newman M.D.  Shirley Chandler M.D.    Dear Doctors:    Thank you for allowing us to visit with your patient, Chelsea Flower, for evaluation of pulmonary hypertension of uncertain etiology though provocative laboratory testing with elevated CLINTON, +DS-DNA, elevated hemoglobin.    Problem List  1. Exertional shortness of breath   2. Pulmonary hypertension x 33.  3. Childbirth x 3 by   4 Hypertension treated with recently started diltiazem  5.Hypothyroidism  6. Severe right ventricular dysfunction  7. Severe pulmonary arterial hypertension  8. Initiation of parenteral prostacyclin  9. Oxygen dependence    Plan:  1. Titrate Remodulin up to 42 ng/kr/min over the next 6-8 weeks  2. RHC after  with echo, labs, 6MWT    History  31 yo F with a pmhx of severe idiopathic PAH c/b RV failure on remodulin, hypothyroidism, HTN who presents to clinic for follow up. She has severe idiopathic PAH confirmed on recent RHC at [facility]. She is currently managed on IV remodulin and tadalafil with the recent addition of opsumit. Is is chronic O2 dependent. Last seen on 3/8/23 virtually.     Today, the patient presents with her . She feels that her cardiopulmonary symptoms are somewhat improved overall. She is on stable 3L NC at rest and with exertion. She is able to do more than previously (e.g. shopping). No orthopnea. Slight BLE edema. Occasional chest tightness when ambulating or \"doing too much\" though this improves with rest. No fever " "or chills. No issues taking current PH meds. Her current birth control is a prior tubal ligation.     CURRENT PULMONARY HYPERTENSION REGIMEN:     PAH Rx: Remodulin 26 ng/kg/min, digoxin 125 mcg daily, tadalafil 40 mg daily, opsumit 10 mg daily     Diuretics: Lasix 20 mg daily     Oxygen: 3L NC     Anticoagulation: None     Immunosuppression: None    Allergies: none  ETOH: none  Smoking: none  Family history: negative  Surgery: C-Sections (first child presented breach)    Objective:  /76 (BP Location: Right arm, Patient Position: Sitting, Cuff Size: Adult Regular)   Pulse 67   Ht 1.6 m (5' 3\")   Wt 78.6 kg (173 lb 3.2 oz)   SpO2 93%   BMI 30.68 kg/m       General: appears comfortable, alert and articulate  Head: normocephalic, atraumatic  Eyes: anicteric sclera, EOMI  Heart: regular, S1/S2, no murmur, gallop, rub, no JVD  Lungs: clear, no rales or wheezing  Abdomen: soft  Extremities: Trace BLE edema  Neurological: normal speech and affect, no gross motor deficits    Right heart catheterization  RA 8, PCP 8 PA pressure PA 62/48 mean 55      Echocardiogram    Name: RONALD LEBLANC  MRN: [id number]  : 1990  Study Date: 2023 03:23 PM  Age: 32 yrs  Gender: Female  Patient Location: [facility]  Reason For Study: Heart Failure  Ordering Physician: ENRIKE BENJAMIN  Performed By: Noreen Atkinson     BSA: 1.8 m2  Height: 63 in  Weight: 179 lb  BP: 147/98 mmHg  ______________________________________________________________________________  Procedure  Echocardiogram with two-dimensional, color and spectral Doppler performed.  Contrast Optison. Patient was given 6 ml mixture of 3 ml Optison and 6 ml  saline. 3 ml wasted.  ______________________________________________________________________________  Interpretation Summary  Left ventricular cavity size is small. Left ventricular function is normal.The  ejection fraction is 55-60%. Flattened septum is consistent with right  ventricular pressure and " volume overload.     Severe right ventricular dilation is present. Global right ventricular  function is severely reduced.     Moderate to severe tricuspid insufficiency is present.     Estimated pulmonary artery systolic pressure is 72 mmHg. IVC diameter and  respiratory changes fall into an intermediate range suggesting an RA pressure  of 8 mmHg.     Trivial pericardial effusion is present.  ______________________________________________________________________________  Left Ventricle  Left ventricular function is normal.The ejection fraction is 55-60%. Left  ventricular cavity size is small. Flattened septum is consistent with right  ventricular pressure and volume overload.     Right Ventricle  Severe right ventricular dilation is present. Global right ventricular  function is severely reduced.     Atria  The left atrium appears normal. Severe right atrial enlargement is present.     Mitral Valve  The mitral valve is normal.     Aortic Valve  Aortic valve is normal in structure and function.     Tricuspid Valve  Moderate to severe tricuspid insufficiency is present. Estimated pulmonary  artery systolic pressure is 64 mmHg plus right atrial pressure.     Pulmonic Valve  Mild pulmonic insufficiency is present.     Vessels  IVC diameter and respiratory changes fall into an intermediate range  suggesting an RA pressure of 8 mmHg.     Pericardium  Trivial pericardial effusion is present.     Compared to Previous Study  There is no prior study for direct comparison.  ______________________________________________________________________________  MMode/2D Measurements & Calculations  IVSd: 0.68 cm  LVIDd: 4.1 cm  LVIDs: 2.8 cm  LVPWd: 0.69 cm  FS: 32.1 %  LV mass(C)d: 79.7 grams  LV mass(C)dI: 43.2 grams/m2  Ao root diam: 2.6 cm  asc Aorta Diam: 2.6 cm  LVOT diam: 2.1 cm  LVOT area: 3.4 cm2  LA Volume (BP): 18.9 ml  LA Volume Index (BP): 10.3 ml/m2     RWT: 0.33     Doppler Measurements & Calculations  MV E max gabino:  55.0 cm/sec  MV A max gabino: 41.4 cm/sec  MV E/A: 1.3  MV dec slope: 233.2 cm/sec2  MV dec time: 0.24 sec  PA acc time: 0.07 sec  TR max gabino: 399.4 cm/sec  TR max P.8 mmHg  RVSP(TR): 66.8 mmHg  E/E' av.1  Lateral E/e': 4.6  Medial E/e': 9.7                  VQ Lung Scan      Meds    Current Outpatient Medications   Medication    macitentan (OPSUMIT) 10 MG tablet    acetaminophen (TYLENOL) 325 MG tablet    albuterol (PROAIR HFA/PROVENTIL HFA/VENTOLIN HFA) 108 (90 Base) MCG/ACT inhaler    albuterol (PROVENTIL) (2.5 MG/3ML) 0.083% neb solution    digoxin (LANOXIN) 125 MCG tablet    furosemide (LASIX) 20 MG tablet    Multiple Vitamins-Minerals (WOMENS MULTIVITAMIN) TABS    pantoprazole (PROTONIX) 40 MG EC tablet    prochlorperazine (COMPAZINE) 5 MG tablet    tadalafil (CIALIS) 20 MG tablet    treprostinil (REMODULIN) 10 mcg/mL in glycine diluent 50 mL infusion     No current facility-administered medications for this visit.         Labs    Outside laboratories reviewed and abnormal include: hemoglobin 18, , , CLINTON +, DS-DNA +     Latest Reference Range & Units 23 06:16 23 07:09   Sodium 136 - 145 mmol/L 139 139   Potassium 3.4 - 5.3 mmol/L 3.9 4.1   Chloride 98 - 107 mmol/L 101 103   Carbon Dioxide (CO2) 22 - 29 mmol/L 27 25   Urea Nitrogen 6.0 - 20.0 mg/dL 8.2 10.2   Creatinine 0.51 - 0.95 mg/dL 0.59 0.54   GFR Estimate >60 mL/min/1.73m2 >90 >90   Calcium 8.6 - 10.0 mg/dL 9.2 9.2   Anion Gap 7 - 15 mmol/L 11 11   Magnesium 1.7 - 2.3 mg/dL 1.9 1.9   Glucose 70 - 99 mg/dL 77 86   WBC 4.0 - 11.0 10e3/uL 7.2 7.0   Hemoglobin 11.7 - 15.7 g/dL 16.6 (H) 16.3 (H)   Hematocrit 35.0 - 47.0 % 50.3 (H) 48.5 (H)   Platelet Count 150 - 450 10e3/uL 171 160   RBC Count 3.80 - 5.20 10e6/uL 4.94 4.79   MCV 78 - 100 fL 102 (H) 101 (H)   MCH 26.5 - 33.0 pg 33.6 (H) 34.0 (H)   MCHC 31.5 - 36.5 g/dL 33.0 33.6   RDW 10.0 - 15.0 % 13.4 13.2   % Neutrophils % 62 64   % Lymphocytes % 26 25   % Monocytes % 8 7   %  Eosinophils % 2 2   % Basophils % 1 1   Absolute Basophils 0.0 - 0.2 10e3/uL 0.1 0.1   Absolute Eosinophils 0.0 - 0.7 10e3/uL 0.1 0.2   Absolute Immature Granulocytes <=0.4 10e3/uL 0.0 0.1   Absolute Lymphocytes 0.8 - 5.3 10e3/uL 1.9 1.8   Absolute Monocytes 0.0 - 1.3 10e3/uL 0.5 0.5   % Immature Granulocytes % 1 1   Absolute Neutrophils 1.6 - 8.3 10e3/uL 4.5 4.5   Absolute NRBCs 10e3/uL 0.0 0.0   NRBCs per 100 WBC <1 /100 0 0   (H): Data is abnormally high    Catheterization:  Severe pulmonary arterial hypertension  Severely reduced cardiac index with elevated right-sided filling pressure  Normal left-sided filling pressure  No response to acute vasoreactivity testing with inhaled nitric oxide         Pressures Phase: Baseline     Time Systolic (mmHg) Diastolic (mmHg) Mean (mmHg) A Wave (mmHg) V Wave (mmHg) EDP (mmHg) Max dp/dt (mmHg/sec) HR (bpm) Content (mL/dL) SAT (%)   RA Pressures  2:50 PM   12    12    16      65        RV Pressures  2:51 PM 72        14     69        PA Pressures  2:52 PM 74    34    50        64        PCW Pressures  2:52 PM   10        68        Art Pressures  2:34     92         87          Pressures Phase: Nitric Oxide     Time Systolic (mmHg) Diastolic (mmHg) Mean (mmHg) A Wave (mmHg) V Wave (mmHg) EDP (mmHg) Max dp/dt (mmHg/sec) HR (bpm) Content (mL/dL) SAT (%)   RA Pressures  3:10 PM   12    21    17      71        PA Pressures  3:08 PM 70    30    42        70        PCW Pressures  3:08 PM   10        75        AO Pressures  3:17     92    106        69          Blood Flow Results Phase: Baseline     Time Results  Indexed Values (L/min/m2)   QP  2:30 PM 2.54 L/min    1.41      QS  2:30 PM 2.54 L/min    1.41        Blood Flow Results Phase: Nitric Oxide     Time Results  Indexed Values (L/min/m2)   QP  3:03 PM 2.9 L/min    1.61      QS  3:03 PM 2.9 L/min    1.61        Blood Oximetry Phase: Baseline     Time Hb  SAT(%)  PO2  Content (mL/dL) PA Sat (%)   PA  2:30 PM  48.5 %       48.5      Art  2:30 PM  86 %     18.13         Blood Oximetry Phase: Nitric Oxide     Time Hb  SAT(%)  PO2  Content (mL/dL) PA Sat (%)   PA  3:03 PM  60.4 %      60.4      Art  3:03 PM  96 %     18.67         Cardiac Output Phase: Baseline     Time TDCO (L/min) TDCI (L/min/m2) Eugenia C.O. (L/min) Eugenia C.I. (L/min/m2) Eugenia HR (bpm)   Cardiac Output Results  2:30 PM 2.93    1.63    2.54    1.41         2:54 PM 2.93            Cardiac Output Phase: Nitric Oxide     Time TDCO (L/min) TDCI (L/min/m2) Eugenia C.O. (L/min) Eugenia C.I. (L/min/m2) Eugenia HR (bpm)   Cardiac Output Results  3:03 PM 3.13    1.74    2.9    1.61         3:10 PM 3.13            Resistance Results Phase: Baseline     Time PVR  SVR  TPR  TVR  PVR/SVR  TPR/TVR    Resistance Results (Metric)  2:30 PM 1258.19 dsc-5    3019.66 dsc-5    1572.74 dsc-5    3397.12 dsc-5    0.42    0.46      Resistance Results (Wood)  2:30 PM 15.73 JENKINS    37.76 JENKINS    19.66 JENKINS    42.47 JENKINS    0.42    0.46        Resistance Results Phase: Nitric Oxide     Time PVR  SVR  TPR  TVR  PVR/SVR  TPR/TVR    Resistance Results (Metric)  3:03 .58 dsc-5    2589.63 dsc-5    1157.07 dsc-5    2920.22 dsc-5    0.34    0.4      Resistance Results (Wood)  3:03 PM 11.02 JENKINS    32.38 JENKINS    14.47 JENKINS    36.51 JENKINS    0.34    0.4        Stoke Volume Results Phase: Baseline     Time RVSW (gm*m) LVSW (gm*m) RVSW-I (gm*m/m2) LVSW-I (gm*m/m2)   Stroke Work Results  2:30 PM 20.53     11.4         Stoke Volume Results Phase: Nitric Oxide     Time RVSW (gm*m) LVSW (gm*m) RVSW-I (gm*m/m2) LVSW-I (gm*m/m2)   Stroke Work Results  3:03 PM 16.92    54.93    9.4         VQ Lung Scan   Low probability of emboli    CC: Dr. Pro in [facility]  Answers for HPI/ROS submitted by the patient on 5/9/2023  General Symptoms: No  Skin Symptoms: No  HENT Symptoms: No  EYE SYMPTOMS: No  HEART SYMPTOMS: Yes  LUNG SYMPTOMS: Yes  INTESTINAL SYMPTOMS: No  URINARY SYMPTOMS: No  GYNECOLOGIC  SYMPTOMS: No  BREAST SYMPTOMS: No  SKELETAL SYMPTOMS: No  BLOOD SYMPTOMS: No  NERVOUS SYSTEM SYMPTOMS: No  MENTAL HEALTH SYMPTOMS: No  Chest pain or pressure: Yes  Fast or irregular heartbeat: No  Pain in legs with walking: No  Trouble breathing while lying down: No  Fingers or toes appear blue: No  High blood pressure: No  Low blood pressure: No  Fainting: No  Murmurs: No  Pacemaker: No  Varicose veins: No  Edema or swelling: Yes  Wake up at night with shortness of breath: No  Light-headedness: No  Exercise intolerance: Yes  Cough: No  Sputum or phlegm: No  Coughing up blood: No  Difficulty breating or shortness of breath: No  Snoring: No  Wheezing: No  Difficulty breathing on exertion: Yes  Nighttime Cough: No  Difficulty breathing when lying flat: No          Please do not hesitate to contact me if you have any questions/concerns.     Sincerely,     Alex Carroll MD

## 2023-05-09 NOTE — NURSING NOTE
Chief Complaint   Patient presents with     Follow Up      1MO follow-up with labs and 6MWT prior          Vitals were taken and medications reconciled.     Pat Campo, Visit Facilitator    9:53 AM

## 2023-05-09 NOTE — TELEPHONE ENCOUNTER
Called [facility] changes to treprostinil from 30ng/kg/mn to 40ng/kg/min. Increased by 1-2ng every 3-7 days. Requested a dose guide faxed to  team  Ora Do RN on 5/9/2023 at 2:30 PM

## 2023-05-11 LAB — FIO2-PRE: 32 %

## 2023-05-17 ENCOUNTER — TELEPHONE (OUTPATIENT)
Dept: CARDIOLOGY | Facility: CLINIC | Age: 33
End: 2023-05-17
Payer: COMMERCIAL

## 2023-05-17 NOTE — TELEPHONE ENCOUNTER
Patient reports tolerating increased dose as of now. New target goal of 42ng, [facility] was able to dose around 44ng for goal with the 3ml treprostinil 2.5mg/ml changing around every 48 hours.     Ora Do RN on 5/17/2023 at 9:07 AM

## 2023-06-09 ENCOUNTER — MYC MEDICAL ADVICE (OUTPATIENT)
Dept: CARDIOLOGY | Facility: CLINIC | Age: 33
End: 2023-06-09
Payer: COMMERCIAL

## 2023-06-19 LAB
HCG UR QL: NEGATIVE
INTERNAL QC OK POCT: NORMAL
POCT KIT EXPIRATION DATE: NORMAL
POCT KIT LOT NUMBER: NORMAL

## 2023-07-25 ENCOUNTER — MYC MEDICAL ADVICE (OUTPATIENT)
Dept: CARDIOLOGY | Facility: CLINIC | Age: 33
End: 2023-07-25
Payer: COMMERCIAL

## 2023-07-28 NOTE — PROGRESS NOTES
"Onel Pro M.D.  tel:[phone]  [address] [place] [id number]        Alex Carroll M.D.  Cardiovascular Medicine  [phone]  Kirk Newman M.D.  Shirley Chandler M.D.    Dear Doctors:    Thank you for allowing us to visit with your patient, Chelsea Flower, for evaluation of pulmonary hypertension of uncertain etiology though provocative laboratory testing with elevated CLINTON, +DS-DNA, elevated hemoglobin.    Problem List  1. Exertional shortness of breath   2. Pulmonary hypertension x 33.  3. Childbirth x 3 by   4 Hypertension treated with recently started diltiazem  5.Hypothyroidism  6. Severe right ventricular dysfunction  7. Severe pulmonary arterial hypertension  8. Initiation of parenteral prostacyclin  9. Oxygen dependence    Plan:  Increase to 50ng/kg/minute  RTC early November for repeat RHC, 6 minute walk without oxygen, Labs + CLINTON, single  and double strand DNA, TSH  If CLINTON + today schedule rheumatology visit for early November return      History  31 yo F with a pmhx of severe idiopathic PAH c/b RV failure on remodulin, hypothyroidism, HTN who presents to clinic for follow up. She has severe idiopathic PAH confirmed on recent RHC at [facility]. She is currently managed on IV remodulin and tadalafil with the recent addition of opsumit. Is is chronic O2 dependent. Last seen on 3/8/23 virtually.     Today, the patient presents with her . She feels that her cardiopulmonary symptoms are somewhat improved overall. She is on stable 3L NC at rest and with exertion. She is able to do more than previously (e.g. shopping). No orthopnea. Slight BLE edema. Occasional chest tightness when ambulating or \"doing too much\" though this improves with rest. No fever or chills. No issues taking current PH meds. Her current birth control is a prior tubal ligation.     CURRENT PULMONARY HYPERTENSION REGIMEN:     PAH Rx: Remodulin 26 ng/kg/min, digoxin 125 mcg daily, tadalafil 40 mg daily, opsumit 10 mg " "daily     Diuretics: Lasix 20 mg daily     Oxygen: 3L NC     Anticoagulation: None     Immunosuppression: None    Allergies: none  ETOH: none  Smoking: none  Family history: negative  Surgery: C-Sections (first child presented breach)    Objective:  /76 (BP Location: Right arm, Patient Position: Sitting, Cuff Size: Adult Regular)   Pulse 67   Ht 1.6 m (5' 3\")   Wt 78.6 kg (173 lb 3.2 oz)   SpO2 93%   BMI 30.68 kg/m       General: appears comfortable, alert and articulate  Head: normocephalic, atraumatic  Eyes: anicteric sclera, EOMI  Heart: regular, S1/S2, no murmur, gallop, rub, no JVD  Lungs: clear, no rales or wheezing  Abdomen: soft  Extremities: Trace BLE edema  Neurological: normal speech and affect, no gross motor deficits    Right heart catheterization  RA 8, PCP 8 PA pressure PA 62/48 mean 55    2023  RA 13/10/9  RV 59/13  PA 59/25/40  wedge 16///13  Hemoglobin 13 points globin 13.0, heart rate 80-80  PA sat 69%  Eugenia cardiac output 6.5, Eugenia CI: 3.5   Thermo CO: 6.2, Theromo CI: 3.4    Right sided filling pressures are mildly elevated. Left sided filling pressures are normal. Moderately elevated pulmonary artery hypertension. Normal cardiac output leve       Echocardiogram  Name: RONALD LEBLANC  MRN: [id number]  : 1990  Study Date: 2023 10:17 AM  Age: 32 yrs  Gender: Female  Patient Location: [facility]  Reason For Study: Pulmonary hypertension (H), MALAVE (dyspnea on exertion)  Ordering Physician: HERMILO UNDERWOOD  Referring Physician: HERMILO UNDERWOOD  Performed By: Moe Varghese     BSA: 1.8 m2  Height: 63 in  Weight: 173 lb  BP: 122/76 mmHg  ______________________________________________________________________________  ______________________________________________________________________________  Interpretation Summary  Global and regional left ventricular function is normal with an EF of 60-65%.  Global right ventricular function is mildly reduced.  No " significant valvular abnormalities present.  The right ventricular systolic pressure is approximated at 37 mmHg The  estimated mean right atrial pressure is normal.  No pericardial effusion is present.  ______________________________________________________________________________  Left Ventricle  Left ventricular size is normal. Global and regional left ventricular function  is normal with an EF of 60-65%. Left ventricular diastolic function is normal.  No regional wall motion abnormalities are seen.     Right Ventricle  Borderline right ventricular enlargement. Mild right ventricular dilation is  present. Global right ventricular function is mildly reduced.     Atria  The left atrium appears normal. Mild right atrial enlargement is present.     Mitral Valve  The mitral valve is normal.     Aortic Valve  Aortic valve is normal in structure and function.     Tricuspid Valve  Trace tricuspid insufficiency is present. The right ventricular systolic  pressure is approximated at 33.9 mmHg plus the right atrial pressure.     Pulmonic Valve  Trace pulmonic insufficiency is present.     Vessels  The aorta root is normal. The thoracic aorta is normal. IVC diameter <2.1 cm  collapsing >50% with sniff suggests a normal RA pressure of 3 mmHg. Estimated  mean right atrial pressure is normal.     Pericardium  No pericardial effusion is present.     Miscellaneous  No significant valvular abnormalities present.     Compared to Previous Study  Compared to prior study, RA size and estimated RVSP are lower.     Attestation  I have personally viewed the imaging and agree with the interpretation and  report as documented by the fellow, Muriel, and/or edited by me.  ______________________________________________________________________________  MMode/2D Measurements & Calculations  IVSd: 1.0 cm  LVIDd: 4.8 cm  LVIDs: 2.8 cm  LVPWd: 0.97 cm  FS: 40.3 %  LV mass(C)d: 165.5 grams  LV mass(C)dI: 91.0 grams/m2  Ao root diam: 2.9 cm  asc  Aorta Diam: 2.6 cm  LVOT diam: 1.8 cm  LVOT area: 2.5 cm2  LA Volume (BP): 32.2 ml     LA Volume Index (BP): 17.7 ml/m2  RV Base: 4.0 cm  RWT: 0.41  TAPSE: 1.9 cm     Doppler Measurements & Calculations  MV E max jose antonio: 90.2 cm/sec  MV A max jose antonio: 50.2 cm/sec  MV E/A: 1.8  MV dec slope: 365.0 cm/sec2  MV dec time: 0.25 sec  PA acc time: 0.15 sec  TR max jose antonio: 291.0 cm/sec  TR max P.9 mmHg  E/E' av.8  Lateral E/e': 5.1     Medial E/e': 10.5  RV S Jose Antonio: 17.1 cm/sec             Name: RONALD LEBLANC  MRN: [id number]  : 1990  Study Date: 2023 03:23 PM  Age: 32 yrs  Gender: Female  [place] [facility] [place]  Reason For Study: Heart Failure  Ordering Physician: ENRIKE BENJAMIN  Performed By: Noreen Atkinson     BSA: 1.8 m2  Height: 63 in  Weight: 179 lb  BP: 147/98 mmHg  ______________________________________________________________________________  Procedure  Echocardiogram with two-dimensional, color and spectral Doppler performed.  Contrast Optison. Patient was given 6 ml mixture of 3 ml Optison and 6 ml  saline. 3 ml wasted.  ______________________________________________________________________________  Interpretation Summary  Left ventricular cavity size is small. Left ventricular function is normal.The  ejection fraction is 55-60%. Flattened septum is consistent with right  ventricular pressure and volume overload.     Severe right ventricular dilation is present. Global right ventricular  function is severely reduced.     Moderate to severe tricuspid insufficiency is present.     Estimated pulmonary artery systolic pressure is 72 mmHg. IVC diameter and  respiratory changes fall into an intermediate range suggesting an RA pressure  of 8 mmHg.     Trivial pericardial effusion is present.  ______________________________________________________________________________  Left Ventricle  Left ventricular function is normal.The ejection fraction is 55-60%. Left  ventricular cavity size is small.  Flattened septum is consistent with right  ventricular pressure and volume overload.     Right Ventricle  Severe right ventricular dilation is present. Global right ventricular  function is severely reduced.     Atria  The left atrium appears normal. Severe right atrial enlargement is present.     Mitral Valve  The mitral valve is normal.     Aortic Valve  Aortic valve is normal in structure and function.     Tricuspid Valve  Moderate to severe tricuspid insufficiency is present. Estimated pulmonary  artery systolic pressure is 64 mmHg plus right atrial pressure.     Pulmonic Valve  Mild pulmonic insufficiency is present.     Vessels  IVC diameter and respiratory changes fall into an intermediate range  suggesting an RA pressure of 8 mmHg.     Pericardium  Trivial pericardial effusion is present.     Compared to Previous Study  There is no prior study for direct comparison.  ______________________________________________________________________________  MMode/2D Measurements & Calculations  IVSd: 0.68 cm  LVIDd: 4.1 cm  LVIDs: 2.8 cm  LVPWd: 0.69 cm  FS: 32.1 %  LV mass(C)d: 79.7 grams  LV mass(C)dI: 43.2 grams/m2  Ao root diam: 2.6 cm  asc Aorta Diam: 2.6 cm  LVOT diam: 2.1 cm  LVOT area: 3.4 cm2  LA Volume (BP): 18.9 ml  LA Volume Index (BP): 10.3 ml/m2     RWT: 0.33     Doppler Measurements & Calculations  MV E max gabino: 55.0 cm/sec  MV A max gabino: 41.4 cm/sec  MV E/A: 1.3  MV dec slope: 233.2 cm/sec2  MV dec time: 0.24 sec  PA acc time: 0.07 sec  TR max gabino: 399.4 cm/sec  TR max P.8 mmHg  RVSP(TR): 66.8 mmHg  E/E' av.1  Lateral E/e': 4.6  Medial E/e': 9.7                  VQ Lung Scan      Meds    Current Outpatient Medications   Medication    macitentan (OPSUMIT) 10 MG tablet    acetaminophen (TYLENOL) 325 MG tablet    albuterol (PROAIR HFA/PROVENTIL HFA/VENTOLIN HFA) 108 (90 Base) MCG/ACT inhaler    albuterol (PROVENTIL) (2.5 MG/3ML) 0.083% neb solution    digoxin (LANOXIN) 125 MCG tablet    furosemide  (LASIX) 20 MG tablet    Multiple Vitamins-Minerals (WOMENS MULTIVITAMIN) TABS    pantoprazole (PROTONIX) 40 MG EC tablet    prochlorperazine (COMPAZINE) 5 MG tablet    tadalafil (CIALIS) 20 MG tablet    treprostinil (REMODULIN) 10 mcg/mL in glycine diluent 50 mL infusion     No current facility-administered medications for this visit.         Labs    Outside laboratories reviewed and abnormal include: hemoglobin 18, , , CLINTON +, DS-DNA +     Latest Reference Range & Units 02/17/23 06:16 02/18/23 07:09   Sodium 136 - 145 mmol/L 139 139   Potassium 3.4 - 5.3 mmol/L 3.9 4.1   Chloride 98 - 107 mmol/L 101 103   Carbon Dioxide (CO2) 22 - 29 mmol/L 27 25   Urea Nitrogen 6.0 - 20.0 mg/dL 8.2 10.2   Creatinine 0.51 - 0.95 mg/dL 0.59 0.54   GFR Estimate >60 mL/min/1.73m2 >90 >90   Calcium 8.6 - 10.0 mg/dL 9.2 9.2   Anion Gap 7 - 15 mmol/L 11 11   Magnesium 1.7 - 2.3 mg/dL 1.9 1.9   Glucose 70 - 99 mg/dL 77 86   WBC 4.0 - 11.0 10e3/uL 7.2 7.0   Hemoglobin 11.7 - 15.7 g/dL 16.6 (H) 16.3 (H)   Hematocrit 35.0 - 47.0 % 50.3 (H) 48.5 (H)   Platelet Count 150 - 450 10e3/uL 171 160   RBC Count 3.80 - 5.20 10e6/uL 4.94 4.79   MCV 78 - 100 fL 102 (H) 101 (H)   MCH 26.5 - 33.0 pg 33.6 (H) 34.0 (H)   MCHC 31.5 - 36.5 g/dL 33.0 33.6   RDW 10.0 - 15.0 % 13.4 13.2   % Neutrophils % 62 64   % Lymphocytes % 26 25   % Monocytes % 8 7   % Eosinophils % 2 2   % Basophils % 1 1   Absolute Basophils 0.0 - 0.2 10e3/uL 0.1 0.1   Absolute Eosinophils 0.0 - 0.7 10e3/uL 0.1 0.2   Absolute Immature Granulocytes <=0.4 10e3/uL 0.0 0.1   Absolute Lymphocytes 0.8 - 5.3 10e3/uL 1.9 1.8   Absolute Monocytes 0.0 - 1.3 10e3/uL 0.5 0.5   % Immature Granulocytes % 1 1   Absolute Neutrophils 1.6 - 8.3 10e3/uL 4.5 4.5   Absolute NRBCs 10e3/uL 0.0 0.0   NRBCs per 100 WBC <1 /100 0 0   (H): Data is abnormally high    Catheterization:  Severe pulmonary arterial hypertension  Severely reduced cardiac index with elevated right-sided filling  pressure  Normal left-sided filling pressure  No response to acute vasoreactivity testing with inhaled nitric oxide         Pressures Phase: Baseline     Time Systolic (mmHg) Diastolic (mmHg) Mean (mmHg) A Wave (mmHg) V Wave (mmHg) EDP (mmHg) Max dp/dt (mmHg/sec) HR (bpm) Content (mL/dL) SAT (%)   RA Pressures  2:50 PM   12    12    16      65        RV Pressures  2:51 PM 72        14     69        PA Pressures  2:52 PM 74    34    50        64        PCW Pressures  2:52 PM   10        68        Art Pressures  2:34     92         87          Pressures Phase: Nitric Oxide     Time Systolic (mmHg) Diastolic (mmHg) Mean (mmHg) A Wave (mmHg) V Wave (mmHg) EDP (mmHg) Max dp/dt (mmHg/sec) HR (bpm) Content (mL/dL) SAT (%)   RA Pressures  3:10 PM   12    21    17      71        PA Pressures  3:08 PM 70    30    42        70        PCW Pressures  3:08 PM   10        75        AO Pressures  3:17     92    106        69          Blood Flow Results Phase: Baseline     Time Results  Indexed Values (L/min/m2)   QP  2:30 PM 2.54 L/min    1.41      QS  2:30 PM 2.54 L/min    1.41        Blood Flow Results Phase: Nitric Oxide     Time Results  Indexed Values (L/min/m2)   QP  3:03 PM 2.9 L/min    1.61      QS  3:03 PM 2.9 L/min    1.61        Blood Oximetry Phase: Baseline     Time Hb  SAT(%)  PO2  Content (mL/dL) PA Sat (%)   PA  2:30 PM  48.5 %      48.5      Art  2:30 PM  86 %     18.13         Blood Oximetry Phase: Nitric Oxide     Time Hb  SAT(%)  PO2  Content (mL/dL) PA Sat (%)   PA  3:03 PM  60.4 %      60.4      Art  3:03 PM  96 %     18.67         Cardiac Output Phase: Baseline     Time TDCO (L/min) TDCI (L/min/m2) Eugenia C.O. (L/min) Eugenia C.I. (L/min/m2) Eugenia HR (bpm)   Cardiac Output Results  2:30 PM 2.93    1.63    2.54    1.41         2:54 PM 2.93            Cardiac Output Phase: Nitric Oxide     Time TDCO (L/min) TDCI (L/min/m2) Eugenia C.O. (L/min) Eugenia C.I. (L/min/m2) Eugenia HR (bpm)   Cardiac Output Results  3:03  PM 3.13    1.74    2.9    1.61         3:10 PM 3.13            Resistance Results Phase: Baseline     Time PVR  SVR  TPR  TVR  PVR/SVR  TPR/TVR    Resistance Results (Metric)  2:30 PM 1258.19 dsc-5    3019.66 dsc-5    1572.74 dsc-5    3397.12 dsc-5    0.42    0.46      Resistance Results (Wood)  2:30 PM 15.73 JENKINS    37.76 JENKINS    19.66 JENKINS    42.47 JENKINS    0.42    0.46        Resistance Results Phase: Nitric Oxide     Time PVR  SVR  TPR  TVR  PVR/SVR  TPR/TVR    Resistance Results (Metric)  3:03 .58 dsc-5    2589.63 dsc-5    1157.07 dsc-5    2920.22 dsc-5    0.34    0.4      Resistance Results (Wood)  3:03 PM 11.02 JENKINS    32.38 JENKINS    14.47 JENKINS    36.51 JENKINS    0.34    0.4        Stoke Volume Results Phase: Baseline     Time RVSW (gm*m) LVSW (gm*m) RVSW-I (gm*m/m2) LVSW-I (gm*m/m2)   Stroke Work Results  2:30 PM 20.53     11.4         Stoke Volume Results Phase: Nitric Oxide     Time RVSW (gm*m) LVSW (gm*m) RVSW-I (gm*m/m2) LVSW-I (gm*m/m2)   Stroke Work Results  3:03 PM 16.92    54.93    9.4         VQ Lung Scan   Low probability of emboli    CC: Dr. Pro in [facility]  Answers for HPI/ROS submitted by the patient on 5/9/2023  General Symptoms: No  Skin Symptoms: No  HENT Symptoms: No  EYE SYMPTOMS: No  HEART SYMPTOMS: Yes  LUNG SYMPTOMS: Yes  INTESTINAL SYMPTOMS: No  URINARY SYMPTOMS: No  GYNECOLOGIC SYMPTOMS: No  BREAST SYMPTOMS: No  SKELETAL SYMPTOMS: No  BLOOD SYMPTOMS: No  NERVOUS SYSTEM SYMPTOMS: No  MENTAL HEALTH SYMPTOMS: No  Chest pain or pressure: Yes  Fast or irregular heartbeat: No  Pain in legs with walking: No  Trouble breathing while lying down: No  Fingers or toes appear blue: No  High blood pressure: No  Low blood pressure: No  Fainting: No  Murmurs: No  Pacemaker: No  Varicose veins: No  Edema or swelling: Yes  Wake up at night with shortness of breath: No  Light-headedness: No  Exercise intolerance: Yes  Cough: No  Sputum or phlegm: No  Coughing up blood:  No  Difficulty breating or shortness of breath: No  Snoring: No  Wheezing: No  Difficulty breathing on exertion: Yes  Nighttime Cough: No  Difficulty breathing when lying flat: No

## 2023-08-01 ENCOUNTER — APPOINTMENT (OUTPATIENT)
Dept: LAB | Facility: CLINIC | Age: 33
End: 2023-08-01
Attending: INTERNAL MEDICINE
Payer: COMMERCIAL

## 2023-08-01 ENCOUNTER — HOSPITAL ENCOUNTER (OUTPATIENT)
Facility: CLINIC | Age: 33
Discharge: HOME | End: 2023-08-01
Attending: INTERNAL MEDICINE | Admitting: INTERNAL MEDICINE
Payer: COMMERCIAL

## 2023-08-01 ENCOUNTER — APPOINTMENT (OUTPATIENT)
Dept: MEDSURG UNIT | Facility: CLINIC | Age: 33
End: 2023-08-01
Attending: INTERNAL MEDICINE
Payer: COMMERCIAL

## 2023-08-01 ENCOUNTER — OFFICE VISIT (OUTPATIENT)
Dept: CARDIOLOGY | Facility: CLINIC | Age: 33
End: 2023-08-01
Attending: INTERNAL MEDICINE
Payer: COMMERCIAL

## 2023-08-01 ENCOUNTER — HOSPITAL ENCOUNTER (OUTPATIENT)
Dept: CARDIOLOGY | Facility: CLINIC | Age: 33
Discharge: HOME | End: 2023-08-01
Attending: INTERNAL MEDICINE
Payer: COMMERCIAL

## 2023-08-01 ENCOUNTER — ANCILLARY PROCEDURE (OUTPATIENT)
Dept: CT IMAGING | Facility: CLINIC | Age: 33
End: 2023-08-01
Attending: INTERNAL MEDICINE
Payer: COMMERCIAL

## 2023-08-01 VITALS
DIASTOLIC BLOOD PRESSURE: 72 MMHG | OXYGEN SATURATION: 90 % | RESPIRATION RATE: 15 BRPM | HEART RATE: 73 BPM | TEMPERATURE: 97.7 F | WEIGHT: 178.2 LBS | BODY MASS INDEX: 31.57 KG/M2 | SYSTOLIC BLOOD PRESSURE: 127 MMHG | HEIGHT: 63 IN

## 2023-08-01 VITALS
HEART RATE: 71 BPM | HEIGHT: 64 IN | SYSTOLIC BLOOD PRESSURE: 111 MMHG | WEIGHT: 179.8 LBS | OXYGEN SATURATION: 98 % | DIASTOLIC BLOOD PRESSURE: 67 MMHG | BODY MASS INDEX: 30.7 KG/M2

## 2023-08-01 DIAGNOSIS — I27.20 PULMONARY HYPERTENSION (H): ICD-10-CM

## 2023-08-01 DIAGNOSIS — I27.20 PULMONARY HTN (H): ICD-10-CM

## 2023-08-01 DIAGNOSIS — R06.02 SOB (SHORTNESS OF BREATH): ICD-10-CM

## 2023-08-01 DIAGNOSIS — R06.09 DOE (DYSPNEA ON EXERTION): ICD-10-CM

## 2023-08-01 DIAGNOSIS — I27.21 PULMONARY ARTERIAL HYPERTENSION (H): ICD-10-CM

## 2023-08-01 LAB
6 MIN WALK (FT): 1155 FT
6 MIN WALK (M): 352 M
ALBUMIN SERPL BCG-MCNC: 4.5 G/DL (ref 3.5–5.2)
ALP SERPL-CCNC: 88 U/L (ref 35–104)
ALT SERPL W P-5'-P-CCNC: 11 U/L (ref 0–50)
ANION GAP SERPL CALCULATED.3IONS-SCNC: 12 MMOL/L (ref 7–15)
AST SERPL W P-5'-P-CCNC: 18 U/L (ref 0–45)
BASOPHILS # BLD AUTO: 0.1 10E3/UL (ref 0–0.2)
BASOPHILS NFR BLD AUTO: 1 %
BILIRUB SERPL-MCNC: 0.3 MG/DL
BUN SERPL-MCNC: 10.2 MG/DL (ref 6–20)
CALCIUM SERPL-MCNC: 9 MG/DL (ref 8.6–10)
CHLORIDE SERPL-SCNC: 106 MMOL/L (ref 98–107)
CREAT SERPL-MCNC: 0.57 MG/DL (ref 0.51–0.95)
CRP SERPL HS-MCNC: 3.34 MG/L
DEPRECATED HCO3 PLAS-SCNC: 20 MMOL/L (ref 22–29)
EOSINOPHIL # BLD AUTO: 0.1 10E3/UL (ref 0–0.7)
EOSINOPHIL NFR BLD AUTO: 2 %
ERYTHROCYTE [DISTWIDTH] IN BLOOD BY AUTOMATED COUNT: 12.7 % (ref 10–15)
GFR SERPL CREATININE-BSD FRML MDRD: >90 ML/MIN/1.73M2
GLUCOSE SERPL-MCNC: 91 MG/DL (ref 70–99)
HCG INTACT+B SERPL-ACNC: <1 MIU/ML
HCG SERPL QL: NEGATIVE
HCT VFR BLD AUTO: 39.5 % (ref 35–47)
HGB BLD-MCNC: 13.3 G/DL (ref 11.7–15.7)
IMM GRANULOCYTES # BLD: 0.1 10E3/UL
IMM GRANULOCYTES NFR BLD: 1 %
INR PPP: 1.01 (ref 0.85–1.15)
LVEF ECHO: NORMAL
LYMPHOCYTES # BLD AUTO: 1.1 10E3/UL (ref 0.8–5.3)
LYMPHOCYTES NFR BLD AUTO: 16 %
MCH RBC QN AUTO: 32.2 PG (ref 26.5–33)
MCHC RBC AUTO-ENTMCNC: 33.7 G/DL (ref 31.5–36.5)
MCV RBC AUTO: 96 FL (ref 78–100)
MONOCYTES # BLD AUTO: 0.3 10E3/UL (ref 0–1.3)
MONOCYTES NFR BLD AUTO: 4 %
NEUTROPHILS # BLD AUTO: 5.3 10E3/UL (ref 1.6–8.3)
NEUTROPHILS NFR BLD AUTO: 76 %
NRBC # BLD AUTO: 0 10E3/UL
NRBC BLD AUTO-RTO: 0 /100
NT-PROBNP SERPL-MCNC: 29 PG/ML (ref 0–450)
PLATELET # BLD AUTO: 199 10E3/UL (ref 150–450)
POTASSIUM SERPL-SCNC: 4 MMOL/L (ref 3.4–5.3)
PROT SERPL-MCNC: 7.2 G/DL (ref 6.4–8.3)
RBC # BLD AUTO: 4.13 10E6/UL (ref 3.8–5.2)
SODIUM SERPL-SCNC: 138 MMOL/L (ref 136–145)
WBC # BLD AUTO: 6.9 10E3/UL (ref 4–11)

## 2023-08-01 PROCEDURE — 71250 CT THORAX DX C-: CPT | Performed by: RADIOLOGY

## 2023-08-01 PROCEDURE — 36415 COLL VENOUS BLD VENIPUNCTURE: CPT | Performed by: INTERNAL MEDICINE

## 2023-08-01 PROCEDURE — C1751 CATH, INF, PER/CENT/MIDLINE: HCPCS | Performed by: INTERNAL MEDICINE

## 2023-08-01 PROCEDURE — 85025 COMPLETE CBC W/AUTO DIFF WBC: CPT | Performed by: INTERNAL MEDICINE

## 2023-08-01 PROCEDURE — 86141 C-REACTIVE PROTEIN HS: CPT | Performed by: INTERNAL MEDICINE

## 2023-08-01 PROCEDURE — 94618 PULMONARY STRESS TESTING: CPT | Performed by: INTERNAL MEDICINE

## 2023-08-01 PROCEDURE — 99213 OFFICE O/P EST LOW 20 MIN: CPT | Mod: 25 | Performed by: INTERNAL MEDICINE

## 2023-08-01 PROCEDURE — 93451 RIGHT HEART CATH: CPT | Performed by: INTERNAL MEDICINE

## 2023-08-01 PROCEDURE — 999N000142 HC STATISTIC PROCEDURE PREP ONLY

## 2023-08-01 PROCEDURE — 93451 RIGHT HEART CATH: CPT | Mod: 26 | Performed by: INTERNAL MEDICINE

## 2023-08-01 PROCEDURE — 84703 CHORIONIC GONADOTROPIN ASSAY: CPT | Performed by: INTERNAL MEDICINE

## 2023-08-01 PROCEDURE — 93306 TTE W/DOPPLER COMPLETE: CPT

## 2023-08-01 PROCEDURE — 250N000009 HC RX 250: Performed by: INTERNAL MEDICINE

## 2023-08-01 PROCEDURE — 86038 ANTINUCLEAR ANTIBODIES: CPT | Performed by: INTERNAL MEDICINE

## 2023-08-01 PROCEDURE — 84702 CHORIONIC GONADOTROPIN TEST: CPT | Performed by: INTERNAL MEDICINE

## 2023-08-01 PROCEDURE — 83880 ASSAY OF NATRIURETIC PEPTIDE: CPT | Performed by: INTERNAL MEDICINE

## 2023-08-01 PROCEDURE — 999N000132 HC STATISTIC PP CARE STAGE 1

## 2023-08-01 PROCEDURE — 99214 OFFICE O/P EST MOD 30 MIN: CPT | Mod: 25 | Performed by: INTERNAL MEDICINE

## 2023-08-01 PROCEDURE — 93306 TTE W/DOPPLER COMPLETE: CPT | Mod: 26 | Performed by: INTERNAL MEDICINE

## 2023-08-01 PROCEDURE — 85610 PROTHROMBIN TIME: CPT | Performed by: INTERNAL MEDICINE

## 2023-08-01 PROCEDURE — 80053 COMPREHEN METABOLIC PANEL: CPT | Performed by: INTERNAL MEDICINE

## 2023-08-01 PROCEDURE — 272N000001 HC OR GENERAL SUPPLY STERILE: Performed by: INTERNAL MEDICINE

## 2023-08-01 RX ORDER — LIDOCAINE 40 MG/G
CREAM TOPICAL
Status: COMPLETED | OUTPATIENT
Start: 2023-08-01 | End: 2023-08-01

## 2023-08-01 RX ORDER — LIDOCAINE 40 MG/G
CREAM TOPICAL
Status: CANCELLED | OUTPATIENT
Start: 2023-08-01

## 2023-08-01 RX ADMIN — LIDOCAINE: 40 CREAM TOPICAL at 11:57

## 2023-08-01 ASSESSMENT — ACTIVITIES OF DAILY LIVING (ADL)
ADLS_ACUITY_SCORE: 35
ADLS_ACUITY_SCORE: 35

## 2023-08-01 ASSESSMENT — PAIN SCALES - GENERAL: PAINLEVEL: MILD PAIN (3)

## 2023-08-01 NOTE — PATIENT INSTRUCTIONS
Medication Changes:   We will be INCREASING you Remodulin at the following rate:    Current rate: 40ng/kg/min  Tit rate: 1-2ng/kg/min   Frequency: every 7 days  New Goal: 50ng/kg/min    Dosing Weight: 75.6kg       Patient Instructions:  1. Continue staying active and eat a heart healthy diet.    2. Please keep current list of medications with you at all times.    3. Remember to weigh yourself daily after voiding and before you consume any food or beverages and log the numbers.  If you have gained 2 pounds overnight or 5 pounds in a week contact us immediately for medication adjustments or further instructions.    4. **Please call us immediately if you have any syncope (fainting or passing out), chest pain, edema (swelling or weight gain), or decline in your functional status (general decline in how you are feeling).    5. Patients on Remodulin (treprostinil) or Veletri (epoprostenol): Please make sure that you have your backup pump and supplies with you at all times, your mixing instructions, and contact information for your specialty pharmacy.    Follow up Appointment Information:  Follow-up with Dr Carroll in 2 months with right heart catheterization prior    Pre-procedure instructions - Right heart catheterization  Patient Education    Your arrival time is TBD.  Location is [address] Waiting Room  Please plan on being at the hospital all day.  At any time, emergencies and/or urgent cases may come up which could delay the start of your procedure.    Pre-procedure instructions - Right heart catheterization  No solid food for 8 hours prior  Nothing to drink 2 hours prior to arrival time  You can take your morning medications (except diabetic and blood thinners) with sips of water  We recommend you arrange for a ride to drop you off and pick you up, in the instance, you are unable to drive home, however you should be able to  function as you normally would after the procedure      You will need to follow up with one of our cardiology APPs 1-2 weeks after your procedure. If you need help scheduling or rescheduling your appointment, please call [phone]      Results:  Component      Latest Ref Rng 8/1/2023  10:11 AM   WBC      4.0 - 11.0 10e3/uL 6.9    RBC Count      3.80 - 5.20 10e6/uL 4.13    Hemoglobin      11.7 - 15.7 g/dL 13.3    Hematocrit      35.0 - 47.0 % 39.5    MCV      78 - 100 fL 96    MCH      26.5 - 33.0 pg 32.2    MCHC      31.5 - 36.5 g/dL 33.7    RDW      10.0 - 15.0 % 12.7    Platelet Count      150 - 450 10e3/uL 199    % Neutrophils      % 76    % Lymphocytes      % 16    % Monocytes      % 4    % Eosinophils      % 2    % Basophils      % 1    % Immature Granulocytes      % 1    NRBCs per 100 WBC      <1 /100 0    Absolute Neutrophils      1.6 - 8.3 10e3/uL 5.3    Absolute Lymphocytes      0.8 - 5.3 10e3/uL 1.1    Absolute Monocytes      0.0 - 1.3 10e3/uL 0.3    Absolute Eosinophils      0.0 - 0.7 10e3/uL 0.1    Absolute Basophils      0.0 - 0.2 10e3/uL 0.1    Absolute Immature Granulocytes      <=0.4 10e3/uL 0.1    Absolute NRBCs      10e3/uL 0.0    Sodium      136 - 145 mmol/L 138    Potassium      3.4 - 5.3 mmol/L 4.0    Chloride      98 - 107 mmol/L 106    Carbon Dioxide (CO2)      22 - 29 mmol/L 20 (L)    Anion Gap      7 - 15 mmol/L 12    Urea Nitrogen      6.0 - 20.0 mg/dL 10.2    Creatinine      0.51 - 0.95 mg/dL 0.57    Calcium      8.6 - 10.0 mg/dL 9.0    Glucose      70 - 99 mg/dL 91    Alkaline Phosphatase      35 - 104 U/L 88    AST      0 - 45 U/L 18    ALT      0 - 50 U/L 11    Protein Total      6.4 - 8.3 g/dL 7.2    Albumin      3.5 - 5.2 g/dL 4.5    Bilirubin Total      <=1.2 mg/dL 0.3    GFR Estimate      >60 mL/min/1.73m2 >90    INR      0.85 - 1.15  1.01    hCG Quantitative      <5 mIU/mL <1    C-Reactive Protein High Sensitivity 3.34    N-Terminal Pro BNP Inpatient      0 - 450 pg/mL 29    HCG  Qualitative Serum      Negative  Negative       Legend:  (L) Low  We are located on the third floor [facility] on the [facility].  Our address is     [address] [place] [address] on 3rd Floor   [place] [phone]      Thank you for allowing us to be a part of your care here at the [facility]    If you have questions or concerns please contact us at:    rOa Do RN, BSN      Nurse Coordinator         Pulmonary Hypertension       [facility]     (Phone)[phone]         Gilma Irvin, JESUS Sanchez   (Prior Authorizations)   ()  Clinic   Clinic   Pulmonary Hypertension   Pulmonary Hypertension  [facility]  (P)[phone]    (P) [phone]  (F) [phone]          ** Please note that you will NOT receive a reminder call regarding your scheduled testing, reminder calls are for provider appointments only.  If you are scheduled for testing within the [employer] system you may receive a call regarding pre-registration for billing purposes only.**     Support Group:  Pulmonary Hypertension Association  Https://www.phassociation.org/  **Look at the Events Tab** They even have Support Groups that you can call into    [facility] PH Support Group  Second Saturday of the Month from 1-3 PM   Location: [address] [place] [address]  Leader: Martina Nichole  Phone: [phone]  Email: [email or web address]     Great Videos about Pulmonary Hypertension!!  Scan ME!    Website: [email or web address]

## 2023-08-01 NOTE — NURSING NOTE
Right Heart Catheterization: Patient was instructed regarding right heart catheterization, including discussion of the procedure, preparation, intra-procedural steps, and recovery at home. Patient demonstrated understanding of this information and agreed to call with further questions or concerns.  Medication Change: Patient was educated regarding prescribed medication change, including discussion of the indication, administration, side effects, and when to report to MD or RN. Patient demonstrated understanding of this information and agreed to call with further questions or concerns.  Labs: Patient was given results of the laboratory testing obtained today. Patient demonstrated understanding of this information and agreed to call with further questions or concerns.   Diet: Patient instructed regarding a heart healthy diet, including discussion of reduced fat and sodium intake. Patient demonstrated understanding of this information and agreed to call with further questions or concerns.  Return Appointment: Patient given instructions regarding scheduling next clinic visit. Patient demonstrated understanding of this information and agreed to call with further questions or concerns.    Patient will get HRCT before she leaves today. Staff message sent to Crystal to schedule F/U testing. Mary Guerrero RN on 8/1/2023 at 4:16 PM    Updated dosing for IV Remodulin called into [facility]. Mary Guerrero RN on 8/2/2023 at 8:26 AM

## 2023-08-01 NOTE — Clinical Note
Called to Leelee RN on 2A. All questions answered. All belongings sent with patient. Patient transported to 2A via ambulation with CCL RN.

## 2023-08-01 NOTE — DISCHARGE INSTRUCTIONS
[facility]                        Interventional Cardiology  Discharge Instructions   Post Right Heart Cath and/or Heart Biopsy      AFTER YOU GO HOME:  DO drink plenty of fluids  DO resume your regular diet and medications unless otherwise instructed by your Primary Physician  Do Not scrub the procedure site vigorously  No lotion or powder to the puncture site for 3 days    CALL YOUR PRIMARY PHYSICIAN IF: You may resume all normal activity.  Monitor neck site for bleeding, swelling, or voice changes. If you notice bleeding or swelling immediately apply pressure to the site and call number below to speak with Cardiology Fellow.  If you experience any changes in your breathing you should call your doctor immediately or come to the closest Emergency Department.  Do not drive yourself.    ADDITIONAL INSTRUCTIONS: Medications: You are to resume all home medications including anticoagulation therapy unless otherwise advised by your primary cardiologist or nurse coordinator.    Follow Up: Per your primary cardiology team    If you have any questions or concerns regarding your procedure site please call [phone] at anytime and ask for Cardiology Fellow on call.  They are available 24 hours a day.  You may also contact the Cardiology Clinic after hours number at [phone].                                                       Telephone Numbers [phone] Monday-Friday 8:00 am to 4:30 pm    [phone] After 4:30 pm Monday-Friday, Weekends & Holidays  Ask for Interventional Cardiologist on call. Someone is on call 24 hours/day   [facility] toll free number [phone] Monday-Friday 8:00 am to 4:30 pm   [facility] Emergency Dept [phone]

## 2023-08-01 NOTE — NURSING NOTE
Chief Complaint   Patient presents with    Follow Up     follow-up after RHC       Vitals were taken, medications reconciled.    Paige Thurner, Facilitator   3:06 PM

## 2023-08-01 NOTE — Clinical Note
8/1/2023      RE: Chelsea Flower  [address]  [facility] [address]       Dear Colleague,    Thank you for the opportunity to participate in the care of your patient, Chelsea Flower, at the [facility]. Please see a copy of my visit note below.    No notes on file    Please do not hesitate to contact me if you have any questions/concerns.     Sincerely,     Alex Carroll MD

## 2023-08-01 NOTE — PROGRESS NOTES
Pt walked back from Cath Lab with RN; pt on room air, sats 90%; placed back on baseline oxygen at 3 lpm nasal cannula. Pt awake and alert, denies pain; R neck site is soft, dry and intact with guaze dressing in place. Pt clarified she has her discharge instructions. Denies questions. Pt has appointment at [facility] with Dr Carroll; pt brought to shuttle with Oxygen tank in carrier. 1405--discharged to self care.

## 2023-08-01 NOTE — PROGRESS NOTES
Pt prepped for right heart cath.LMX cream applied on right neck.Consent signed and questions answered.Reviewed discharge instructions with pt prior to procedure because she has had several of the procedures and understand the care after procedure.

## 2023-08-02 ENCOUNTER — MYC MEDICAL ADVICE (OUTPATIENT)
Dept: CARDIOLOGY | Facility: CLINIC | Age: 33
End: 2023-08-02
Payer: COMMERCIAL

## 2023-08-02 LAB
ANA PAT SER IF-IMP: ABNORMAL
ANA SER QL IF: POSITIVE
ANA TITR SER IF: ABNORMAL {TITER}

## 2023-08-04 NOTE — TELEPHONE ENCOUNTER
Cath Lab Case Request/Order    Location: [address] [address] [facility] Waiting Room    Procedure: Right Heart Cath (RHC)    Procedure Date: 11/8    Patient Arrival Time: 7:00 AM    Procedure Time: 0830 (pending emergency)    Ordering Provider: Dr. Alex Carroll    Performing Cardiologist: ?    Inpatient Bed Needed: No    Post-  Procedure ANKIT appointment scheduled (1 - 2 weeks): N/A, RHC     Date: 11/8     Provider: Dr. Carroll    Communicated Patient Instructions:  NURSE TO COMMUNICATE INSTRUCTIONS    Appointment was scheduled: Over the phone    Patient expressed understanding of above instructions and denied further questions at this time.    FYI!!! Pt needed Wednesday so I had to take a HF spot.     xochitl Rowell

## 2023-08-05 ENCOUNTER — APPOINTMENT (OUTPATIENT)
Dept: INTERVENTIONAL RADIOLOGY/VASCULAR | Facility: CLINIC | Age: 33
End: 2023-08-05
Attending: STUDENT IN AN ORGANIZED HEALTH CARE EDUCATION/TRAINING PROGRAM
Payer: COMMERCIAL

## 2023-08-05 ENCOUNTER — HOSPITAL ENCOUNTER (INPATIENT)
Facility: CLINIC | Age: 33
LOS: 4 days | Discharge: HOME | End: 2023-08-09
Attending: STUDENT IN AN ORGANIZED HEALTH CARE EDUCATION/TRAINING PROGRAM | Admitting: INTERNAL MEDICINE
Payer: COMMERCIAL

## 2023-08-05 DIAGNOSIS — R50.9 FEVER, UNSPECIFIED FEVER CAUSE: ICD-10-CM

## 2023-08-05 DIAGNOSIS — T80.219A CENTRAL VENOUS LINE INFECTION, INITIAL ENCOUNTER: ICD-10-CM

## 2023-08-05 DIAGNOSIS — L03.90 CELLULITIS, UNSPECIFIED CELLULITIS SITE: Primary | ICD-10-CM

## 2023-08-05 LAB
ALBUMIN SERPL BCG-MCNC: 4.7 G/DL (ref 3.5–5.2)
ALBUMIN UR-MCNC: NEGATIVE MG/DL
ALP SERPL-CCNC: 98 U/L (ref 35–104)
ALT SERPL W P-5'-P-CCNC: 17 U/L (ref 0–50)
ANION GAP SERPL CALCULATED.3IONS-SCNC: 14 MMOL/L (ref 7–15)
APPEARANCE UR: CLEAR
AST SERPL W P-5'-P-CCNC: 23 U/L (ref 0–45)
BASOPHILS # BLD AUTO: 0 10E3/UL (ref 0–0.2)
BASOPHILS NFR BLD AUTO: 1 %
BILIRUB SERPL-MCNC: 0.4 MG/DL
BILIRUB UR QL STRIP: NEGATIVE
BUN SERPL-MCNC: 9.3 MG/DL (ref 6–20)
CALCIUM SERPL-MCNC: 9.6 MG/DL (ref 8.6–10)
CHLORIDE SERPL-SCNC: 104 MMOL/L (ref 98–107)
COLOR UR AUTO: NORMAL
CREAT SERPL-MCNC: 0.64 MG/DL (ref 0.51–0.95)
DEPRECATED HCO3 PLAS-SCNC: 22 MMOL/L (ref 22–29)
EOSINOPHIL # BLD AUTO: 0.2 10E3/UL (ref 0–0.7)
EOSINOPHIL NFR BLD AUTO: 3 %
ERYTHROCYTE [DISTWIDTH] IN BLOOD BY AUTOMATED COUNT: 12.6 % (ref 10–15)
GFR SERPL CREATININE-BSD FRML MDRD: >90 ML/MIN/1.73M2
GLUCOSE SERPL-MCNC: 130 MG/DL (ref 70–99)
GLUCOSE UR STRIP-MCNC: NEGATIVE MG/DL
HCG UR QL: NEGATIVE
HCT VFR BLD AUTO: 39.9 % (ref 35–47)
HGB BLD-MCNC: 13.4 G/DL (ref 11.7–15.7)
HGB UR QL STRIP: NEGATIVE
HOLD SPECIMEN: NORMAL
HOLD SPECIMEN: NORMAL
IMM GRANULOCYTES # BLD: 0 10E3/UL
IMM GRANULOCYTES NFR BLD: 1 %
KETONES UR STRIP-MCNC: NEGATIVE MG/DL
LACTATE SERPL-SCNC: 1.7 MMOL/L (ref 0.7–2)
LEUKOCYTE ESTERASE UR QL STRIP: NEGATIVE
LYMPHOCYTES # BLD AUTO: 1.2 10E3/UL (ref 0.8–5.3)
LYMPHOCYTES NFR BLD AUTO: 17 %
MCH RBC QN AUTO: 31.4 PG (ref 26.5–33)
MCHC RBC AUTO-ENTMCNC: 33.6 G/DL (ref 31.5–36.5)
MCV RBC AUTO: 93 FL (ref 78–100)
MONOCYTES # BLD AUTO: 0.4 10E3/UL (ref 0–1.3)
MONOCYTES NFR BLD AUTO: 6 %
NEUTROPHILS # BLD AUTO: 5 10E3/UL (ref 1.6–8.3)
NEUTROPHILS NFR BLD AUTO: 72 %
NITRATE UR QL: NEGATIVE
NRBC # BLD AUTO: 0 10E3/UL
NRBC BLD AUTO-RTO: 0 /100
PH UR STRIP: 6.5 [PH] (ref 5–7)
PLATELET # BLD AUTO: 231 10E3/UL (ref 150–450)
POTASSIUM SERPL-SCNC: 3.7 MMOL/L (ref 3.4–5.3)
PROT SERPL-MCNC: 7.6 G/DL (ref 6.4–8.3)
RBC # BLD AUTO: 4.27 10E6/UL (ref 3.8–5.2)
RBC URINE: <1 /HPF
SODIUM SERPL-SCNC: 140 MMOL/L (ref 136–145)
SP GR UR STRIP: 1.01 (ref 1–1.03)
SQUAMOUS EPITHELIAL: 1 /HPF
UROBILINOGEN UR STRIP-MCNC: NORMAL MG/DL
WBC # BLD AUTO: 6.8 10E3/UL (ref 4–11)
WBC URINE: 1 /HPF

## 2023-08-05 PROCEDURE — 87070 CULTURE OTHR SPECIMN AEROBIC: CPT | Performed by: STUDENT IN AN ORGANIZED HEALTH CARE EDUCATION/TRAINING PROGRAM

## 2023-08-05 PROCEDURE — 36589 REMOVAL TUNNELED CV CATH: CPT | Mod: GC | Performed by: RADIOLOGY

## 2023-08-05 PROCEDURE — 81001 URINALYSIS AUTO W/SCOPE: CPT | Performed by: STUDENT IN AN ORGANIZED HEALTH CARE EDUCATION/TRAINING PROGRAM

## 2023-08-05 PROCEDURE — 99223 1ST HOSP IP/OBS HIGH 75: CPT | Mod: GC | Performed by: INTERNAL MEDICINE

## 2023-08-05 PROCEDURE — 250N000011 HC RX IP 250 OP 636: Mod: JZ | Performed by: STUDENT IN AN ORGANIZED HEALTH CARE EDUCATION/TRAINING PROGRAM

## 2023-08-05 PROCEDURE — 85025 COMPLETE CBC W/AUTO DIFF WBC: CPT | Performed by: STUDENT IN AN ORGANIZED HEALTH CARE EDUCATION/TRAINING PROGRAM

## 2023-08-05 PROCEDURE — 81025 URINE PREGNANCY TEST: CPT | Performed by: STUDENT IN AN ORGANIZED HEALTH CARE EDUCATION/TRAINING PROGRAM

## 2023-08-05 PROCEDURE — C1751 CATH, INF, PER/CENT/MIDLINE: HCPCS

## 2023-08-05 PROCEDURE — 258N000003 HC RX IP 258 OP 636: Performed by: STUDENT IN AN ORGANIZED HEALTH CARE EDUCATION/TRAINING PROGRAM

## 2023-08-05 PROCEDURE — 99285 EMERGENCY DEPT VISIT HI MDM: CPT | Mod: 25 | Performed by: STUDENT IN AN ORGANIZED HEALTH CARE EDUCATION/TRAINING PROGRAM

## 2023-08-05 PROCEDURE — 0JPT0XZ REMOVAL OF TUNNELED VASCULAR ACCESS DEVICE FROM TRUNK SUBCUTANEOUS TISSUE AND FASCIA, OPEN APPROACH: ICD-10-PCS | Performed by: RADIOLOGY

## 2023-08-05 PROCEDURE — 99291 CRITICAL CARE FIRST HOUR: CPT | Performed by: STUDENT IN AN ORGANIZED HEALTH CARE EDUCATION/TRAINING PROGRAM

## 2023-08-05 PROCEDURE — 36415 COLL VENOUS BLD VENIPUNCTURE: CPT | Performed by: STUDENT IN AN ORGANIZED HEALTH CARE EDUCATION/TRAINING PROGRAM

## 2023-08-05 PROCEDURE — 250N000009 HC RX 250: Performed by: STUDENT IN AN ORGANIZED HEALTH CARE EDUCATION/TRAINING PROGRAM

## 2023-08-05 PROCEDURE — 36573 INSJ PICC RS&I 5 YR+: CPT | Mod: GC | Performed by: RADIOLOGY

## 2023-08-05 PROCEDURE — 83605 ASSAY OF LACTIC ACID: CPT | Performed by: STUDENT IN AN ORGANIZED HEALTH CARE EDUCATION/TRAINING PROGRAM

## 2023-08-05 PROCEDURE — 36573 INSJ PICC RS&I 5 YR+: CPT

## 2023-08-05 PROCEDURE — 272N000504 HC NEEDLE CR4

## 2023-08-05 PROCEDURE — 87040 BLOOD CULTURE FOR BACTERIA: CPT | Performed by: STUDENT IN AN ORGANIZED HEALTH CARE EDUCATION/TRAINING PROGRAM

## 2023-08-05 PROCEDURE — 82310 ASSAY OF CALCIUM: CPT | Performed by: STUDENT IN AN ORGANIZED HEALTH CARE EDUCATION/TRAINING PROGRAM

## 2023-08-05 PROCEDURE — 214N000001 HC R&B CCU UMMC

## 2023-08-05 PROCEDURE — 250N000013 HC RX MED GY IP 250 OP 250 PS 637: Performed by: STUDENT IN AN ORGANIZED HEALTH CARE EDUCATION/TRAINING PROGRAM

## 2023-08-05 PROCEDURE — 02PY33Z REMOVAL OF INFUSION DEVICE FROM GREAT VESSEL, PERCUTANEOUS APPROACH: ICD-10-PCS | Performed by: RADIOLOGY

## 2023-08-05 PROCEDURE — 272N000004 HC RX 272: Performed by: STUDENT IN AN ORGANIZED HEALTH CARE EDUCATION/TRAINING PROGRAM

## 2023-08-05 RX ORDER — LIDOCAINE 40 MG/G
CREAM TOPICAL
Status: ACTIVE | OUTPATIENT
Start: 2023-08-05 | End: 2023-08-08

## 2023-08-05 RX ORDER — DIPHENHYDRAMINE HYDROCHLORIDE 50 MG/ML
25 INJECTION INTRAMUSCULAR; INTRAVENOUS ONCE
Status: COMPLETED | OUTPATIENT
Start: 2023-08-05 | End: 2023-08-05

## 2023-08-05 RX ORDER — PIPERACILLIN SODIUM, TAZOBACTAM SODIUM 4; .5 G/20ML; G/20ML
4.5 INJECTION, POWDER, LYOPHILIZED, FOR SOLUTION INTRAVENOUS EVERY 6 HOURS
Status: DISCONTINUED | OUTPATIENT
Start: 2023-08-05 | End: 2023-08-08

## 2023-08-05 RX ORDER — ONDANSETRON 2 MG/ML
4 INJECTION INTRAMUSCULAR; INTRAVENOUS ONCE
Status: COMPLETED | OUTPATIENT
Start: 2023-08-05 | End: 2023-08-05

## 2023-08-05 RX ORDER — DIPHENHYDRAMINE HCL 25 MG
25 CAPSULE ORAL EVERY 6 HOURS PRN
Status: DISCONTINUED | OUTPATIENT
Start: 2023-08-05 | End: 2023-08-09 | Stop reason: HOSPADM

## 2023-08-05 RX ORDER — LIDOCAINE HYDROCHLORIDE 10 MG/ML
1-30 INJECTION, SOLUTION EPIDURAL; INFILTRATION; INTRACAUDAL; PERINEURAL
Status: COMPLETED | OUTPATIENT
Start: 2023-08-05 | End: 2023-08-05

## 2023-08-05 RX ORDER — PIPERACILLIN SODIUM, TAZOBACTAM SODIUM 4; .5 G/20ML; G/20ML
4.5 INJECTION, POWDER, LYOPHILIZED, FOR SOLUTION INTRAVENOUS ONCE
Status: COMPLETED | OUTPATIENT
Start: 2023-08-05 | End: 2023-08-05

## 2023-08-05 RX ORDER — ACETAMINOPHEN 325 MG/1
650 TABLET ORAL EVERY 4 HOURS PRN
Status: DISCONTINUED | OUTPATIENT
Start: 2023-08-05 | End: 2023-08-09 | Stop reason: HOSPADM

## 2023-08-05 RX ADMIN — LIDOCAINE HYDROCHLORIDE 10 ML: 10 INJECTION, SOLUTION EPIDURAL; INFILTRATION; INTRACAUDAL; PERINEURAL at 19:05

## 2023-08-05 RX ADMIN — DIPHENHYDRAMINE HYDROCHLORIDE 25 MG: 50 INJECTION, SOLUTION INTRAMUSCULAR; INTRAVENOUS at 17:21

## 2023-08-05 RX ADMIN — ONDANSETRON 4 MG: 2 INJECTION INTRAMUSCULAR; INTRAVENOUS at 17:22

## 2023-08-05 RX ADMIN — TREPROSTINIL 42 NG/KG/MIN: 1 INJECTION, SOLUTION INTRAVENOUS; SUBCUTANEOUS at 19:10

## 2023-08-05 RX ADMIN — PIPERACILLIN SODIUM AND TAZOBACTAM SODIUM 4.5 G: 4; .5 INJECTION, POWDER, LYOPHILIZED, FOR SOLUTION INTRAVENOUS at 16:07

## 2023-08-05 RX ADMIN — DIPHENHYDRAMINE HYDROCHLORIDE 25 MG: 25 CAPSULE ORAL at 21:13

## 2023-08-05 RX ADMIN — ACETAMINOPHEN 650 MG: 325 TABLET, FILM COATED ORAL at 22:26

## 2023-08-05 RX ADMIN — VANCOMYCIN HYDROCHLORIDE 2000 MG: 1 INJECTION, POWDER, LYOPHILIZED, FOR SOLUTION INTRAVENOUS at 20:01

## 2023-08-05 ASSESSMENT — ACTIVITIES OF DAILY LIVING (ADL)
ADLS_ACUITY_SCORE: 35

## 2023-08-05 NOTE — H&P
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     # Arreola site infection  Afebrile on presentation, hemodynamically stable. CBC, CMP, lactate unremarkable. Obtained a set of blood cultures in ED prior to giving doses of vancomycin and zosyn. Currently running remodulin through peripheral IV.   - IR consult for PICC placement and Arreola removal  - Continue vancomycin and zosyn   - Follow blood cultures   - Will pursue placement of new Arreola once blood cultures negative for 48-72 hours    # Severe idiopathic PAH c/b RV failure  - PAH: Remodulin 42 ng/kg/min, digoxin 125 mcg daily, tadalafil 40 mg daily, opsumit 10 mg daily  - Diuretics: Lasix 20 mg daily  - Anticoagulation: None  - Immunosuppression: None    FEN: NPO prior to IR procedure  DVT PPx: Ambulate  Code: Full code    Disposition: Inpatient admission    Discussed with Dr. 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 She was seen by Dr. Carroll in clinic on 8/1, states that she was doing well at that time. Over the next 2 days, she began to experience malaise and fatigue but didn't make much of it. On 8/3 she noticed greenish discharge while changing the Arreola dressing. She also began experiencing pain a the insertion site, as well as soreness in her right shoulder and upper arm. She's had intermittent fevers over the last 2 days, Tmax 102.8. Denies chills. No chest pain, shortness of breath, nausea, vomiting or abdominal pain. She does have some redness of the skin around  the Arreola.     Review of Systems:    Complete review of systems was performed and negative except per HPI.      Past Medical History:     Past Medical History:   Diagnosis Date    Hypertension       Past Surgical History:     Past Surgical History:   Procedure Laterality Date    CV RIGHT HEART CATH MEASUREMENTS RECORDED N/A 2/8/2023    Procedure: Right Heart Catheterization;  Surgeon: Karin Frazier MD;  Location:  HEART CARDIAC CATH LAB    CV RIGHT HEART CATH MEASUREMENTS RECORDED N/A 8/1/2023    Procedure: Heart Cath Right Heart Cath;  Surgeon: Ahmet Lovell MD;  Location:  HEART CARDIAC CATH LAB    IR CVC TUNNEL PLACEMENT > 5 YRS OF AGE  2/14/2023    PICC SINGLE LUMEN PLACEMENT Right 02/08/2023    Basilic Vein 4F SL 43 cm, 4 cm out      Social History:     Social History     Tobacco Use    Smoking status: Never    Smokeless tobacco: Never   Substance Use Topics    Alcohol use: Not Currently      Family History:   No family history on file.     Medications:      pharmacy alert - intermittent dosing  1 each Other See Admin Instructions    vancomycin  2,000 mg Intravenous Once            Allergies:     Allergies   Allergen Reactions    Other Food Allergy      Cantaloupe       Physical Exam:   Temp:  [98.2  F (36.8  C)] 98.2  F (36.8  C)  Pulse:  [110] 110  Resp:  [16] 16  BP: (134)/(83) 134/83  SpO2:  [95 %] 95 %  GEN: No acute distress   HEENT: EOMI, no icterus, moist mucous membranes   CV: Mildly tachycardic, normal s1/s2, no murmurs. JVP not elevated   CHEST: Normal work of breathing. Lungs CTAB, no wheezes or crackles.   ABD: Soft, NT/ND   EXT: Warm and well-perfused, No LE edema  NEURO: Awake, alert, answering questions appropriately, motor grossly nonfocal  SKIN: Arreola in place, left chest wall. Redness and mild swelling noted around insertion site.   PSYCH: cooperative, affect appropriate         Data:   CBC  Recent Labs   Lab 08/05/23  1503 08/01/23  1011   WBC 6.8 6.9   RBC 4.27 4.13    HGB 13.4 13.3   HCT 39.9 39.5   MCV 93 96   MCH 31.4 32.2   MCHC 33.6 33.7   RDW 12.6 12.7    199     CMP  Recent Labs   Lab 08/05/23  1503 08/01/23  1011    138   POTASSIUM 3.7 4.0   CHLORIDE 104 106   CO2 22 20*   ANIONGAP 14 12   * 91   BUN 9.3 10.2   CR 0.64 0.57   GFRESTIMATED >90 >90   RIGO 9.6 9.0   PROTTOTAL 7.6 7.2   ALBUMIN 4.7 4.5   BILITOTAL 0.4 0.3   ALKPHOS 98 88   AST 23 18   ALT 17 11     TroponinsNo results found for: TROPI, TROPONIN, TROPR, TROPN  INR  Recent Labs   Lab 08/01/23  1011   INR 1.01

## 2023-08-05 NOTE — ED TRIAGE NOTES
Pt lives 9 hrs away and was told by her nurse from dr mcintyre to come in  Her remodulin is due to be changed tonight and she has extra medication on her/per pt it does not require refrigeration

## 2023-08-05 NOTE — ED TRIAGE NOTES
Pt is here for a possible infection in central line  She said last night she spiked a fever and has some green drainage from the site  She said she had a fever of 102.8  Her temp this am was 100.6 or 100.8  Pt says the line hurts and it is spreading - her shoulder is sore  She sees dr mcintyre  Pt is on remodulin for pulmonary hypertension

## 2023-08-05 NOTE — PROGRESS NOTES
Vascular Access Services Notes:    PICC insertion on-hold until NEGATIVE blood cultures (has pending blood cultures). ER staff aware. Pt has a right internal jugular tunneled CVC.      SHIRA AlexanderN, RN [facility]

## 2023-08-05 NOTE — CONSULTS
INTERVENTIONAL RADIOLOGY CONSULT NOTE    Reason for referral:     Right internal jugular tunneled catheter removal.     History:   Chelsea Flower is a 32 year old female with past medical history of severe idiopathic PAH c/b RV failure on IV remodulin, hypothyroidism, HTN and is chronic O2 dependent who presents to the ED with fever possibly due to infectedright internal jugular tunneled catheter which placed 2/14/2023.    Labs:  Lab Results   Component Value Date    HGB 13.4 08/05/2023     Lab Results   Component Value Date     08/05/2023     Lab Results   Component Value Date    WBC 6.8 08/05/2023       Lab Results   Component Value Date    INR 1.01 08/01/2023    INR 1.2 01/04/2023       Lab Results   Component Value Date    PROTTOTAL 7.6 08/05/2023      Lab Results   Component Value Date    ALBUMIN 4.7 08/05/2023     Lab Results   Component Value Date    BILITOTAL 0.4 08/05/2023     No results found for: BILICONJ   Lab Results   Component Value Date    ALKPHOS 98 08/05/2023     Lab Results   Component Value Date    AST 23 08/05/2023     Lab Results   Component Value Date    ALT 17 08/05/2023       Lab Results   Component Value Date    CR 0.64 08/05/2023     Lab Results   Component Value Date    BUN 9.3 08/05/2023       Imaging:   -    Assessment:   Chelsea Flower is a 32 year old female with past medical history of severe idiopathic PAH c/b RV failure on IV remodulin, hypothyroidism, HTN and is chronic O2 dependent who presents to the ED with fever possibly due to infected right internal jugular tunneled catheter. Primary team asked for placing PICC before removal of the central access and therefore Vascular access team was consulted for placement of PICC. Case and plan discussed with IR attending Dr. Johnson and Primary (ED) team.    Plan:       We will schedule the patient for removal of possible infected tunneled central line today 5/8/23 after placement of PICC.

## 2023-08-05 NOTE — ED NOTES
Pharmacist vaibhav hardy and dr miller brought in to evaluate pt and discuss plan of care. Pharmacist verified pt's dose and will have it made into a peripheral IV dose until a new central line can be placed. Pt says her current dose will last her until 10 pm tonight

## 2023-08-05 NOTE — PHARMACY-CONSULT NOTE
Parenteral Prostacyclin Therapy Continuation     This patient has been admitted to the hospital while receiving outpatient Treprostinil (Remodulin) therapy for the treatment of pulmonary hypertension.      [facility] has been contacted at [phone] to verify outpatient prostacyclin regimen. Based on the latest records, the following applies to this patient's  prostacyclin therapy.    1. Type of ambulatory pump used:  CADD-Legacy (100 mL cassette)  2.  Treprostinil (Remodulin)  Dosing Weight= 75.6 kg  3.  Prostacyclin Concentration= 10mg/100mL if CADD Legacy or MS3 pump.  4.  Prostacyclin Dose = 42 Nanograms/kg/min (Most recent fill was at 40 ng/kg/min to titrate up to 50 ng/kg/min), confirmed current rate at 42 ng/kg/min with pt at bedside.    5. Ambulatory Pump Rate = 46 mL/day if CADD Legacy or MS3 pump    Plan:  The patient will be switched over to our hospital syringe pump for the duration of their admission.  We have entered the order into [facility] [employer] based on the patient s most recent dose.  Please note,  MS3/Crono 5 pump patients switching to our syringe pump will necessitate calculating a new concentration/rate to deliver the patient s correct dose.

## 2023-08-05 NOTE — ED PROVIDER NOTES
[place] EMERGENCY DEPARTMENT ([facility])    8/05/23       ED PROVIDER NOTE  [facility]      History     Chief Complaint   Patient presents with    Vascular Access Problem     HPI  Chelsea Flower is a 32 year old female with past medical history of severe idiopathic PAH c/b RV failure on IV remodulin, hypothyroidism, HTN and is chronic O2 dependent who presents to the ED for evaluation of possible central line infection.  Patient states that she has been having fevers at home as well as greenish discharge from her right upper chest central line site.  She has not been having severe rigors.  No significant pain shortness of breath palpitations.  She has been feeling occasionally lightheaded.  She spoke with her cardiologist who directed her to come to the [facility] emergency department for evaluation.  Apparently lives a significant distance away as well.    Past Medical History  Past Medical History:   Diagnosis Date    Hypertension      Past Surgical History:   Procedure Laterality Date    CV RIGHT HEART CATH MEASUREMENTS RECORDED N/A 2/8/2023    Procedure: Right Heart Catheterization;  Surgeon: Karin Frazier MD;  Location:  HEART CARDIAC CATH LAB    CV RIGHT HEART CATH MEASUREMENTS RECORDED N/A 8/1/2023    Procedure: Heart Cath Right Heart Cath;  Surgeon: Ahmet Lovell MD;  Location:  HEART CARDIAC CATH LAB    IR CVC TUNNEL PLACEMENT > 5 YRS OF AGE  2/14/2023    PICC SINGLE LUMEN PLACEMENT Right 02/08/2023    Basilic Vein 4F SL 43 cm, 4 cm out     acetaminophen (TYLENOL) 325 MG tablet  albuterol (PROAIR HFA/PROVENTIL HFA/VENTOLIN HFA) 108 (90 Base) MCG/ACT inhaler  albuterol (PROVENTIL) (2.5 MG/3ML) 0.083% neb solution  digoxin (LANOXIN) 125 MCG tablet  furosemide (LASIX) 20 MG tablet  macitentan (OPSUMIT) 10 MG tablet  Multiple Vitamins-Minerals (WOMENS MULTIVITAMIN) TABS  pantoprazole (PROTONIX) 40 MG EC tablet  prochlorperazine  (COMPAZINE) 5 MG tablet  tadalafil (CIALIS) 20 MG tablet  treprostinil (REMODULIN) 10 mcg/mL in glycine diluent 50 mL infusion      Allergies   Allergen Reactions    Other Food Allergy      Cantaloupe     Family History  No family history on file.  Social History   Social History     Tobacco Use    Smoking status: Never    Smokeless tobacco: Never   Substance Use Topics    Alcohol use: Not Currently    Drug use: Not Currently      Past medical history, past surgical history, medications, allergies, family history, and social history were reviewed with the patient. No additional pertinent items.      A medically appropriate review of systems was performed with pertinent positives and negatives noted in the HPI, and all other systems negative.    Physical Exam   BP: 134/83  Pulse: 110  Temp: 98.2  F (36.8  C)  Resp: 16  SpO2: 95 %  Physical Exam  Vital Signs Reviewed  Gen: Well nourished, well developed, resting comfortably, no acute distress  HEENT: NC/AT, PERRL, EOMI, MMM  Neck: Supple, FROM  CV: Regular Rate  Lungs/Chest: Normal Effort  Abd: Non-distended, non-tender  MSK/Back: FROM, no visible deformity  Neuro: A&Ox3, GCS 15, CN II-XII unremarkable  Skin: Warm, Dry, Intact, erythema under dressing around central line.  Small amount of discoloration to Biopatch    ED Course, Procedures, & Data   Patient seen and evaluated in triage room, moved to ED 6  Discussed with pharmacy, cardiology to staff Dr. Lovell - will admit  Infectious work-up started including blood cultures.  IV antibiotics.  Attempting to coordinate PICC line placement and central line removal with vascular access and interventional radiology  ED Course as of 08/05/23 1940   Sat Aug 05, 2023   1639 Continuing to attempt to coordinate with vascular access and IR for PICC placement to allow removal of suspected infected Arreola line.   1717 Patient flushed, nauseated. Sx began roughly 30 minutes after abx administration. No throat tightness, no  hypotension. Giving IV benadryl, zofran.   1728 Vascular access initially declined PICC consult due to infection. Attempted to call back to explain extraordinary circumstances re: remodulin but have not gotten a hold of them. IR says they will place PICC and remove line. Will send for culture.   Dr. Lovell updated on clinical status at the end of my shift. Patient remains boarding in ED    Procedures                     Results for orders placed or performed during the hospital encounter of 08/05/23   1) RIght arm PICC 2) Right tunneled CVC removal     Status: None    Narrative    Benjamin Johnson MD     8/5/2023  7:20 PM  [facility]    Procedure: 1) RIght arm PICC 2) Right tunneled CVC removal    Date/Time: 8/5/2023 7:19 PM    Performed by: Benjamin Johnson MD  Authorized by: Benjamin Johnson MD  IR Fellow Physician:  Radiology Resident Physician: Behzadi        UNIVERSAL PROTOCOL   Site Marked: NA  Prior Images Obtained and Reviewed:  Yes  Required items: Required blood products, implants, devices and special   equipment available    Patient identity confirmed:  Verbally with patient, arm band, provided   demographic data and hospital-assigned identification number  Patient was reevaluated immediately before administering moderate or deep   sedation or anesthesia  Confirmation Checklist:  Patient's identity using two indicators, relevant   allergies, procedure was appropriate and matched the consent or emergent   situation and correct equipment/implants were available  Time out: Immediately prior to the procedure a time out was called    Universal Protocol: the Joint Commission Universal Protocol was followed    Preparation: Patient was prepped and draped in usual sterile fashion       ANESTHESIA    Anesthesia: Local infiltration  Local Anesthetic:  Lidocaine 1% without epinephrine      SEDATION    Patient Sedated: No    See dictated procedure note for full  details.  Findings: 1) Right arm PICC via basilic, 39 cm SL, tip at low SVC, hooked   up to remodulin pump, 0.6cc priming volume  2) Right tunneled Arreola removed    Specimens: none    Complications: None    Condition: Stable      PROCEDURE    Patient Tolerance:  Patient tolerated the procedure well with no immediate   complications  Length of time physician/provider present for 1:1 monitoring during   sedation: 0   Comprehensive metabolic panel     Status: Abnormal   Result Value Ref Range    Sodium 140 136 - 145 mmol/L    Potassium 3.7 3.4 - 5.3 mmol/L    Chloride 104 98 - 107 mmol/L    Carbon Dioxide (CO2) 22 22 - 29 mmol/L    Anion Gap 14 7 - 15 mmol/L    Urea Nitrogen 9.3 6.0 - 20.0 mg/dL    Creatinine 0.64 0.51 - 0.95 mg/dL    Calcium 9.6 8.6 - 10.0 mg/dL    Glucose 130 (H) 70 - 99 mg/dL    Alkaline Phosphatase 98 35 - 104 U/L    AST 23 0 - 45 U/L    ALT 17 0 - 50 U/L    Protein Total 7.6 6.4 - 8.3 g/dL    Albumin 4.7 3.5 - 5.2 g/dL    Bilirubin Total 0.4 <=1.2 mg/dL    GFR Estimate >90 >60 mL/min/1.73m2   Lactic acid whole blood     Status: Normal   Result Value Ref Range    Lactic Acid 1.7 0.7 - 2.0 mmol/L   CBC with platelets and differential     Status: None   Result Value Ref Range    WBC Count 6.8 4.0 - 11.0 10e3/uL    RBC Count 4.27 3.80 - 5.20 10e6/uL    Hemoglobin 13.4 11.7 - 15.7 g/dL    Hematocrit 39.9 35.0 - 47.0 %    MCV 93 78 - 100 fL    MCH 31.4 26.5 - 33.0 pg    MCHC 33.6 31.5 - 36.5 g/dL    RDW 12.6 10.0 - 15.0 %    Platelet Count 231 150 - 450 10e3/uL    % Neutrophils 72 %    % Lymphocytes 17 %    % Monocytes 6 %    % Eosinophils 3 %    % Basophils 1 %    % Immature Granulocytes 1 %    NRBCs per 100 WBC 0 <1 /100    Absolute Neutrophils 5.0 1.6 - 8.3 10e3/uL    Absolute Lymphocytes 1.2 0.8 - 5.3 10e3/uL    Absolute Monocytes 0.4 0.0 - 1.3 10e3/uL    Absolute Eosinophils 0.2 0.0 - 0.7 10e3/uL    Absolute Basophils 0.0 0.0 - 0.2 10e3/uL    Absolute Immature Granulocytes 0.0 <=0.4 10e3/uL     Absolute NRBCs 0.0 10e3/uL   Hitchcock Draw     Status: None    Narrative    The following orders were created for panel order Hitchcock Draw.  Procedure                               Abnormality         Status                     ---------                               -----------         ------                     Extra Blue Top Tube[[id number]]                              Final result               Extra Red Top Tube[[id number]]                               Final result                 Please view results for these tests on the individual orders.   Extra Blue Top Tube     Status: None   Result Value Ref Range    Hold Specimen JIC    Extra Red Top Tube     Status: None   Result Value Ref Range    Hold Specimen JIC    UA with Microscopic reflex to Culture     Status: Normal    Specimen: Urine, Midstream   Result Value Ref Range    Color Urine Light Yellow Colorless, Straw, Light Yellow, Yellow    Appearance Urine Clear Clear    Glucose Urine Negative Negative mg/dL    Bilirubin Urine Negative Negative    Ketones Urine Negative Negative mg/dL    Specific Gravity Urine 1.009 1.003 - 1.035    Blood Urine Negative Negative    pH Urine 6.5 5.0 - 7.0    Protein Albumin Urine Negative Negative mg/dL    Urobilinogen Urine Normal Normal, 2.0 mg/dL    Nitrite Urine Negative Negative    Leukocyte Esterase Urine Negative Negative    RBC Urine <1 <=2 /HPF    WBC Urine 1 <=5 /HPF    Squamous Epithelials Urine 1 <=1 /HPF    Narrative    Urine Culture not indicated   HCG qualitative urine     Status: Normal   Result Value Ref Range    hCG Urine Qualitative Negative Negative   CBC with platelets differential     Status: None    Narrative    The following orders were created for panel order CBC with platelets differential.  Procedure                               Abnormality         Status                     ---------                               -----------         ------                     CBC with platelets and d...[[id number]]                       Final result                 Please view results for these tests on the individual orders.     Medications   lidocaine 1 % 0.1-5 mL (has no administration in time range)   lidocaine (LMX4) cream (has no administration in time range)   sodium chloride (PF) 0.9% PF flush 10-40 mL (has no administration in time range)   vancomycin (VANCOCIN) 2,000 mg in sodium chloride 0.9 % 500 mL intermittent infusion (has no administration in time range)   medication instruction (has no administration in time range)   treprostinil (REMODULIN) 100 mcg/mL in glycine diluent 50 mL infusion (42 ng/kg/min × 75.6 kg (Order-Specific) Intravenous Rate/Dose Verify 8/5/23 1933)   pharmacy alert - intermittent dosing (has no administration in time range)   vancomycin (VANCOCIN) 1,500 mg in 0.9% NaCl 250 mL intermittent infusion (has no administration in time range)   piperacillin-tazobactam (ZOSYN) 4.5 g vial to attach to  mL bag (0 g Intravenous Stopped 8/5/23 1932)   diphenhydrAMINE (BENADRYL) injection 25 mg (25 mg Intravenous $Given 8/5/23 1721)   ondansetron (ZOFRAN) injection 4 mg (4 mg Intravenous $Given 8/5/23 1722)   lidocaine (PF) (XYLOCAINE) 1 % injection 1-30 mL (10 mLs Subcutaneous $Given by Other Clinician 8/5/23 1905)     Labs Ordered and Resulted from Time of ED Arrival to Time of ED Departure   COMPREHENSIVE METABOLIC PANEL - Abnormal       Result Value    Sodium 140      Potassium 3.7      Chloride 104      Carbon Dioxide (CO2) 22      Anion Gap 14      Urea Nitrogen 9.3      Creatinine 0.64      Calcium 9.6      Glucose 130 (*)     Alkaline Phosphatase 98      AST 23      ALT 17      Protein Total 7.6      Albumin 4.7      Bilirubin Total 0.4      GFR Estimate >90     LACTIC ACID WHOLE BLOOD - Normal    Lactic Acid 1.7     ROUTINE UA WITH MICROSCOPIC REFLEX TO CULTURE - Normal    Color Urine Light Yellow      Appearance Urine Clear      Glucose Urine Negative      Bilirubin Urine Negative      Ketones  Urine Negative      Specific Gravity Urine 1.009      Blood Urine Negative      pH Urine 6.5      Protein Albumin Urine Negative      Urobilinogen Urine Normal      Nitrite Urine Negative      Leukocyte Esterase Urine Negative      RBC Urine <1      WBC Urine 1      Squamous Epithelials Urine 1     HCG QUALITATIVE URINE - Normal    hCG Urine Qualitative Negative     CBC WITH PLATELETS AND DIFFERENTIAL    WBC Count 6.8      RBC Count 4.27      Hemoglobin 13.4      Hematocrit 39.9      MCV 93      MCH 31.4      MCHC 33.6      RDW 12.6      Platelet Count 231      % Neutrophils 72      % Lymphocytes 17      % Monocytes 6      % Eosinophils 3      % Basophils 1      % Immature Granulocytes 1      NRBCs per 100 WBC 0      Absolute Neutrophils 5.0      Absolute Lymphocytes 1.2      Absolute Monocytes 0.4      Absolute Eosinophils 0.2      Absolute Basophils 0.0      Absolute Immature Granulocytes 0.0      Absolute NRBCs 0.0     BLOOD CULTURE   BLOOD CULTURE   BLOOD CULTURE   BLOOD CULTURE   AEROBIC BACTERIAL CULTURE ROUTINE     IR PICC Placement > 5 Yrs of Age    (Results Pending)          Critical care was performed.   Critical Care Addendum  My initial assessment, based on my review of nursing observations, review of vital signs, focused history, and physical exam, established a high suspicion that Chelsea Flower has  suspectd central line infection with possible compromise of remoulin infusion , which requires immediate intervention, and therefore she is critically ill.     After the initial assessment, the care team initiated multiple lab tests, initiated medication therapy with broad spectrum antibiotics, and consulted with cardiology, ED pharmacy, and Interventional radiology  to provide stabilization care. Due to the critical nature of this patient, I reassessed vital signs, physical exam, mental status, and neurologic status multiple times prior to her disposition.     Time also spent performing  documentation, reviewing test results, discussion with consultants, and coordination of care.     Critical care time (excluding teaching time and procedures): 60 minutes.     Assessment & Plan    Chelsea Flower is a 32 year old female with past medical history of severe idiopathic PAH c/b RV failure on IV remodulin, hypothyroidism, HTN and is chronic O2 dependent who presents to the ED for evaluation of possible central line infection.  On arrival the patient was noted to have a mild tachycardia.  There was some skin changes around the central line site and discoloration.  Given her reports of drainage and fevers central line infection is a serious concern.  She is completely dependent on the module and infusion and infection at the catheter could lead to sepsis and life-threatening infection as well as septic thrombus compromising the catheter and preventing her modular infusion from continuing which would also put her life at risk.  Patient started on broad-spectrum antibiotics and coordination between vascular access and interventional radiology was attempted.  Ultimately interventional radiology will replace the PICC line.  After discussions with cardiology staff plan will be to continue her module and through the central line until the PICC line is in place at which point will be transition to the PICC line and the central line will be removed for a short line holiday.  Cultures to be sent from the tip of the central line as well as blood cultures.    The patient's mild tachycardia on arrival did improve in the emergency department.  Her labs were largely reassuring with no major abnormalities suggesting severe sepsis.  She did have some flushing lightheadedness after receiving IV Zosyn.  Her reaction did not appear consistent with significant anaphylactic reaction.  Allergy was a possibility.  She was given some IV Benadryl as well as IV Zofran.  This also could be possibly due to bacterial lysis in the  setting of a bacteremia.  Cardiology was made aware of this and she was monitored closely.    Patient's disposition is admission to the cardiology 2 service.  She will be boarding in the emergency department at the end of my shift.    New Prescriptions    No medications on file       Final diagnoses:   Fever, unspecified fever cause   Central venous line infection, initial encounter       Manny Razo Jr., MD   [facility] EMERGENCY DEPARTMENT  8/5/2023     Manny Razo MD  08/05/23 1944

## 2023-08-05 NOTE — IR NOTE
Patient Name: Chelsea Flower  Medical Record Number: [id number]  Today's Date: 8/5/2023    Procedure: PICC placement and tunneled CVC removal  Proceduralist: Dr. Johnson,. Dr. Heshmatzaday Behzadi   Pathology present: NA    Procedure Start: 1841  Procedure end: 1917  Sedation medications administered: NA     Report given to: Melita VIEIRA ED  : YUE    Other Notes: Pt arrived to IR room 4 from ED. Consent reviewed. Pt denies any questions or concerns regarding procedure. Pt positioned supine and monitored per protocol. Pt tolerated procedure without any noted complications. Pt transferred back to ED.     Priming volume of PICC line: 0.6 ml    Cath tip sent for culture from CVC.    Remodulin hooked up with new tubing and new syringe from hospital pharmacy to newly placed picc line.  Pt's home dose stopped on her home pump and tunneled CVC removed.

## 2023-08-06 PROCEDURE — 214N000001 HC R&B CCU UMMC

## 2023-08-06 PROCEDURE — 272N000004 HC RX 272: Performed by: STUDENT IN AN ORGANIZED HEALTH CARE EDUCATION/TRAINING PROGRAM

## 2023-08-06 PROCEDURE — 258N000003 HC RX IP 258 OP 636: Performed by: STUDENT IN AN ORGANIZED HEALTH CARE EDUCATION/TRAINING PROGRAM

## 2023-08-06 PROCEDURE — 250N000011 HC RX IP 250 OP 636: Performed by: STUDENT IN AN ORGANIZED HEALTH CARE EDUCATION/TRAINING PROGRAM

## 2023-08-06 PROCEDURE — 99233 SBSQ HOSP IP/OBS HIGH 50: CPT | Mod: 24 | Performed by: INTERNAL MEDICINE

## 2023-08-06 PROCEDURE — 250N000011 HC RX IP 250 OP 636: Mod: JZ | Performed by: STUDENT IN AN ORGANIZED HEALTH CARE EDUCATION/TRAINING PROGRAM

## 2023-08-06 PROCEDURE — 250N000013 HC RX MED GY IP 250 OP 250 PS 637

## 2023-08-06 PROCEDURE — 250N000013 HC RX MED GY IP 250 OP 250 PS 637: Performed by: INTERNAL MEDICINE

## 2023-08-06 PROCEDURE — 250N000013 HC RX MED GY IP 250 OP 250 PS 637: Performed by: STUDENT IN AN ORGANIZED HEALTH CARE EDUCATION/TRAINING PROGRAM

## 2023-08-06 RX ORDER — TADALAFIL 20 MG/1
20 TABLET ORAL EVERY 24 HOURS
COMMUNITY
End: 2024-01-17 | Stop reason: ALTCHOICE

## 2023-08-06 RX ORDER — FUROSEMIDE 20 MG
20 TABLET ORAL DAILY
Status: DISCONTINUED | OUTPATIENT
Start: 2023-08-06 | End: 2023-08-09 | Stop reason: HOSPADM

## 2023-08-06 RX ORDER — PIPERACILLIN SODIUM, TAZOBACTAM SODIUM 4; .5 G/20ML; G/20ML
4.5 INJECTION, POWDER, LYOPHILIZED, FOR SOLUTION INTRAVENOUS EVERY 6 HOURS
Status: DISCONTINUED | OUTPATIENT
Start: 2023-08-06 | End: 2023-08-06

## 2023-08-06 RX ORDER — TRIAMCINOLONE ACETONIDE 5 MG/G
CREAM TOPICAL 2 TIMES DAILY PRN
Status: DISCONTINUED | OUTPATIENT
Start: 2023-08-06 | End: 2023-08-09 | Stop reason: HOSPADM

## 2023-08-06 RX ORDER — LIDOCAINE 40 MG/G
CREAM TOPICAL
Status: DISCONTINUED | OUTPATIENT
Start: 2023-08-06 | End: 2023-08-09 | Stop reason: HOSPADM

## 2023-08-06 RX ORDER — TADALAFIL 20 MG/1
20 TABLET ORAL EVERY 24 HOURS
Status: DISCONTINUED | OUTPATIENT
Start: 2023-08-06 | End: 2023-08-09 | Stop reason: HOSPADM

## 2023-08-06 RX ORDER — DIGOXIN 125 MCG
125 TABLET ORAL DAILY
Status: DISCONTINUED | OUTPATIENT
Start: 2023-08-06 | End: 2023-08-09 | Stop reason: HOSPADM

## 2023-08-06 RX ORDER — TADALAFIL 20 MG/1
40 TABLET ORAL EVERY 24 HOURS
Status: DISCONTINUED | OUTPATIENT
Start: 2023-08-06 | End: 2023-08-06

## 2023-08-06 RX ORDER — PANTOPRAZOLE SODIUM 40 MG/1
40 TABLET, DELAYED RELEASE ORAL
Status: DISCONTINUED | OUTPATIENT
Start: 2023-08-06 | End: 2023-08-09 | Stop reason: HOSPADM

## 2023-08-06 RX ORDER — TREPROSTINIL 2.5 MG/ML
INJECTION, SOLUTION INTRAVENOUS; SUBCUTANEOUS
COMMUNITY
Start: 2023-07-11 | End: 2023-08-24

## 2023-08-06 RX ORDER — ACETAMINOPHEN 325 MG/1
650 TABLET ORAL EVERY 4 HOURS PRN
Status: DISCONTINUED | OUTPATIENT
Start: 2023-08-06 | End: 2023-08-07

## 2023-08-06 RX ADMIN — PIPERACILLIN AND TAZOBACTAM 4.5 G: 4; .5 INJECTION, POWDER, FOR SOLUTION INTRAVENOUS at 17:00

## 2023-08-06 RX ADMIN — MACITENTAN 10 MG: 10 TABLET, FILM COATED ORAL at 13:19

## 2023-08-06 RX ADMIN — DIPHENHYDRAMINE HYDROCHLORIDE 25 MG: 25 CAPSULE ORAL at 11:10

## 2023-08-06 RX ADMIN — TREPROSTINIL 42 NG/KG/MIN: 1 INJECTION, SOLUTION INTRAVENOUS; SUBCUTANEOUS at 18:18

## 2023-08-06 RX ADMIN — FUROSEMIDE 20 MG: 20 TABLET ORAL at 07:50

## 2023-08-06 RX ADMIN — TADALAFIL 20 MG: 20 TABLET, FILM COATED ORAL at 09:42

## 2023-08-06 RX ADMIN — DIPHENHYDRAMINE HYDROCHLORIDE 25 MG: 25 CAPSULE ORAL at 04:37

## 2023-08-06 RX ADMIN — PIPERACILLIN AND TAZOBACTAM 4.5 G: 4; .5 INJECTION, POWDER, FOR SOLUTION INTRAVENOUS at 04:36

## 2023-08-06 RX ADMIN — VANCOMYCIN HYDROCHLORIDE 1500 MG: 10 INJECTION, POWDER, LYOPHILIZED, FOR SOLUTION INTRAVENOUS at 22:16

## 2023-08-06 RX ADMIN — VANCOMYCIN HYDROCHLORIDE 1500 MG: 10 INJECTION, POWDER, LYOPHILIZED, FOR SOLUTION INTRAVENOUS at 09:42

## 2023-08-06 RX ADMIN — DIPHENHYDRAMINE HYDROCHLORIDE 25 MG: 25 CAPSULE ORAL at 17:27

## 2023-08-06 RX ADMIN — ACETAMINOPHEN 650 MG: 325 TABLET, FILM COATED ORAL at 11:50

## 2023-08-06 RX ADMIN — PIPERACILLIN AND TAZOBACTAM 4.5 G: 4; .5 INJECTION, POWDER, FOR SOLUTION INTRAVENOUS at 00:14

## 2023-08-06 RX ADMIN — DIGOXIN 125 MCG: 125 TABLET ORAL at 09:42

## 2023-08-06 ASSESSMENT — ACTIVITIES OF DAILY LIVING (ADL)
ADLS_ACUITY_SCORE: 35
ADLS_ACUITY_SCORE: 20
ADLS_ACUITY_SCORE: 35
ADLS_ACUITY_SCORE: 35

## 2023-08-06 NOTE — PHARMACY-VANCOMYCIN DOSING SERVICE
Pharmacy Vancomycin Initial Note  Date of Service 2023  Patient's  1990  32 year old, female    Indication: Sepsis, Line infection    Current estimated CrCl = Estimated Creatinine Clearance: 133.9 mL/min (based on SCr of 0.64 mg/dL).    Creatinine for last 3 days  2023:  3:03 PM Creatinine 0.64 mg/dL    Recent Vancomycin Level(s) for last 3 days  No results found for requested labs within last 3 days.      Vancomycin IV Administrations (past 72 hours)        No vancomycin orders with administrations in past 72 hours.                    Nephrotoxins and other renal medications (From now, onward)      Start     Dose/Rate Route Frequency Ordered Stop    23 0800  vancomycin (VANCOCIN) 1,500 mg in 0.9% NaCl 250 mL intermittent infusion         1,500 mg  over 90 Minutes Intravenous EVERY 12 HOURS 23 1918      23 1600  vancomycin (VANCOCIN) 2,000 mg in sodium chloride 0.9 % 500 mL intermittent infusion         2,000 mg  over 120 Minutes Intravenous ONCE 23 1506              Contrast Orders - past 72 hours (72h ago, onward)      None            Insight[employer] Prediction of Planned Initial Vancomycin Regimen    Loading dose: 2000mg   Regimen: 1500 mg IV every 12 hours.  Start time: 19:18 on 2023  Exposure target: AUC24 (range)400-600 mg/L.hr   AUC24,ss: 551 mg/L.hr  Probability of AUC24 > 400: 81 %  Ctrough,ss: 15.9 mg/L  Probability of Ctrough,ss > 20: 33 %  Probability of nephrotoxicity (Lodise TIM ): 11 %          Plan:  Start vancomycin 2000mg IV once followed by 1500 mg IV q12h.   Vancomycin monitoring method: AUC  Vancomycin therapeutic monitoring goal: 400-600 mg*h/L  Pharmacy will check vancomycin levels as appropriate in 1-3 Days.    Serum creatinine levels will be ordered daily for the first week of therapy and at least twice weekly for subsequent weeks.      Lisseth Juan RP

## 2023-08-06 NOTE — PROGRESS NOTES
Vital signs:  Temp: 97.7  F (36.5  C) Temp src: Oral BP: 108/62 Pulse: 71   Resp: 16 SpO2: 99 % O2 Device: Nasal cannula Oxygen Delivery: 3 LPM        Just changed remodulin syringe at 1815 with dual sign off and a new syringe has been ordered by me.  Patient was able to rest this afternoon. Dressing changed at old guy site.  Will continue POC

## 2023-08-06 NOTE — PROCEDURES
[facility]    Procedure: 1) RIght arm PICC 2) Right tunneled CVC removal    Date/Time: 8/5/2023 7:19 PM    Performed by: Benjamin Johnson MD  Authorized by: Benjamin Johnson MD  IR Fellow Physician:  Radiology Resident Physician: Behzadi        UNIVERSAL PROTOCOL   Site Marked: NA  Prior Images Obtained and Reviewed:  Yes  Required items: Required blood products, implants, devices and special equipment available    Patient identity confirmed:  Verbally with patient, arm band, provided demographic data and hospital-assigned identification number  Patient was reevaluated immediately before administering moderate or deep sedation or anesthesia  Confirmation Checklist:  Patient's identity using two indicators, relevant allergies, procedure was appropriate and matched the consent or emergent situation and correct equipment/implants were available  Time out: Immediately prior to the procedure a time out was called    Universal Protocol: the Joint Commission Universal Protocol was followed    Preparation: Patient was prepped and draped in usual sterile fashion       ANESTHESIA    Anesthesia: Local infiltration  Local Anesthetic:  Lidocaine 1% without epinephrine      SEDATION    Patient Sedated: No    See dictated procedure note for full details.  Findings: 1) Right arm PICC via basilic, 39 cm SL, tip at low SVC, hooked up to remodulin pump, 0.6cc priming volume  2) Right tunneled Arreola removed    Specimens: none    Complications: None    Condition: Stable      PROCEDURE    Patient Tolerance:  Patient tolerated the procedure well with no immediate complications  Length of time physician/provider present for 1:1 monitoring during sedation: 0

## 2023-08-06 NOTE — PROGRESS NOTES
"[facility]    Cardiology Progress Note- CARDS 2        Date of Admission:  8/5/2023     Assessment & Plan: SL     Chelsea Flower is a 32 year-old female with a history of severe idiopathic PAH c/b RV failure on remodulin, hypothyroidism, HTN who presents with 2-3 history of malaise, fatigue and redness/drainage at Guy site, altogether consistent with infection.     Today--   -Continue Vanc/zosyn  -follow blood cultures   -decrease tadalafil to her home dose of 20 mg daily      # Guy site infection  Afebrile on presentation, hemodynamically stable. CBC, CMP, lactate unremarkable. Obtained a set of blood cultures in ED prior to giving doses of vancomycin and zosyn. Currently running remodulin through PICC line placed on 8/5/23.  - Continue vancomycin and zosyn   - Follow blood cultures - no growth after 12 hours   -Follow guy site cultures   - Will pursue placement of new Guy once blood cultures negative for 48-72 hours     # Severe idiopathic PAH c/b RV failure  - PAH: Remodulin 42 ng/kg/min, digoxin 125 mcg daily, tadalafil 20 mg daily, opsumit 10 mg daily  - Diuretics: Lasix 20 mg daily  - Anticoagulation: None  - Immunosuppression: None        Diet: Regular Diet Adult    DVT Prophylaxis: Ambulate every shift  Valerio Catheter: Not present  Cardiac Monitoring: ACTIVE order. Indication: Pulmonary hypertension  Code Status: Full Code          Clinically Significant Risk Factors Present on Admission                         # Obesity: Estimated body mass index is 30.51 kg/m  as calculated from the following:    Height as of 8/1/23: 1.635 m (5' 4.37\").    Weight as of 8/1/23: 81.6 kg (179 lb 12.8 oz).            Disposition Plan   Expected discharge: 2 - 3 days, recommended to prior living arrangement once  replacement of guy .    Entered: AVINASH ALBRIGHT MD 08/06/2023, 1:00 PM   The patient's care was discussed with the Attending Physician,  " Nas .    Soraida Rhodes MD  Internal Medicine, PGY1  [facility]  ______________________________________________________________________    Interval History   No acute events overnight. Feeling ok this AM.  No fevers or chills. Eating and drinking ok. Notes some pain around prior guy site.     No new shortness of breath, chest pain, abdominal distension, peripheral edema or dizziness.     Physical Exam   Vital Signs: Temp: 97.8  F (36.6  C) Temp src: Oral BP: 103/62 Pulse: 86   Resp: 18 SpO2: 97 % O2 Device: Nasal cannula Oxygen Delivery: 3 LPM  Weight: 0 lbs 0 oz    Constitutional: awake, alert, cooperative, NAD  HEENT: normocephalic, anicteric sclerae   Pulmonary: no increased work of breathing on room air, lungs clear to auscultation bilaterally without wheezes or rales   Cardiovascular: JVP none, RRR, normal S1 and S2, no murmur appreciated   GI: soft, non-tender, non-distended, without guarding or rigidity  Skin: warm, dry, erythema and tenderness surrounding guy site   MSK: no peripheral edema bilaterally   Neuro: AAOx3, no gross focal neurologic deficits noted      Medical Decision Making       Please see A&P for additional details of medical decision making.      Data     I have personally reviewed the following data over the past 24 hrs:    6.8  \   13.4   / 231     140 104 9.3 /  130 (H)   3.7 22 0.64 \     ALT: 17 AST: 23 AP: 98 TBILI: 0.4   ALB: 4.7 TOT PROTEIN: 7.6 LIPASE: N/A     Procal: N/A CRP: N/A Lactic Acid: 1.7       Imaging results reviewed over the past 24 hrs:   Recent Results (from the past 24 hour(s))   IR PICC Placement > 5 Yrs of Age    Narrative    Procedure: 8/5/2023.  1. Ultrasound guidance for vascular access.  2. Fluoroscopic guidance PICC placement.  3. Removal of right IJ tunneled catheter.    History: Patient is a 32 year old?female?with past medical history of  severe idiopathic PAH?c/b?RV failure on?IV Remodulin, ?who?presents  to  the ED?with fever possibly due to infected right internal jugular  tunneled catheter. Patient presents for PICC placement and removal of  the indwelling right IJ tunneled catheter.     Comparison: Study dated 2/14/2023.    Staff: Benjamin Johnson MD    Fellow/Resident: Ashkan Behzadi. M.D.    Monitoring: Patient was placed on continuous monitoring supervised by  the IR nursing staff and IR attending. Patient remained stable  throughout the procedure.    Medications: 1% lidocaine 15 ml local anesthesia    Fluoroscopy time: 0.3 minutes    Procedure/Findings: The patient understood the limitations,  alternatives, and risks of the procedure and requested the procedure  be performed. Both written and oral consent were obtained.    The right upper extremity was prepped and draped in the usual sterile  fashion.      Ultrasound revealed a patent basilic vein, and the overlying tissue  was anesthetized and skin dermatotomy was made. Under ultrasound  guidance, venipuncture was made with a 19 gauge needle with blood  return. A permanent copy of the image documenting a patent vein was  entered is in the patient record.     An 0.018 guidewire was advanced freely into the vein and the needle  was exchanged for 5 Czech peel-away sheath. Course confirmed with  fluoroscopy. Guidewire was advanced to the right atrium and a  measurement was obtained. PICC cut to length. Inner dilator was  removed and catheter was advanced through the peel-away sheath under  fluoroscopic guidance, with the catheter tip placed in the high right  atrium. The catheter aspirated and flushed freely and was locked with  heparin. Catheter secured to the skin with two 2-0 nylon sutures.     Then attention directed toward removal of indwelling tunneled right IJ  catheter. Physical examination demonstrated mild local erythema, and  minimal discharge discharge at the catheter exit site or along the  tract. The catheter entry site was prepped and draped in  usual sterile  fashion. Following infiltration around the catheter with 1% lidocaine,  blunt dissection was used to free the cuff from the subcutaneous  tissues. The catheter was then removed intact and without difficulty.  Compression was applied over the venotomy site as well as along the  tract until hemostasis was achieved. A sterile dressing was applied at  the site.     Procedures were well tolerated, with no immediate complications.    Estimate blood loss: less then 1 cc.      Impression    IMPRESSION:     Completed image-guided placement of 5 Jordanian, 39 cm single lumen PICC  via right basilicwith tip in high right atrium. Aspirates and flushes  freely, heparin locked and ready for immediate use.     Right-sided tunneled central venous catheter removed intact and  without difficulty.    No immediate complication.

## 2023-08-06 NOTE — PROGRESS NOTES
Medication Scribe Admission Medication History    Admission medication history is complete. The information provided in this note is only as accurate as the sources available at the time of the update.    Medication reconciliation/reorder completed by provider prior to medication history? No    Information Source(s): Patient via in-person    Pertinent Information: Patient states that she stopped taking the following medications   Albuterol 108 (90 Base) MCG/ACT Inhaler   Albuterol 0.083% Neb Solution   Pantoprazole 40MG EC Tab - Stopped my provider February 2022   Prochlorperazine 5MG Tab - Stopped my provider February 2022     Changes made to PTA medication list:  Added: Treprostinil 2.5 Infusion MG/mL   Deleted: Albuterol 108 (90 Base) MCG/ACT Inhaler                        Albuterol 0.083% Neb Solution                        Pantoprazole 40MG EC Tab - Stopped my provider February 2022                        Prochlorperazine 5MG Tab - Stopped my provider February 2022   Changed: Tadalafil 20MG Tab - 1 Tab daily  Medication Affordability:  Not including over the counter (OTC) medications, was there a time in the past 3 months when you did not take your medications as prescribed because of cost?: No    Allergies reviewed with patient and updates made in EHR: no    Medication History Completed By: Lucinda Gilman 8/6/2023 8:55 AM    Prior to Admission medications    Medication Sig Last Dose Taking? Auth Provider Long Term End Date   acetaminophen (TYLENOL) 325 MG tablet Take 3 tablets (975 mg) by mouth every 6 hours 8/5/2023 at PM Yes Maryjo Mckeon MD     digoxin (LANOXIN) 125 MCG tablet Take 1 tablet (125 mcg) by mouth daily 8/5/2023 Yes Alex Carroll MD Yes    furosemide (LASIX) 20 MG tablet Take 1 tablet (20 mg) by mouth daily 8/4/2023 Yes Alex Carroll MD Yes    macitentan (OPSUMIT) 10 MG tablet Take 1 tablet (10 mg) by mouth daily Please make sure you are completing your monthly mandatory  labs. 8/5/2023 Yes Alex Carroll MD     Multiple Vitamins-Minerals (WOMENS MULTIVITAMIN) TABS Take 1 tablet by mouth daily More than a month Yes Unknown, Entered By History     tadalafil (ADCIRCA/CIALIS) 20 MG tablet Take 20 mg by mouth every 24 hours 8/5/2023 Yes Reported, Patient No    treprostinil (REMODULIN) infusion 2.5 mg/mL  Past Week Yes Reported, Patient Yes

## 2023-08-06 NOTE — PROGRESS NOTES
"Provider notification:    \"Stopped vanco after an hour d/t pt saying they feel itchy, headache, and dizzy. Can I get benedryl or something else? No vital changes.\"         "

## 2023-08-06 NOTE — PROGRESS NOTES
"Provider notification:    \"pt back from IR with new picc, wondering if they can eat/drink? Thnx\"    Paged heart failure  "

## 2023-08-07 LAB
ANION GAP SERPL CALCULATED.3IONS-SCNC: 9 MMOL/L (ref 7–15)
BACTERIA SPEC CULT: NO GROWTH
BUN SERPL-MCNC: 6.9 MG/DL (ref 6–20)
CALCIUM SERPL-MCNC: 8.2 MG/DL (ref 8.6–10)
CHLORIDE SERPL-SCNC: 108 MMOL/L (ref 98–107)
CREAT SERPL-MCNC: 0.78 MG/DL (ref 0.51–0.95)
DEPRECATED HCO3 PLAS-SCNC: 22 MMOL/L (ref 22–29)
ERYTHROCYTE [DISTWIDTH] IN BLOOD BY AUTOMATED COUNT: 12.8 % (ref 10–15)
GFR SERPL CREATININE-BSD FRML MDRD: >90 ML/MIN/1.73M2
GLUCOSE SERPL-MCNC: 90 MG/DL (ref 70–99)
HCT VFR BLD AUTO: 36 % (ref 35–47)
HGB BLD-MCNC: 11.7 G/DL (ref 11.7–15.7)
MAGNESIUM SERPL-MCNC: 2.2 MG/DL (ref 1.7–2.3)
MCH RBC QN AUTO: 31.7 PG (ref 26.5–33)
MCHC RBC AUTO-ENTMCNC: 32.5 G/DL (ref 31.5–36.5)
MCV RBC AUTO: 98 FL (ref 78–100)
PLATELET # BLD AUTO: 181 10E3/UL (ref 150–450)
POTASSIUM SERPL-SCNC: 3.7 MMOL/L (ref 3.4–5.3)
RBC # BLD AUTO: 3.69 10E6/UL (ref 3.8–5.2)
SODIUM SERPL-SCNC: 139 MMOL/L (ref 136–145)
VANCOMYCIN SERPL-MCNC: 13.8 UG/ML
WBC # BLD AUTO: 7.6 10E3/UL (ref 4–11)

## 2023-08-07 PROCEDURE — 250N000011 HC RX IP 250 OP 636: Performed by: STUDENT IN AN ORGANIZED HEALTH CARE EDUCATION/TRAINING PROGRAM

## 2023-08-07 PROCEDURE — 250N000011 HC RX IP 250 OP 636: Mod: JZ | Performed by: STUDENT IN AN ORGANIZED HEALTH CARE EDUCATION/TRAINING PROGRAM

## 2023-08-07 PROCEDURE — 250N000013 HC RX MED GY IP 250 OP 250 PS 637: Performed by: STUDENT IN AN ORGANIZED HEALTH CARE EDUCATION/TRAINING PROGRAM

## 2023-08-07 PROCEDURE — 214N000001 HC R&B CCU UMMC

## 2023-08-07 PROCEDURE — 250N000013 HC RX MED GY IP 250 OP 250 PS 637

## 2023-08-07 PROCEDURE — 258N000003 HC RX IP 258 OP 636: Performed by: INTERNAL MEDICINE

## 2023-08-07 PROCEDURE — 999N000127 HC STATISTIC PERIPHERAL IV START W US GUIDANCE

## 2023-08-07 PROCEDURE — 999N000248 HC STATISTIC IV INSERT WITH US BY RN

## 2023-08-07 PROCEDURE — 250N000011 HC RX IP 250 OP 636: Performed by: INTERNAL MEDICINE

## 2023-08-07 PROCEDURE — 99233 SBSQ HOSP IP/OBS HIGH 50: CPT | Mod: 24 | Performed by: INTERNAL MEDICINE

## 2023-08-07 PROCEDURE — 85027 COMPLETE CBC AUTOMATED: CPT

## 2023-08-07 PROCEDURE — 80048 BASIC METABOLIC PNL TOTAL CA: CPT

## 2023-08-07 PROCEDURE — 272N000004 HC RX 272: Performed by: STUDENT IN AN ORGANIZED HEALTH CARE EDUCATION/TRAINING PROGRAM

## 2023-08-07 PROCEDURE — 36415 COLL VENOUS BLD VENIPUNCTURE: CPT | Performed by: INTERNAL MEDICINE

## 2023-08-07 PROCEDURE — 83735 ASSAY OF MAGNESIUM: CPT

## 2023-08-07 PROCEDURE — 258N000003 HC RX IP 258 OP 636: Performed by: STUDENT IN AN ORGANIZED HEALTH CARE EDUCATION/TRAINING PROGRAM

## 2023-08-07 PROCEDURE — 36415 COLL VENOUS BLD VENIPUNCTURE: CPT

## 2023-08-07 PROCEDURE — 80202 ASSAY OF VANCOMYCIN: CPT | Performed by: INTERNAL MEDICINE

## 2023-08-07 RX ADMIN — TREPROSTINIL 42 NG/KG/MIN: 1 INJECTION, SOLUTION INTRAVENOUS; SUBCUTANEOUS at 19:50

## 2023-08-07 RX ADMIN — PIPERACILLIN AND TAZOBACTAM 4.5 G: 4; .5 INJECTION, POWDER, FOR SOLUTION INTRAVENOUS at 00:05

## 2023-08-07 RX ADMIN — PIPERACILLIN AND TAZOBACTAM 4.5 G: 4; .5 INJECTION, POWDER, FOR SOLUTION INTRAVENOUS at 17:45

## 2023-08-07 RX ADMIN — VANCOMYCIN HYDROCHLORIDE 1250 MG: 10 INJECTION, POWDER, LYOPHILIZED, FOR SOLUTION INTRAVENOUS at 22:22

## 2023-08-07 RX ADMIN — PIPERACILLIN AND TAZOBACTAM 4.5 G: 4; .5 INJECTION, POWDER, FOR SOLUTION INTRAVENOUS at 06:00

## 2023-08-07 RX ADMIN — VANCOMYCIN HYDROCHLORIDE 1500 MG: 10 INJECTION, POWDER, LYOPHILIZED, FOR SOLUTION INTRAVENOUS at 10:10

## 2023-08-07 RX ADMIN — DIGOXIN 125 MCG: 125 TABLET ORAL at 08:21

## 2023-08-07 RX ADMIN — ACETAMINOPHEN 650 MG: 325 TABLET, FILM COATED ORAL at 00:27

## 2023-08-07 RX ADMIN — MACITENTAN 10 MG: 10 TABLET, FILM COATED ORAL at 10:59

## 2023-08-07 RX ADMIN — FUROSEMIDE 20 MG: 20 TABLET ORAL at 08:21

## 2023-08-07 RX ADMIN — TRIAMCINOLONE ACETONIDE: 5 CREAM TOPICAL at 00:20

## 2023-08-07 RX ADMIN — TADALAFIL 20 MG: 20 TABLET, FILM COATED ORAL at 09:45

## 2023-08-07 RX ADMIN — DIPHENHYDRAMINE HYDROCHLORIDE 25 MG: 25 CAPSULE ORAL at 00:19

## 2023-08-07 RX ADMIN — PIPERACILLIN AND TAZOBACTAM 4.5 G: 4; .5 INJECTION, POWDER, FOR SOLUTION INTRAVENOUS at 12:07

## 2023-08-07 ASSESSMENT — ACTIVITIES OF DAILY LIVING (ADL)
ADLS_ACUITY_SCORE: 20

## 2023-08-07 NOTE — TELEPHONE ENCOUNTER
Spoke with Dr. Carroll who advised patient should remain on IV Remodulin; Guy needs to be placed prior to discharge. Staff message sent to Soraida Rhodes with recommendations. Mary Guerrero RN on 8/7/2023 at 10:36 AM    ----- Message from Soraida Rhodes MD sent at 8/6/2023  4:25 PM CDT -----  Hello,    Pt currently admitted to hospital with guy line infection. Patient mentioned that Dr. Carroll mentioned switching her to subcutaneous remodulin from the IV at some point. This was not in his note. Is this an option right now? If so, we may defer giving her a new guy and instead give her a PICC line until the subcutaneous remodulin can be approved.    Let us know!  Cards 2  Soraida Rhodes

## 2023-08-07 NOTE — PHARMACY-VANCOMYCIN DOSING SERVICE
Pharmacy Vancomycin Note  Date of Service 2023  Patient's  1990   32 year old, female    Indication: Sepsis, line infection  Day of Therapy: 2  Current vancomycin regimen:  1500 mg IV q12h  Current vancomycin monitoring method: AUC  Current vancomycin therapeutic monitoring goal: 400-600 mg*h/L    InsightRX Prediction of Current Vancomycin Regimen  Loading dose: N/A  Regimen: 1500 mg IV every 12 hours.  Start time: 22:10 on 2023  Exposure target: AUC24 (range)400-600 mg/L.hr   AUC24,ss: 573 mg/L.hr  Probability of AUC24 > 400: 98 %  Ctrough,ss: 16.5 mg/L  Probability of Ctrough,ss > 20: 23 %  Probability of nephrotoxicity (Lodise TIM ): 12 %    Current estimated CrCl = Estimated Creatinine Clearance: 105.6 mL/min (based on SCr of 0.78 mg/dL).    Creatinine for last 3 days  2023:  3:03 PM Creatinine 0.64 mg/dL  2023:  6:00 AM Creatinine 0.78 mg/dL    Recent Vancomycin Levels (past 3 days)  2023:  8:33 AM Vancomycin 13.8 ug/mL    Vancomycin IV Administrations (past 72 hours)                     vancomycin (VANCOCIN) 1,500 mg in 0.9% NaCl 250 mL intermittent infusion (mg) 1,500 mg New Bag 23 1010     1,500 mg New Bag 23 2216     1,500 mg New Bag  0942    vancomycin (VANCOCIN) 2,000 mg in sodium chloride 0.9 % 500 mL intermittent infusion ()  Restarted 23 2329     2,000 mg New Bag                      Nephrotoxins and other renal medications (From now, onward)      Start     Dose/Rate Route Frequency Ordered Stop    23 1000  vancomycin (VANCOCIN) 1,500 mg in 0.9% NaCl 250 mL intermittent infusion         1,500 mg  over 90 Minutes Intravenous EVERY 12 HOURS 23 1918      23 0800  furosemide (LASIX) tablet 20 mg        Note to Pharmacy: PTA Sig:Take 1 tablet (20 mg) by mouth daily      20 mg Oral DAILY 23 223  piperacillin-tazobactam (ZOSYN) 4.5 g vial to attach to  mL bag        Note to Pharmacy: For [facility]  "and [facility]: For Zosyn-naive patients, use the \"Zosyn initial dose + extended infusion\" order panel.    4.5 g  over 30 Minutes Intravenous EVERY 6 HOURS 08/05/23 2136                 Contrast Orders - past 72 hours (72h ago, onward)      None            Interpretation of levels and current regimen:  Vancomycin level is reflective of -600    Has serum creatinine changed greater than 50% in last 72 hours: No    Urine output:  unable to determine    Renal Function: Worsening    InsightRX Prediction of Planned New Vancomycin Regimen  Loading dose: N/A  Regimen: 1250 mg IV every 12 hours.  Start time: 22:10 on 08/07/2023  Exposure target: AUC24 (range)400-600 mg/L.hr   AUC24,ss: 479 mg/L.hr  Probability of AUC24 > 400: 84 %  Ctrough,ss: 13.8 mg/L  Probability of Ctrough,ss > 20: 7 %  Probability of nephrotoxicity (Lodise TIM 2009): 9 %    Plan:   Decrease Dose to 1250mg IV every 12h  with rise in scr  Vancomycin monitoring method: AUC  Vancomycin therapeutic monitoring goal: 400-600 mg*h/L  Pharmacy will check vancomycin levels as appropriate in 1-3 Days.  Serum creatinine levels will be ordered daily for the first week of therapy and at least twice weekly for subsequent weeks.    Shawn La BRIANNE    "

## 2023-08-07 NOTE — PLAN OF CARE
Admission          8/6/2023  2330 PM  -----------------------------------------------------------  Diagnosis: Concern for central line guy infection on 8/5/23    Admitted from: ED  Report given from: Ramona Leone RN  Via: Bed  Family Aware of Admission: , Kindal Cushma  Belongings: Backpack, purse, home medications, phone and  with patient  Admission Profile: Complete  Teaching: Orientation to unit, call don't fall, use of call light, meal times, visiting hours,  when to call for the RN (angina/sob/dizzyness, etc.), and enforced importance of safety.  Access: RPIV infusing TKO for abx. LPIV SL. Left SL PICC infusing Remodulin at 42 ng/kg/min  Telemetry: Placed on pt. Sinus Rhythm  Ht./Wt.: Complete  2 RN skin assessment: Completed with Tara DELGADILLO RN. Rash on right chest at CV catheter site.    Temp:  [97.7  F (36.5  C)-98  F (36.7  C)] 98  F (36.7  C)  Pulse:  [71-86] 82  Resp:  [16-19] 16  BP: (103-110)/(57-65) 110/57  SpO2:  [95 %-99 %] 95 %

## 2023-08-07 NOTE — PROGRESS NOTES
[facility]    Cardiology Progress Note- CARDS 2        Date of Admission:  8/5/2023     Assessment & Plan: HVSL     Chelsea Flower is a 32 year-old female admitted with arreola line infection, currently on Vanc/Zosyn. 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     Today--   -Continue Vanc/zosyn  -follow blood/wound cultures   -discuss possibility of subcutaneous Remodulin with outpatient team; if outpatient team approves, would recommend PICC line for Remodulin for short term while insurance authorization is pending. If not, recommend replacing arreola once blood cultures/wound cultures result.      # Arreola site infection  Afebrile on presentation, hemodynamically stable. CBC, CMP, lactate unremarkable. Obtained a set of blood cultures in ED prior to giving doses of vancomycin and zosyn. Currently running remodulin through PICC line placed on 8/5/23.  - Continue vancomycin and zosyn   - Follow blood cultures - no growth after 24 hours   -Follow arreola site cultures   -discuss possibility of subcutaneous Remodulin with outpatient team; if outpatient team approves, would recommend PICC line for Remodulin for short term while insurance authorization is pending. If not, recommend replacing arreola once blood cultures/wound cultures result.      # Severe idiopathic PAH c/b RV failure  - PAH: Remodulin 42 ng/kg/min, digoxin 125 mcg daily, tadalafil 20 mg daily, opsumit 10 mg daily  - Diuretics: Lasix 20 mg daily  - Anticoagulation: None  - Immunosuppression: None        Diet: Regular Diet Adult    DVT Prophylaxis: Ambulate every shift  Valerio Catheter: Not present  Cardiac Monitoring: ACTIVE order. Indication: PH with RHF on remodulin ;  Code Status: Full Code          Clinically Significant Risk Factors            # Hypocalcemia: Lowest Ca = 8.2 mg/dL in last 2 days, will monitor and replace as appropriate          "       # Obesity: Estimated body mass index is 31.21 kg/m  as calculated from the following:    Height as of this encounter: 1.6 m (5' 3\").    Weight as of this encounter: 79.9 kg (176 lb 3.2 oz).  , PRESENT ON ADMISSION          Disposition Plan   Expected discharge: 2 - 3 days, recommended to prior living arrangement once  replacement of guy .    Entered: SORAIDA RHODES MD 08/07/2023, 10:30 AM   The patient's care was discussed with the Attending Physician, Dr. Lovell .    Soraida Rhodes MD  Internal Medicine, PGY1  [facility]  ______________________________________________________________________    Interval History   No acute events overnight. Feeling well this morning. No concerns. Pt reports that she had discussion with Dr. Carroll about possibility of transitioning to subcutaneous remodulin in November after RHC.      No new shortness of breath, chest pain, abdominal distension, peripheral edema or dizziness.     Physical Exam   Vital Signs: Temp: 98.4  F (36.9  C) Temp src: Oral BP: 100/61 Pulse: 74   Resp: 18 SpO2: 93 % O2 Device: Nasal cannula Oxygen Delivery: 3 LPM  Weight: 176 lbs 3.2 oz    Constitutional: awake, alert, cooperative, NAD  HEENT: normocephalic, anicteric sclerae   Pulmonary: no increased work of breathing on room air, lungs clear to auscultation bilaterally without wheezes or rales   Cardiovascular: JVP none, RRR, normal S1 and S2, no murmur appreciated   GI: soft, non-tender, non-distended, without guarding or rigidity  Skin: warm, dry, erythema and tenderness surrounding guy site   MSK: no peripheral edema bilaterally   Neuro: AAOx3, no gross focal neurologic deficits noted      Medical Decision Making       Please see A&P for additional details of medical decision making.      Data     I have personally reviewed the following data over the past 24 hrs:    7.6  \   11.7   / 181     139 108 (H) 6.9 /  90   3.7 22 0.78 \     Imaging " results reviewed over the past 24 hrs:   No results found for this or any previous visit (from the past 24 hour(s)).

## 2023-08-07 NOTE — PLAN OF CARE
D:  admitted with guy line infection, currently on Vanc/Zosyn.     PMHx:severe idiopathic PAH c/b RV failure on remodulin, hypothyroidism, HTN.      I: Monitored vitals and assessed pt status. Encouraged activity.      Changed: Opsumit is patient home medication NOT pharmacy. Removed Pt IV for irrritation.   IV Access: PICC single and PIV x1  Running:  Remodulin at 42 ng/kg/min and TKO for abx.  PRN: na  Tele: SR  O2: 3L NC  Mobility: independent   Skin: rash on right chest. CHG done.      A: A&Ox4. VSS. Afebrile. Lungs clear. Eating well. Reg diet. Independent. Denies SOB, NT, NV, CP. Denies pain. Patient slept on off. Bathroom voiding. Calls appropriately.      P: Continue to monitor pt status and report changes to treatment team.

## 2023-08-08 ENCOUNTER — APPOINTMENT (OUTPATIENT)
Dept: INTERVENTIONAL RADIOLOGY/VASCULAR | Facility: CLINIC | Age: 33
End: 2023-08-08
Attending: PHYSICIAN ASSISTANT
Payer: COMMERCIAL

## 2023-08-08 PROCEDURE — 99152 MOD SED SAME PHYS/QHP 5/>YRS: CPT

## 2023-08-08 PROCEDURE — 272N000192 HC ACCESSORY CR2

## 2023-08-08 PROCEDURE — 250N000009 HC RX 250: Performed by: STUDENT IN AN ORGANIZED HEALTH CARE EDUCATION/TRAINING PROGRAM

## 2023-08-08 PROCEDURE — 02HV33Z INSERTION OF INFUSION DEVICE INTO SUPERIOR VENA CAVA, PERCUTANEOUS APPROACH: ICD-10-PCS | Performed by: RADIOLOGY

## 2023-08-08 PROCEDURE — 272N000004 HC RX 272: Performed by: STUDENT IN AN ORGANIZED HEALTH CARE EDUCATION/TRAINING PROGRAM

## 2023-08-08 PROCEDURE — C1751 CATH, INF, PER/CENT/MIDLINE: HCPCS

## 2023-08-08 PROCEDURE — 250N000011 HC RX IP 250 OP 636: Mod: JZ | Performed by: STUDENT IN AN ORGANIZED HEALTH CARE EDUCATION/TRAINING PROGRAM

## 2023-08-08 PROCEDURE — 250N000011 HC RX IP 250 OP 636: Performed by: STUDENT IN AN ORGANIZED HEALTH CARE EDUCATION/TRAINING PROGRAM

## 2023-08-08 PROCEDURE — 76937 US GUIDE VASCULAR ACCESS: CPT | Mod: 26 | Performed by: RADIOLOGY

## 2023-08-08 PROCEDURE — 250N000013 HC RX MED GY IP 250 OP 250 PS 637: Performed by: STUDENT IN AN ORGANIZED HEALTH CARE EDUCATION/TRAINING PROGRAM

## 2023-08-08 PROCEDURE — 99233 SBSQ HOSP IP/OBS HIGH 50: CPT | Mod: 24 | Performed by: INTERNAL MEDICINE

## 2023-08-08 PROCEDURE — 77001 FLUOROGUIDE FOR VEIN DEVICE: CPT | Mod: 26 | Performed by: RADIOLOGY

## 2023-08-08 PROCEDURE — 0JH63XZ INSERTION OF TUNNELED VASCULAR ACCESS DEVICE INTO CHEST SUBCUTANEOUS TISSUE AND FASCIA, PERCUTANEOUS APPROACH: ICD-10-PCS | Performed by: RADIOLOGY

## 2023-08-08 PROCEDURE — 250N000013 HC RX MED GY IP 250 OP 250 PS 637

## 2023-08-08 PROCEDURE — 99152 MOD SED SAME PHYS/QHP 5/>YRS: CPT | Mod: GC | Performed by: RADIOLOGY

## 2023-08-08 PROCEDURE — 36558 INSERT TUNNELED CV CATH: CPT

## 2023-08-08 PROCEDURE — 36558 INSERT TUNNELED CV CATH: CPT | Mod: GC | Performed by: RADIOLOGY

## 2023-08-08 PROCEDURE — 214N000001 HC R&B CCU UMMC

## 2023-08-08 RX ORDER — CEPHALEXIN 500 MG/1
500 CAPSULE ORAL EVERY 6 HOURS
Qty: 40 CAPSULE | Refills: 0 | Status: SHIPPED | OUTPATIENT
Start: 2023-08-08 | End: 2023-08-08

## 2023-08-08 RX ORDER — NALOXONE HYDROCHLORIDE 0.4 MG/ML
0.4 INJECTION, SOLUTION INTRAMUSCULAR; INTRAVENOUS; SUBCUTANEOUS
Status: DISCONTINUED | OUTPATIENT
Start: 2023-08-08 | End: 2023-08-08 | Stop reason: HOSPADM

## 2023-08-08 RX ORDER — NALOXONE HYDROCHLORIDE 0.4 MG/ML
0.2 INJECTION, SOLUTION INTRAMUSCULAR; INTRAVENOUS; SUBCUTANEOUS
Status: DISCONTINUED | OUTPATIENT
Start: 2023-08-08 | End: 2023-08-08 | Stop reason: HOSPADM

## 2023-08-08 RX ORDER — HEPARIN SODIUM,PORCINE 10 UNIT/ML
5 VIAL (ML) INTRAVENOUS
Status: COMPLETED | OUTPATIENT
Start: 2023-08-08 | End: 2023-08-08

## 2023-08-08 RX ORDER — CEPHALEXIN 500 MG/1
500 CAPSULE ORAL EVERY 6 HOURS SCHEDULED
Status: DISCONTINUED | OUTPATIENT
Start: 2023-08-08 | End: 2023-08-09 | Stop reason: HOSPADM

## 2023-08-08 RX ORDER — CEPHALEXIN 500 MG/1
500 CAPSULE ORAL EVERY 6 HOURS
Qty: 40 CAPSULE | Refills: 0 | Status: SHIPPED | OUTPATIENT
Start: 2023-08-08 | End: 2023-08-18

## 2023-08-08 RX ORDER — FENTANYL CITRATE 50 UG/ML
25-50 INJECTION, SOLUTION INTRAMUSCULAR; INTRAVENOUS EVERY 5 MIN PRN
Status: DISCONTINUED | OUTPATIENT
Start: 2023-08-08 | End: 2023-08-08 | Stop reason: HOSPADM

## 2023-08-08 RX ORDER — FLUMAZENIL 0.1 MG/ML
0.2 INJECTION, SOLUTION INTRAVENOUS
Status: DISCONTINUED | OUTPATIENT
Start: 2023-08-08 | End: 2023-08-08 | Stop reason: HOSPADM

## 2023-08-08 RX ORDER — FENTANYL CITRATE 50 UG/ML
25-50 INJECTION, SOLUTION INTRAMUSCULAR; INTRAVENOUS EVERY 5 MIN PRN
Status: DISCONTINUED | OUTPATIENT
Start: 2023-08-08 | End: 2023-08-08

## 2023-08-08 RX ADMIN — CEPHALEXIN 500 MG: 500 CAPSULE ORAL at 23:47

## 2023-08-08 RX ADMIN — FUROSEMIDE 20 MG: 20 TABLET ORAL at 08:52

## 2023-08-08 RX ADMIN — PIPERACILLIN AND TAZOBACTAM 4.5 G: 4; .5 INJECTION, POWDER, FOR SOLUTION INTRAVENOUS at 06:15

## 2023-08-08 RX ADMIN — MIDAZOLAM 1 MG: 1 INJECTION INTRAMUSCULAR; INTRAVENOUS at 16:57

## 2023-08-08 RX ADMIN — TADALAFIL 20 MG: 20 TABLET, FILM COATED ORAL at 10:22

## 2023-08-08 RX ADMIN — FENTANYL CITRATE 50 MCG: 50 INJECTION, SOLUTION INTRAMUSCULAR; INTRAVENOUS at 16:50

## 2023-08-08 RX ADMIN — ACETAMINOPHEN 650 MG: 325 TABLET, FILM COATED ORAL at 19:56

## 2023-08-08 RX ADMIN — TREPROSTINIL 42 NG/KG/MIN: 1 INJECTION, SOLUTION INTRAVENOUS; SUBCUTANEOUS at 21:20

## 2023-08-08 RX ADMIN — DIGOXIN 125 MCG: 125 TABLET ORAL at 08:52

## 2023-08-08 RX ADMIN — PIPERACILLIN AND TAZOBACTAM 4.5 G: 4; .5 INJECTION, POWDER, FOR SOLUTION INTRAVENOUS at 00:21

## 2023-08-08 RX ADMIN — CEPHALEXIN 500 MG: 500 CAPSULE ORAL at 10:22

## 2023-08-08 RX ADMIN — LIDOCAINE HYDROCHLORIDE 10 ML: 10 INJECTION, SOLUTION EPIDURAL; INFILTRATION; INTRACAUDAL; PERINEURAL at 17:28

## 2023-08-08 RX ADMIN — Medication 5 ML: at 17:24

## 2023-08-08 RX ADMIN — MACITENTAN 10 MG: 10 TABLET, FILM COATED ORAL at 08:52

## 2023-08-08 RX ADMIN — MIDAZOLAM 1 MG: 1 INJECTION INTRAMUSCULAR; INTRAVENOUS at 17:20

## 2023-08-08 RX ADMIN — FENTANYL CITRATE 50 MCG: 50 INJECTION, SOLUTION INTRAMUSCULAR; INTRAVENOUS at 17:07

## 2023-08-08 RX ADMIN — CEPHALEXIN 500 MG: 500 CAPSULE ORAL at 18:02

## 2023-08-08 RX ADMIN — MIDAZOLAM 1 MG: 1 INJECTION INTRAMUSCULAR; INTRAVENOUS at 16:50

## 2023-08-08 RX ADMIN — FENTANYL CITRATE 50 MCG: 50 INJECTION, SOLUTION INTRAMUSCULAR; INTRAVENOUS at 16:57

## 2023-08-08 RX ADMIN — FENTANYL CITRATE 50 MCG: 50 INJECTION, SOLUTION INTRAMUSCULAR; INTRAVENOUS at 17:20

## 2023-08-08 RX ADMIN — MIDAZOLAM 1 MG: 1 INJECTION INTRAMUSCULAR; INTRAVENOUS at 17:07

## 2023-08-08 ASSESSMENT — ACTIVITIES OF DAILY LIVING (ADL)
ADLS_ACUITY_SCORE: 20

## 2023-08-08 NOTE — PROGRESS NOTES
"        [facility]    Cardiology Progress Note- CARDS 2        Date of Admission:  8/5/2023     Assessment & Plan: HVSL     Chelsea Flower is a 32 year-old female admitted with guy line infection. 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     Today--   -replace guy today  -transition to Cephalexin   -discharge tomorrow    # Guy site infection  Afebrile on presentation, hemodynamically stable. CBC, CMP, lactate unremarkable. Obtained a set of blood cultures in ED prior to giving doses of vancomycin and zosyn. Currently running remodulin through PICC line placed on 8/5/23.  -transition to Cephalexin   -no growth cultures     # Severe idiopathic PAH c/b RV failure  - PAH: Remodulin 42 ng/kg/min, digoxin 125 mcg daily, tadalafil 20 mg daily, opsumit 10 mg daily  - Diuretics: Lasix 20 mg daily  - Anticoagulation: None  - Immunosuppression: None        Diet: NPO per Anesthesia Guidelines for Procedure/Surgery Except for: Meds  Diet    DVT Prophylaxis: Ambulate every shift  Valerio Catheter: Not present  Cardiac Monitoring: ACTIVE order. Indication: PH, RHF, IV remodulin  Code Status: Full Code          Clinically Significant Risk Factors                           # Obesity: Estimated body mass index is 31.32 kg/m  as calculated from the following:    Height as of this encounter: 1.6 m (5' 3\").    Weight as of this encounter: 80.2 kg (176 lb 12.8 oz).  , PRESENT ON ADMISSION          Disposition Plan   Expected discharge tomorrow ; recommended to prior living arrangement once  replacement of guy .    Entered: SORAIDA RHODES MD 08/08/2023, 4:13 PM   The patient's care was discussed with the Attending Physician, Dr. Lovell .    Soraida Rhodes MD  Internal Medicine, PGY1  [facility]  ______________________________________________________________________    Interval History "   No acute events overnight. No changes today. Ready to get home tomorrow.     No new shortness of breath, chest pain, abdominal distension, peripheral edema or dizziness.     Physical Exam   Vital Signs: Temp: 98.8  F (37.1  C) Temp src: Oral BP: 104/58 Pulse: 78   Resp: 16 SpO2: 97 % O2 Device: Nasal cannula with humidification Oxygen Delivery: 3 LPM  Weight: 176 lbs 12.8 oz    Constitutional: awake, alert, cooperative, NAD  HEENT: normocephalic, anicteric sclerae   Pulmonary: no increased work of breathing on room air, lungs clear to auscultation bilaterally without wheezes or rales   Cardiovascular: JVP none, RRR, normal S1 and S2, no murmur appreciated   GI: soft, non-tender, non-distended, without guarding or rigidity  Skin: warm, dry, erythema and tenderness surrounding prior guy site   MSK: no peripheral edema bilaterally   Neuro: AAOx3, no gross focal neurologic deficits noted      Medical Decision Making       Please see A&P for additional details of medical decision making.      Data       Imaging results reviewed over the past 24 hrs:   No results found for this or any previous visit (from the past 24 hour(s)).

## 2023-08-08 NOTE — CONSULTS
"    Interventional Radiology  [facility] Consult Service Note  08/08/23   8:48 AM    Consult Requested: \"New arreola line placement for IV remodulin\"    Recommendations/Plan:    Patient is on IR schedule 8/8/23 for a left sided CVC tunneled line placement.   Labs WNL for procedure .  Orders entered for procedure, currently NPO, and patient currently on IV antibiotics.   Medications to be held include: None.   Consent will be done prior to procedure.     Please contact the IR charge RN at [phone] for estimated time of procedure.     Recommendations were reviewed with Dr. Rhodes, *[phone].    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 Arreola was removed on 8/5/23, and PICC line was placed. Cards 2 team inquired about keeping defering new arreola, and keeping PICC line until subcutaneous remodulin could be approved. Per Dr. Carroll's request, patient should remain on IV remodulin and recommended Arreola placement prior to discharge. Patient continues to have redness at former tunneled right chest wall access site. Blood cultures have showed no growth to date. She remains on IV vanco and zosyn. Given the right chest site is irritated, we will have to access via left sided chest for tunneled CVC which is not ideal due to potential issues with left sided CVC functionality, Cards 2 team aware.     Diagnostic labs entered by: N/A.     Pertinent Imaging Reviewed:   8/5/23 PICC placement  Procedure: 8/5/2023.  1. Ultrasound guidance for vascular access.  2. Fluoroscopic guidance PICC placement.  3. Removal of right IJ tunneled catheter.    8/1/23 CT chest w/o contrast:    Expected date of discharge:  Following CVC placement  Vitals:   /59   Pulse 102   Temp 98  F (36.7  C) " "(Oral)   Resp 23   Ht 1.6 m (5' 3\")   Wt 80.2 kg (176 lb 12.8 oz)   SpO2 94%   BMI 31.32 kg/m      Pertinent Labs:   Lab Results   Component Value Date    WBC 7.6 08/07/2023    WBC 6.8 08/05/2023    WBC 6.9 08/01/2023     Lab Results   Component Value Date    HGB 11.7 08/07/2023    HGB 13.4 08/05/2023    HGB 13.3 08/01/2023     Lab Results   Component Value Date     08/07/2023     08/05/2023     08/01/2023     Lab Results   Component Value Date    INR 1.01 08/01/2023    INR 1.2 (H) 01/04/2023    PTT 27 02/07/2023     Lab Results   Component Value Date    POTASSIUM 3.7 08/07/2023        COVID-19 Antibody Results, Testing for Immunity           No data to display              COVID-19 PCR Results          2/7/2023    16:12 2/10/2023    11:01 2/15/2023    20:21   COVID-19 PCR Results   SARS CoV2 PCR Negative  Negative  Negative        Soraida James PA-C  Interventional Radiology  Pager: [phone]    "

## 2023-08-08 NOTE — PROCEDURES
[facility]    Procedure: IR Procedure Note    Date/Time: 8/8/2023 5:36 PM    Performed by: Azar Woodall MD  Authorized by: Azar Woodall MD  IR Fellow Physician:  Radiology Resident Physician: Azar Woodall  Other(s) attending procedure: Benjamin Johnson      UNIVERSAL PROTOCOL   Site Marked: NA  Prior Images Obtained and Reviewed:  Yes  Required items: Required blood products, implants, devices and special equipment available    Patient identity confirmed:  Verbally with patient, arm band, provided demographic data and hospital-assigned identification number  Patient was reevaluated immediately before administering moderate or deep sedation or anesthesia  Confirmation Checklist:  Patient's identity using two indicators, relevant allergies, procedure was appropriate and matched the consent or emergent situation and correct equipment/implants were available  Time out: Immediately prior to the procedure a time out was called    Universal Protocol: the Joint Commission Universal Protocol was followed    Preparation: Patient was prepped and draped in usual sterile fashion       ANESTHESIA    Anesthesia: Local infiltration  Local Anesthetic:  Lidocaine 1% without epinephrine      SEDATION  Patient Sedated: Yes    Sedation Type:  Moderate (conscious) sedation  Sedation:  Fentanyl and midazolam  Vital signs: Vital signs monitored during sedation    See dictated procedure note for full details.  Findings: Successful placement of a 5 fr left TCVC. 5 Fr. Priming volume 0.5cc.    Specimens: none    Complications: None    Condition: Stable    Plan: TCVC heparin locked and ready to use.      PROCEDURE  Describe Procedure: Successful placement of a 5 fr left TCVC. 5 Fr. Priming volume 0.5cc.  Patient Tolerance:  Patient tolerated the procedure well with no immediate complications  Length of time physician/provider present for 1:1 monitoring during sedation: 25

## 2023-08-08 NOTE — DISCHARGE SUMMARY
[facility]    Cardiology Discharge Summary- Cardiology         Date of Admission:  8/5/2023  Date of Discharge:  8/9/2023  Discharging Provider: Dr. BEE BISHOP      Discharge Diagnoses   Cellulitis   Guy line infection  Severe Idiopathic pulmonary hypertension  Right ventricular failure     Follow-ups Needed After Discharge   Follow-up Appointments     Follow Up and recommended labs and tests      Follow up with Dr. Carroll at your scheduled November appointment.   Please Follow up if you develop fevers, chills, worsening redness   surrounding the site.          Unresulted Labs Ordered in the Past 30 Days of this Admission       Date and Time Order Name Status Description    8/5/2023  3:00 PM Blood Culture Peripheral Blood Preliminary     8/5/2023  3:00 PM Blood Culture Arm, Left Preliminary             Discharge Disposition   Discharged to home  Condition at discharge: Stable    Hospital Course   Chelsea Flower is a 32 year-old female admitted with guy line related infection likely cellulitis. 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     #Cellulitis   #Concern for Guy site infection  Afebrile on presentation. Remained hemodynamically stable. CBC, CMP, lactate unremarkable. Guy line removed. Received 2 days of empiric Vancomycin and Zosyn. Blood cultures/wound cultures were negative after 48 hours. IR replaced pt's guy line prior to discharge.  Pt was discharged on oral cephalexin.     -Continue 10 day course of 500 mg Cephalexin 4 times daily     # Severe idiopathic PAH c/b RV failure  Remodulin was continued through a PICC line while admitted. Ugy was replaced prior to discharge. No medication changes while in hospital.   -Continue Remodulin at 42 ng/kg/min, digoxin 125 mcg daily, tadalafil 20 mg daily, opsumit 10 mg daily  - Diuretics: Continue Lasix 20 mg daily  -  Anticoagulation: None  - Immunosuppression: None       Consultations This Hospital Stay   VASCULAR ACCESS FOR PICC PLACEMENT ADULT IP CONSULT  INTERVENTIONAL RADIOLOGY ADULT/PEDS IP CONSULT  PHARMACY TO DOSE VANCO  NURSING TO CONSULT FOR VASCULAR ACCESS CARE IP CONSULT  PHARMACY IP CONSULT  PHARMACY IP CONSULT  PHARMACY IP CONSULT  NURSING TO CONSULT FOR VASCULAR ACCESS CARE IP CONSULT  NURSING TO CONSULT FOR VASCULAR ACCESS CARE IP CONSULT  INTERVENTIONAL RADIOLOGY ADULT/PEDS IP CONSULT  INTERVENTIONAL RADIOLOGY ADULT/PEDS IP CONSULT  SMOKING CESSATION PROGRAM IP CONSULT    Code Status   Full Code        Soraida Rhodes MD  Internal Medicine, PGY1  [facility]  ______________________________________________________________________    Physical Exam   Vital Signs: Temp: 97.6  F (36.4  C) Temp src: Oral BP: 121/63 Pulse: 82   Resp: 20 SpO2: 98 % O2 Device: Nasal cannula Oxygen Delivery: 3 LPM  Weight: 175 lbs 1.6 oz    Constitutional: awake, alert, cooperative, NAD  HEENT: normocephalic, anicteric sclerae   Pulmonary: no increased work of breathing on room air   Cardiovascular: RRR  GI: soft, non-tender, non-distended, without guarding or rigidity  Skin: warm, dry   MSK: no peripheral edema bilaterally   Neuro: AAOx3, no gross focal neurologic deficits noted         Primary Care Physician   Kirk Aguilar    Discharge Orders      Activity    Your activity upon discharge: activity as tolerated     Reason for your hospital stay    Dear Chelsea Flower,    Your were hospitalized at [facility] with skin infection surrounding your guy site. We were initially concerned there was an infection in your line but these cultures were negative. You were treated with IV antibiotics. You received a new Guy today. Over your hospitalization your condition improved and today you are ready to be discharged .  You should continue to improve but if you develop  fever, shortness of breath, worsening redness, increased pain, please seek medical attention.    We are suggesting the following medication changes:  Start Keflex antibiotic.     It was a pleasure meeting with you today. Thank you for allowing me and my team the privilege of caring for you during your hospitalization.     Sincerely,  Soraida Rhodes MD     Follow Up and recommended labs and tests    Follow up with Dr. Carroll at your scheduled November appointment.   Please Follow up if you develop fevers, chills, worsening redness surrounding the site.     Full Code     Diet    Follow this diet upon discharge: Regular diet       Significant Results and Procedures   Most Recent 3 CBC's:  Recent Labs   Lab Test 08/07/23  0600 08/05/23  1503 08/01/23  1011   WBC 7.6 6.8 6.9   HGB 11.7 13.4 13.3   MCV 98 93 96    231 199     Most Recent 3 BMP's:  Recent Labs   Lab Test 08/07/23  0600 08/05/23  1503 08/01/23  1011    140 138   POTASSIUM 3.7 3.7 4.0   CHLORIDE 108* 104 106   CO2 22 22 20*   BUN 6.9 9.3 10.2   CR 0.78 0.64 0.57   ANIONGAP 9 14 12   RIGO 8.2* 9.6 9.0   GLC 90 130* 91     Most Recent 3 INR's:  Recent Labs   Lab Test 08/01/23  1011 02/07/23  1552 01/04/23  0711   INR 1.01 1.22* 1.2*     7-Day Micro Results       Collected Updated Procedure Result Status      08/05/2023 1909 08/07/2023 1057 Foreign Body Aerobic Bacterial Culture Routine [[id number]]   Foreign Body from Central Venous Line    Final result Component Value   Culture No Growth               08/05/2023 1503 08/08/2023 1546 Blood Culture Arm, Left [[id number]]   Blood from Arm, Left    Preliminary result Component Value   Culture No growth after 3 days  [P]                08/05/2023 1503 08/08/2023 1546 Blood Culture Peripheral Blood [[id number]]   Peripheral Blood    Preliminary result Component Value   Culture No growth after 3 days  [P]                    ,   Results for orders placed or performed during the hospital encounter of  08/05/23   IR PICC Placement > 5 Yrs of Age    Narrative    Procedure: 8/5/2023.  1. Ultrasound guidance for vascular access.  2. Fluoroscopic guidance PICC placement.  3. Removal of right IJ tunneled catheter.    History: Patient is a 32 year old?female?with past medical history of  severe idiopathic PAH?c/b?RV failure on?IV Remodulin, ?who?presents to  the ED?with fever possibly due to infected right internal jugular  tunneled catheter. Patient presents for PICC placement and removal of  the indwelling right IJ tunneled catheter.     Comparison: Study dated 2/14/2023.    Staff: Benjamin Johnson MD    Fellow/Resident: Ashkan Behzadi. M.D.    Procedure performed by Dr. Behzadi under my supervision. I, Dr. Benjamin Johnson, was present for the entire procedure.    Monitoring: Patient was placed on continuous monitoring supervised by  the IR nursing staff and IR attending. Patient remained stable  throughout the procedure.    Medications: 1% lidocaine 15 ml local anesthesia    Fluoroscopy time: 0.3 minutes    Procedure/Findings: The patient understood the limitations,  alternatives, and risks of the procedure and requested the procedure  be performed. Both written and oral consent were obtained.    The right upper extremity was prepped and draped in the usual sterile  fashion.      Ultrasound revealed a patent basilic vein, and the overlying tissue  was anesthetized and skin dermatotomy was made. Under ultrasound  guidance, venipuncture was made with a 19 gauge needle with blood  return. A permanent copy of the image documenting a patent vein was  entered is in the patient record.     An 0.018 guidewire was advanced freely into the vein and the needle  was exchanged for 5 Sinhala peel-away sheath. Course confirmed with  fluoroscopy. Guidewire was advanced to the right atrium and a  measurement was obtained. PICC cut to length. Inner dilator was  removed and catheter was advanced through the peel-away sheath  under  fluoroscopic guidance, with the catheter tip placed in the high right  atrium. The catheter aspirated and flushed freely and was locked with  heparin. Catheter secured to the skin with two 2-0 nylon sutures.     Then attention directed toward removal of indwelling tunneled right IJ  catheter. Physical examination demonstrated mild local erythema, and  minimal discharge discharge at the catheter exit site or along the  tract. The catheter entry site was prepped and draped in usual sterile  fashion. Following infiltration around the catheter with 1% lidocaine,  blunt dissection was used to free the cuff from the subcutaneous  tissues. The catheter was then removed intact and without difficulty.  Compression was applied over the venotomy site as well as along the  tract until hemostasis was achieved. A sterile dressing was applied at  the site.     Procedures were well tolerated, with no immediate complications.    Estimate blood loss: less then 1 cc.      Impression    IMPRESSION:     Completed image-guided placement of 5 Congolese, 39 cm single lumen PICC  via right basilicwith tip in high right atrium. Aspirates and flushes  freely, heparin locked and ready for immediate use.     Right-sided tunneled central venous catheter removed intact and  without difficulty.    No immediate complication.    I have personally reviewed the examination and initial interpretation  and I agree with the findings.    RumbleA         SYSTEM ID:  [id number]       Discharge Medications   Current Discharge Medication List        START taking these medications    Details   cephALEXin (KEFLEX) 500 MG capsule Take 1 capsule (500 mg) by mouth every 6 hours for 10 days  Qty: 40 capsule, Refills: 0    Associated Diagnoses: Cellulitis, unspecified cellulitis site           CONTINUE these medications which have NOT CHANGED    Details   acetaminophen (TYLENOL) 325 MG tablet Take 3 tablets (975 mg) by mouth every 6 hours  Qty: 80  tablet, Refills: 0    Associated Diagnoses: Pulmonary hypertension (H)      digoxin (LANOXIN) 125 MCG tablet Take 1 tablet (125 mcg) by mouth daily  Qty: 90 tablet, Refills: 3    Associated Diagnoses: Pulmonary hypertension (H)      furosemide (LASIX) 20 MG tablet Take 1 tablet (20 mg) by mouth daily  Qty: 90 tablet, Refills: 3    Associated Diagnoses: Pulmonary hypertension (H)      macitentan (OPSUMIT) 10 MG tablet Take 1 tablet (10 mg) by mouth daily Please make sure you are completing your monthly mandatory labs.      Multiple Vitamins-Minerals (WOMENS MULTIVITAMIN) TABS Take 1 tablet by mouth daily      tadalafil (ADCIRCA/CIALIS) 20 MG tablet Take 20 mg by mouth every 24 hours      treprostinil (REMODULIN) infusion 2.5 mg/mL            STOP taking these medications       pantoprazole (PROTONIX) 40 MG EC tablet Comments:   Reason for Stopping:             Allergies   Allergies   Allergen Reactions    Other Food Allergy      Cantaloupe

## 2023-08-08 NOTE — PRE-PROCEDURE
GENERAL PRE-PROCEDURE:   Procedure:  Tunneled line placement  Date/Time:  8/8/2023 5:32 PM    Risks and benefits: Risks, benefits and alternatives were discussed    Consent given by:  Patient  Patient states understanding of procedure being performed: Yes    Patient's understanding of procedure matches consent: Yes    Procedure consent matches procedure scheduled: Yes    Appropriately NPO:  Yes  ASA Class:  2  Mallampati  :  Grade 2- soft palate, base of uvula, tonsillar pillars, and portion of posterior pharyngeal wall visible  Lungs:  Lungs clear with good breath sounds bilaterally  Heart:  Normal heart sounds and rate  History & Physical reviewed:  History and physical reviewed and no updates needed  Statement of review:  I have reviewed the lab findings, diagnostic data, medications, and the plan for sedation

## 2023-08-08 NOTE — IR NOTE
.inPatient Name: Chelsea Flower  Medical Record Number: [id number]  Today's Date: 8/8/2023    Procedure:  Tunneled CVC placement  Proceduralist: Dr. Johnson, Dr. Woodall  Pathology present: NA    Procedure Start: 1706  Procedure end: 1732  Sedation medications administered: 200 mcg fentanyl and 4 mg versed     Report given to: Joy VIEIRA 6C  : YUE    Other Notes: Pt arrived to IR room 4 from . Consent reviewed. Pt denies any questions or concerns regarding procedure. Pt positioned supine and monitored per protocol. Pt tolerated procedure without any noted complications. Pt transferred back to .  Line heparin locked and ready to use.  Priming volume of tunneled CVC is 0.5 ml.

## 2023-08-08 NOTE — PROGRESS NOTES
"[id number]    NEURO: A&O x4; calls appropriately; able to make needs known   RESPIRATORY: 3L NC;   CARDIAC: SR; HR 70-80s;   GI/: Voiding adequately; no BM this shift   SKIN: Rash on R former Arreola site;    PAIN: Denies   TESTS/PROCEDURES: New Arreola placed this shift  LAB: No electrolyte replacements ordered; recheck labs in AM   MOBILITY: Up ad jess   DIET: Regular   LDAs: PICC x1 running Remodulin at 42 ng/kg/min (1.91 ml/hr); PIV x1 SL; new Arreola placed in L chest, OK to use per IR     PLAN: Plan to discharge in AM; Continue POC; notify the primary team with any concerns/updates.       Intake/Output Summary (Last 24 hours) at 8/8/2023 1714  Last data filed at 8/8/2023 0435  Gross per 24 hour   Intake 278.65 ml   Output 850 ml   Net -571.35 ml       /65   Pulse 80   Temp 98.8  F (37.1  C) (Oral)   Resp 10   Ht 1.6 m (5' 3\")   Wt 80.2 kg (176 lb 12.8 oz)   SpO2 97%   BMI 31.32 kg/m        "

## 2023-08-08 NOTE — DISCHARGE SUMMARY
[facility]    Cardiology Discharge Summary- Cardiology         Date of Admission:  8/5/2023  Date of Discharge:  8/8/2023  Discharging Provider: Dr. BEE BISHOP      Discharge Diagnoses   Cellulitis   Guy line infection  Severe Idiopathic pulmonary hypertension  Right ventricular failure     Follow-ups Needed After Discharge   Follow-up Appointments     Follow Up and recommended labs and tests      Follow up with Dr. Carroll at your scheduled November appointment.   Please Follow up if you develop fevers, chills, worsening redness   surrounding the site.          Unresulted Labs Ordered in the Past 30 Days of this Admission       Date and Time Order Name Status Description    8/5/2023  3:00 PM Blood Culture Peripheral Blood Preliminary     8/5/2023  3:00 PM Blood Culture Arm, Left Preliminary             Discharge Disposition   Discharged to home  Condition at discharge: Stable    Hospital Course   Chelsea Flower is a 32 year-old female admitted with guy line related infection likely cellulitis. 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     #Cellulitis   #Concern for Guy site infection  Afebrile on presentation. Remained hemodynamically stable. CBC, CMP, lactate unremarkable. Guy line removed. Received 2 days of empiric Vancomycin and Zosyn. Blood cultures/wound cultures were negative after 48 hours. IR replaced pt's guy line prior to discharge.  Pt was discharged on oral cephalexin.     -Continue 10 day course of 500 mg Cephalexin 4 times daily     # Severe idiopathic PAH c/b RV failure  Remodulin was continued through a PICC line while admitted. Guy was replaced prior to discharge. No medication changes while in hospital.   -Continue Remodulin at 42 ng/kg/min, digoxin 125 mcg daily, tadalafil 20 mg daily, opsumit 10 mg daily  - Diuretics: Continue Lasix 20 mg daily  -  Anticoagulation: None  - Immunosuppression: None       Consultations This Hospital Stay   VASCULAR ACCESS FOR PICC PLACEMENT ADULT IP CONSULT  INTERVENTIONAL RADIOLOGY ADULT/PEDS IP CONSULT  PHARMACY TO DOSE VANCO  NURSING TO CONSULT FOR VASCULAR ACCESS CARE IP CONSULT  PHARMACY IP CONSULT  PHARMACY IP CONSULT  PHARMACY IP CONSULT  NURSING TO CONSULT FOR VASCULAR ACCESS CARE IP CONSULT  NURSING TO CONSULT FOR VASCULAR ACCESS CARE IP CONSULT  INTERVENTIONAL RADIOLOGY ADULT/PEDS IP CONSULT  INTERVENTIONAL RADIOLOGY ADULT/PEDS IP CONSULT  SMOKING CESSATION PROGRAM IP CONSULT    Code Status   Full Code        Soraida Rhodes MD  Internal Medicine, PGY1  [facility]  ______________________________________________________________________    Physical Exam   Vital Signs: Temp: 98.5  F (36.9  C) Temp src: Oral BP: 97/61 Pulse: 68   Resp: 20 SpO2: 99 % O2 Device: Nasal cannula with humidification Oxygen Delivery: 3 LPM  Weight: 176 lbs 12.8 oz    Constitutional: awake, alert, cooperative, NAD  HEENT: normocephalic, anicteric sclerae   Pulmonary: no increased work of breathing on room air, lungs clear to auscultation bilaterally without wheezes or rales   Cardiovascular: JVP none, RRR, normal S1 and S2, no murmur appreciated   GI: soft, non-tender, non-distended, without guarding or rigidity  Skin: warm, dry, decreased erythema and tenderness surrounding prior guy site   MSK: no peripheral edema bilaterally   Neuro: AAOx3, no gross focal neurologic deficits noted         Primary Care Physician   Kirk Aguilar    Discharge Orders      Activity    Your activity upon discharge: activity as tolerated     Reason for your hospital stay    Dear Chelsea Flower,    Your were hospitalized at [facility] with skin infection surrounding your guy site. We were initially concerned there was an infection in your line but these cultures were negative. You were  treated with IV antibiotics. You received a new Arreola today. Over your hospitalization your condition improved and today you are ready to be discharged .  You should continue to improve but if you develop fever, shortness of breath, worsening redness, increased pain, please seek medical attention.    We are suggesting the following medication changes:  Start Keflex antibiotic.     It was a pleasure meeting with you today. Thank you for allowing me and my team the privilege of caring for you during your hospitalization.     Sincerely,  Soraida Rhodes MD     Follow Up and recommended labs and tests    Follow up with Dr. Carroll at your scheduled November appointment.   Please Follow up if you develop fevers, chills, worsening redness surrounding the site.     Full Code     Diet    Follow this diet upon discharge: Regular diet       Significant Results and Procedures   Most Recent 3 CBC's:  Recent Labs   Lab Test 08/07/23  0600 08/05/23  1503 08/01/23  1011   WBC 7.6 6.8 6.9   HGB 11.7 13.4 13.3   MCV 98 93 96    231 199     Most Recent 3 BMP's:  Recent Labs   Lab Test 08/07/23  0600 08/05/23  1503 08/01/23  1011    140 138   POTASSIUM 3.7 3.7 4.0   CHLORIDE 108* 104 106   CO2 22 22 20*   BUN 6.9 9.3 10.2   CR 0.78 0.64 0.57   ANIONGAP 9 14 12   RIGO 8.2* 9.6 9.0   GLC 90 130* 91     Most Recent 3 INR's:  Recent Labs   Lab Test 08/01/23  1011 02/07/23  1552 01/04/23  0711   INR 1.01 1.22* 1.2*     7-Day Micro Results       Collected Updated Procedure Result Status      08/05/2023 1909 08/07/2023 1057 Foreign Body Aerobic Bacterial Culture Routine [[id number]]   Foreign Body from Central Venous Line    Final result Component Value   Culture No Growth               08/05/2023 1503 08/07/2023 1546 Blood Culture Arm, Left [[id number]]   Blood from Arm, Left    Preliminary result Component Value   Culture No growth after 2 days  [P]                08/05/2023 1503 08/07/2023 1546 Blood Culture Peripheral  Blood [[id number]]   Peripheral Blood    Preliminary result Component Value   Culture No growth after 2 days  [P]                    ,   Results for orders placed or performed during the hospital encounter of 08/05/23   IR PICC Placement > 5 Yrs of Age    Narrative    Procedure: 8/5/2023.  1. Ultrasound guidance for vascular access.  2. Fluoroscopic guidance PICC placement.  3. Removal of right IJ tunneled catheter.    History: Patient is a 32 year old?female?with past medical history of  severe idiopathic PAH?c/b?RV failure on?IV Remodulin, ?who?presents to  the ED?with fever possibly due to infected right internal jugular  tunneled catheter. Patient presents for PICC placement and removal of  the indwelling right IJ tunneled catheter.     Comparison: Study dated 2/14/2023.    Staff: Benjamin Johnson MD    Fellow/Resident: Ashkan Behzadi. M.D.    Procedure performed by Dr. Behzadi under my supervision. I, Dr. Benjamin Johnson, was present for the entire procedure.    Monitoring: Patient was placed on continuous monitoring supervised by  the IR nursing staff and IR attending. Patient remained stable  throughout the procedure.    Medications: 1% lidocaine 15 ml local anesthesia    Fluoroscopy time: 0.3 minutes    Procedure/Findings: The patient understood the limitations,  alternatives, and risks of the procedure and requested the procedure  be performed. Both written and oral consent were obtained.    The right upper extremity was prepped and draped in the usual sterile  fashion.      Ultrasound revealed a patent basilic vein, and the overlying tissue  was anesthetized and skin dermatotomy was made. Under ultrasound  guidance, venipuncture was made with a 19 gauge needle with blood  return. A permanent copy of the image documenting a patent vein was  entered is in the patient record.     An 0.018 guidewire was advanced freely into the vein and the needle  was exchanged for 5 Guyanese peel-away sheath. Course  confirmed with  fluoroscopy. Guidewire was advanced to the right atrium and a  measurement was obtained. PICC cut to length. Inner dilator was  removed and catheter was advanced through the peel-away sheath under  fluoroscopic guidance, with the catheter tip placed in the high right  atrium. The catheter aspirated and flushed freely and was locked with  heparin. Catheter secured to the skin with two 2-0 nylon sutures.     Then attention directed toward removal of indwelling tunneled right IJ  catheter. Physical examination demonstrated mild local erythema, and  minimal discharge discharge at the catheter exit site or along the  tract. The catheter entry site was prepped and draped in usual sterile  fashion. Following infiltration around the catheter with 1% lidocaine,  blunt dissection was used to free the cuff from the subcutaneous  tissues. The catheter was then removed intact and without difficulty.  Compression was applied over the venotomy site as well as along the  tract until hemostasis was achieved. A sterile dressing was applied at  the site.     Procedures were well tolerated, with no immediate complications.    Estimate blood loss: less then 1 cc.      Impression    IMPRESSION:     Completed image-guided placement of 5 [place]an, 39 cm single lumen PICC  via right basilicwith tip in high right atrium. Aspirates and flushes  freely, heparin locked and ready for immediate use.     Right-sided tunneled central venous catheter removed intact and  without difficulty.    No immediate complication.    I have personally reviewed the examination and initial interpretation  and I agree with the findings.    NICK VAZQUEZ         SYSTEM ID:  [id number]       Discharge Medications   Current Discharge Medication List        START taking these medications    Details   cephALEXin (KEFLEX) 500 MG capsule Take 1 capsule (500 mg) by mouth every 6 hours for 10 days  Qty: 40 capsule, Refills: 0    Associated Diagnoses:  Cellulitis, unspecified cellulitis site           CONTINUE these medications which have NOT CHANGED    Details   acetaminophen (TYLENOL) 325 MG tablet Take 3 tablets (975 mg) by mouth every 6 hours  Qty: 80 tablet, Refills: 0    Associated Diagnoses: Pulmonary hypertension (H)      digoxin (LANOXIN) 125 MCG tablet Take 1 tablet (125 mcg) by mouth daily  Qty: 90 tablet, Refills: 3    Associated Diagnoses: Pulmonary hypertension (H)      furosemide (LASIX) 20 MG tablet Take 1 tablet (20 mg) by mouth daily  Qty: 90 tablet, Refills: 3    Associated Diagnoses: Pulmonary hypertension (H)      macitentan (OPSUMIT) 10 MG tablet Take 1 tablet (10 mg) by mouth daily Please make sure you are completing your monthly mandatory labs.      Multiple Vitamins-Minerals (WOMENS MULTIVITAMIN) TABS Take 1 tablet by mouth daily      tadalafil (ADCIRCA/CIALIS) 20 MG tablet Take 20 mg by mouth every 24 hours      treprostinil (REMODULIN) infusion 2.5 mg/mL            STOP taking these medications       pantoprazole (PROTONIX) 40 MG EC tablet Comments:   Reason for Stopping:             Allergies   Allergies   Allergen Reactions    Other Food Allergy      Cantaloupe

## 2023-08-08 NOTE — PROGRESS NOTES
Shift [id number]    Hx: admitted with guy line infection, currently on Vanc/Zosyn. PMHx: Severe idiopathic PAH c/b RV failure on remodulin, hypothyroidism, HTN.           Vitals: VSS, afebrile  Neuro: A/O x4 . Call light appropriate.    Cardiac:  SR, HR 70-80s          Respiratory:  O2 saturation > 92% via NC at 3LPM   GI/: Adequate UO voiding in the bathroom. Last BM 8/6  Diet/appetite:  Regular diet with adequate appetite    Activity: ad jess  Pain: Denies pain    Skin: Rash on right chest is improving and didn't c/o itchiness   LDA's:  PICC - single Running Remodulin at 42ng/kg/min and antibiotics running in R PIV  Plan:  Continue to monitor pt status and report changes to cards 2

## 2023-08-09 VITALS
HEIGHT: 63 IN | WEIGHT: 175.1 LBS | SYSTOLIC BLOOD PRESSURE: 121 MMHG | BODY MASS INDEX: 31.02 KG/M2 | HEART RATE: 82 BPM | OXYGEN SATURATION: 98 % | TEMPERATURE: 97.6 F | RESPIRATION RATE: 20 BRPM | DIASTOLIC BLOOD PRESSURE: 63 MMHG

## 2023-08-09 PROCEDURE — 250N000013 HC RX MED GY IP 250 OP 250 PS 637: Performed by: STUDENT IN AN ORGANIZED HEALTH CARE EDUCATION/TRAINING PROGRAM

## 2023-08-09 PROCEDURE — 99239 HOSP IP/OBS DSCHRG MGMT >30: CPT | Mod: 24 | Performed by: INTERNAL MEDICINE

## 2023-08-09 PROCEDURE — 250N000013 HC RX MED GY IP 250 OP 250 PS 637

## 2023-08-09 RX ADMIN — DIGOXIN 125 MCG: 125 TABLET ORAL at 07:52

## 2023-08-09 RX ADMIN — ACETAMINOPHEN 650 MG: 325 TABLET, FILM COATED ORAL at 09:11

## 2023-08-09 RX ADMIN — CEPHALEXIN 500 MG: 500 CAPSULE ORAL at 06:08

## 2023-08-09 RX ADMIN — ACETAMINOPHEN 650 MG: 325 TABLET, FILM COATED ORAL at 04:20

## 2023-08-09 ASSESSMENT — ACTIVITIES OF DAILY LIVING (ADL)
ADLS_ACUITY_SCORE: 20

## 2023-08-09 NOTE — PROGRESS NOTES
Shift [id number]     Hx: admitted with arreola line infection, currently on Vanc/Zosyn. PMHx: Severe idiopathic PAH c/b RV failure on remodulin, hypothyroidism, HTN.             Vitals: VSS, afebrile  Neuro: A/O x4 . Call light appropriate.    Cardiac:  SR, HR 70-80s          Respiratory:  O2 saturation > 92% via NC at 3LPM   GI/: Adequate UO voiding in the bathroom. No BM this shift  Diet/appetite:  Regular diet with good appetite    Activity: ad jess  Pain: stated 6/10 pain in new Arreola site. Ice and Tyle provided    Skin: Rash on right chest is improving and didn't c/o itchiness   LDA's:  PICC -saline locked. Arreola L chest  running Remodulin at 42ng/kg/min   Plan: Pt is being discharged today. Continue to monitor pt status and report changes to cards 2.

## 2023-08-09 NOTE — DISCHARGE SUMMARY
DISCHARGE   Discharged to: Home  Via: Automobile  Accompanied by: Self  Discharge Instructions: diet, activity, medications, follow up appointments, when to call the MD, and what to watchout for (i.e. s/s of infection, increasing SOB, palpitations, chest pain,)  Prescriptions: To be filled by discharge pharmacy per pt's request; medication list reviewed & sent with pt  Follow Up Appointments: arranged; information given  Belongings: All sent with pt  IV: out  Telemetry: off  Pt exhibits understanding of above discharge instructions; all questions answered.  Discharge Paperwork: faxed

## 2023-08-10 LAB
BACTERIA BLD CULT: NO GROWTH
BACTERIA BLD CULT: NO GROWTH

## 2023-08-11 ENCOUNTER — PATIENT OUTREACH (OUTPATIENT)
Dept: CARE COORDINATION | Facility: CLINIC | Age: 33
End: 2023-08-11
Payer: COMMERCIAL

## 2023-08-11 NOTE — PROGRESS NOTES
Connected Care Resource Center Contact  [facility]/Voicemail     Clinical Data: Post-Discharge Outreach     Outreach attempted x 2.  Left message on patient's voicemail, providing [facility]'s 24/7 scheduling and nurse triage phone number [phone] ([phone]) for questions/concerns and/or to schedule an appt with an [facility] provider, if they do not have a PCP.      Plan:  [facility] will do no further outreaches at this time.       Theresa Oliveros  [facility], [facility]    *Connected Care Resource Team does NOT follow patient ongoing. Referrals are identified based on internal discharge reports and the outreach is to ensure patient has an understanding of their discharge instructions.

## 2023-08-22 NOTE — PROGRESS NOTES
Virtual Visit Details    Telephone visit x 30 minutes with patient permission    Onel Pro M.D.  tel:[phone] [address] [place] [address], [place] [id number]        Alex Carroll M.D.  Cardiovascular Medicine  [phone]  Kirk Newman M.D.  Shirley Chandler M.D.       Recommendations:    Please have an overnight oximetry study completed, to monitor your o2 sats overnight    The PH Nurses will be reaching out to your to work on titrating your Remodulin to 70ng/kg/min.   Please follow-up with Dr. Carroll via virtual visit in Sept.    Follow-up in person to see Dr. Iniguez in November with a repeat Right Heart Cath, 6min walk without O2 and labs            Dear Doctors:    Thank you for allowing us to visit with your patient, Chelsea Flower, for evaluation of pulmonary hypertension of uncertain etiology though provocative laboratory testing with elevated CLINTON, +DS-DNA, elevated hemoglobin.    Problem List  1. Exertional shortness of breath   2. Pulmonary hypertension x 33.  3. Childbirth x 3 by   4 Hypertension treated with recently started diltiazem  5.Hypothyroidism  6. Severe right ventricular dysfunction  7. Severe pulmonary arterial hypertension  8. Initiation of parenteral prostacyclin  9. Oxygen dependence    Plan:  Increase to 50ng/kg/minute  RTC early November for repeat RHC, 6 minute walk without oxygen, Labs + CLINTON, single  and double strand DNA, TSH  If CLINTON + today schedule rheumatology visit for early November return      History  31 yo F with a pmhx of severe idiopathic PAH c/b RV failure on remodulin, hypothyroidism, HTN who presents to clinic for follow up. She has severe idiopathic PAH confirmed on recent RHC at [facility]. She is currently managed on IV remodulin and tadalafil with the recent addition of opsumit. Is is chronic O2 dependent. Last seen on 3/8/23 virtually.     Today, the patient presents with her . She feels that her cardiopulmonary symptoms are somewhat improved overall.  "She is on stable 3L NC at rest and with exertion. She is able to do more than previously (e.g. shopping). No orthopnea. Slight BLE edema. Occasional chest tightness when ambulating or \"doing too much\" though this improves with rest. No fever or chills. No issues taking current PH meds. Her current birth control is a prior tubal ligation.     CURRENT PULMONARY HYPERTENSION REGIMEN:     PAH Rx: Remodulin 26 ng/kg/min, digoxin 125 mcg daily, tadalafil 40 mg daily, opsumit 10 mg daily     Diuretics: Lasix 20 mg daily     Oxygen: 3L NC     Anticoagulation: None     Immunosuppression: None    Allergies: none  ETOH: none  Smoking: none  Family history: negative  Surgery: C-Sections (first child presented breach)    Objective:  /76 (BP Location: Right arm, Patient Position: Sitting, Cuff Size: Adult Regular)   Pulse 67   Ht 1.6 m (5' 3\")   Wt 78.6 kg (173 lb 3.2 oz)   SpO2 93%   BMI 30.68 kg/m       General: appears comfortable, alert and articulate  Head: normocephalic, atraumatic  Eyes: anicteric sclera, EOMI  Heart: regular, S1/S2, no murmur, gallop, rub, no JVD  Lungs: clear, no rales or wheezing  Abdomen: soft  Extremities: Trace BLE edema  Neurological: normal speech and affect, no gross motor deficits    Right heart catheterization  RA 8, PCP 8 PA pressure PA 62/48 mean 55    2023  RA 13/10/9  RV 59/13  PA 59/25/40  wedge 16/17//13  Hemoglobin 13 points globin 13.0, heart rate 80-80  PA sat 69%  Eugenia cardiac output 6.5, Eugenia CI: 3.5   Thermo CO: 6.2, Theromo CI: 3.4    Right sided filling pressures are mildly elevated. Left sided filling pressures are normal. Moderately elevated pulmonary artery hypertension. Normal cardiac output leve       Echocardiogram  Name: RONALD LEBLANC  MRN: [id number]  : 1990  Study Date: 2023 10:17 AM  Age: 32 yrs  Gender: Female  Patient Location: [facility]  Reason For Study: Pulmonary hypertension (H), MALAVE (dyspnea on exertion)  Ordering Physician: " HERMILO UNDERWOOD  Referring Physician: HERMILO UNDERWOOD  Performed By: Moe Varghese     BSA: 1.8 m2  Height: 63 in  Weight: 173 lb  BP: 122/76 mmHg  ______________________________________________________________________________  ______________________________________________________________________________  Interpretation Summary  Global and regional left ventricular function is normal with an EF of 60-65%.  Global right ventricular function is mildly reduced.  No significant valvular abnormalities present.  The right ventricular systolic pressure is approximated at 37 mmHg The  estimated mean right atrial pressure is normal.  No pericardial effusion is present.  ______________________________________________________________________________  Left Ventricle  Left ventricular size is normal. Global and regional left ventricular function  is normal with an EF of 60-65%. Left ventricular diastolic function is normal.  No regional wall motion abnormalities are seen.     Right Ventricle  Borderline right ventricular enlargement. Mild right ventricular dilation is  present. Global right ventricular function is mildly reduced.     Atria  The left atrium appears normal. Mild right atrial enlargement is present.     Mitral Valve  The mitral valve is normal.     Aortic Valve  Aortic valve is normal in structure and function.     Tricuspid Valve  Trace tricuspid insufficiency is present. The right ventricular systolic  pressure is approximated at 33.9 mmHg plus the right atrial pressure.     Pulmonic Valve  Trace pulmonic insufficiency is present.     Vessels  The aorta root is normal. The thoracic aorta is normal. IVC diameter <2.1 cm  collapsing >50% with sniff suggests a normal RA pressure of 3 mmHg. Estimated  mean right atrial pressure is normal.     Pericardium  No pericardial effusion is present.     Miscellaneous  No significant valvular abnormalities present.     Compared to Previous Study  Compared to  prior study, RA size and estimated RVSP are lower.     Attestation  I have personally viewed the imaging and agree with the interpretation and  report as documented by the fellow, Mruiel, and/or edited by me.  ______________________________________________________________________________  MMode/2D Measurements & Calculations  IVSd: 1.0 cm  LVIDd: 4.8 cm  LVIDs: 2.8 cm  LVPWd: 0.97 cm  FS: 40.3 %  LV mass(C)d: 165.5 grams  LV mass(C)dI: 91.0 grams/m2  Ao root diam: 2.9 cm  asc Aorta Diam: 2.6 cm  LVOT diam: 1.8 cm  LVOT area: 2.5 cm2  LA Volume (BP): 32.2 ml     LA Volume Index (BP): 17.7 ml/m2  RV Base: 4.0 cm  RWT: 0.41  TAPSE: 1.9 cm     Doppler Measurements & Calculations  MV E max jose antonio: 90.2 cm/sec  MV A max jose antonio: 50.2 cm/sec  MV E/A: 1.8  MV dec slope: 365.0 cm/sec2  MV dec time: 0.25 sec  PA acc time: 0.15 sec  TR max jose antonio: 291.0 cm/sec  TR max P.9 mmHg  E/E' av.8  Lateral E/e': 5.1     Medial E/e': 10.5  RV S Jose Antonio: 17.1 cm/sec             Name: RONALD LEBLANC  MRN: [id number]  : 1990  Study Date: 2023 03:23 PM  Age: 32 yrs  Gender: Female  [place] [facility]  Reason For Study: Heart Failure  Ordering Physician: ENRIKE BENJAMIN  Performed By: Noreen Atkinson     BSA: 1.8 m2  Height: 63 in  Weight: 179 lb  BP: 147/98 mmHg  ______________________________________________________________________________  Procedure  Echocardiogram with two-dimensional, color and spectral Doppler performed.  Contrast Optison. Patient was given 6 ml mixture of 3 ml Optison and 6 ml  saline. 3 ml wasted.  ______________________________________________________________________________  Interpretation Summary  Left ventricular cavity size is small. Left ventricular function is normal.The  ejection fraction is 55-60%. Flattened septum is consistent with right  ventricular pressure and volume overload.     Severe right ventricular dilation is present. Global right ventricular  function is severely  reduced.     Moderate to severe tricuspid insufficiency is present.     Estimated pulmonary artery systolic pressure is 72 mmHg. IVC diameter and  respiratory changes fall into an intermediate range suggesting an RA pressure  of 8 mmHg.     Trivial pericardial effusion is present.  ______________________________________________________________________________  Left Ventricle  Left ventricular function is normal.The ejection fraction is 55-60%. Left  ventricular cavity size is small. Flattened septum is consistent with right  ventricular pressure and volume overload.     Right Ventricle  Severe right ventricular dilation is present. Global right ventricular  function is severely reduced.     Atria  The left atrium appears normal. Severe right atrial enlargement is present.     Mitral Valve  The mitral valve is normal.     Aortic Valve  Aortic valve is normal in structure and function.     Tricuspid Valve  Moderate to severe tricuspid insufficiency is present. Estimated pulmonary  artery systolic pressure is 64 mmHg plus right atrial pressure.     Pulmonic Valve  Mild pulmonic insufficiency is present.     Vessels  IVC diameter and respiratory changes fall into an intermediate range  suggesting an RA pressure of 8 mmHg.     Pericardium  Trivial pericardial effusion is present.     Compared to Previous Study  There is no prior study for direct comparison.  ______________________________________________________________________________  MMode/2D Measurements & Calculations  IVSd: 0.68 cm  LVIDd: 4.1 cm  LVIDs: 2.8 cm  LVPWd: 0.69 cm  FS: 32.1 %  LV mass(C)d: 79.7 grams  LV mass(C)dI: 43.2 grams/m2  Ao root diam: 2.6 cm  asc Aorta Diam: 2.6 cm  LVOT diam: 2.1 cm  LVOT area: 3.4 cm2  LA Volume (BP): 18.9 ml  LA Volume Index (BP): 10.3 ml/m2     RWT: 0.33     Doppler Measurements & Calculations  MV E max gabino: 55.0 cm/sec  MV A max gabino: 41.4 cm/sec  MV E/A: 1.3  MV dec slope: 233.2 cm/sec2  MV dec time: 0.24 sec  PA acc  time: 0.07 sec  TR max gabino: 399.4 cm/sec  TR max P.8 mmHg  RVSP(TR): 66.8 mmHg  E/E' av.1  Lateral E/e': 4.6  Medial E/e': 9.7                  VQ Lung Scan      Meds    Current Outpatient Medications   Medication    macitentan (OPSUMIT) 10 MG tablet    acetaminophen (TYLENOL) 325 MG tablet    albuterol (PROAIR HFA/PROVENTIL HFA/VENTOLIN HFA) 108 (90 Base) MCG/ACT inhaler    albuterol (PROVENTIL) (2.5 MG/3ML) 0.083% neb solution    digoxin (LANOXIN) 125 MCG tablet    furosemide (LASIX) 20 MG tablet    Multiple Vitamins-Minerals (WOMENS MULTIVITAMIN) TABS    pantoprazole (PROTONIX) 40 MG EC tablet    prochlorperazine (COMPAZINE) 5 MG tablet    tadalafil (CIALIS) 20 MG tablet    treprostinil (REMODULIN) 10 mcg/mL in glycine diluent 50 mL infusion     No current facility-administered medications for this visit.         Labs    Outside laboratories reviewed and abnormal include: hemoglobin 18, , , CLINTON +, DS-DNA +     Latest Reference Range & Units 23 06:16 23 07:09   Sodium 136 - 145 mmol/L 139 139   Potassium 3.4 - 5.3 mmol/L 3.9 4.1   Chloride 98 - 107 mmol/L 101 103   Carbon Dioxide (CO2) 22 - 29 mmol/L 27 25   Urea Nitrogen 6.0 - 20.0 mg/dL 8.2 10.2   Creatinine 0.51 - 0.95 mg/dL 0.59 0.54   GFR Estimate >60 mL/min/1.73m2 >90 >90   Calcium 8.6 - 10.0 mg/dL 9.2 9.2   Anion Gap 7 - 15 mmol/L 11 11   Magnesium 1.7 - 2.3 mg/dL 1.9 1.9   Glucose 70 - 99 mg/dL 77 86   WBC 4.0 - 11.0 10e3/uL 7.2 7.0   Hemoglobin 11.7 - 15.7 g/dL 16.6 (H) 16.3 (H)   Hematocrit 35.0 - 47.0 % 50.3 (H) 48.5 (H)   Platelet Count 150 - 450 10e3/uL 171 160   RBC Count 3.80 - 5.20 10e6/uL 4.94 4.79   MCV 78 - 100 fL 102 (H) 101 (H)   MCH 26.5 - 33.0 pg 33.6 (H) 34.0 (H)   MCHC 31.5 - 36.5 g/dL 33.0 33.6   RDW 10.0 - 15.0 % 13.4 13.2   % Neutrophils % 62 64   % Lymphocytes % 26 25   % Monocytes % 8 7   % Eosinophils % 2 2   % Basophils % 1 1   Absolute Basophils 0.0 - 0.2 10e3/uL 0.1 0.1   Absolute Eosinophils 0.0  - 0.7 10e3/uL 0.1 0.2   Absolute Immature Granulocytes <=0.4 10e3/uL 0.0 0.1   Absolute Lymphocytes 0.8 - 5.3 10e3/uL 1.9 1.8   Absolute Monocytes 0.0 - 1.3 10e3/uL 0.5 0.5   % Immature Granulocytes % 1 1   Absolute Neutrophils 1.6 - 8.3 10e3/uL 4.5 4.5   Absolute NRBCs 10e3/uL 0.0 0.0   NRBCs per 100 WBC <1 /100 0 0   (H): Data is abnormally high    Catheterization:  Severe pulmonary arterial hypertension  Severely reduced cardiac index with elevated right-sided filling pressure  Normal left-sided filling pressure  No response to acute vasoreactivity testing with inhaled nitric oxide         Pressures Phase: Baseline     Time Systolic (mmHg) Diastolic (mmHg) Mean (mmHg) A Wave (mmHg) V Wave (mmHg) EDP (mmHg) Max dp/dt (mmHg/sec) HR (bpm) Content (mL/dL) SAT (%)   RA Pressures  2:50 PM   12    12    16      65        RV Pressures  2:51 PM 72        14     69        PA Pressures  2:52 PM 74    34    50        64        PCW Pressures  2:52 PM   10        68        Art Pressures  2:34     92         87          Pressures Phase: Nitric Oxide     Time Systolic (mmHg) Diastolic (mmHg) Mean (mmHg) A Wave (mmHg) V Wave (mmHg) EDP (mmHg) Max dp/dt (mmHg/sec) HR (bpm) Content (mL/dL) SAT (%)   RA Pressures  3:10 PM   12    21    17      71        PA Pressures  3:08 PM 70    30    42        70        PCW Pressures  3:08 PM   10        75        AO Pressures  3:17     92    106        69          Blood Flow Results Phase: Baseline     Time Results  Indexed Values (L/min/m2)   QP  2:30 PM 2.54 L/min    1.41      QS  2:30 PM 2.54 L/min    1.41        Blood Flow Results Phase: Nitric Oxide     Time Results  Indexed Values (L/min/m2)   QP  3:03 PM 2.9 L/min    1.61      QS  3:03 PM 2.9 L/min    1.61        Blood Oximetry Phase: Baseline     Time Hb  SAT(%)  PO2  Content (mL/dL) PA Sat (%)   PA  2:30 PM  48.5 %      48.5      Art  2:30 PM  86 %     18.13         Blood Oximetry Phase: Nitric Oxide     Time Hb  SAT(%)  PO2   Content (mL/dL) PA Sat (%)   PA  3:03 PM  60.4 %      60.4      Art  3:03 PM  96 %     18.67         Cardiac Output Phase: Baseline     Time TDCO (L/min) TDCI (L/min/m2) Eugenia C.O. (L/min) Eugenia C.I. (L/min/m2) Eugenia HR (bpm)   Cardiac Output Results  2:30 PM 2.93    1.63    2.54    1.41         2:54 PM 2.93            Cardiac Output Phase: Nitric Oxide     Time TDCO (L/min) TDCI (L/min/m2) Eugenia C.O. (L/min) Eugenia C.I. (L/min/m2) Eugenia HR (bpm)   Cardiac Output Results  3:03 PM 3.13    1.74    2.9    1.61         3:10 PM 3.13            Resistance Results Phase: Baseline     Time PVR  SVR  TPR  TVR  PVR/SVR  TPR/TVR    Resistance Results (Metric)  2:30 PM 1258.19 dsc-5    3019.66 dsc-5    1572.74 dsc-5    3397.12 dsc-5    0.42    0.46      Resistance Results (Wood)  2:30 PM 15.73 JENKINS    37.76 JENKINS    19.66 JENKINS    42.47 JENKINS    0.42    0.46        Resistance Results Phase: Nitric Oxide     Time PVR  SVR  TPR  TVR  PVR/SVR  TPR/TVR    Resistance Results (Metric)  3:03 .58 dsc-5    2589.63 dsc-5    1157.07 dsc-5    2920.22 dsc-5    0.34    0.4      Resistance Results (Wood)  3:03 PM 11.02 JENKINS    32.38 JENKINS    14.47 JENKINS    36.51 JENKINS    0.34    0.4        Stoke Volume Results Phase: Baseline     Time RVSW (gm*m) LVSW (gm*m) RVSW-I (gm*m/m2) LVSW-I (gm*m/m2)   Stroke Work Results  2:30 PM 20.53     11.4         Stoke Volume Results Phase: Nitric Oxide     Time RVSW (gm*m) LVSW (gm*m) RVSW-I (gm*m/m2) LVSW-I (gm*m/m2)   Stroke Work Results  3:03 PM 16.92    54.93    9.4         VQ Lung Scan   Low probability of emboli    CC: Dr. Pro in [facility]  Answers for HPI/ROS submitted by the patient on 5/9/2023  General Symptoms: No  Skin Symptoms: No  HENT Symptoms: No  EYE SYMPTOMS: No  HEART SYMPTOMS: Yes  LUNG SYMPTOMS: Yes  INTESTINAL SYMPTOMS: No  URINARY SYMPTOMS: No  GYNECOLOGIC SYMPTOMS: No  BREAST SYMPTOMS: No  SKELETAL SYMPTOMS: No  BLOOD SYMPTOMS: No  NERVOUS SYSTEM SYMPTOMS:  No  MENTAL HEALTH SYMPTOMS: No  Chest pain or pressure: Yes  Fast or irregular heartbeat: No  Pain in legs with walking: No  Trouble breathing while lying down: No  Fingers or toes appear blue: No  High blood pressure: No  Low blood pressure: No  Fainting: No  Murmurs: No  Pacemaker: No  Varicose veins: No  Edema or swelling: Yes  Wake up at night with shortness of breath: No  Light-headedness: No  Exercise intolerance: Yes  Cough: No  Sputum or phlegm: No  Coughing up blood: No  Difficulty breating or shortness of breath: No  Snoring: No  Wheezing: No  Difficulty breathing on exertion: Yes  Nighttime Cough: No  Difficulty breathing when lying flat: No

## 2023-08-23 ENCOUNTER — VIRTUAL VISIT (OUTPATIENT)
Dept: CARDIOLOGY | Facility: CLINIC | Age: 33
End: 2023-08-23
Attending: INTERNAL MEDICINE
Payer: COMMERCIAL

## 2023-08-23 VITALS — BODY MASS INDEX: 31.02 KG/M2 | HEIGHT: 63 IN

## 2023-08-23 DIAGNOSIS — R09.02 HYPOXIA: ICD-10-CM

## 2023-08-23 DIAGNOSIS — R06.09 DOE (DYSPNEA ON EXERTION): Primary | ICD-10-CM

## 2023-08-23 PROCEDURE — 99214 OFFICE O/P EST MOD 30 MIN: CPT | Mod: 93 | Performed by: INTERNAL MEDICINE

## 2023-08-23 ASSESSMENT — PAIN SCALES - GENERAL: PAINLEVEL: NO PAIN (0)

## 2023-08-23 NOTE — PATIENT INSTRUCTIONS
You were seen today in the Cardiovascular Clinic at the [facility].      Cardiology Providers you saw during your visit:  Dr. Alex Carroll     Recommendations:    Please have an overnight oximetry study completed, to monitor your o2 sats overnight    The PH Nurses will be reaching out to your to work on titrating your Remodulin to 70ng/kg/min.   Please follow-up with Dr. Carroll via virtual visit in Sept.    Follow-up in person to see Dr. Iniguez in November with a repeat Right Heart Cath, 6min walk without O2 and labs      We are located on the third floor of [facility] on the [facility].  Our address is      [address] [place] [address] on [address] [place] [address]       Thank you for allowing us to be a part of your care here at the [facility]     If you have questions or concerns please contact us at:     Ora Do RN, BSN                                     Nurse Coordinator                                                       Pulmonary Hypertension                                              [facility]                              (Phone)[phone]                                                      Gilma Irvin, JESUS Sanchez   (Prior Authorizations)                            ()  Clinic            Clinic   Pulmonary Hypertension                        Pulmonary Hypertension  [facility]        [facility]  (P)[phone]                                    (P) [phone]  (F) [phone]                                                            ** Please note that you will NOT receive a reminder call regarding your scheduled testing, reminder calls are for provider appointments only.  If you are scheduled for testing within  the [employer] system you may receive a call regarding pre-registration for billing purposes only.**      Support Group:  Pulmonary Hypertension Association  Https://www.phassociation.org/  **Look at the Events Tab** They even have Support Groups that you can call into     [facility] PH Support Group  Second Saturday of the Month from 1-3 PM   Location: [address] [place] [address]  Leader: Martina Nichole  Phone: [phone]  Email: [email or web address]      Great Videos about Pulmonary Hypertension!!  Scan ME!    Website: [email or web address]

## 2023-08-23 NOTE — NURSING NOTE
Is the patient currently in the state of [place]? YES    Visit mode:VIDEO    If the visit is dropped, the patient can be reconnected by: VIDEO VISIT: Text to cell phone:   Telephone Information:   Mobile [phone]       Will anyone else be joining the visit? NO  (If patient encounters technical issues they should call [phone]    How would you like to obtain your AVS? MyChart    Are changes needed to the allergy or medication list? No    Reason for visit: RK FINCH

## 2023-08-24 RX ORDER — TREPROSTINIL 2.5 MG/ML
70 INJECTION, SOLUTION INTRAVENOUS; SUBCUTANEOUS CONTINUOUS
Status: ON HOLD | COMMUNITY
Start: 2023-08-24 | End: 2023-11-17

## 2023-08-24 NOTE — NURSING NOTE
"AVS completed from via virtual visit with Dr. Carroll    Called to confirm with Dr. Carroll after the visit as note says goal of 50ng. Correct increase goal is 70ng/kg/min per Dr. Carroll    Plan:     [facility]etry [facility]  Titrate remodulin up to 70ng/kg/minute - called order into [facility]  Video visit in end of Sept - updated  to reach out to patient    RTC early November for repeat RHC, 6 minute walk without oxygen, Labs + CLINTON, single  and double strand DNA, TSH - orders entered, patient is already scheduled. 6MWT to be added onto day         Patient to be called for scheduling after virtual visit by     Reviewed Med list   Completed AVS  Follow-up orders placed  Marked chart \"Ready for Checkout\"    Ora Do RN    "

## 2023-09-11 ENCOUNTER — TELEPHONE (OUTPATIENT)
Dept: CARDIOLOGY | Facility: CLINIC | Age: 33
End: 2023-09-11
Payer: COMMERCIAL

## 2023-09-11 NOTE — TELEPHONE ENCOUNTER
Cath Lab Case Request/Order    Location: [address] [address] [facility] Waiting Room    Procedure: Right Heart Cath (RHC)    Procedure Date:  11/15    Patient Arrival Time:  7    Procedure Time: 0830 (pending emergency)    Ordering Provider: Dr. Alex Carroll    Performing Cardiologist: No Sure     Inpatient Bed Needed: No    Post-  Procedure ANKIT appointment scheduled (1 - 2 weeks): YES     Date:  11/15     Provider: Carly    Communicated Patient Instructions:  NO  NURSE WILL GO OVER ALL PRE PROCEDURE INSTRUCTIONS TO PATIENT   Appointment was scheduled: Robin    Patient expressed understanding of above instructions and denied further questions at this time.    Crystal Sanchez

## 2023-09-18 ENCOUNTER — TELEPHONE (OUTPATIENT)
Dept: CARDIOLOGY | Facility: CLINIC | Age: 33
End: 2023-09-18
Payer: COMMERCIAL

## 2023-09-18 NOTE — TELEPHONE ENCOUNTER
Patient has had a cold and has been sleeping poorly. She would like to hold off on JOVANNY, she has device on hand and will update when she completes it. Discussed this is okay. Has a follow-up on 10/4 to discuss remodulin switch to subcutaneous. Patient is currently on generic treprotstinil due to insurance denial.     Ora Do RN on 9/18/2023 at 11:55 AM

## 2023-09-19 ENCOUNTER — TELEPHONE (OUTPATIENT)
Dept: CARDIOLOGY | Facility: CLINIC | Age: 33
End: 2023-09-19

## 2023-09-19 ENCOUNTER — VIRTUAL VISIT (OUTPATIENT)
Dept: CARDIOLOGY | Facility: CLINIC | Age: 33
End: 2023-09-19
Attending: INTERNAL MEDICINE
Payer: COMMERCIAL

## 2023-09-19 DIAGNOSIS — R06.02 SOB (SHORTNESS OF BREATH): ICD-10-CM

## 2023-09-19 DIAGNOSIS — R06.09 DOE (DYSPNEA ON EXERTION): Primary | ICD-10-CM

## 2023-09-19 DIAGNOSIS — I27.20 PULMONARY HTN (H): ICD-10-CM

## 2023-09-19 PROCEDURE — 99207 PR NO DOCUMENTATION ON VISIT: CPT | Mod: 95 | Performed by: INTERNAL MEDICINE

## 2023-09-19 NOTE — PATIENT INSTRUCTIONS
Medication Changes:   None at this time    Patient Instructions:  1. Continue staying active and eat a heart healthy diet.    2. Please keep current list of medications with you at all times.    3. Remember to weigh yourself daily after voiding and before you consume any food or beverages and log the numbers.  If you have gained 2 pounds overnight or 5 pounds in a week contact us immediately for medication adjustments or further instructions.    4. **Please call us immediately if you have any syncope (fainting or passing out), chest pain, edema (swelling or weight gain), or decline in your functional status (general decline in how you are feeling).    5. Patients on Remodulin (treprostinil) or Veletri (epoprostenol): Please make sure that you have your backup pump and supplies with you at all times, your mixing instructions, and contact information for your specialty pharmacy.    Follow up Appointment Information:  Phone follow-up with Dr Carroll in mid October.  Right Heart Catheterization in November with 6 minute walk test with titration and echocardiogram with follow-up with Carly after testing.  Potential admission for transition from IV remodulin to subcutaneous remodulin.    We are located on the third floor of the [facility] ([facility] on the [facility].  Our address is     [address] [place] [address] [place] [address] [place] [address]      Thank you for allowing us to be a part of your care here at the [facility]    If you have questions or concerns please contact us at:    Ora Do RN, BSN      Nurse Coordinator         Pulmonary Hypertension       [facility]     (Phone)[phone]         JESUS Carlos   (Prior Authorizations)   ()  Clinic   Clinic   Pulmonary Hypertension   Pulmonary Hypertension  [facility]  [place] [facility]  (P)[phone]    (P) [phone]  (F) [phone]          ** Please note that you will NOT receive a reminder call regarding your scheduled testing, reminder calls are for provider appointments only.  If you are scheduled for testing within the [facility] system you may receive a call regarding pre-registration for billing purposes only.**     Support Group:  Pulmonary Hypertension Association  Https://www.phassociation.org/  **Look at the Events Tab** They even have Support Groups that you can call into    [facility] PH Support Group  Second Saturday of the Month from 1-3 PM   Location: [address] [place] [address]  Leader: Martina Nichole  Phone: [phone]  Email: [email or web address]     Great Videos about Pulmonary Hypertension!!  Scan ME!    Website: [email or web address]

## 2023-10-17 ENCOUNTER — CARE COORDINATION (OUTPATIENT)
Dept: CARDIOLOGY | Facility: CLINIC | Age: 33
End: 2023-10-17
Payer: COMMERCIAL

## 2023-10-17 NOTE — PROGRESS NOTES
Impression:  Pulmonary arterial hypertension  Parenteral prostacyclin IV  Central line infection resulting in sepsis    Plan:  Switch to subcutaneous prostacyclin to reduce sepsis risk.  She also has young children and subcutaneous more protected than IV connection which children may pull on and disconnect.    The patient should be prescribed the long acting form of prostacyclin as she lives a great distance from pulmonary hypertension center.  The long acting form protects her from abrupt discontinuance of drug, a complication which has been reported to be fatal.        Current Outpatient Medications   Medication    acetaminophen (TYLENOL) 325 MG tablet    digoxin (LANOXIN) 125 MCG tablet    furosemide (LASIX) 20 MG tablet    macitentan (OPSUMIT) 10 MG tablet    Multiple Vitamins-Minerals (WOMENS MULTIVITAMIN) TABS    tadalafil (ADCIRCA/CIALIS) 20 MG tablet    treprostinil (REMODULIN) infusion 2.5 mg/mL     No current facility-administered medications for this visit.      Wt Readings from Last 24 Encounters:   08/09/23 79.4 kg (175 lb 1.6 oz)   08/01/23 81.6 kg (179 lb 12.8 oz)   08/01/23 80.8 kg (178 lb 3.2 oz)   05/09/23 78.6 kg (173 lb 3.2 oz)   02/17/23 70.4 kg (155 lb 3.2 oz)      RA 13/10/9  RV 59/13  PA 59/25/40  wedge 16/17//13  Hemoglobin 13 points globin 13.0, heart rate 80-80  PA sat 69%  Eugenia cardiac output 6.5, Eugenia CI: 3.5   Thermo CO: 6.2, Theromo CI: 3.4    Right sided filling pressures are mildly elevated. Left sided filling pressures are normal. Moderately elevated pulmonary artery hypertension. Normal cardiac output leve     Baseline    Severe pulmonary arterial hypertension  Severely reduced cardiac index with elevated right-sided filling pressure  Normal left-sided filling pressure  No response to acute vasoreactivity testing with inhaled nitric oxide         Pressures Phase: Baseline     Time Systolic (mmHg) Diastolic (mmHg) Mean (mmHg) A Wave (mmHg) V Wave (mmHg) EDP (mmHg) Max dp/dt  (mmHg/sec) HR (bpm) Content (mL/dL) SAT (%)   RA Pressures  2:50 PM   12    12    16      65        RV Pressures  2:51 PM 72        14     69        PA Pressures  2:52 PM 74    34    50        64        PCW Pressures  2:52 PM   10        68        Art Pressures  2:34     92         87          Pressures Phase: Nitric Oxide     Time Systolic (mmHg) Diastolic (mmHg) Mean (mmHg) A Wave (mmHg) V Wave (mmHg) EDP (mmHg) Max dp/dt (mmHg/sec) HR (bpm) Content (mL/dL) SAT (%)   RA Pressures  3:10 PM   12    21    17      71        PA Pressures  3:08 PM 70    30    42        70        PCW Pressures  3:08 PM   10        75        AO Pressures  3:17     92    106        69          Blood Flow Results Phase: Baseline     Time Results Indexed Values (L/min/m2)   QP  2:30 PM 2.54 L/min    1.41      QS  2:30 PM 2.54 L/min    1.41        Blood Flow Results Phase: Nitric Oxide     Time Results Indexed Values (L/min/m2)   QP  3:03 PM 2.9 L/min    1.61      QS  3:03 PM 2.9 L/min    1.61        Blood Oximetry Phase: Baseline     Time Hb SAT(%) PO2 Content (mL/dL) PA Sat (%)   PA  2:30 PM  48.5 %      48.5      Art  2:30 PM  86 %     18.13         Blood Oximetry Phase: Nitric Oxide     Time Hb SAT(%) PO2 Content (mL/dL) PA Sat (%)   PA  3:03 PM  60.4 %      60.4      Art  3:03 PM  96 %     18.67         Cardiac Output Phase: Baseline     Time TDCO (L/min) TDCI (L/min/m2) Eugenia C.O. (L/min) Eugenia C.I. (L/min/m2) Eugenia HR (bpm)   Cardiac Output Results  2:30 PM 2.93    1.63    2.54    1.41         2:54 PM 2.93            Cardiac Output Phase: Nitric Oxide     Time TDCO (L/min) TDCI (L/min/m2) Eugenia C.O. (L/min) Eugenia C.I. (L/min/m2) Eugenia HR (bpm)   Cardiac Output Results  3:03 PM 3.13    1.74    2.9    1.61         3:10 PM 3.13            Resistance Results Phase: Baseline     Time PVR SVR TPR TVR PVR/SVR TPR/TVR   Resistance Results (Metric)  2:30 PM 1258.19 dsc-5    3019.66 dsc-5    1572.74 dsc-5    3397.12 dsc-5    0.42    0.46       Resistance Results (Wood)  2:30 PM 15.73 JENKINS    37.76 JENKINS    19.66 JENKINS    42.47 JENKINS    0.42    0.46        Resistance Results Phase: Nitric Oxide     Time PVR SVR TPR TVR PVR/SVR TPR/TVR   Resistance Results (Metric)  3:03 .58 dsc-5    2589.63 dsc-5    1157.07 dsc-5    2920.22 dsc-5    0.34    0.4      Resistance Results (Wood)  3:03 PM 11.02 JENKINS    32.38 JENKINS    14.47 JENKINS    36.51 JENKINS    0.34    0.4        Stoke Volume Results Phase: Baseline     Time RVSW (gm*m) LVSW (gm*m) RVSW-I (gm*m/m2) LVSW-I (gm*m/m2)   Stroke Work Results  2:30 PM 20.53     11.4         Stoke Volume Results Phase: Nitric Oxide     Time RVSW (gm*m) LVSW (gm*m) RVSW-I (gm*m/m2) LVSW-I (gm*m/m2)   Stroke Work Results  3:03 PM 16.92    54.93    9.4    30.51          Name: RONALD LEBLANC  MRN: [id number]  : 1990  Study Date: 2023 10:17 AM  Age: 32 yrs  Gender: Female  Patient Location: [facility]  Reason For Study: Pulmonary hypertension (H), MALAVE (dyspnea on exertion)  Ordering Physician: HERMILO UNDERWOOD  Referring Physician: HERMILO UNDERWOOD  Performed By: Moe Varghese     BSA: 1.8 m2  Height: 63 in  Weight: 173 lb  BP: 122/76 mmHg  ______________________________________________________________________________  ______________________________________________________________________________  Interpretation Summary  Global and regional left ventricular function is normal with an EF of 60-65%.  Global right ventricular function is mildly reduced.  No significant valvular abnormalities present.  The right ventricular systolic pressure is approximated at 37 mmHg The  estimated mean right atrial pressure is normal.  No pericardial effusion is present.  ______________________________________________________________________________  Left Ventricle  Left ventricular size is normal. Global and regional left ventricular function  is normal with an EF of 60-65%. Left ventricular diastolic function is normal.  No regional  wall motion abnormalities are seen.     Right Ventricle  Borderline right ventricular enlargement. Mild right ventricular dilation is  present. Global right ventricular function is mildly reduced.     Atria  The left atrium appears normal. Mild right atrial enlargement is present.     Mitral Valve  The mitral valve is normal.     Aortic Valve  Aortic valve is normal in structure and function.     Tricuspid Valve  Trace tricuspid insufficiency is present. The right ventricular systolic  pressure is approximated at 33.9 mmHg plus the right atrial pressure.     Pulmonic Valve  Trace pulmonic insufficiency is present.     Vessels  The aorta root is normal. The thoracic aorta is normal. IVC diameter <2.1 cm  collapsing >50% with sniff suggests a normal RA pressure of 3 mmHg. Estimated  mean right atrial pressure is normal.     Pericardium  No pericardial effusion is present.     Miscellaneous  No significant valvular abnormalities present.     Compared to Previous Study  Compared to prior study, RA size and estimated RVSP are lower.     Attestation  I have personally viewed the imaging and agree with the interpretation and  report as documented by the fellow, Muriel, and/or edited by me.  ______________________________________________________________________________  MMode/2D Measurements & Calculations  IVSd: 1.0 cm  LVIDd: 4.8 cm  LVIDs: 2.8 cm  LVPWd: 0.97 cm  FS: 40.3 %  LV mass(C)d: 165.5 grams  LV mass(C)dI: 91.0 grams/m2  Ao root diam: 2.9 cm  asc Aorta Diam: 2.6 cm  LVOT diam: 1.8 cm  LVOT area: 2.5 cm2  LA Volume (BP): 32.2 ml     LA Volume Index (BP): 17.7 ml/m2  RV Base: 4.0 cm  RWT: 0.41  TAPSE: 1.9 cm     Doppler Measurements & Calculations  MV E max jose antonio: 90.2 cm/sec  MV A max jose antonio: 50.2 cm/sec  MV E/A: 1.8  MV dec slope: 365.0 cm/sec2  MV dec time: 0.25 sec  PA acc time: 0.15 sec  TR max jose antonio: 291.0 cm/sec  TR max P.9 mmHg  E/E' av.8  Lateral E/e': 5.1     Medial E/e': 10.5  RV S Jose Antonio: 17.1  cm/sec      Alex Carroll M.D.  Professor, Cardiovascular Medicine and Surgery  [facility]    [phone]

## 2023-11-01 NOTE — TELEPHONE ENCOUNTER
"2023  @ 4:19 PM -  Rcvd DME code and cost from [facility]; submitted via fax to [phone]  w/ prog note dated 23, RHC dated 23, ECHO dated 23 and Romario subcutaneous PA approval             Gilma ARGUETA CMA- Prior Auths  Cardiology/Pulmonary Hypertension       2023  @ 5:22 PM -  Rcvd email from [facility]/ [facility] : \"Per BCBS pre-certification is required for DME if it s cost exceeds $1000. Can you please call [phone] regarding this pre-certification?    DEANAGEGE GUARDADOIE    [birth year]   Let me know please if you have any questions!  [facility]\"    523 pm -   Called [phone] -  [employer] @  -  \"precertification is not a guarantee of medical benefits\"     Spoke to Rep/Deb -  DME code or HCPA code; cost of Remodulin SubQ pump exceeds $1000.   Pt starting med I ~ 1-3 wks, PA approval on file, expires:  10/26/24.  Need pump code that starts with E or an L.... alpha and then 4 numbers.      Rep/Deb filled out form but is waiting on pump code -  call back with code when received from [facility]; they will also need clinicals.   They will reopen the case.   Cert # :  [id number]  and fax to:  [phone].  Include on fax cover sheet : Pt name and  also pending cert # and DME code/Procedure code.   Need \"Purchase price\"  and DME code from [facility].     Servicing provider:   [facility]   NPI: [id number], [place] [address].   Dr Carroll - contracted thru [facility]     Ins:  BCBS out of [place] -  ID #:  [id number] Group: [id number]     [email or web address] - form is available at this website.    Gilma ARGUETA CMA- Prior Auths  Cardiology/Pulmonary Hypertension     "
----- Message from Mary Guerrero RN sent at 9/19/2023  3:06 PM CDT -----  Regarding: F/U Plan  Team! Lots of changes for Chelsea, but hopefully for the best.     She is currently at 61 ng/kg/min on her IV treprostinil.     - Start PA for subcutaneous Remodulin. Will appeal if needed.  - Follow up with Dr. Carroll (telephone visit) in October  - Right heart catheterization, echocardiogram, and 6 MWT in November with plan to potentially admit to [facility] to transition from IV to SubQ. Ora, can you please schedule an admission for 11/15?    Gilma is already aware and working on the prior authorization. Ora, she will need teaching before her RHC. Depending on what the C reads, Dr. Carroll will decide if she should transition or not.     Please let us know if you have any questions. Thanks!  Yan    
10/17/2023  @ 2:50 PM -  Rcvd staff msg :  Ora Do, Gilma Sal CMA  We finally got in the note today. Let me know if that won't suffice     Faxed Romario SubQ enrollment, facesheet, prog note dated 9/19/23, ECHO dated 8/1/23, RHC dated 8/1/23.           Gilma ARGUETA CMA- Prior Auths  Cardiology/Pulmonary Hypertension         9/19/2023  @ 4:23 PM -  Remodulin SubQ - new start - [transition from Remodulin IV] - enrollment  *pending      Gilma ARGUETA CMA- Prior Auths  Cardiology/Pulmonary Hypertension     
10/17/2023  @ 2:50 PM -  Rcvd staff msg :  Ora Do, Gilma Sal CMA  We finally got in the note today. Let me know if that won't suffice     Faxed Romario SubQ enrollment, facesheet, prog note dated 9/19/23, ECHO dated 8/1/23, RHC dated 8/1/23.   Gilma ARGUETA CMA- Prior Auths  Cardiology/Pulmonary Hypertension       9/19/2023  @ 4:24 PM -  Remodulin SubQ - new start - [transition from Remodulin IV]      PA Initiation  Medication: Remodulin SubQ - new start - [transition from Remodulin IV]  Insurance Company: [employer] [place] - Phone [phone] Fax [phone]:  [facility] SP TO SUBMIT PRIOR AUTH TO MEDICAL  Pharmacy Filling the Rx: [facility] - [place], [place] - [address]  Filling Pharmacy Phone:    Filling Pharmacy Fax:    Start Date: 9/19/2023  -  WAITING ON MRP TO SIGN PROGRESS NOTE FROM 9/19/23.    10/16/23 -  Update from ISHA Miller: pt to have RHC 11/5/23, and start Remodulin afterwards per Dr Carroll.   lrr    Fax referral to [facility] SP w/ :  RHC dated 9/1/23, ECHO dated 8/1/23, facesheet, progress note dated 9/19/23 Pending MRP signature,   Gilma ARGUETA CMA- Prior Auths  Cardiology/Pulmonary Hypertension     
10/19/2023  @ 3:52 PM -  Rcvd from [facility] SP:  Paper Prior Auth for Remodulin Sub Q  - faxed back to [facility] [employer] @ [phone] with RHC dated 8/1/23, prog note dated 9/19/23.    PA Initiation  Medication: Remodulin SubQ - new start - [transition from Remodulin IV]  Insurance Company: [employer] [place] - Phone [phone] Fax [phone]:  [facility] SP TO SUBMIT PRIOR AUTH TO MEDICAL  Pharmacy Filling the Rx: [facility] - [place], [place] - [address]  Filling Pharmacy Phone:    Filling Pharmacy Fax:    Start Date: 9/19/2023  via FAX, w/ RHC dated : 8/1/23, prog note dated 9/19/23 by Dr Carroll; Dx Code: I27.0.                Gilma ARGUETA CMA- Prior Auths  Cardiology/Pulmonary Hypertension     
10/26/2023  @ 1:23 PM -  Remodulin SubQ - new start - [transition from Remodulin IV] APPROVED -       Prior Authorization Approval  Medication: REMODULIN SC  Authorization Effective Date: 10/26/2023  Authorization Expiration Date: 10/26/2024  Approved Dose/Quantity: *  Reference #: PA#: [id number]   Insurance Company: [employer] - Phone [phone] Fax [phone]:  [facility] SP TO SUBMIT PRIOR AUTH TO MEDICAL  Expected CoPay: $    CoPay Card Available:      Financial Assistance Needed: *  Which Pharmacy is filling the prescription: [facility] - [place], [place] - [address]  Pharmacy Notified: Y, EMAIL TO [facility]  Patient Notified: Y    Updated Snapshot, added to PA Calendar; faxed to : [facility] SP - FX: [phone]          Gilma ARGUETA CMA- Prior Auths  Cardiology/Pulmonary Hypertension     
Batsheva stated on 9/28 Carly said he addended his note to explain why she's transitioning from Romario IV to Sub Q, looked today 9/29 and it wasn't completed. Nurse will reach out in clinic on Tuesday for carly to get this note completed and then faxed to [facility]      Crystal Sanchez  Clinic    Pulmonary Hypertension   [facility]  P) [phone]   
Statement Selected

## 2023-11-02 ENCOUNTER — VIRTUAL VISIT (OUTPATIENT)
Dept: CARDIOLOGY | Facility: CLINIC | Age: 33
End: 2023-11-02
Payer: COMMERCIAL

## 2023-11-02 DIAGNOSIS — R06.02 SOB (SHORTNESS OF BREATH): Primary | ICD-10-CM

## 2023-11-02 DIAGNOSIS — I27.20 PULMONARY HYPERTENSION (H): ICD-10-CM

## 2023-11-02 PROCEDURE — 99207 PR NO DOCUMENTATION ON VISIT: CPT | Mod: VID | Performed by: INTERNAL MEDICINE

## 2023-11-02 NOTE — LETTER
11/2/2023    Kirk Aguilar MD  [place] [address] [place] [address]    RE: Chelsea Flower       Dear Colleague,     I had the pleasure of seeing Chelsea Flower in the [facility].  Chelsea is a 33 year old who is being evaluated via a billable telephone visit.      What phone number would you like to be contacted at? [phone]  How would you like to obtain your AVS? MyChart  {PROVIDER LOCATION On-site should be selected for visits conducted from your clinic location or adjoining [facility] hospital, academic office, or other nearby [facility] building. Off-site should be selected for all other provider locations, including home:[id number]}  Distant Location (provider location):  On-site  Phone call duration: 5 minutes      Thank you for allowing me to participate in the care of your patient.      Sincerely,     Alex Carroll MD     [facility]  cc:   No referring provider defined for this encounter.

## 2023-11-02 NOTE — PATIENT INSTRUCTIONS
You were seen today in the Pulmonary Hypertension Clinic at the [facility].     Cardiology Provider you saw during your visit:    Dr. Carroll      Recommendations:   - Please work with [facility] specialty pharmacy to coordinate pre-teaching for your subcutaneous Remodulin prior to your upcoming right heart catheterization    Follow-up:   Dr Carroll will call you after the right heart cath to decide whether to switch you over or keep you on IV Remodulin    Please call us immediately if you have any syncope (fainting or passing out), chest pain, edema (swelling or weight gain), or decline in your functional status (general decline in how you are feeling).    If you have emergent concerns after hours or on the weekend, please call our on-call Cardiologist at [phone], option 4. For emergencies call [phone].     Thank you for allowing us to be a part of your care here at the [facility]    If you have questions or concerns please contact us at:    Ora Do RN (P: [phone])    Nurse Coordinator       Pulmonary Hypertension     [facility]         JESUS Carlos   (Prior Authorizations)    ()  Clinic   Clinic   Pulmonary Hypertension   Pulmonary Hypertension  [facility]  [facility]  (P)[phone]    (P) [phone]  (F) [phone]

## 2023-11-02 NOTE — NURSING NOTE
Email sent to Michelle with [facility] SP regarding self-fill option for subQ Remodulin. Mary Guerrero RN on 11/2/2023 at 5:55 PM

## 2023-11-02 NOTE — PROGRESS NOTES
Chelsea is a 33 year old who is being evaluated via a billable telephone visit.      What phone number would you like to be contacted at? [phone]  How would you like to obtain your AVS? Robin    Distant Location (provider location):  On-site  Phone call duration: 5 minutes

## 2023-11-07 ENCOUNTER — TELEPHONE (OUTPATIENT)
Dept: CARDIOLOGY | Facility: CLINIC | Age: 33
End: 2023-11-07
Payer: COMMERCIAL

## 2023-11-07 NOTE — TELEPHONE ENCOUNTER
"Patient has a RHC scheduled on 11/15/23 followed by hospital admission for IV to SQ transition on remodulin. This was confirmed by patient placement today.      This will be a 1:1 transition on current does of remodulin. She is at 70ng IV with a pump rate of 44ml/24 hours. Using 2.5/ml 20ml MDV concentration vial. (Dose guides can be searched in patient chart using \"dose guide\"). She will be transitioned to 70.55ng/kg/min using 10mg/ml 20ml MDV concentration. SQ infusion rate of 32ul/hr (3ml cartridge volume). Dosing weight to be used 75.6kg     Filling pharmacy is [facility] specialty. Phone: [phone]. Patient completed pre-teaching. She will have IV and subcutaneous supplies with her at the hospital. [facility] is aware of admit date. Dr. Carroll would like to review RHC results prior to moving forward with transitions.    Gia VIEIRA with [facility] will order medications and bring to the hospital on 11/16/23  Cell: [phone]    [facility] :  Phone: [phone]  Fax:[phone]                "

## 2023-11-15 ENCOUNTER — HOSPITAL ENCOUNTER (INPATIENT)
Facility: CLINIC | Age: 33
LOS: 2 days | Discharge: HOME | End: 2023-11-17
Attending: INTERNAL MEDICINE | Admitting: INTERNAL MEDICINE
Payer: COMMERCIAL

## 2023-11-15 ENCOUNTER — APPOINTMENT (OUTPATIENT)
Dept: LAB | Facility: CLINIC | Age: 33
End: 2023-11-15
Attending: INTERNAL MEDICINE
Payer: COMMERCIAL

## 2023-11-15 ENCOUNTER — OFFICE VISIT (OUTPATIENT)
Dept: PULMONOLOGY | Facility: CLINIC | Age: 33
End: 2023-11-15
Payer: COMMERCIAL

## 2023-11-15 ENCOUNTER — APPOINTMENT (OUTPATIENT)
Dept: MEDSURG UNIT | Facility: CLINIC | Age: 33
End: 2023-11-15
Attending: INTERNAL MEDICINE
Payer: COMMERCIAL

## 2023-11-15 ENCOUNTER — ANCILLARY PROCEDURE (OUTPATIENT)
Dept: CARDIOLOGY | Facility: CLINIC | Age: 33
End: 2023-11-15
Attending: INTERNAL MEDICINE
Payer: COMMERCIAL

## 2023-11-15 VITALS
WEIGHT: 181 LBS | OXYGEN SATURATION: 95 % | BODY MASS INDEX: 32.06 KG/M2 | SYSTOLIC BLOOD PRESSURE: 114 MMHG | HEART RATE: 80 BPM | DIASTOLIC BLOOD PRESSURE: 72 MMHG

## 2023-11-15 DIAGNOSIS — R06.09 DOE (DYSPNEA ON EXERTION): ICD-10-CM

## 2023-11-15 DIAGNOSIS — R06.02 SOB (SHORTNESS OF BREATH): ICD-10-CM

## 2023-11-15 DIAGNOSIS — I27.21 PULMONARY ARTERIAL HYPERTENSION (H): Primary | ICD-10-CM

## 2023-11-15 DIAGNOSIS — I27.20 PULMONARY HYPERTENSION (H): ICD-10-CM

## 2023-11-15 DIAGNOSIS — I27.20 PULMONARY HTN (H): ICD-10-CM

## 2023-11-15 LAB
6 MIN WALK (FT): 1215 FT
6 MIN WALK (M): 370 M
ALBUMIN SERPL BCG-MCNC: 4.4 G/DL (ref 3.5–5.2)
ALP SERPL-CCNC: 94 U/L (ref 40–150)
ALT SERPL W P-5'-P-CCNC: 26 U/L (ref 0–50)
ANION GAP SERPL CALCULATED.3IONS-SCNC: 11 MMOL/L (ref 7–15)
AST SERPL W P-5'-P-CCNC: 21 U/L (ref 0–45)
BASOPHILS # BLD AUTO: 0.1 10E3/UL (ref 0–0.2)
BASOPHILS NFR BLD AUTO: 1 %
BILIRUB SERPL-MCNC: 0.2 MG/DL
BUN SERPL-MCNC: 11.1 MG/DL (ref 6–20)
CALCIUM SERPL-MCNC: 9.2 MG/DL (ref 8.6–10)
CHLORIDE SERPL-SCNC: 105 MMOL/L (ref 98–107)
CREAT SERPL-MCNC: 0.57 MG/DL (ref 0.51–0.95)
DEPRECATED HCO3 PLAS-SCNC: 21 MMOL/L (ref 22–29)
EGFRCR SERPLBLD CKD-EPI 2021: >90 ML/MIN/1.73M2
EOSINOPHIL # BLD AUTO: 0.4 10E3/UL (ref 0–0.7)
EOSINOPHIL NFR BLD AUTO: 5 %
ERYTHROCYTE [DISTWIDTH] IN BLOOD BY AUTOMATED COUNT: 12.9 % (ref 10–15)
GLUCOSE SERPL-MCNC: 95 MG/DL (ref 70–99)
HCG INTACT+B SERPL-ACNC: <1 MIU/ML
HCT VFR BLD AUTO: 39 % (ref 35–47)
HGB BLD-MCNC: 13.5 G/DL (ref 11.7–15.7)
HOLD SPECIMEN: NORMAL
IMM GRANULOCYTES # BLD: 0 10E3/UL
IMM GRANULOCYTES NFR BLD: 1 %
INR PPP: 0.99 (ref 0.85–1.15)
LVEF ECHO: NORMAL
LYMPHOCYTES # BLD AUTO: 1.2 10E3/UL (ref 0.8–5.3)
LYMPHOCYTES NFR BLD AUTO: 14 %
MCH RBC QN AUTO: 31.8 PG (ref 26.5–33)
MCHC RBC AUTO-ENTMCNC: 34.6 G/DL (ref 31.5–36.5)
MCV RBC AUTO: 92 FL (ref 78–100)
MONOCYTES # BLD AUTO: 0.4 10E3/UL (ref 0–1.3)
MONOCYTES NFR BLD AUTO: 5 %
NEUTROPHILS # BLD AUTO: 6.3 10E3/UL (ref 1.6–8.3)
NEUTROPHILS NFR BLD AUTO: 74 %
NRBC # BLD AUTO: 0 10E3/UL
NRBC BLD AUTO-RTO: 0 /100
NT-PROBNP SERPL-MCNC: <36 PG/ML (ref 0–450)
PLATELET # BLD AUTO: 210 10E3/UL (ref 150–450)
POTASSIUM SERPL-SCNC: 4 MMOL/L (ref 3.4–5.3)
PROT SERPL-MCNC: 7.3 G/DL (ref 6.4–8.3)
RBC # BLD AUTO: 4.24 10E6/UL (ref 3.8–5.2)
SODIUM SERPL-SCNC: 137 MMOL/L (ref 135–145)
TSH SERPL DL<=0.005 MIU/L-ACNC: 4.16 UIU/ML (ref 0.3–4.2)
WBC # BLD AUTO: 8.3 10E3/UL (ref 4–11)

## 2023-11-15 PROCEDURE — 36415 COLL VENOUS BLD VENIPUNCTURE: CPT | Performed by: INTERNAL MEDICINE

## 2023-11-15 PROCEDURE — 85610 PROTHROMBIN TIME: CPT | Performed by: INTERNAL MEDICINE

## 2023-11-15 PROCEDURE — 999N000128 HC STATISTIC PERIPHERAL IV START W/O US GUIDANCE

## 2023-11-15 PROCEDURE — 999N000142 HC STATISTIC PROCEDURE PREP ONLY

## 2023-11-15 PROCEDURE — 94618 PULMONARY STRESS TESTING: CPT | Performed by: INTERNAL MEDICINE

## 2023-11-15 PROCEDURE — C1751 CATH, INF, PER/CENT/MIDLINE: HCPCS | Performed by: INTERNAL MEDICINE

## 2023-11-15 PROCEDURE — 99213 OFFICE O/P EST LOW 20 MIN: CPT | Performed by: INTERNAL MEDICINE

## 2023-11-15 PROCEDURE — 93451 RIGHT HEART CATH: CPT | Mod: 26 | Performed by: INTERNAL MEDICINE

## 2023-11-15 PROCEDURE — 250N000009 HC RX 250: Performed by: INTERNAL MEDICINE

## 2023-11-15 PROCEDURE — 85025 COMPLETE CBC W/AUTO DIFF WBC: CPT | Performed by: INTERNAL MEDICINE

## 2023-11-15 PROCEDURE — 84702 CHORIONIC GONADOTROPIN TEST: CPT | Performed by: INTERNAL MEDICINE

## 2023-11-15 PROCEDURE — 120N000003 HC R&B IMCU UMMC

## 2023-11-15 PROCEDURE — 99223 1ST HOSP IP/OBS HIGH 75: CPT | Mod: 25 | Performed by: INTERNAL MEDICINE

## 2023-11-15 PROCEDURE — 99214 OFFICE O/P EST MOD 30 MIN: CPT | Mod: 25 | Performed by: INTERNAL MEDICINE

## 2023-11-15 PROCEDURE — 272N000001 HC OR GENERAL SUPPLY STERILE: Performed by: INTERNAL MEDICINE

## 2023-11-15 PROCEDURE — 93306 TTE W/DOPPLER COMPLETE: CPT | Mod: GC | Performed by: INTERNAL MEDICINE

## 2023-11-15 PROCEDURE — 93451 RIGHT HEART CATH: CPT | Performed by: INTERNAL MEDICINE

## 2023-11-15 PROCEDURE — 84443 ASSAY THYROID STIM HORMONE: CPT | Performed by: INTERNAL MEDICINE

## 2023-11-15 PROCEDURE — 80053 COMPREHEN METABOLIC PANEL: CPT | Performed by: INTERNAL MEDICINE

## 2023-11-15 PROCEDURE — 86038 ANTINUCLEAR ANTIBODIES: CPT | Performed by: INTERNAL MEDICINE

## 2023-11-15 PROCEDURE — 999N000132 HC STATISTIC PP CARE STAGE 1

## 2023-11-15 PROCEDURE — 4A023N6 MEASUREMENT OF CARDIAC SAMPLING AND PRESSURE, RIGHT HEART, PERCUTANEOUS APPROACH: ICD-10-PCS | Performed by: INTERNAL MEDICINE

## 2023-11-15 PROCEDURE — 83880 ASSAY OF NATRIURETIC PEPTIDE: CPT | Performed by: INTERNAL MEDICINE

## 2023-11-15 PROCEDURE — 86225 DNA ANTIBODY NATIVE: CPT | Performed by: INTERNAL MEDICINE

## 2023-11-15 RX ORDER — MAGNESIUM HYDROXIDE/ALUMINUM HYDROXICE/SIMETHICONE 120; 1200; 1200 MG/30ML; MG/30ML; MG/30ML
30 SUSPENSION ORAL EVERY 4 HOURS PRN
Status: DISCONTINUED | OUTPATIENT
Start: 2023-11-15 | End: 2023-11-17 | Stop reason: HOSPADM

## 2023-11-15 RX ORDER — FUROSEMIDE 20 MG
20 TABLET ORAL DAILY
Status: DISCONTINUED | OUTPATIENT
Start: 2023-11-16 | End: 2023-11-17 | Stop reason: HOSPADM

## 2023-11-15 RX ORDER — TADALAFIL 20 MG/1
20 TABLET ORAL EVERY 24 HOURS
Status: DISCONTINUED | OUTPATIENT
Start: 2023-11-16 | End: 2023-11-17 | Stop reason: HOSPADM

## 2023-11-15 RX ORDER — ACETAMINOPHEN 650 MG/1
650 SUPPOSITORY RECTAL EVERY 4 HOURS PRN
Status: DISCONTINUED | OUTPATIENT
Start: 2023-11-15 | End: 2023-11-17 | Stop reason: HOSPADM

## 2023-11-15 RX ORDER — AMOXICILLIN 250 MG
1 CAPSULE ORAL 2 TIMES DAILY PRN
Status: DISCONTINUED | OUTPATIENT
Start: 2023-11-15 | End: 2023-11-17 | Stop reason: HOSPADM

## 2023-11-15 RX ORDER — DIGOXIN 125 MCG
125 TABLET ORAL DAILY
Status: DISCONTINUED | OUTPATIENT
Start: 2023-11-15 | End: 2023-11-15

## 2023-11-15 RX ORDER — ACETAMINOPHEN 325 MG/1
650 TABLET ORAL EVERY 4 HOURS PRN
Status: DISCONTINUED | OUTPATIENT
Start: 2023-11-15 | End: 2023-11-17 | Stop reason: HOSPADM

## 2023-11-15 RX ORDER — LIDOCAINE 40 MG/G
CREAM TOPICAL
Status: COMPLETED | OUTPATIENT
Start: 2023-11-15 | End: 2023-11-15

## 2023-11-15 RX ORDER — AMOXICILLIN 250 MG
2 CAPSULE ORAL 2 TIMES DAILY PRN
Status: DISCONTINUED | OUTPATIENT
Start: 2023-11-15 | End: 2023-11-17 | Stop reason: HOSPADM

## 2023-11-15 RX ORDER — LIDOCAINE 40 MG/G
CREAM TOPICAL
Status: DISCONTINUED | OUTPATIENT
Start: 2023-11-15 | End: 2023-11-17 | Stop reason: HOSPADM

## 2023-11-15 RX ORDER — TADALAFIL 20 MG/1
20 TABLET ORAL EVERY 24 HOURS
Status: DISCONTINUED | OUTPATIENT
Start: 2023-11-15 | End: 2023-11-15

## 2023-11-15 RX ORDER — DIGOXIN 125 MCG
125 TABLET ORAL DAILY
Status: DISCONTINUED | OUTPATIENT
Start: 2023-11-16 | End: 2023-11-17 | Stop reason: HOSPADM

## 2023-11-15 RX ORDER — FUROSEMIDE 20 MG
20 TABLET ORAL DAILY
Status: DISCONTINUED | OUTPATIENT
Start: 2023-11-15 | End: 2023-11-15

## 2023-11-15 RX ADMIN — LIDOCAINE: 40 CREAM TOPICAL at 08:00

## 2023-11-15 ASSESSMENT — COLUMBIA-SUICIDE SEVERITY RATING SCALE - C-SSRS
6. HAVE YOU EVER DONE ANYTHING, STARTED TO DO ANYTHING, OR PREPARED TO DO ANYTHING TO END YOUR LIFE?: NO
1. IN THE PAST MONTH, HAVE YOU WISHED YOU WERE DEAD OR WISHED YOU COULD GO TO SLEEP AND NOT WAKE UP?: NO
2. HAVE YOU ACTUALLY HAD ANY THOUGHTS OF KILLING YOURSELF IN THE PAST MONTH?: NO

## 2023-11-15 ASSESSMENT — ACTIVITIES OF DAILY LIVING (ADL)
ADLS_ACUITY_SCORE: 35
ADLS_ACUITY_SCORE: 18
ADLS_ACUITY_SCORE: 35

## 2023-11-15 ASSESSMENT — PAIN SCALES - GENERAL: PAINLEVEL: NO PAIN (0)

## 2023-11-15 NOTE — DISCHARGE INSTRUCTIONS
[facility]                        Interventional Cardiology  Discharge Instructions   Post Right Heart Cath       AFTER YOU GO HOME:  DO drink plenty of fluids  DO resume your regular diet and medications unless otherwise instructed by your Primary Physician  Do Not scrub the procedure site vigorously  No lotion or powder to the puncture site for 3 days    CALL YOUR PRIMARY PHYSICIAN IF: You may resume all normal activity.  Monitor neck site for bleeding, swelling, or voice changes. If you notice bleeding or swelling immediately apply pressure to the site and call number below to speak with Cardiology Fellow.  If you experience any changes in your breathing you should call your doctor immediately or come to the closest Emergency Department.  Do not drive yourself.    ADDITIONAL INSTRUCTIONS: Medications: You are to resume all home medications including anticoagulation therapy unless otherwise advised by your primary cardiologist or nurse coordinator.    Follow Up: Per your primary cardiology team    If you have any questions or concerns regarding your procedure site please call [phone] at anytime and ask for Cardiology Fellow on call.  They are available 24 hours a day.  You may also contact the Cardiology Clinic after hours number at [phone].                                                       Telephone Numbers [phone] Monday-Friday 8:00 am to 4:30 pm    [phone] After 4:30 pm Monday-Friday, Weekends & Holidays  Ask for Interventional Cardiologist on call. Someone is on call 24 hours/day   [facility] toll free number [phone] Monday-Friday 8:00 am to 4:30 pm   [facility] Emergency Dept [phone]

## 2023-11-15 NOTE — PROGRESS NOTES
Admission          11/15/2023  6:49 AM  -----------------------------------------------------------  Diagnosis:  Pulmonary HNT    Admitted from: clinic  Via: ambulation  Accompanied by: self  Family Aware of Admission: [id number]  Belongings: cell phone, blanket, remodulin pump and supplies, blanker, Purse with $60, shoes, bracelets,   Admission Profile: Complete  Teaching: Orientation to unit, call don't fall, use of call light, meal times, visiting hours,  when to call for the RN (angina/sob/dizzyness, etc.), and enforced importance of safety.  Access:   Telemetry: Placed on pt  Ht./Wt.: Complete  2 RN skin assessment: completed with Leonides Rn    Temp:  [97.7  F (36.5  C)-98.2  F (36.8  C)] 98.2  F (36.8  C)  Pulse:  [79-92] 88  Resp:  [18-20] 18  BP: ()/(66-74) 97/74  SpO2:  [94 %-95 %] 95 %

## 2023-11-15 NOTE — PROGRESS NOTES
Prep and teaching complete for [facility] pt awake and alert, reports head pain, a nuisance per pt. Drove self, planned 6C admission after out pt appointments; plan to change from IV to subcutaneous remodulin on 6C.  Consent current. Discharge instructions reviewed; pt stated understanding, copy to pt.

## 2023-11-15 NOTE — PROGRESS NOTES
Chelsearomero Flower comes into clinic today at the request of Dr. Alex Carroll Ordering Provider for 6 minute walk        This service provided today was under the supervising provider of the day Dr. Otto Miller, who was available if needed.    Angel Damian, RRT

## 2023-11-15 NOTE — PROGRESS NOTES
Coordinated care with hospital and nurses 40 minutes    Impression:    1. Exertional shortness of breath   2. Pulmonary hypertension .  3. Childbirth x 3 by   4 Hypertension treated with recently started diltiazem  5.Hypothyroidism  6. Severe right ventricular dysfunction  7. Severe pulmonary arterial hypertension  8. Initiation of parenteral prostacyclin  9. Oxygen dependence    Plan:  Admit for conversion from IV to subcutaneous remodulin  Marked improvement    The patient returns for follow-up of PAH.  There is no interim history of chest pain, tightness, paroxysmal nocturnal dyspnea, orthopnea, peripheral edema, palpitation, pre-syncope, syncope,  Exercise tolerance is stable.  The patient is attempting to exercise regularly and following a sodium restricted, calorically appropriate diet.  Medications are reviewed and the patient is taking medications as prescribed.  The patient is generally sleeping well.      Constitutional: weight loss, fever, chills, night sweats  HEENT: without visual changes, swallow difficulties  IV for prostacyclin  Pulmonary: without shortness of breath, cough, wheeze, hemoptysis  Cardiac: without chest pain, MALAVE, PND, orthopnea, edema, palpitation, pre-syncope, syncope,  GI: without diarrhea, constipation, jaundice, melena, GERD, hematemesis  : without frequency, urgency, dysuria, hematuria  Skin: rash, bruise, open lesions  Neuro: without TIA, focal neurologic complaints, seizure, trauma  Ortho: without pain, swelling, mobility impairment  Endocrine: diabetes, thyroid, heat/cold intolerance, polyuria, polyphagia, change bowel habits.    Constitutional: alert, oriented, normal gait and station, normal mentation.  Oral: moist mucous membrans  Lymph: without pathologic adenopathy  Chest: clear to ausculation and percussion  Cor: No evidence of left or right ventricular activity.  Rhythm is regular.  S1 normal, S2 split physiologically. Murmurs are not present  Abdomen:  without tenderness, rebound, guarding, masses, ascites  Extremities: Edema not present  Neuro: no focal defects, normal mentation  Skin: without open lesions  Psych: oriented, verbal, mental status in tact            Catheterization 2023       Right sided filling pressures are mildly elevated.    Left sided filling pressures are mildly elevated.    Moderately elevated pulmonary artery hypertension.    Normal cardiac output level.    Hemodynamic data has been modified in Epic per physician review.     Mildly elevated right side filling pressures   Combined pre and post capillary pulmonary hypertension   Mildly elevated pulmonary capillary wedge pressure  Normal cardiac output                         Hemodynamics    RA:15/13/11  RV:45  PCWP:/  PA:48/18/34    PA O2 Sat:74%  Wedge O2 Sat:93%  H.4    Thermodilution; CO:7.40, CI:4.02  [place]; CO:6.32, CI:3.43    PVR:2.56w Right sided filling pressures are mildly elevated. Left sided filling pressures are mildly elevated. Moderately elevated pulmonary artery hypertension. Normal cardiac output level.       Labs:   Latest Reference Range & Units 11/15/23 07:12   Sodium 135 - 145 mmol/L 137   Potassium 3.4 - 5.3 mmol/L 4.0   Chloride 98 - 107 mmol/L 105   Carbon Dioxide (CO2) 22 - 29 mmol/L 21 (L)   Urea Nitrogen 6.0 - 20.0 mg/dL 11.1   Creatinine 0.51 - 0.95 mg/dL 0.57   GFR Estimate >60 mL/min/1.73m2 >90   Calcium 8.6 - 10.0 mg/dL 9.2   Anion Gap 7 - 15 mmol/L 11   Albumin 3.5 - 5.2 g/dL 4.4   Protein Total 6.4 - 8.3 g/dL 7.3   Alkaline Phosphatase 40 - 150 U/L 94   ALT 0 - 50 U/L 26   AST 0 - 45 U/L 21   Bilirubin Total <=1.2 mg/dL 0.2   Glucose 70 - 99 mg/dL 95   hCG Quantitative <5 mIU/mL <1   N-Terminal Pro BNP Inpatient 0 - 450 pg/mL <36   TSH 0.30 - 4.20 uIU/mL 4.16   WBC 4.0 - 11.0 10e3/uL 8.3   Hemoglobin 11.7 - 15.7 g/dL 13.5   Hematocrit 35.0 - 47.0 % 39.0   Platelet Count 150 - 450 10e3/uL 210   RBC Count 3.80 - 5.20 10e6/uL 4.24   MCV 78 - 100  fL 92   MCH 26.5 - 33.0 pg 31.8   MCHC 31.5 - 36.5 g/dL 34.6   RDW 10.0 - 15.0 % 12.9   % Neutrophils % 74   % Lymphocytes % 14   % Monocytes % 5   % Eosinophils % 5   % Basophils % 1   Absolute Basophils 0.0 - 0.2 10e3/uL 0.1   Absolute Eosinophils 0.0 - 0.7 10e3/uL 0.4   Absolute Immature Granulocytes <=0.4 10e3/uL 0.0   Absolute Lymphocytes 0.8 - 5.3 10e3/uL 1.2   Absolute Monocytes 0.0 - 1.3 10e3/uL 0.4   % Immature Granulocytes % 1   Absolute Neutrophils 1.6 - 8.3 10e3/uL 6.3   Absolute NRBCs 10e3/uL 0.0   NRBCs per 100 WBC <1 /100 0   INR 0.85 - 1.15  0.99     Echocardiogram 11/2023 pending

## 2023-11-15 NOTE — H&P
[facility]    Cardiology History and Physical - Cardiology    Date of Admission:  11/15/2023    Assessment & Plan: S    Chelsea Flower is a 33 year old female admitted on 11/15/2023. She has a history of severe idiopathic PAH c/b RV failure, hypertension, and subclinical hypothyroidism here for planned admission for conversion from IV to subcutaneous remodulin.     # Severe idiopathic PAH c/b RV failure  Patient presents for planned admission for transition from IV Remodulin to subcutaneous. Plan to switch to subcutaneous prostacyclin to reduce sepsis risk. She also has young children and subcutaneous is more protected than IV connection. She lives far from  center as well. She is being well maintained from volume and pressure perspective on current therapies listed below. She follows with Dr. Carroll outpatient.      RHC today shows   RA: 11   RV: 45/7   PA: 45/18 (34)   PCWP: 17   PVR: 2.56     Findings consistent with pre-capillary pulmonary hypertension that is mild in severity. Improved since last RHC. We will resume home therapies with planned teaching and transition.    - PAH: Remodulin 70 ng/kg/min, digoxin 125 mcg daily, tadalafil 20 mg daily, macitentan 10 mg daily  - Diuretics: Lasix 20 mg daily  - Plan transition tomorrow with specialty coordinator   - Discontinue IV Remodulin 1 hour prior to subcutaneous injection   - Plan discharge 23 hours after switch to subcutaneous Remodulin     #Subclinical Hypothyroidism   Hx of elevated TSH with normal T4. Repeat today shows TSH at upper limits of normal at 4.16.        Diet: Regular Diet Adult    DVT Prophylaxis: Ambulate every shift  Valerio Catheter: Not present  Cardiac Monitoring: ACTIVE order. Indication: Drug monitoring, pulmonary HTN  Code Status: Full Code      Clinically Significant Risk Factors Present on Admission                       # Obesity: Estimated body mass index is 31.55 kg/m   "as calculated from the following:    Height as of this encounter: 1.6 m (5' 3\").    Weight as of this encounter: 80.8 kg (178 lb 1.6 oz).           Disposition Plan   Expected discharge: Thursday or Friday recommended to prior living arrangement once  transitioned to Subcutaneous Remodulin .    Entered: Jr Ortiz MD 11/15/2023, 5:07 PM   The patient's care was discussed with the Attending Physician, Dr. Lyudmila Lovell .    Jr Ortiz MD  Internal Medicine Resident, PGY-1   [facility]  __________________________________________________________________    Chief Complaint   Transition from IV Remodulin to Subcutaneous     History is obtained from the patient    History of Present Illness   Chelsea Flower is a 33 year old female with PmHx of severe idiopathic PAH c/b RV failure on remodulin, subclinical hypothyroidism and HTN presenting for planned admission for transition from IV Remodulin to subcutaneous.     Patient reports that she has had significant improvement in her health since being diagnosed with pulmonary hypertension in February of this year.  She says for 2 years she was misdiagnosed and treated for asthma.  She is asymptomatic at rest.  She reports mild shortness of breath and chest pressure with exertion of going up stairs or carrying something.  She endorses mild headaches that are manageable with Tylenol or Advil.  She also endorses loose stools which she attributes to remodeling.  She has no concerns or complaints today.    Past Medical History    Past Medical History:   Diagnosis Date    Hypertension     Pulmonary hypertension (H) 02/2023     Past Surgical History   Past Surgical History:   Procedure Laterality Date    CV RIGHT HEART CATH MEASUREMENTS RECORDED N/A 2/8/2023    Procedure: Right Heart Catheterization;  Surgeon: Karin Frazier MD;  Location:  HEART CARDIAC CATH LAB    CV RIGHT HEART CATH MEASUREMENTS RECORDED N/A 8/1/2023    " Procedure: Heart Cath Right Heart Cath;  Surgeon: Ahmet Lovell MD;  Location:  HEART CARDIAC CATH LAB    IR CVC TUNNEL PLACEMENT > 5 YRS OF AGE  2023    IR CVC TUNNEL PLACEMENT > 5 YRS OF AGE  2023    IR PICC PLACEMENT > 5 YRS OF AGE  2023    PICC SINGLE LUMEN PLACEMENT Right 2023    Basilic Vein 4F SL 43 cm, 4 cm out     Surgery   -section x3    Prior to Admission Medications   Prior to Admission Medications   Prescriptions Last Dose Informant Patient Reported? Taking?   COMPOUNDED NON-CONTROLLED SUBSTANCE (CMPD RX) - PHARMACY TO MIX COMPOUNDED MEDICATION Unknown  Yes Yes   Sig: 10% Lidocaine gel. Apply 1-2 grams to affected site 1-2 times per day 8-12 hours apart  [facility] [phone]   Multiple Vitamins-Minerals (WOMENS MULTIVITAMIN) TABS 2023  Yes Yes   Sig: Take 1 tablet by mouth daily   acetaminophen (TYLENOL) 325 MG tablet Past Week  No Yes   Sig: Take 3 tablets (975 mg) by mouth every 6 hours   digoxin (LANOXIN) 125 MCG tablet 11/15/2023 at 0530  No Yes   Sig: Take 1 tablet (125 mcg) by mouth daily   furosemide (LASIX) 20 MG tablet 2023  No Yes   Sig: Take 1 tablet (20 mg) by mouth daily   macitentan (OPSUMIT) 10 MG tablet 11/15/2023 at 0530  Yes Yes   Sig: Take 1 tablet (10 mg) by mouth daily Please make sure you are completing your monthly mandatory labs.   tadalafil (ADCIRCA/CIALIS) 20 MG tablet 11/15/2023 at 0530  Yes Yes   Sig: Take 20 mg by mouth every 24 hours   treprostinil (REMODULIN) infusion 2.5 mg/mL 11/15/2023  Yes Yes   Sig: Inject 70 ng/kg/min into the vein continuous Dose Weight: 75.6kg      Facility-Administered Medications: None        Review of Systems    CONSTITUTIONAL:  negative for  fevers, chills, anorexia, and weight loss  EYES:  negative for vision changes  HEENT: Recent head cold, currently asymptomatic.  No hearing issues.  RESPIRATORY: SOB per HPI.  No cough.  No sputum production.  CARDIOVASCULAR: Occasional palpitations  with activity.  GASTROINTESTINAL: No abdominal pain, nausea, vomiting or constipation.  GENITOURINARY: No urinary symptoms.  Normal menstruation.  HEMATOLOGIC/LYMPHATIC: No personal or family history of blood clots.  MUSCULOSKELETAL: No myalgias or bone pain.  Occasional lightheadedness  NEUROLOGICAL: Occasional lightheadedness.     Social History   I have reviewed this patient's social history and updated it with pertinent information if needed.  Social History     Tobacco Use    Smoking status: Never    Smokeless tobacco: Never   Vaping Use    Vaping Use: Never used   Substance Use Topics    Alcohol use: Not Currently    Drug use: Not Currently     Family History     No family history of diabetes or autoimmune disease    Allergies   Allergies   Allergen Reactions    Other Food Allergy      Cantaloupe      Physical Exam   Vital Signs: Temp: 98.2  F (36.8  C) Temp src: Oral BP: 97/74 Pulse: 88   Resp: 18 SpO2: 95 % O2 Device: None (Room air)    Weight: 178 lbs 1.6 oz    General Appearance: Patient is sitting up in bed.  She appears comfortable.  No acute distress.  Eyes: Normal conjunctive a and sclera.  HEENT: Moist mucous membranes.  Respiratory: Normal respiratory effort.  Lungs are clear to auscultation.  Cardiovascular: Regular rate and rhythm.  Prominent S2.  No murmur noted.  2+ dorsalis pedis pulses.  GI: Abdomen is soft, nontender, nondistended.   Musculoskeletal: No lower extremity edema.  Neurologic: Speech is fluent.  Alert and oriented.  Answers questions appropriately.  Gross motor is intact.    Medical Decision Making       Please see A&P for additional details of medical decision making.      Data     I have personally reviewed the following data over the past 24 hrs:    8.3  \   13.5   / 210     137 105 11.1 /  95   4.0 21 (L) 0.57 \     ALT: 26 AST: 21 AP: 94 TBILI: 0.2   ALB: 4.4 TOT PROTEIN: 7.3 LIPASE: N/A     Trop: N/A BNP: <36     TSH: 4.16 T4: N/A A1C: N/A     INR:  0.99 PTT:  N/A   D-dimer:   N/A Fibrinogen:  N/A

## 2023-11-15 NOTE — LETTER
11/15/2023      RE: Chelsea Flower  [address]  [place] [address]       Dear Colleague,    Thank you for the opportunity to participate in the care of your patient, Chelsea Flower, at the [facility] [place] [facility]. Please see a copy of my visit note below.      Coordinated care with hospital and nurses 40 minutes    Impression:    1. Exertional shortness of breath   2. Pulmonary hypertension .  3. Childbirth x 3 by   4 Hypertension treated with recently started diltiazem  5.Hypothyroidism  6. Severe right ventricular dysfunction  7. Severe pulmonary arterial hypertension  8. Initiation of parenteral prostacyclin  9. Oxygen dependence    Plan:  Admit for conversion from IV to subcutaneous remodulin  Marked improvement    The patient returns for follow-up of PAH.  There is no interim history of chest pain, tightness, paroxysmal nocturnal dyspnea, orthopnea, peripheral edema, palpitation, pre-syncope, syncope,  Exercise tolerance is stable.  The patient is attempting to exercise regularly and following a sodium restricted, calorically appropriate diet.  Medications are reviewed and the patient is taking medications as prescribed.  The patient is generally sleeping well.      Constitutional: weight loss, fever, chills, night sweats  HEENT: without visual changes, swallow difficulties  IV for prostacyclin  Pulmonary: without shortness of breath, cough, wheeze, hemoptysis  Cardiac: without chest pain, MALAVE, PND, orthopnea, edema, palpitation, pre-syncope, syncope,  GI: without diarrhea, constipation, jaundice, melena, GERD, hematemesis  : without frequency, urgency, dysuria, hematuria  Skin: rash, bruise, open lesions  Neuro: without TIA, focal neurologic complaints, seizure, trauma  Ortho: without pain, swelling, mobility impairment  Endocrine: diabetes, thyroid, heat/cold intolerance, polyuria, polyphagia, change bowel  habits.    Constitutional: alert, oriented, normal gait and station, normal mentation.  Oral: moist mucous membrans  Lymph: without pathologic adenopathy  Chest: clear to ausculation and percussion  Cor: No evidence of left or right ventricular activity.  Rhythm is regular.  S1 normal, S2 split physiologically. Murmurs are not present  Abdomen: without tenderness, rebound, guarding, masses, ascites  Extremities: Edema not present  Neuro: no focal defects, normal mentation  Skin: without open lesions  Psych: oriented, verbal, mental status in tact            Catheterization 2023       Right sided filling pressures are mildly elevated.     Left sided filling pressures are mildly elevated.     Moderately elevated pulmonary artery hypertension.     Normal cardiac output level.     Hemodynamic data has been modified in Epic per physician review.     Mildly elevated right side filling pressures   Combined pre and post capillary pulmonary hypertension   Mildly elevated pulmonary capillary wedge pressure  Normal cardiac output                         Hemodynamics    RA:15/13/11  RV:45  PCWP:19/20/17  PA:48/18/34    PA O2 Sat:74%  Wedge O2 Sat:93%  H.4    Thermodilution; CO:7.40, CI:4.02  [place]; CO:6.32, CI:3.43    PVR:2.56w Right sided filling pressures are mildly elevated. Left sided filling pressures are mildly elevated. Moderately elevated pulmonary artery hypertension. Normal cardiac output level.       Labs:   Latest Reference Range & Units 11/15/23 07:12   Sodium 135 - 145 mmol/L 137   Potassium 3.4 - 5.3 mmol/L 4.0   Chloride 98 - 107 mmol/L 105   Carbon Dioxide (CO2) 22 - 29 mmol/L 21 (L)   Urea Nitrogen 6.0 - 20.0 mg/dL 11.1   Creatinine 0.51 - 0.95 mg/dL 0.57   GFR Estimate >60 mL/min/1.73m2 >90   Calcium 8.6 - 10.0 mg/dL 9.2   Anion Gap 7 - 15 mmol/L 11   Albumin 3.5 - 5.2 g/dL 4.4   Protein Total 6.4 - 8.3 g/dL 7.3   Alkaline Phosphatase 40 - 150 U/L 94   ALT 0 - 50 U/L 26   AST 0 - 45 U/L 21    Bilirubin Total <=1.2 mg/dL 0.2   Glucose 70 - 99 mg/dL 95   hCG Quantitative <5 mIU/mL <1   N-Terminal Pro BNP Inpatient 0 - 450 pg/mL <36   TSH 0.30 - 4.20 uIU/mL 4.16   WBC 4.0 - 11.0 10e3/uL 8.3   Hemoglobin 11.7 - 15.7 g/dL 13.5   Hematocrit 35.0 - 47.0 % 39.0   Platelet Count 150 - 450 10e3/uL 210   RBC Count 3.80 - 5.20 10e6/uL 4.24   MCV 78 - 100 fL 92   MCH 26.5 - 33.0 pg 31.8   MCHC 31.5 - 36.5 g/dL 34.6   RDW 10.0 - 15.0 % 12.9   % Neutrophils % 74   % Lymphocytes % 14   % Monocytes % 5   % Eosinophils % 5   % Basophils % 1   Absolute Basophils 0.0 - 0.2 10e3/uL 0.1   Absolute Eosinophils 0.0 - 0.7 10e3/uL 0.4   Absolute Immature Granulocytes <=0.4 10e3/uL 0.0   Absolute Lymphocytes 0.8 - 5.3 10e3/uL 1.2   Absolute Monocytes 0.0 - 1.3 10e3/uL 0.4   % Immature Granulocytes % 1   Absolute Neutrophils 1.6 - 8.3 10e3/uL 6.3   Absolute NRBCs 10e3/uL 0.0   NRBCs per 100 WBC <1 /100 0   INR 0.85 - 1.15  0.99     Echocardiogram 11/2023 pending      Please do not hesitate to contact me if you have any questions/concerns.     Sincerely,     Alex Carroll MD

## 2023-11-15 NOTE — PROGRESS NOTES
Patient arrived to room 15 with cath lab RN s/p ANA . VSS. Denies pain. Pt alert and oriented x4. Right internal jugular site CDI.

## 2023-11-15 NOTE — NURSING NOTE
Chief Complaint   Patient presents with    Follow Up     Return Pulmonary Hypertension       Vitals were taken, medications reconciled.    Julien Will, Facilitator   2:25 PM

## 2023-11-16 ENCOUNTER — APPOINTMENT (OUTPATIENT)
Dept: INTERVENTIONAL RADIOLOGY/VASCULAR | Facility: CLINIC | Age: 33
End: 2023-11-16
Attending: NURSE PRACTITIONER
Payer: COMMERCIAL

## 2023-11-16 LAB
ALBUMIN SERPL BCG-MCNC: 3.9 G/DL (ref 3.5–5.2)
ALP SERPL-CCNC: 85 U/L (ref 40–150)
ALT SERPL W P-5'-P-CCNC: 17 U/L (ref 0–50)
ANION GAP SERPL CALCULATED.3IONS-SCNC: 10 MMOL/L (ref 7–15)
AST SERPL W P-5'-P-CCNC: 18 U/L (ref 0–45)
BILIRUB SERPL-MCNC: 0.2 MG/DL
BUN SERPL-MCNC: 9.9 MG/DL (ref 6–20)
CALCIUM SERPL-MCNC: 8.6 MG/DL (ref 8.6–10)
CHLORIDE SERPL-SCNC: 105 MMOL/L (ref 98–107)
CREAT SERPL-MCNC: 0.54 MG/DL (ref 0.51–0.95)
DEPRECATED HCO3 PLAS-SCNC: 22 MMOL/L (ref 22–29)
DSDNA AB SER-ACNC: 20 IU/ML
EGFRCR SERPLBLD CKD-EPI 2021: >90 ML/MIN/1.73M2
FIO2-PRE: 28 %
GLUCOSE SERPL-MCNC: 90 MG/DL (ref 70–99)
HOLD SPECIMEN: NORMAL
POTASSIUM SERPL-SCNC: 3.9 MMOL/L (ref 3.4–5.3)
PROT SERPL-MCNC: 6.5 G/DL (ref 6.4–8.3)
SODIUM SERPL-SCNC: 137 MMOL/L (ref 135–145)

## 2023-11-16 PROCEDURE — 36589 REMOVAL TUNNELED CV CATH: CPT | Performed by: PHYSICIAN ASSISTANT

## 2023-11-16 PROCEDURE — 36415 COLL VENOUS BLD VENIPUNCTURE: CPT

## 2023-11-16 PROCEDURE — 99233 SBSQ HOSP IP/OBS HIGH 50: CPT | Performed by: INTERNAL MEDICINE

## 2023-11-16 PROCEDURE — 250N000013 HC RX MED GY IP 250 OP 250 PS 637

## 2023-11-16 PROCEDURE — G0378 HOSPITAL OBSERVATION PER HR: HCPCS

## 2023-11-16 PROCEDURE — 120N000003 HC R&B IMCU UMMC

## 2023-11-16 PROCEDURE — 36589 REMOVAL TUNNELED CV CATH: CPT

## 2023-11-16 PROCEDURE — 80053 COMPREHEN METABOLIC PANEL: CPT

## 2023-11-16 RX ORDER — TREPROSTINIL 10 MG/ML
70.55 INJECTION, SOLUTION INTRAVENOUS; SUBCUTANEOUS CONTINUOUS
Status: DISCONTINUED | OUTPATIENT
Start: 2023-11-16 | End: 2023-11-17 | Stop reason: HOSPADM

## 2023-11-16 RX ADMIN — ACETAMINOPHEN 650 MG: 325 TABLET, FILM COATED ORAL at 17:00

## 2023-11-16 RX ADMIN — FUROSEMIDE 20 MG: 20 TABLET ORAL at 08:28

## 2023-11-16 RX ADMIN — TADALAFIL 20 MG: 20 TABLET ORAL at 08:28

## 2023-11-16 RX ADMIN — ACETAMINOPHEN 650 MG: 325 TABLET, FILM COATED ORAL at 21:18

## 2023-11-16 RX ADMIN — DIGOXIN 125 MCG: 125 TABLET ORAL at 08:28

## 2023-11-16 ASSESSMENT — ACTIVITIES OF DAILY LIVING (ADL)
ADLS_ACUITY_SCORE: 18

## 2023-11-16 NOTE — PROGRESS NOTES
Brief Care Coordination Note    Pt already established with [facility] for home remodulin. Pt is independent with administration, currently hospitalized for transition from IV to subcutaneous delivery. [facility] RN met with pt last week at pt's home for education and in the hospital today as the transition was made. Pt will stay overnight for assessment of response before discharging tomorrow with 8.5 hr drive home. Writer agreed to call [facility] when pt discharges tomorrow. Pt has all equipment and supplies needed and will receive cassette exchange with [facility] on 11/18.     Contacts  [facility] teaching nurse- Gia Conte (cell: [phone])    Amanda Gambino RNCC  Covering for 6C /Obs ([id number])  Phone [phone]  Pager [phone]      SEARCHABLE in [facility] - search CARE COORDINATOR      [place] [employer] [place] ([id number]) Saturday & Sunday; ([id number]) [facility] Recognized Holidays    Weekend Pager--  Units: 5A, 5B & 5C  Pager: [phone]  Units: 6B, 6C & 6D    Pager: [phone]  Units: 7A, 7B, 7C & 7D    Pager: [phone]  Units: 6A & ICU   Pager: [phone]  Units: 5 Ortho, 5MS & WB ED Pager: [phone]  Units: 6MS, 8A & 10 ICU  Pager [phone]

## 2023-11-16 NOTE — PROCEDURES
Chelsea Flower  [id number]    Completed removal of tunneled catheter via LEFT chest. Dx: venous access; no longer needed. Tonya.

## 2023-11-16 NOTE — PHARMACY-ADMISSION MEDICATION HISTORY
Pharmacist Admission Medication History    Admission medication history is complete. The information provided in this note is only as accurate as the sources available at the time of the update.    Information Source(s): Clinic records and CareEverywhere/SureScripts via in-person and EMR    Pertinent Information: Treprostinil dosing confirmed with [facility] (see pharmacist note from 11/15), last doses confirmed by nursing.    Changes made to PTA medication list:  Added: None  Deleted: None  Changed: None    Allergies reviewed with patient and updates made in EHR: unable to assess    Medication History Completed By: Paz Sifuentes [facility] 11/16/2023 1:53 PM  Prior to Admission medications    Medication Sig Last Dose Taking? Auth Provider Long Term End Date   acetaminophen (TYLENOL) 325 MG tablet Take 3 tablets (975 mg) by mouth every 6 hours Past Week Yes Maryjo Mckeon MD     COMPOUNDED NON-CONTROLLED SUBSTANCE (CMPD RX) - PHARMACY TO MIX COMPOUNDED MEDICATION 10% Lidocaine gel. Apply 1-2 grams to affected site 1-2 times per day 8-12 hours apart  [facility] [phone] Unknown Yes Alex Carroll MD     digoxin (LANOXIN) 125 MCG tablet Take 1 tablet (125 mcg) by mouth daily 11/15/2023 at 0530 Yes Alex Carroll MD Yes    furosemide (LASIX) 20 MG tablet Take 1 tablet (20 mg) by mouth daily 11/13/2023 Yes Alex Carroll MD Yes    macitentan (OPSUMIT) 10 MG tablet Take 1 tablet (10 mg) by mouth daily Please make sure you are completing your monthly mandatory labs. 11/15/2023 at 0530 Yes Alex Carroll MD     Multiple Vitamins-Minerals (WOMENS MULTIVITAMIN) TABS Take 1 tablet by mouth daily 11/14/2023 Yes Unknown, Entered By History     tadalafil (ADCIRCA/CIALIS) 20 MG tablet Take 20 mg by mouth every 24 hours 11/15/2023 at 0530 Yes Reported, Patient No    treprostinil (REMODULIN) infusion 2.5 mg/mL Inject 70 ng/kg/min into the vein continuous Dose Weight: 75.6kg 11/15/2023  Yes Alex Carroll MD Yes

## 2023-11-16 NOTE — PHARMACY-CONSULT NOTE
Parenteral Prostacyclin Therapy Continuation     This patient has been admitted to the hospital while receiving outpatient Treprostinil (Remodulin) therapy for the treatment of pulmonary hypertension.      [facility] has been contacted at [phone] to verify outpatient prostacyclin regimen. Based on the latest records, the following applies to this patient's  prostacyclin therapy.    1. Type of ambulatory pump used:  CADD-Legacy (100 mL cassette)  2.  Treprostinil (Remodulin)  Dosing Weight= 75.6kg  3.  Prostacyclin Concentration= 062791 Nanograms/mL if CADD Legacy   4.  Prostacyclin Dose = 70 Nanograms/kg/min  5. Ambulatory Pump Rate = 44 mL/day     Plan:    The patient s hospital stay is expected to be less than/equal to 24 hours and therefore the patient will remain on their ambulatory pump until transitioned to subcutaneous continuous infusion 11/16.  If the hospital stay is extended beyond 24 hours, pharmacy will work with the primary team to convert the patient over to a hospital syringe pump.     Juan Lang PharmD, BCTXP, BCPS  Inpatient clinical pharmacist

## 2023-11-16 NOTE — CONSULTS
"    Interventional Radiology  [facility] Consult Service Note  11/16/23   12:06 PM    Consult Requested: \"Elba remouval\"     Recommendations/Plan:    Patient is on IR schedule tentatively 11/16 for a LIJ TCVC removal.   Labs WNL for procedure.  Orders entered for procedure, No NPO required.  Consent will be done prior to procedure.     Please contact the IR charge RN at [phone] for estimated time of procedure. Timing of procedure TBD based on staffing/schedule and triage     Case and imaging discussed with IR attending, Dr. Paez.  Recommendations were reviewed with cardiology.    History of Present Illness:  Cheslea Flower is a 33 year old female with a history of severe idiopathic PAH c/b RV failure, hypertension, and subclinical hypothyroidism admitted 11/15 for planned for conversion from IV to subcutaneous remodulin. Pt with LIJ TCVC placed by IR 8/8/23. We are now consulted for removal.    Pertinent Imaging Reviewed:         Expected date of discharge:  TBD    Vitals:   /46 (BP Location: Left arm)   Pulse 83   Temp 98  F (36.7  C) (Oral)   Resp 16   Ht 1.6 m (5' 3\")   Wt 80 kg (176 lb 5.9 oz)   SpO2 96%   BMI 31.24 kg/m      Pertinent Labs:   Lab Results   Component Value Date    WBC 8.3 11/15/2023    WBC 7.6 08/07/2023    WBC 6.8 08/05/2023     Lab Results   Component Value Date    HGB 13.5 11/15/2023    HGB 11.7 08/07/2023    HGB 13.4 08/05/2023     Lab Results   Component Value Date     11/15/2023     08/07/2023     08/05/2023     Lab Results   Component Value Date    INR 0.99 11/15/2023    INR 1.2 (H) 01/04/2023    PTT 27 02/07/2023     Lab Results   Component Value Date    POTASSIUM 3.9 11/16/2023        COVID-19 Antibody Results, Testing for Immunity           No data to display              COVID-19 PCR Results          2/7/2023    16:12 2/10/2023    11:01 2/15/2023    20:21   COVID-19 PCR Results   SARS CoV2 PCR Negative  Negative  " Negative        DOLORES Lucas CNP  Interventional Radiology  Pager: [phone]

## 2023-11-16 NOTE — PLAN OF CARE
D: MALAVE and Pulmonary hypertension    I: Monitored vitals and assessed pt status.   Changed: Subcutaneous site placed on right side of abdomen and Remodulin switch over started at 1145. CVC removed @ 1530  Running: n/a  PRN: 650mg tylenol @ 1700  Tele: SR  O2: RA  Mobility: up ad jess    Neuro: A0x4  Cardiac:SR   Resp: Clear   GI/: No concerns  Diet:Reg diet   Skin: Rash on R. Chest, R. Heart cath site, site where CVC was removed  LADs: PIV R. Forearm     Need to knows: New Subcutaneous device for Remodulin, nurse phone number on pt. Whiteboard for questions. Device managed by patient     Plan: discharge home 11/17/2023     P: Continue to monitor Pt status and report changes to cards 2.

## 2023-11-16 NOTE — PROGRESS NOTES
[facility]    Cardiology History and Physical - Cardiology    Date of Admission:  11/15/2023    Assessment & Plan: S    Chelsea Flower is a 33 year old female admitted on 11/15/2023. She has a history of severe idiopathic PAH c/b RV failure, hypertension, and subclinical hypothyroidism here for planned admission for conversion from IV to subcutaneous remodulin.     Changes today   - Plan to stop IV remodulin 30-60 mins prior to sub q injection today. Plan to discharge tomorrow.   - IR consult for guy removal. NPO after [place]    # Severe idiopathic PAH c/b RV failure  Patient presents for planned admission for transition from IV Remodulin to subcutaneous. Plan to switch to subcutaneous prostacyclin to reduce sepsis risk. She also has young children and subcutaneous is more protected than IV connection. She lives far from  center as well. She is being well maintained from volume and pressure perspective on current therapies listed below. She follows with Dr. Carroll outpatient.      RHC today shows   RA: 11   RV: 45/7   PA: 45/18 (34)   PCWP: 17   PVR: 2.56     Findings consistent with pre-capillary pulmonary hypertension that is mild in severity. Improved since last RHC. We will resume home therapies with planned teaching and transition.    - PAH: Remodulin 70 ng/kg/min, digoxin 125 mcg daily, tadalafil 20 mg daily, macitentan 10 mg daily  - Diuretics: Lasix 20 mg daily  - Plan transition tomorrow with specialty coordinator   - Discontinue IV Remodulin 1 hour prior to subcutaneous injection   - Plan discharge 23 hours after switch to subcutaneous Remodulin     #Subclinical Hypothyroidism   Hx of elevated TSH with normal T4. Repeat today shows TSH at upper limits of normal at 4.16.        Diet: Regular Diet Adult    DVT Prophylaxis: Ambulate every shift  Valerio Catheter: Not present  Cardiac Monitoring: ACTIVE order. Indication: Drug monitoring,  "pulmonary HTN  Code Status: Full Code      Clinically Significant Risk Factors Present on Admission                       # Obesity: Estimated body mass index is 31.24 kg/m  as calculated from the following:    Height as of this encounter: 1.6 m (5' 3\").    Weight as of this encounter: 80 kg (176 lb 5.9 oz).           Disposition Plan   Expected discharge: Thursday or Friday recommended to prior living arrangement once  transitioned to Subcutaneous Remodulin .    Carloz Madrigal MD  Cardiology fellow (PGY4)  [phone]    __________________________________________________________________    Chief Complaint   Transition from IV Remodulin to Subcutaneous     History is obtained from the patient    History of Present Illness   Chelsea Flower is a 33 year old female with PmHx of severe idiopathic PAH c/b RV failure on remodulin, subclinical hypothyroidism and HTN presenting for planned admission for transition from IV Remodulin to subcutaneous.     Patient reports that she has had significant improvement in her health since being diagnosed with pulmonary hypertension in February of this year.  She says for 2 years she was misdiagnosed and treated for asthma.  She is asymptomatic at rest.  She reports mild shortness of breath and chest pressure with exertion of going up stairs or carrying something.  She endorses mild headaches that are manageable with Tylenol or Advil.  She also endorses loose stools which she attributes to remodeling.  She has no concerns or complaints today.    Past Medical History    Past Medical History:   Diagnosis Date    Hypertension     Pulmonary hypertension (H) 02/2023     Past Surgical History   Past Surgical History:   Procedure Laterality Date    CV RIGHT HEART CATH MEASUREMENTS RECORDED N/A 2/8/2023    Procedure: Right Heart Catheterization;  Surgeon: Karin Frazier MD;  Location:  HEART CARDIAC CATH LAB    CV RIGHT HEART CATH MEASUREMENTS RECORDED N/A 8/1/2023    Procedure: Heart " Cath Right Heart Cath;  Surgeon: Ahmet Lovell MD;  Location:  HEART CARDIAC CATH LAB    CV RIGHT HEART CATH MEASUREMENTS RECORDED N/A 11/15/2023    Procedure: Heart Cath Right Heart Cath;  Surgeon: Ahmet Lovell MD;  Location:  HEART CARDIAC CATH LAB    IR CVC TUNNEL PLACEMENT > 5 YRS OF AGE  2023    IR CVC TUNNEL PLACEMENT > 5 YRS OF AGE  2023    IR PICC PLACEMENT > 5 YRS OF AGE  2023    PICC SINGLE LUMEN PLACEMENT Right 2023    Basilic Vein 4F SL 43 cm, 4 cm out     Surgery   -section x3    Prior to Admission Medications   Prior to Admission Medications   Prescriptions Last Dose Informant Patient Reported? Taking?   COMPOUNDED NON-CONTROLLED SUBSTANCE (CMPD RX) - PHARMACY TO MIX COMPOUNDED MEDICATION Unknown  Yes Yes   Sig: 10% Lidocaine gel. Apply 1-2 grams to affected site 1-2 times per day 8-12 hours apart  [facility] [phone]   Multiple Vitamins-Minerals (WOMENS MULTIVITAMIN) TABS 2023  Yes Yes   Sig: Take 1 tablet by mouth daily   acetaminophen (TYLENOL) 325 MG tablet Past Week  No Yes   Sig: Take 3 tablets (975 mg) by mouth every 6 hours   digoxin (LANOXIN) 125 MCG tablet 11/15/2023 at 0530  No Yes   Sig: Take 1 tablet (125 mcg) by mouth daily   furosemide (LASIX) 20 MG tablet 2023  No Yes   Sig: Take 1 tablet (20 mg) by mouth daily   macitentan (OPSUMIT) 10 MG tablet 11/15/2023 at 0530  Yes Yes   Sig: Take 1 tablet (10 mg) by mouth daily Please make sure you are completing your monthly mandatory labs.   tadalafil (ADCIRCA/CIALIS) 20 MG tablet 11/15/2023 at 0530  Yes Yes   Sig: Take 20 mg by mouth every 24 hours   treprostinil (REMODULIN) infusion 2.5 mg/mL 11/15/2023  Yes Yes   Sig: Inject 70 ng/kg/min into the vein continuous Dose Weight: 75.6kg      Facility-Administered Medications: None        Review of Systems    CONSTITUTIONAL:  negative for  fevers, chills, anorexia, and weight loss  EYES:  negative for vision changes  HEENT: Recent  head cold, currently asymptomatic.  No hearing issues.  RESPIRATORY: SOB per HPI.  No cough.  No sputum production.  CARDIOVASCULAR: Occasional palpitations with activity.  GASTROINTESTINAL: No abdominal pain, nausea, vomiting or constipation.  GENITOURINARY: No urinary symptoms.  Normal menstruation.  HEMATOLOGIC/LYMPHATIC: No personal or family history of blood clots.  MUSCULOSKELETAL: No myalgias or bone pain.  Occasional lightheadedness  NEUROLOGICAL: Occasional lightheadedness.     Social History   I have reviewed this patient's social history and updated it with pertinent information if needed.  Social History     Tobacco Use    Smoking status: Never    Smokeless tobacco: Never   Vaping Use    Vaping Use: Never used   Substance Use Topics    Alcohol use: Not Currently    Drug use: Not Currently     Family History     No family history of diabetes or autoimmune disease    Allergies   Allergies   Allergen Reactions    Other Food Allergy      Cantaloupe      Physical Exam   Vital Signs: Temp: 97.5  F (36.4  C) Temp src: Oral BP: 110/65 Pulse: 81   Resp: 16 SpO2: 96 % O2 Device: None (Room air)    Weight: 176 lbs 5.89 oz    General Appearance: Patient is sitting up in bed.  She appears comfortable.  No acute distress.  Eyes: Normal conjunctive a and sclera.  HEENT: Moist mucous membranes.  Respiratory: Normal respiratory effort.  Lungs are clear to auscultation.  Cardiovascular: Regular rate and rhythm.  Prominent S2.  No murmur noted.  2+ dorsalis pedis pulses.  GI: Abdomen is soft, nontender, nondistended.   Musculoskeletal: No lower extremity edema.  Neurologic: Speech is fluent.  Alert and oriented.  Answers questions appropriately.  Gross motor is intact.    Medical Decision Making       Please see A&P for additional details of medical decision making.      Data     I have personally reviewed the following data over the past 24 hrs:    N/A  \   N/A   / N/A     137 105 9.9 /  90   3.9 22 0.54 \     ALT: 17  AST: 18 AP: 85 TBILI: 0.2   ALB: 3.9 TOT PROTEIN: 6.5 LIPASE: N/A

## 2023-11-16 NOTE — PLAN OF CARE
D: Pulmonary arterial HTN - transition from IV trepostinil to subcut    I: Monitored vitals and assessed pt status. Encouraged activity.      A: Neuro: A&O x 4. Call light appropriate.   Cardiac: SR, ST when active. VSS.   Resp: Room air. Denies chest pain and SOB. LS diminished.   GI/: Regular diet. Last BM 11/15. Up to bathroom as needed.  Skin: See PCS for assessment details.  Lines/Drains: R PIV, saline locked. Subclavian CVC running Remodulin at 70ng/kg/min - Pt's home pump and cassette.  Activity: Up ad jess. Tolerating well.    Pain: Denied pain.  Sleep: Pt appeared asleep between cares.     P: Continue to monitor pt status and report changes to treatment team.

## 2023-11-16 NOTE — PROGRESS NOTES
Observation goals  1) Transition to sub q remodulin. Observe for 24h -not met   2) Remove guy- not met

## 2023-11-16 NOTE — UTILIZATION REVIEW
"Admission Status; Secondary Review Determination     Under the authority of the Utilization Management Committee, the utilization review process indicated a secondary review on the above patient.  The review outcome is based on review of the medical records, discussions with staff, and applying clinical experience noted on the date of the review.       (x) Observation Status Appropriate - This patient does not meet hospital inpatient criteria and is placed in observation status. If this patient's primary payer is Medicare and was admitted as an inpatient, Condition Code 44 should be used and patient status changed to \"observation\".     RATIONALE FOR DETERMINATION:  33-year-old female with severe idiopathic PAH c/b RV failure on remodulin, subclinical hypothyroidism and HTN presenting for planned admission for transition from IV Remodulin to subcutaneous.  Patient underwent right heart cath on the morning of admission with relative good results on her medical management.  Is been decided to transition patient from intravenous Remodulin to subcutaneous to lower the risk of infection and ease of use as she has 3 young kids at home.      If patient develops any cardiac complications during the initial transition phase requiring a more prolonged hospital stay then at that time patient would be appropriate to advance to inpatient care.      The severity of illness, intensity of service provided, expected LOS and risk for adverse outcome make the care appropriate for further observation; however, doesn't meet criteria for hospital inpatient admission. This was discussed with attending physician who concurred with this determination.    The information on this document is developed by the utilization review team in order for the business office to ensure compliance.  This only denotes the appropriateness of proper admission status and does not reflect the quality of care rendered.         The definitions of Inpatient Status " and Observation Status used in making the determination above are those provided in the CMS Coverage Manual, Chapter 1 and Chapter 6, section 70.4.      Sincerely,     Colin Frank MD    Physician Advisor  Utilization Review/ Case Management  [facility].

## 2023-11-16 NOTE — PROGRESS NOTES
Observation goals    1) Transition to sub q remodulin. Observe for 24h: not met   2) Remove guy : met

## 2023-11-17 VITALS
DIASTOLIC BLOOD PRESSURE: 64 MMHG | HEART RATE: 79 BPM | HEIGHT: 63 IN | OXYGEN SATURATION: 96 % | WEIGHT: 176.8 LBS | TEMPERATURE: 98.1 F | BODY MASS INDEX: 31.33 KG/M2 | RESPIRATION RATE: 19 BRPM | SYSTOLIC BLOOD PRESSURE: 122 MMHG

## 2023-11-17 LAB
ALBUMIN SERPL BCG-MCNC: 3.9 G/DL (ref 3.5–5.2)
ALP SERPL-CCNC: 82 U/L (ref 40–150)
ALT SERPL W P-5'-P-CCNC: 16 U/L (ref 0–50)
ANA PAT SER IF-IMP: ABNORMAL
ANA SER QL IF: POSITIVE
ANA TITR SER IF: ABNORMAL {TITER}
ANION GAP SERPL CALCULATED.3IONS-SCNC: 10 MMOL/L (ref 7–15)
AST SERPL W P-5'-P-CCNC: 25 U/L (ref 0–45)
BILIRUB SERPL-MCNC: 0.2 MG/DL
BUN SERPL-MCNC: 11.2 MG/DL (ref 6–20)
CALCIUM SERPL-MCNC: 8.7 MG/DL (ref 8.6–10)
CHLORIDE SERPL-SCNC: 104 MMOL/L (ref 98–107)
CREAT SERPL-MCNC: 0.57 MG/DL (ref 0.51–0.95)
DEPRECATED HCO3 PLAS-SCNC: 23 MMOL/L (ref 22–29)
EGFRCR SERPLBLD CKD-EPI 2021: >90 ML/MIN/1.73M2
GLUCOSE SERPL-MCNC: 85 MG/DL (ref 70–99)
HOLD SPECIMEN: NORMAL
POTASSIUM SERPL-SCNC: 4.1 MMOL/L (ref 3.4–5.3)
PROT SERPL-MCNC: 6.6 G/DL (ref 6.4–8.3)
SODIUM SERPL-SCNC: 137 MMOL/L (ref 135–145)

## 2023-11-17 PROCEDURE — 36415 COLL VENOUS BLD VENIPUNCTURE: CPT

## 2023-11-17 PROCEDURE — 80053 COMPREHEN METABOLIC PANEL: CPT

## 2023-11-17 PROCEDURE — 250N000013 HC RX MED GY IP 250 OP 250 PS 637

## 2023-11-17 PROCEDURE — 99239 HOSP IP/OBS DSCHRG MGMT >30: CPT | Mod: 24 | Performed by: INTERNAL MEDICINE

## 2023-11-17 RX ORDER — TREPROSTINIL 10 MG/ML
70.55 INJECTION, SOLUTION INTRAVENOUS; SUBCUTANEOUS CONTINUOUS
Qty: 20 ML | Refills: 3 | Status: SHIPPED | OUTPATIENT
Start: 2023-11-17 | End: 2023-12-06

## 2023-11-17 RX ADMIN — TADALAFIL 20 MG: 20 TABLET ORAL at 08:11

## 2023-11-17 RX ADMIN — DIGOXIN 125 MCG: 125 TABLET ORAL at 08:10

## 2023-11-17 RX ADMIN — ACETAMINOPHEN 650 MG: 325 TABLET, FILM COATED ORAL at 08:09

## 2023-11-17 ASSESSMENT — ACTIVITIES OF DAILY LIVING (ADL)
ADLS_ACUITY_SCORE: 18

## 2023-11-17 NOTE — PROGRESS NOTES
Observation goals     1) Transition to sub q remodulin. Observe for 24h: not met - VSS, no new symptoms besides subcutaneous site pain, relieved by prn Tylenol.  2) Remove guy : met

## 2023-11-17 NOTE — PROGRESS NOTES
Observation goals     1) Transition to sub q remodulin. Observe for 24h: not met - VSS, no new symptoms.  2) Remove guy : met

## 2023-11-17 NOTE — PROGRESS NOTES
DISCHARGE   Discharged to: Home  Via: Automobile  Accompanied by: Self  Discharge Instructions: diet, activity, medications, follow up appointments, when to call the MD, and what to watchout for (i.e. s/s of infection, increasing SOB, palpitations, chest pain,)  Prescriptions: To be filled by       pharmacy per pt's request; medication list reviewed & sent with pt  Follow Up Appointments: arranged; information given  Belongings: All sent with pt  IV: out  Telemetry: off  Pt exhibits understanding of above discharge instructions; all questions answered.  Discharge Paperwork: faxed

## 2023-11-17 NOTE — DISCHARGE SUMMARY
"Discharge Summary    Chelsea Flower MRN# [id number]   YOB: 1990 Age: 33 year old     Date of Admission:  11/15/2023  Date of Discharge:  11/17/2023  Admitting Physician:  Ahmet Lovell MD  Discharge Physician:  Petrona Falcon MD  Discharging Service:  Cardiology Heart Failure     Home clinic: [facility]  Primary Provider: Kirk Aguilar  Primary Cardiology Provider: Alex Carroll          Admission Diagnoses:   Severe Idiopathic Pulmonary Arterial Hypertension   Chronic Right Ventricular Failure   Subclinical Hypothyroidism          Discharge Diagnosis:     Severe Idiopathic Pulmonary Arterial Hypertension   Chronic Right Ventricular Failure   Subclinical Hypothyroidism             Discharge Disposition:     Discharged to home           Condition on Discharge:     Discharge condition: Good   Discharge vitals: Blood pressure 122/64, pulse 79, temperature 98.1  F (36.7  C), temperature source Oral, resp. rate 19, height 1.6 m (5' 3\"), weight 80.2 kg (176 lb 12.8 oz), SpO2 96%.   Code status on discharge: Full Code          Procedures:   Right Heart Catheterization  Removal of Arreola catheter by Interventional Radiology          Medications Prior to Admission:     Medications Prior to Admission   Medication Sig Dispense Refill Last Dose    acetaminophen (TYLENOL) 325 MG tablet Take 3 tablets (975 mg) by mouth every 6 hours 80 tablet 0 Past Week    COMPOUNDED NON-CONTROLLED SUBSTANCE (CMPD RX) - PHARMACY TO MIX COMPOUNDED MEDICATION 10% Lidocaine gel. Apply 1-2 grams to affected site 1-2 times per day 8-12 hours apart  [facility] [phone]   Unknown    digoxin (LANOXIN) 125 MCG tablet Take 1 tablet (125 mcg) by mouth daily 90 tablet 3 11/15/2023 at 0530    furosemide (LASIX) 20 MG tablet Take 1 tablet (20 mg) by mouth daily 90 tablet 3 11/13/2023    macitentan (OPSUMIT) 10 MG tablet Take 1 tablet (10 mg) by mouth daily Please make sure you are " completing your monthly mandatory labs.   11/15/2023 at 0530    Multiple Vitamins-Minerals (WOMENS MULTIVITAMIN) TABS Take 1 tablet by mouth daily   11/14/2023    tadalafil (ADCIRCA/CIALIS) 20 MG tablet Take 20 mg by mouth every 24 hours   11/15/2023 at 0530    [DISCONTINUED] treprostinil (REMODULIN) infusion 2.5 mg/mL Inject 70 ng/kg/min into the vein continuous Dose Weight: 75.6kg   11/15/2023             Discharge Medications:     Current Discharge Medication List        START taking these medications    Details   treprostinil (REMODULIN) infusion 10 mg/mL Inject 5,333.58 ng/min Subcutaneous continuous  Qty: 20 mL, Refills: 3    Associated Diagnoses: Pulmonary arterial hypertension (H)           CONTINUE these medications which have NOT CHANGED    Details   acetaminophen (TYLENOL) 325 MG tablet Take 3 tablets (975 mg) by mouth every 6 hours  Qty: 80 tablet, Refills: 0    Associated Diagnoses: Pulmonary hypertension (H)      COMPOUNDED NON-CONTROLLED SUBSTANCE (CMPD RX) - PHARMACY TO MIX COMPOUNDED MEDICATION 10% Lidocaine gel. Apply 1-2 grams to affected site 1-2 times per day 8-12 hours apart  [facility] [phone]      digoxin (LANOXIN) 125 MCG tablet Take 1 tablet (125 mcg) by mouth daily  Qty: 90 tablet, Refills: 3    Associated Diagnoses: Pulmonary hypertension (H)      furosemide (LASIX) 20 MG tablet Take 1 tablet (20 mg) by mouth daily  Qty: 90 tablet, Refills: 3    Associated Diagnoses: Pulmonary hypertension (H)      macitentan (OPSUMIT) 10 MG tablet Take 1 tablet (10 mg) by mouth daily Please make sure you are completing your monthly mandatory labs.      Multiple Vitamins-Minerals (WOMENS MULTIVITAMIN) TABS Take 1 tablet by mouth daily      tadalafil (ADCIRCA/CIALIS) 20 MG tablet Take 20 mg by mouth every 24 hours           STOP taking these medications       treprostinil (REMODULIN) infusion 2.5 mg/mL Comments:   Reason for Stopping:                     Consultations:     Interventional  Radiology             Brief History of Illness:   Ms Chelsea Flower is a 33 year old female with a history of severe idiopathic PAH c/b RV failure and subclinical hypothyroidism who was admitted on 11/15/2023 for planned conversion from IV to subcutaneous remodulin.            Hospital Course:     # Severe idiopathic PAH c/b RV failure  Patient presented for planned admission for transition from IV Remodulin to subcutaneous. The transition was planned for a reduction in sepsis risk and reduced risk of central access compromise as Ms Flower has young children at home and has been living with a Arreola catheter in place for IV Remodulin. She is well-optimized from volume and pressure perspective on current therapies listed below. She follows with Dr. Carroll outpatient.     She underwent right heart catheterization which demonstrated findings consistent with pre-capillary pulmonary hypertension that is mild in severity. Improved since last RHC. Her home digoxin 125 mcg daily, tadalafil 20 mg daily, and macitentan 10 mg daily were all continued through her hospital course and her Remodulin was successfully transitioned to 70 ng/kg/min subcutaneous. She tolerated the changed in medication well, has appropriate supplies for home care, has appropriate follow up for cassette exchange on 11/18/23 near her home, and appropriate follow up with a PH provider on 11/30/23.      #Subclinical Hypothyroidism   Hx of elevated TSH with normal T4.     This patient was seen and discussed with Dr Petrona Falcon, attending cardiologist, who agrees with the assessment and plan unless otherwise indicated.    Sam Gonzalez MD [facility], PGY-5  Fellow, Cardiovascular Disease                 Significant Results:     Right Heart Catheterization 11/15/23:  RA: 11   RV: 45/7   PA: 45/18 (34)   PCWP: 17   PVR: 2.56              Pending Results:     None           Discharge Instructions and Follow-Up:     Discharge diet: Regular   Discharge  activity: Activity as tolerated   Discharge follow-up: Follow up scheduled 11/30/23 at 4:15 PM at [facility]    Outpatient therapy: None    Home Care agency: None    Supplies and equipment: Home subcutaneous Remodulin supplies present at bedside   Lines and drains: Subcutaneous Remodulin infusion cassette in place and functioning appropriately    Wound care: None   Other instructions: None

## 2023-11-17 NOTE — PLAN OF CARE
Observation goals     1) Transition to sub q remodulin. Observe for 24h: not met - VSS, no new symptoms.  2) Remove guy : met      Neuro: A&O x 4. Call light appropriate.   Cardiac: SR. VSS.   Resp: Room air. Denies chest pain and SOB. LS diminished.   GI/: Regular diet. Last BM 11/16. Up to bathroom as needed.  Skin: See PCS for assessment details.  Lines/Drains: R PIV, saline locked. Subcutaneous running Remodulin at 70.55 ng/kg/min.  Activity: Up ad jess. Tolerating well.    Pain: Denied pain.  Sleep:  Pt appeared asleep between cares.

## 2023-11-20 ENCOUNTER — PATIENT OUTREACH (OUTPATIENT)
Dept: CARE COORDINATION | Facility: CLINIC | Age: 33
End: 2023-11-20
Payer: COMMERCIAL

## 2023-11-20 NOTE — PROGRESS NOTES
Connected Care Resource Center Contact  [facility]/Voicemail     Clinical Data: Post-Discharge Outreach     Outreach attempted x 2.  Left message on patient's voicemail, providing [facility]'s central phone number of [phone] ([phone]) for questions/concerns and/or to schedule an appt with an [facility] provider, if they do not have a PCP.      Plan:  [facility] will do no further outreaches at this time.       ROSLYN Potts  Connected Care Resource Center, [facility]    *Connected Care Resource Team does NOT follow patient ongoing. Referrals are identified based on internal discharge reports and the outreach is to ensure patient has an understanding of their discharge instructions.

## 2023-11-21 RX ORDER — TRAMADOL HYDROCHLORIDE 50 MG/1
50 TABLET ORAL EVERY 6 HOURS PRN
Qty: 15 TABLET | Refills: 1 | COMMUNITY
Start: 2023-11-21 | End: 2023-11-21

## 2023-11-21 RX ORDER — TRAMADOL HYDROCHLORIDE 50 MG/1
50 TABLET ORAL EVERY 6 HOURS PRN
COMMUNITY
Start: 2023-11-21

## 2023-11-21 NOTE — PROGRESS NOTES
Date: 11/21/2023    Time of Call: 1:33 PM     Diagnosis:  site pain     [ VORB ] Ordering provider: Dr. Carroll  Order: Tramadol 25mg every 6 hours 15 tablets with 1 refill for site pain     Order received by: Ora Do RN       Follow-up/additional notes: called prescription into pharmacy

## 2023-12-06 ENCOUNTER — EXTERNAL ORDER RESULTS (OUTPATIENT)
Dept: CARDIOLOGY | Facility: CLINIC | Age: 33
End: 2023-12-06
Payer: COMMERCIAL

## 2023-12-06 DIAGNOSIS — I27.21 PULMONARY ARTERIAL HYPERTENSION (H): ICD-10-CM

## 2023-12-06 LAB — HCG UR QL: NEGATIVE

## 2023-12-06 RX ORDER — TREPROSTINIL 10 MG/ML
70.55 INJECTION, SOLUTION INTRAVENOUS; SUBCUTANEOUS CONTINUOUS
Status: ON HOLD | COMMUNITY
Start: 2023-12-06 | End: 2024-06-24

## 2023-12-07 DIAGNOSIS — I27.20 PULMONARY HTN (H): Primary | ICD-10-CM

## 2024-01-09 ENCOUNTER — VIRTUAL VISIT (OUTPATIENT)
Dept: CARDIOLOGY | Facility: CLINIC | Age: 34
End: 2024-01-09
Attending: INTERNAL MEDICINE
Payer: COMMERCIAL

## 2024-01-09 VITALS — HEIGHT: 63 IN | WEIGHT: 170 LBS | BODY MASS INDEX: 30.12 KG/M2

## 2024-01-09 DIAGNOSIS — I27.20 PULMONARY HTN (H): ICD-10-CM

## 2024-01-09 PROCEDURE — 99214 OFFICE O/P EST MOD 30 MIN: CPT | Mod: 95 | Performed by: PHYSICIAN ASSISTANT

## 2024-01-09 ASSESSMENT — PAIN SCALES - GENERAL: PAINLEVEL: NO PAIN (0)

## 2024-01-09 NOTE — NURSING NOTE
Is the patient currently in the state of [place]? YES    Visit mode:VIDEO    If the visit is dropped, the patient can be reconnected by: VIDEO VISIT: Text to cell phone:   Telephone Information:   Mobile [phone]       Will anyone else be joining the visit? NO  (If patient encounters technical issues they should call [phone]    How would you like to obtain your AVS? MyChart    Are changes needed to the allergy or medication list? Pt stated no med changes    Reason for visit: RECHECK    No other vitals to report per pt    Olga Lidia WILLAMSF

## 2024-01-09 NOTE — LETTER
"1/9/2024      RE: Chelsea Flower  [address]  [place] [address]       Dear Colleague,    Thank you for the opportunity to participate in the care of your patient, Chelsea Flower, at the [facility]. Please see a copy of my visit note below.          1/24/2023     7:37 AM 11/2/2023     4:02 PM   Vitals - Patient Reported   Height (Patient Reported) 5' 3\" 5' 3\"   Weight (Patient Reported) 170 lb 175 lb   BMI (Based on Pt Reported Ht/Wt) 30.11 kg/m2 31 kg/m2             CARDIOLOGY PH CLINIC VIDEO VISIT    Date of video visit: 01/09/24      Chelsea Flower is a 33 year old female who is being evaluated via a billable video visit.      MEDICATIONS:  Current Outpatient Medications   Medication Sig Dispense Refill    acetaminophen (TYLENOL) 325 MG tablet Take 3 tablets (975 mg) by mouth every 6 hours 80 tablet 0    COMPOUNDED NON-CONTROLLED SUBSTANCE (CMPD RX) - PHARMACY TO MIX COMPOUNDED MEDICATION 10% Lidocaine gel. Apply 1-2 grams to affected site 1-2 times per day 8-12 hours apart  [facility] [phone]      digoxin (LANOXIN) 125 MCG tablet Take 1 tablet (125 mcg) by mouth daily 90 tablet 3    furosemide (LASIX) 20 MG tablet Take 1 tablet (20 mg) by mouth daily 90 tablet 3    macitentan (OPSUMIT) 10 MG tablet Take 1 tablet (10 mg) by mouth daily Please make sure you are completing your monthly mandatory labs.      Multiple Vitamins-Minerals (WOMENS MULTIVITAMIN) TABS Take 1 tablet by mouth daily      tadalafil (ADCIRCA/CIALIS) 20 MG tablet Take 20 mg by mouth every 24 hours      traMADol (ULTRAM) 50 MG tablet Take 0.5 tablets (25 mg) by mouth every 6 hours as needed for severe pain      treprostinil (REMODULIN) infusion 10 mg/mL Inject 5,333.58 ng/min Subcutaneous continuous 70.55ng continuously  (goal)  75.6kg dosing weight           Primary PH cardiologist: Dr. Carroll      HPI:  Chelsea Flower is a pleasant " 33 year old female with a PMHx including subclinical hypothyroidism and hypertension. She was referred to the PH clinic in January of 2023 from [place] after having a workukp for exertional dyspnea and presyncope shortly after her last pregnancy (3 children total). She had outside testing suggesting severe PAH with a mPAP of 55 and evidence of RV dysfunction. Secondary workup was unrevealing. She then underwent repeat testing here in Feb 2023 to assess for vaso-responsiveness (confirmed again severe PAH with mPAP 50mmHg, significantly reduced CO, and negative vasodilator challenge). She was then admitted and placed on IV Remodulin and ultimately tadalafil and macitentan were added shortly after.     When she met most recently with Dr. Carroll in mid November, she was overall doing much better and given her active lifestyle with 3 small children, decision was made for conversion to subcutaneous therapy instead. She was then admitted shortly after and RHC confirmed only mild PAH at that time with normalized cardiac output. She was then successfully transitioned, and kept on a equivalent dose of 70g/kg/min.     Today, I am seeing Chelsea back in follow up virtually. She tells me that she has noticed she isn't feeling quite as well lately. She is having some more MALAVE with things like stairs or carrying things. She has also had some recurrence of exertional chest discomfort, mild dizziness, and more fatigue. She works at a  and gets tired more easily. She has not had presyncope/syncope and does not think she is having increased swelling. She is tolerating the Remodulin itself ok without significant diarrhea or headaches. She did have a lot of site pain initially and does have Tramadol to use PRN. She tells me she still has the initial site she was discharged with and it seems to be ok.     Most recent labs were reviewed as below.     CURRENT PULMONARY HYPERTENSION REGIMEN:    PAH Rx: subcutaneous Remodulin  70.55ng/kg/min, Opsumit 10mg daily, tadalafil 20mg daily    Diuretics: Lasix 20mg daily     Oxygen: 2LNC at night, not using much during the day     Anticoagulation: None      Assessment/Plan:    Idiopathic pulmonary arterial hypertension.   --Chelsea has previously severe iPAH, and was placed on triple therapy with IV Remodulin, Opsumit, and Adcirca, in early 2023. In Nov 2023, she was electively changed from IV to subcutaneous Remodulin (70.55ng/kg/min without titration) as she had responded well to therapy and given her active lifestyle with 3 children. However, she reports she isn't feeling quite as well with a bit more MALAVE, chest discomfort, mild dizziness, and fatigue. We will repeat hemodynamic testing and make further decisions from there.   --I note she is only on 20mg of tadalafil and she does not recall ever going up higher. Will increase to 40mg today. Also continue digoxin for RV support.   --As today is a virtual visit I cannot assess her volume status formally but she reports no worsening edema on Lasix 20mg daily which we will continue. Unfortunately, she has not had any repeat labs since her transition. If we are not able to get her in for repeat RHC in the next few weeks, will repeat labs locally. Will also add iron studies given her fatigue.    --She previously was instructed to utilize supplemental oxygen but admits today she is only doing so at night and very rarely during the day. Last 6MWT from Nov showed the need for 2L to keep sats >90%. Will repeat formal 6MWT when she returns. I believe we are also awaiting an JOVANNY for completeness.    --She is aware of the need for monthly pregnancy testing while on ERA.     Follow up plan: Return in the next few weeks with repeat RHC, echocardiogram, 6MWT, and labs.  I asked the patient to call sooner with any new concerns.         Testing/labs:    Most recent labs:    Latest Reference Range & Units 11/17/23 05:42   Sodium 135 - 145 mmol/L 137    Potassium 3.4 - 5.3 mmol/L 4.1   Chloride 98 - 107 mmol/L 104   Carbon Dioxide (CO2) 22 - 29 mmol/L 23   Urea Nitrogen 6.0 - 20.0 mg/dL 11.2   Creatinine 0.51 - 0.95 mg/dL 0.57   GFR Estimate >60 mL/min/1.73m2 >90   Calcium 8.6 - 10.0 mg/dL 8.7   Anion Gap 7 - 15 mmol/L 10   Albumin 3.5 - 5.2 g/dL 3.9   Protein Total 6.4 - 8.3 g/dL 6.6   Alkaline Phosphatase 40 - 150 U/L 82   ALT 0 - 50 U/L 16   AST 0 - 45 U/L 25   Bilirubin Total <=1.2 mg/dL 0.2   Glucose 70 - 99 mg/dL 85         Other most recent pertinent testing:    Echo 11/15/23  Interpretation Summary  The right ventricle is normal size. Global right ventricular function is  mildly reduced. RVFAC 32%. RV free wall longitudinal strain -20.1%.  Pulmonary artery systolic pressure cannot be assessed.  This study was compared with the study from 23: No significant changes  noted.    [facility] 11/15/23  Hemodynamics    RA:15/13/11  RV:45//11  PA:45/18/34  PCWP:      PA O2 Sat:74%  Wedge O2 Sat:93%  H.4    Thermodilution; CO:7.40, CI:4.02  [place]ugenia; CO:6.32, CI:3.43    PVR:2.56w Right sided filling pressures are mildly elevated. Left sided filling pressures are mildly elevated. Moderately elevated pulmonary artery hypertension. Normal cardiac output level. Hemodynamic data has been modified in Epic per physician review.       NYHA Functional Class:  3      Kelsea Bloom PA-C  [facility] Heart  Pager [phone]

## 2024-01-09 NOTE — PROGRESS NOTES
"      1/24/2023     7:37 AM 11/2/2023     4:02 PM   Vitals - Patient Reported   Height (Patient Reported) 5' 3\" 5' 3\"   Weight (Patient Reported) 170 lb 175 lb   BMI (Based on Pt Reported Ht/Wt) 30.11 kg/m2 31 kg/m2               CARDIOLOGY PH CLINIC VIDEO VISIT    Date of video visit: 01/09/24      Chelsea Flower is a 33 year old female who is being evaluated via a billable video visit.      MEDICATIONS:  Current Outpatient Medications   Medication Sig Dispense Refill    acetaminophen (TYLENOL) 325 MG tablet Take 3 tablets (975 mg) by mouth every 6 hours 80 tablet 0    COMPOUNDED NON-CONTROLLED SUBSTANCE (CMPD RX) - PHARMACY TO MIX COMPOUNDED MEDICATION 10% Lidocaine gel. Apply 1-2 grams to affected site 1-2 times per day 8-12 hours apart  [facility] [phone]      digoxin (LANOXIN) 125 MCG tablet Take 1 tablet (125 mcg) by mouth daily 90 tablet 3    furosemide (LASIX) 20 MG tablet Take 1 tablet (20 mg) by mouth daily 90 tablet 3    macitentan (OPSUMIT) 10 MG tablet Take 1 tablet (10 mg) by mouth daily Please make sure you are completing your monthly mandatory labs.      Multiple Vitamins-Minerals (WOMENS MULTIVITAMIN) TABS Take 1 tablet by mouth daily      tadalafil (ADCIRCA/CIALIS) 20 MG tablet Take 20 mg by mouth every 24 hours      traMADol (ULTRAM) 50 MG tablet Take 0.5 tablets (25 mg) by mouth every 6 hours as needed for severe pain      treprostinil (REMODULIN) infusion 10 mg/mL Inject 5,333.58 ng/min Subcutaneous continuous 70.55ng continuously  (goal)  75.6kg dosing weight           Primary PH cardiologist: Dr. Carroll      HPI:  Chelsea Flower is a pleasant 33 year old female with a PMHx including subclinical hypothyroidism and hypertension. She was referred to the PH clinic in January of 2023 from [place] after having a workukp for exertional dyspnea and presyncope shortly after her last pregnancy (3 children total). She had outside testing suggesting severe PAH with a mPAP of 55 " and evidence of RV dysfunction. Secondary workup was unrevealing. She then underwent repeat testing here in Feb 2023 to assess for vaso-responsiveness (confirmed again severe PAH with mPAP 50mmHg, significantly reduced CO, and negative vasodilator challenge). She was then admitted and placed on IV Remodulin and ultimately tadalafil and macitentan were added shortly after.     When she met most recently with Dr. Carroll in mid November, she was overall doing much better and given her active lifestyle with 3 small children, decision was made for conversion to subcutaneous therapy instead. She was then admitted shortly after and RHC confirmed only mild PAH at that time with normalized cardiac output. She was then successfully transitioned, and kept on a equivalent dose of 70g/kg/min.     Today, I am seeing Chelsae back in follow up virtually. She tells me that she has noticed she isn't feeling quite as well lately. She is having some more MALAVE with things like stairs or carrying things. She has also had some recurrence of exertional chest discomfort, mild dizziness, and more fatigue. She works at a  and gets tired more easily. She has not had presyncope/syncope and does not think she is having increased swelling. She is tolerating the Remodulin itself ok without significant diarrhea or headaches. She did have a lot of site pain initially and does have Tramadol to use PRN. She tells me she still has the initial site she was discharged with and it seems to be ok.     Most recent labs were reviewed as below.     CURRENT PULMONARY HYPERTENSION REGIMEN:    PAH Rx: subcutaneous Remodulin 70.55ng/kg/min, Opsumit 10mg daily, tadalafil 20mg daily    Diuretics: Lasix 20mg daily     Oxygen: 2LNC at night, not using much during the day     Anticoagulation: None      Assessment/Plan:    Idiopathic pulmonary arterial hypertension.   --Chelsea has previously severe iPAH, and was placed on triple therapy with IV Remodulin,  Opsumit, and Adcirca, in early 2023. In Nov 2023, she was electively changed from IV to subcutaneous Remodulin (70.55ng/kg/min without titration) as she had responded well to therapy and given her active lifestyle with 3 children. However, she reports she isn't feeling quite as well with a bit more MALAVE, chest discomfort, mild dizziness, and fatigue. We will repeat hemodynamic testing and make further decisions from there.   --I note she is only on 20mg of tadalafil and she does not recall ever going up higher. Will increase to 40mg today. Also continue digoxin for RV support.   --As today is a virtual visit I cannot assess her volume status formally but she reports no worsening edema on Lasix 20mg daily which we will continue. Unfortunately, she has not had any repeat labs since her transition. If we are not able to get her in for repeat RHC in the next few weeks, will repeat labs locally. Will also add iron studies given her fatigue.    --She previously was instructed to utilize supplemental oxygen but admits today she is only doing so at night and very rarely during the day. Last 6MWT from Nov showed the need for 2L to keep sats >90%. Will repeat formal 6MWT when she returns. I believe we are also awaiting an JOVANNY for completeness.    --She is aware of the need for monthly pregnancy testing while on ERA.     Follow up plan: Return in the next few weeks with repeat RHC, echocardiogram, 6MWT, and labs.  I asked the patient to call sooner with any new concerns.         Testing/labs:    Most recent labs:    Latest Reference Range & Units 11/17/23 05:42   Sodium 135 - 145 mmol/L 137   Potassium 3.4 - 5.3 mmol/L 4.1   Chloride 98 - 107 mmol/L 104   Carbon Dioxide (CO2) 22 - 29 mmol/L 23   Urea Nitrogen 6.0 - 20.0 mg/dL 11.2   Creatinine 0.51 - 0.95 mg/dL 0.57   GFR Estimate >60 mL/min/1.73m2 >90   Calcium 8.6 - 10.0 mg/dL 8.7   Anion Gap 7 - 15 mmol/L 10   Albumin 3.5 - 5.2 g/dL 3.9   Protein Total 6.4 - 8.3 g/dL 6.6    Alkaline Phosphatase 40 - 150 U/L 82   ALT 0 - 50 U/L 16   AST 0 - 45 U/L 25   Bilirubin Total <=1.2 mg/dL 0.2   Glucose 70 - 99 mg/dL 85         Other most recent pertinent testing:    Echo 11/15/23  Interpretation Summary  The right ventricle is normal size. Global right ventricular function is  mildly reduced. RVFAC 32%. RV free wall longitudinal strain -20.1%.  Pulmonary artery systolic pressure cannot be assessed.  This study was compared with the study from 23: No significant changes  noted.    RHC 11/15/23  Hemodynamics    RA:15/13/11  RV:  PA:  PCWP:      PA O2 Sat:74%  Wedge O2 Sat:93%  H.4    Thermodilution; CO:7.40, CI:4.02  Eugenia; CO:6.32, CI:3.43    PVR:2.56w Right sided filling pressures are mildly elevated. Left sided filling pressures are mildly elevated. Moderately elevated pulmonary artery hypertension. Normal cardiac output level. Hemodynamic data has been modified in Epic per physician review.       NYHA Functional Class:  3      Video-Visit Details    Type of service:  Video Visit    Video Start Time: 1700  Video End Time: 1718    An additional 20 minutes was spent today performing chart and history review, pre and post visit documentation, patient education, and care coordination.      Originating Location (pt. Location): Home    Distant Location (provider location):  On-site    Platform used for Video Visit: James Bloom PA-C  [facility] Heart  Pager [phone]

## 2024-01-10 ENCOUNTER — TELEPHONE (OUTPATIENT)
Dept: CARDIOLOGY | Facility: CLINIC | Age: 34
End: 2024-01-10
Payer: COMMERCIAL

## 2024-01-10 RX ORDER — LIDOCAINE 40 MG/G
CREAM TOPICAL
Status: CANCELLED | OUTPATIENT
Start: 2024-01-10

## 2024-01-10 NOTE — TELEPHONE ENCOUNTER
Called and LVM to schedule RHC /labs w/ TT , 6 min walk , Echo and follow up with yvonne Sanchez  Clinic    Pulmonary Hypertension   [facility]  (p) [phone]

## 2024-01-11 ENCOUNTER — TELEPHONE (OUTPATIENT)
Dept: CARDIOLOGY | Facility: CLINIC | Age: 34
End: 2024-01-11
Payer: COMMERCIAL

## 2024-01-11 NOTE — TELEPHONE ENCOUNTER
Cath Lab Case Request/Order    Location: [address] [address] [facility] Waiting Room    Procedure: Right Heart Cath (RHC)    Procedure Date: 2/16    Patient Arrival Time: 7:00 AM    Procedure Time: 2nd case to follow    Ordering Provider: Kelsea Bloom    Performing Cardiologist: Dr. Karin Frazier    Inpatient Bed Needed: No    Post-  Procedure ANKIT appointment scheduled (1 - 2 weeks): N/A, RHC     Date: 2/26     Provider: Kelsea Bloom    Communicated Patient Instructions:  NURSE TO COMMUNICATE     Appointment was scheduled: Over the phone    Patient expressed understanding of above instructions and denied further questions at this time.    xochitl Rowell

## 2024-01-17 DIAGNOSIS — I27.20 PULMONARY HTN (H): Primary | ICD-10-CM

## 2024-01-17 RX ORDER — TADALAFIL 20 MG/1
40 TABLET ORAL DAILY
Qty: 60 TABLET | Refills: 11 | Status: SHIPPED | OUTPATIENT
Start: 2024-01-17

## 2024-01-24 ENCOUNTER — MYC MEDICAL ADVICE (OUTPATIENT)
Dept: CARDIOLOGY | Facility: CLINIC | Age: 34
End: 2024-01-24
Payer: COMMERCIAL

## 2024-01-24 ENCOUNTER — TELEPHONE (OUTPATIENT)
Dept: CARDIOLOGY | Facility: CLINIC | Age: 34
End: 2024-01-24
Payer: COMMERCIAL

## 2024-01-25 NOTE — TELEPHONE ENCOUNTER
"1/25/2024  @ 10:17 AM - Remodulin SubQ -   Rcvd fax approval - for \"treprostinil subcutaneous injection\" instead of Remodulin (brand).   Sent email to [facility] to look into it further/ request Remodulin PA approval letter.             Gilma ARGUETA CMA- Prior Auths  Cardiology/Pulmonary Hypertension     "
1/24/2024  @ 9:13 AM -  Remodulin SubQ - PA Request      PA Initiation  Medication: Remodulin SubQ - PA Request    Insurance Company: [employer] - Phone [phone] Fax [phone]  Pharmacy Filling the Rx: [facility] - [place] [place] [address]  Filling Pharmacy Phone:    Filling Pharmacy Fax:    Start Date: 1/24/2024  via PAPER / FAX,  w/ RH dated : 8/1/23; Dx Code: I27.0 idiopathic.            ----------------------------  Insurance: ETP PPO RETAIL/ [employer]  BIN: [id number]  PCN: [id number]  RX GRP#: [id number]  ID#: [id number]         Gilma ARGUETA CMA- Prior Auths  Cardiology/Pulmonary Hypertension     
Malnutrition...

## 2024-01-26 ENCOUNTER — TELEPHONE (OUTPATIENT)
Dept: CARDIOLOGY | Facility: CLINIC | Age: 34
End: 2024-01-26
Payer: COMMERCIAL

## 2024-01-29 ENCOUNTER — TELEPHONE (OUTPATIENT)
Dept: CARDIOLOGY | Facility: CLINIC | Age: 34
End: 2024-01-29
Payer: COMMERCIAL

## 2024-01-29 NOTE — TELEPHONE ENCOUNTER
1/29/2024  @ 2:10 PM -  Opsumit 10 mg (30/30) - renew - exp: 2/10/24      PA Initiation  Medication: Opsumit 10 mg (30/30) - renew - exp: 2/10/24  Insurance Company: [employer] - Phone [phone] Fax [phone]  Pharmacy Filling the Rx: [facility] - [place], [place] [address]  Filling Pharmacy Phone:    Filling Pharmacy Fax:    Start Date: 1/29/2024  via CMM, key [id number], / [facility] dated : 8/1/23; Dx Code: i27.0 idiopathic [email or web address]      ----------------------------  Insurance: ETMIRIAM PPO RETAIL/ [employer]  BIN: [id number]  PCN: ADV  RX GRP#: [id number]  ID#: [id number]          Gilma ARGUETA CMA- Prior Auths  Cardiology/Pulmonary Hypertension

## 2024-01-29 NOTE — TELEPHONE ENCOUNTER
1/29/2024  @ 1:07 PM -  tadalafil 20 mg PAH (60/30) - renew - exp:  2/10/24 - [pt was using [employer], msg sent to Ora Do RN to check if pt is still  using [employer], then PA is not needed]     PA Initiation  Medication: tadalafil 20 mg PAH (60/30) - renew - exp:  2/10/24 -  Insurance Company: [employer] - Phone [phone] Fax [phone]  Pharmacy Filling the Rx: [facility] - [place], [place] - [address]  Filling Pharmacy Phone:    Filling Pharmacy Fax:    Start Date: 1/29/2024  via RANDY, key [id number]/ [facility] dated : 8/1/23; Dx Code: I27.0 IDIOPATHIC.     [id number]  ----------------------------  Insurance: ETP PPO RETAIL/ [employer]  BIN: [id number]  PCN: ADV  RX GRP#: [id number]  ID#: [id number]          Gilma ARGUETA CMA- Prior Auths  Cardiology/Pulmonary Hypertension

## 2024-01-29 NOTE — TELEPHONE ENCOUNTER
1/29/2024  @ 3:59 PM -  tadalafil 20 mg PAH (60/30) - renew - APPROVED - Approved today - Your PA request has been approved. Additional information will be provided in the approval communication. (Message 1145)  Authorization Expiration Date: 1/28/2025    Prior Authorization Approval    Medication: TADALAFIL (PAH) 20 MG PO TABS  Authorization Effective Date: 1/29/2024  Authorization Expiration Date: 1/29/2025cvscvs  Approved Dose/Quantity: 60/30  Reference #: n/a   Insurance Company: [employer] - Phone [phone] Fax [phone]  Expected CoPay: $    CoPay Card Available:      Financial Assistance Needed: *Undetermined as of the date of this note   Which Pharmacy is filling the prescription: [facility] - [place], [place] - [address]  Pharmacy Notified Y  Patient Notified: Y    Updated Snapshot, added to PA Calendar; faxed to : [facility] SP - FX: [phone]          Gilma ARGUETA CMA- Prior Auths  Cardiology/Pulmonary Hypertension

## 2024-02-05 ENCOUNTER — TELEPHONE (OUTPATIENT)
Dept: CARDIOLOGY | Facility: CLINIC | Age: 34
End: 2024-02-05
Payer: COMMERCIAL

## 2024-02-05 NOTE — TELEPHONE ENCOUNTER
Patient reports she has been trying most site pain management: PLO gell, hydrocortisone, topical lidocaine OTC (she was not able to obtain the 10% as this was out of pocket not covered by insurance), Allegra. She has ongoing site pain. She does report her site pain is not tolerable when she changes.     Called Dr. Carroll. VO to increase tramadol to 50mg every 6 hours prn pain  Verbal order called into local pharmacy    Ora Do RN on 2/5/2024 at 12:51 PM

## 2024-02-05 NOTE — TELEPHONE ENCOUNTER
2/5/2024  @ 9:39 AM -  Opsumit 10 mg (30/30) - APPROVED  Message from plan: Your PA request has been approved. Additional information will be provided in the approval communication. (Message 1145)    Prior Authorization Approval    Medication: OPSUMIT 10 MG PO TABS  Authorization Effective Date: 1/29/2024  Authorization Expiration Date: 1/29/2025  Approved Dose/Quantity: 30/30  Reference #: [id number]   Insurance Company: [employer] - Phone [phone] Fax [phone]  Expected CoPay: $    CoPay Card Available:      Financial Assistance Needed: *UNDETERMINED AS OF THE DATE OF THIS NOTE   Which Pharmacy is filling the prescription: [facility] - [place], [place] - [address]  Pharmacy Notified: Y  Patient Notified: Y    Updated Snapshot, added to PA Calendar; faxed to : [facility] - FX: [phone]              Gilma ARGUETA CMA- Prior Auths  Cardiology/Pulmonary Hypertension

## 2024-02-08 ENCOUNTER — TELEPHONE (OUTPATIENT)
Dept: CARDIOLOGY | Facility: CLINIC | Age: 34
End: 2024-02-08
Payer: COMMERCIAL

## 2024-02-08 NOTE — TELEPHONE ENCOUNTER
Patient called on 2/7 for severe site reaction on her upper left arm placement of her subcutaneous remodulin. Swelling and redness reported. Advised to visit local ED for histamine reaction treatment. Patient called today to update they sent home on steroids, 1x dose of morphine at ED. She reports the swelling is starting to go down and is feeling slightly better. Patient will keep cardiology team updated on this. She has changed her site back to abdomen and is working on prophylaxis of site pain management.     Ora Do RN on 2/8/2024 at 10:07 AM

## 2024-02-08 NOTE — TELEPHONE ENCOUNTER
Called patient to discuss pre-procedure instructions  Patient denied further questions, verbalized understanding of instructions.       Pre-procedure instructions - Right heart catheterization      Your arrival time is 7AM 2/16/24.  Location is [address] Waiting Room  . This is on the first floor down the foster from the entrance. You can  park or there is a parking ramp near by.   Shuttle to the [facility] after testing for walk test  You have a follow-up with Kelsea palmer on 2/26 at 1:30  Please plan on being at the hospital all day.  At any time, emergencies and/or urgent cases may come up which could delay the start of your procedure.    Pre-procedure instructions - Right heart catheterization  No solid food for 8 hours prior  Nothing to drink 2 hours prior to arrival time  You can take your morning medications (except diabetic and blood thinners) with sips of water

## 2024-02-16 ENCOUNTER — APPOINTMENT (OUTPATIENT)
Dept: MEDSURG UNIT | Facility: CLINIC | Age: 34
End: 2024-02-16
Attending: INTERNAL MEDICINE
Payer: COMMERCIAL

## 2024-02-16 ENCOUNTER — HOSPITAL ENCOUNTER (OUTPATIENT)
Facility: CLINIC | Age: 34
Discharge: HOME | End: 2024-02-16
Attending: INTERNAL MEDICINE | Admitting: INTERNAL MEDICINE
Payer: COMMERCIAL

## 2024-02-16 ENCOUNTER — HOSPITAL ENCOUNTER (OUTPATIENT)
Dept: CARDIOLOGY | Facility: CLINIC | Age: 34
Discharge: HOME | End: 2024-02-16
Attending: PHYSICIAN ASSISTANT | Admitting: INTERNAL MEDICINE
Payer: COMMERCIAL

## 2024-02-16 ENCOUNTER — APPOINTMENT (OUTPATIENT)
Dept: LAB | Facility: CLINIC | Age: 34
End: 2024-02-16
Attending: INTERNAL MEDICINE
Payer: COMMERCIAL

## 2024-02-16 ENCOUNTER — OFFICE VISIT (OUTPATIENT)
Dept: PULMONOLOGY | Facility: CLINIC | Age: 34
End: 2024-02-16
Payer: COMMERCIAL

## 2024-02-16 ENCOUNTER — ALLIED HEALTH/NURSE VISIT (OUTPATIENT)
Dept: RESEARCH | Facility: CLINIC | Age: 34
End: 2024-02-16

## 2024-02-16 VITALS
RESPIRATION RATE: 16 BRPM | SYSTOLIC BLOOD PRESSURE: 126 MMHG | BODY MASS INDEX: 30.65 KG/M2 | WEIGHT: 173 LBS | TEMPERATURE: 97.9 F | HEART RATE: 74 BPM | OXYGEN SATURATION: 94 % | DIASTOLIC BLOOD PRESSURE: 71 MMHG | HEIGHT: 63 IN

## 2024-02-16 DIAGNOSIS — Z00.6 RESEARCH STUDY PATIENT: Primary | ICD-10-CM

## 2024-02-16 DIAGNOSIS — I27.20 PULMONARY HTN (H): ICD-10-CM

## 2024-02-16 DIAGNOSIS — I27.20 PULMONARY HTN (H): Primary | ICD-10-CM

## 2024-02-16 LAB
6 MIN WALK (FT): 1215 FT
6 MIN WALK (M): 370 M
ANION GAP SERPL CALCULATED.3IONS-SCNC: 10 MMOL/L (ref 7–15)
BASOPHILS # BLD AUTO: NORMAL 10*3/UL
BASOPHILS # BLD MANUAL: 0 10E3/UL (ref 0–0.2)
BASOPHILS NFR BLD AUTO: NORMAL %
BASOPHILS NFR BLD MANUAL: 0 %
BUN SERPL-MCNC: 9.7 MG/DL (ref 6–20)
CALCIUM SERPL-MCNC: 8.7 MG/DL (ref 8.6–10)
CHLORIDE SERPL-SCNC: 107 MMOL/L (ref 98–107)
CREAT SERPL-MCNC: 0.61 MG/DL (ref 0.51–0.95)
DEPRECATED HCO3 PLAS-SCNC: 22 MMOL/L (ref 22–29)
EGFRCR SERPLBLD CKD-EPI 2021: >90 ML/MIN/1.73M2
EOSINOPHIL # BLD AUTO: NORMAL 10*3/UL
EOSINOPHIL # BLD MANUAL: 0.2 10E3/UL (ref 0–0.7)
EOSINOPHIL NFR BLD AUTO: NORMAL %
EOSINOPHIL NFR BLD MANUAL: 4 %
ERYTHROCYTE [DISTWIDTH] IN BLOOD BY AUTOMATED COUNT: 13 % (ref 10–15)
GLUCOSE SERPL-MCNC: 91 MG/DL (ref 70–99)
HCG INTACT+B SERPL-ACNC: <1 MIU/ML
HCT VFR BLD AUTO: 36.7 % (ref 35–47)
HGB BLD-MCNC: 12 G/DL (ref 11.7–15.7)
HGB BLD-MCNC: 12.8 G/DL (ref 11.7–15.7)
IMM GRANULOCYTES # BLD: NORMAL 10*3/UL
IMM GRANULOCYTES NFR BLD: NORMAL %
INR PPP: 1.04 (ref 0.85–1.15)
LVEF ECHO: NORMAL
LYMPHOCYTES # BLD AUTO: NORMAL 10*3/UL
LYMPHOCYTES # BLD MANUAL: 1.2 10E3/UL (ref 0.8–5.3)
LYMPHOCYTES NFR BLD AUTO: NORMAL %
LYMPHOCYTES NFR BLD MANUAL: 19 %
MCH RBC QN AUTO: 31.8 PG (ref 26.5–33)
MCHC RBC AUTO-ENTMCNC: 34.9 G/DL (ref 31.5–36.5)
MCV RBC AUTO: 91 FL (ref 78–100)
MONOCYTES # BLD AUTO: NORMAL 10*3/UL
MONOCYTES # BLD MANUAL: 0 10E3/UL (ref 0–1.3)
MONOCYTES NFR BLD AUTO: NORMAL %
MONOCYTES NFR BLD MANUAL: 0 %
NEUTROPHILS # BLD AUTO: NORMAL 10*3/UL
NEUTROPHILS # BLD MANUAL: 4.7 10E3/UL (ref 1.6–8.3)
NEUTROPHILS NFR BLD AUTO: NORMAL %
NEUTROPHILS NFR BLD MANUAL: 77 %
NRBC # BLD AUTO: 0 10E3/UL
NRBC BLD AUTO-RTO: 0 /100
NT-PROBNP SERPL-MCNC: <36 PG/ML (ref 0–450)
PLAT MORPH BLD: ABNORMAL
PLATELET # BLD AUTO: 193 10E3/UL (ref 150–450)
POLYCHROMASIA BLD QL SMEAR: SLIGHT
POTASSIUM SERPL-SCNC: 3.6 MMOL/L (ref 3.4–5.3)
RBC # BLD AUTO: 4.02 10E6/UL (ref 3.8–5.2)
RBC MORPH BLD: ABNORMAL
SODIUM SERPL-SCNC: 139 MMOL/L (ref 135–145)
WBC # BLD AUTO: 6.1 10E3/UL (ref 4–11)

## 2024-02-16 PROCEDURE — C1751 CATH, INF, PER/CENT/MIDLINE: HCPCS | Performed by: INTERNAL MEDICINE

## 2024-02-16 PROCEDURE — 999N000142 HC STATISTIC PROCEDURE PREP ONLY

## 2024-02-16 PROCEDURE — 85610 PROTHROMBIN TIME: CPT | Performed by: PHYSICIAN ASSISTANT

## 2024-02-16 PROCEDURE — 93306 TTE W/DOPPLER COMPLETE: CPT

## 2024-02-16 PROCEDURE — 85007 BL SMEAR W/DIFF WBC COUNT: CPT | Performed by: PHYSICIAN ASSISTANT

## 2024-02-16 PROCEDURE — 93451 RIGHT HEART CATH: CPT | Mod: 26 | Performed by: INTERNAL MEDICINE

## 2024-02-16 PROCEDURE — C1894 INTRO/SHEATH, NON-LASER: HCPCS | Performed by: INTERNAL MEDICINE

## 2024-02-16 PROCEDURE — 85027 COMPLETE CBC AUTOMATED: CPT | Performed by: PHYSICIAN ASSISTANT

## 2024-02-16 PROCEDURE — 85018 HEMOGLOBIN: CPT

## 2024-02-16 PROCEDURE — 83880 ASSAY OF NATRIURETIC PEPTIDE: CPT | Performed by: PHYSICIAN ASSISTANT

## 2024-02-16 PROCEDURE — 272N000001 HC OR GENERAL SUPPLY STERILE: Performed by: INTERNAL MEDICINE

## 2024-02-16 PROCEDURE — 250N000009 HC RX 250: Performed by: INTERNAL MEDICINE

## 2024-02-16 PROCEDURE — 94618 PULMONARY STRESS TESTING: CPT | Performed by: INTERNAL MEDICINE

## 2024-02-16 PROCEDURE — 84702 CHORIONIC GONADOTROPIN TEST: CPT | Performed by: PHYSICIAN ASSISTANT

## 2024-02-16 PROCEDURE — 36415 COLL VENOUS BLD VENIPUNCTURE: CPT | Performed by: PHYSICIAN ASSISTANT

## 2024-02-16 PROCEDURE — 999N000132 HC STATISTIC PP CARE STAGE 1

## 2024-02-16 PROCEDURE — 93451 RIGHT HEART CATH: CPT | Performed by: INTERNAL MEDICINE

## 2024-02-16 PROCEDURE — 80048 BASIC METABOLIC PNL TOTAL CA: CPT | Performed by: PHYSICIAN ASSISTANT

## 2024-02-16 PROCEDURE — 93306 TTE W/DOPPLER COMPLETE: CPT | Mod: 26 | Performed by: INTERNAL MEDICINE

## 2024-02-16 RX ORDER — LIDOCAINE 40 MG/G
CREAM TOPICAL
Status: DISCONTINUED | OUTPATIENT
Start: 2024-02-16 | End: 2024-02-16 | Stop reason: HOSPADM

## 2024-02-16 ASSESSMENT — ACTIVITIES OF DAILY LIVING (ADL)
ADLS_ACUITY_SCORE: 37
ADLS_ACUITY_SCORE: 37

## 2024-02-16 NOTE — DISCHARGE INSTRUCTIONS
[facility]                        Interventional Cardiology  Discharge Instructions   Post Right Heart Cath and/or Heart Biopsy      AFTER YOU GO HOME:  DO drink plenty of fluids  DO resume your regular diet and medications unless otherwise instructed by your Primary Physician  Do Not scrub the procedure site vigorously  No lotion or powder to the puncture site for 3 days    CALL YOUR PRIMARY PHYSICIAN IF: You may resume all normal activity.  Monitor neck site for bleeding, swelling, or voice changes. If you notice bleeding or swelling immediately apply pressure to the site and call number below to speak with Cardiology Fellow.  If you experience any changes in your breathing you should call your doctor immediately or come to the closest Emergency Department.  Do not drive yourself.    ADDITIONAL INSTRUCTIONS: Medications: You are to resume all home medications including anticoagulation therapy unless otherwise advised by your primary cardiologist or nurse coordinator.    Follow Up: Per your primary cardiology team    If you have any questions or concerns regarding your procedure site please call [phone] at anytime and ask for Cardiology Fellow on call.  They are available 24 hours a day.  You may also contact the Cardiology Clinic after hours number at [phone].                                                       Telephone Numbers [phone] Monday-Friday 8:00 am to 4:30 pm    [phone] After 4:30 pm Monday-Friday, Weekends & Holidays  Ask for Interventional Cardiologist on call. Someone is on call 24 hours/day   [facility] toll free number [phone] Monday-Friday 8:00 am to 4:30 pm   [facility] Emergency Dept [phone]

## 2024-02-16 NOTE — PROGRESS NOTES
Arrived from home for a right heart catheterization.  VSS.  Denies pain.  H&P current.  Labs resulted.  Will be ready for procedure when consent obtained.

## 2024-02-16 NOTE — DISCHARGE SUMMARY
Pt discharged to home. VSS on RA. RIJ site C/D/I, negative for a hematoma. Up ambulating and voiding w/o difficulty. Tolerating PO intake. Discharge instructions reviewed and all questions answered. Prescription filled and picked up at downstairs pt ambulated to the front entrance where her ride picked her up.

## 2024-02-16 NOTE — PROGRESS NOTES
Research Title: [place] Pulmonary Hypertension Repository Study (MEASURE)  IRB #: [id number]  PI: Karin Frazier MD ([phone])   Study coordinators: Reshma Monge ([phone]), Jossie Joy ([phone])  Estimated duration of study: Until no longer receiving clinical care at [facility]    Patient was approached for possible participation in the MEASURE registry.  The current IRB approved consent form was reviewed and discussed at length with the patient, including purpose, nature of the research, risk & benefits. Confidentiality issues and the option for data/specimen sharing were also reviewed. Patient was informed that participation is voluntary and that refusal to participate will involve no penalty or decrease benefits to which the patient is entitled. They were informed that they may discontinue their involvement at any time.    Patient had the opportunity to read the entire written consent form, ask any questions and concerns of the study, and was offered sufficient time to consider the research trial.  All questions and concerns were addressed and the patient was able to clearly state what study participation involved. Patient voluntarily signed the combined consent and HIPAA form prior to any research data collection and/or blood sample collection.  A signed copy of the combined consent form was given to the patient.    Patient was consented on 16 Feb 2024 at 0915 and opted in the sub-study.

## 2024-02-19 LAB — FIO2-PRE: 21 %

## 2024-02-23 DIAGNOSIS — I27.20 PULMONARY HTN (H): Primary | ICD-10-CM

## 2024-02-26 ENCOUNTER — VIRTUAL VISIT (OUTPATIENT)
Dept: CARDIOLOGY | Facility: CLINIC | Age: 34
End: 2024-02-26
Payer: COMMERCIAL

## 2024-02-26 DIAGNOSIS — I27.20 PULMONARY HTN (H): ICD-10-CM

## 2024-02-26 PROCEDURE — 99215 OFFICE O/P EST HI 40 MIN: CPT | Mod: 95 | Performed by: PHYSICIAN ASSISTANT

## 2024-02-26 NOTE — PATIENT INSTRUCTIONS
You were seen today in the Pulmonary Hypertension Clinic at the [facility].     Cardiology Provider you saw during your visit:    Kelsea Bloom    Medication Changes:  No changes    Recommendations:   Schedule with Rheumatology locally. We will fax this to your local clinic and they can enter in the orders locally    Follow-up:   Follow-up with Dr. Carly palmer in 3 months with labs locally prior- labs faxed to [facility]    Results:       Please call us immediately if you have any syncope (fainting or passing out), chest pain, edema (swelling or weight gain), or decline in your functional status (general decline in how you are feeling).    If you have emergent concerns after hours or on the weekend, please call our on-call Cardiologist at [phone], option 4. For emergencies call [phone].     Thank you for allowing us to be a part of your care here at the [facility]    If you have questions or concerns please contact us at:    Ora Do RN (P: [phone])      Nurse Coordinator       Pulmonary Hypertension     [facility]         JESUS Carlos   (Prior Authorizations)    ()  Clinic   Clinic   Pulmonary Hypertension   Pulmonary Hypertension  [facility]  [facility]  (P)[phone]    (P) [phone]  (F) [phone]

## 2024-02-26 NOTE — LETTER
2/26/2024    Kirk Aguilar MD  [place] [address] [address] [place] [address]    RE: Chelsea Flower       Dear Colleague,     I had the pleasure of seeing Chelsea Flower in the [facility].        CARDIOLOGY PH CLINIC VIDEO VISIT    Date of video visit: 02/26/24      Chelsea Flower is a 33 year old female who is being evaluated via a billable video visit.      Patient reported vitals:  BP: N/A  Heart rate: N/A  Weight: N/A      MEDICATIONS:  Current Outpatient Medications   Medication Sig Dispense Refill    acetaminophen (TYLENOL) 325 MG tablet Take 3 tablets (975 mg) by mouth every 6 hours 80 tablet 0    COMPOUNDED NON-CONTROLLED SUBSTANCE (CMPD RX) - PHARMACY TO MIX COMPOUNDED MEDICATION 10% Lidocaine gel. Apply 1-2 grams to affected site 1-2 times per day 8-12 hours apart  [facility] [phone]      digoxin (LANOXIN) 125 MCG tablet Take 1 tablet (125 mcg) by mouth daily 90 tablet 3    furosemide (LASIX) 20 MG tablet Take 1 tablet (20 mg) by mouth daily 90 tablet 3    macitentan (OPSUMIT) 10 MG tablet Take 1 tablet (10 mg) by mouth daily Please make sure you are completing your monthly mandatory labs. 30 tablet 11    Multiple Vitamins-Minerals (WOMENS MULTIVITAMIN) TABS Take 1 tablet by mouth daily      tadalafil, PAH, 20 MG TABS Take 2 tablets (40 mg) by mouth daily 60 tablet 11    traMADol (ULTRAM) 50 MG tablet Take 50 mg by mouth every 6 hours as needed for severe pain      treprostinil (REMODULIN) infusion 10 mg/mL Inject 5,333.58 ng/min Subcutaneous continuous 70.55ng continuously  (goal)  75.6kg dosing weight           Primary PH cardiologist: Dr. Carroll      HPI:  Chelsea Flower is a pleasant 33 year old female with a PMHx including subclinical hypothyroidism and hypertension. She was referred to the PH clinic in January of 2023 from [place] after having a workukp for exertional dyspnea and presyncope shortly after her last pregnancy (3 children total).  "She had outside testing suggesting severe PAH with a mPAP of 55 and evidence of RV dysfunction. Secondary workup was unrevealing. She then underwent repeat testing here in Feb 2023 to assess for vaso-responsiveness (confirmed again severe PAH with mPAP 50mmHg, significantly reduced CO, and negative vasodilator challenge). She was then admitted and placed on IV Remodulin and ultimately tadalafil and macitentan were added shortly after.     When she met with Dr. Carroll in mid November, she was overall doing much better and given her active lifestyle with 3 small children, decision was made for conversion to subcutaneous therapy instead. She was then admitted shortly after and RHC confirmed only mild PAH at that time with normalized cardiac output. She was then successfully transitioned, and kept on a equivalent dose of 70g/kg/min. When I followed up with her virtually in early January she told me she wasn't feeling quite as well with some recurrence of exertional chest discomfort and fatigue, so I referred her back for hemodynamic testing.     Today, we are following back up virtually once again. Her RHC done a few weeks ago showed near normalization of her PA pressures, with preserved cardiac output. She tells me that ultimately she is feeling better, and in retrospect, thinks she wasn't feeling as well due to an issue with her subcutaneous site. Despite the site not being painful/red, or any pump alarms, she discovered that she thinks her catheter was \"clogged.\" She states that it seemed to have some pus but she reports no fevers or chills, and was surprised the site was not erythematous. She tried changing her site to her arm which ultimately caused a significant local reaction with swelling of her arm requiring an ER visit where she was given a brief course of steroids. Now, she has he site on her abdomen once again and things sound to be better. From a breathing standpoint she is doing well and feels back to " her normal. She notes some occasional chest pain toward the end of the day, but says she had this awhile ago when she had some back issues as well, and plans to visit with her chiropractor. She denies any new LE edema, orthopnea, dizziness, or presyncope. 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    Most recent labs were reviewed as below.     CURRENT PULMONARY HYPERTENSION REGIMEN:    PAH Rx: subcutaneous Remodulin 70.55ng/kg/min, Opsumit 10mg daily, tadalafil 40mg daily    Diuretics: Lasix 20mg daily     Oxygen: 2LNC at night, not using during the day    Anticoagulation: None      Assessment/Plan:    Idiopathic pulmonary arterial hypertension.   --Chelsea has previously severe iPAH, and was placed on triple therapy with IV Remodulin, Opsumit, and Adcirca, in early 2023. In Nov 2023, she was electively changed from IV to subcutaneous Remodulin (70.55ng/kg/min without titration) as she had responded well to therapy and given her active lifestyle with 3 children. Repeat hemodynamic testing a few weeks ago shows remarkable improvement with near normalization of her PA pressures. She is doing well currently and RV function remains normal, and remains on digoxin for RV support.    --In regard to etiology of her PH, this is not yet clear. Genetic testing is planned. Additionally, due to a pos CLINTON on initial screening, will request a formal Rheumatology evaluation. She would like to have this done locally if possible. Fatigue remains her most consistent symptom currently (I will also add iron studies to next lab draw).    --As today is a virtual visit I cannot assess her volume status formally but she reports no worsening edema on Lasix 20mg daily which we will continue.   --Repeat 6MWT does show continued desaturation with exertion, but sats now staying mostly 89-90% on room air. She has supplemental oxygen at home, using only rarely  during the day.  I believe we are also awaiting an JOVANNY for completeness.    --She is aware of the need for monthly pregnancy testing while on ERA.     Follow up plan: Return in 3 months to see Dr Carroll (virtually ok) with local labs prior.  I asked the patient to call sooner with any new concerns.         Testing/labs:    Most recent labs:    Latest Reference Range & Units 02/16/24 07:21   Sodium 135 - 145 mmol/L 139   Potassium 3.4 - 5.3 mmol/L 3.6   Chloride 98 - 107 mmol/L 107   Carbon Dioxide (CO2) 22 - 29 mmol/L 22   Urea Nitrogen 6.0 - 20.0 mg/dL 9.7   Creatinine 0.51 - 0.95 mg/dL 0.61   GFR Estimate >60 mL/min/1.73m2 >90   Calcium 8.6 - 10.0 mg/dL 8.7   Anion Gap 7 - 15 mmol/L 10   Glucose 70 - 99 mg/dL 91   hCG Quantitative <5 mIU/mL <1   N-Terminal Pro BNP Inpatient 0 - 450 pg/mL <36   WBC 4.0 - 11.0 10e3/uL 6.1   Hemoglobin 11.7 - 15.7 g/dL 12.8   Hematocrit 35.0 - 47.0 % 36.7   Platelet Count 150 - 450 10e3/uL 193   RBC Count 3.80 - 5.20 10e6/uL 4.02   MCV 78 - 100 fL 91   MCH 26.5 - 33.0 pg 31.8   MCHC 31.5 - 36.5 g/dL 34.9   RDW 10.0 - 15.0 % 13.0   % Neutrophils % 77   % Lymphocytes % 19   % Monocytes % 0   % Eosinophils % 4   % Basophils % 0   Absolute Basophils 0.0 - 0.2 10e3/uL 0.0       Other most recent pertinent testing:      Echo 2/16/24  Interpretation Summary  Global and regional left ventricular function is normal with an EF of 55-60%.  RVFWLS -20.5%. RV4CLS -17.9%.  RVEDv 183.8ml. RVESv 101.2ml.RV EF 44.9%.  The right ventricular systolic pressure is 36mmHg above the right atrial  pressure.  PV AT 102ms.  IVC diameter <2.1 cm collapsing >50% with sniff suggests a normal RA pressure  of 3 mmHg.  No pericardial effusion is present.      RHC 2/16/24    Conclusion         Right sided filling pressures are normal.    Left sided filling pressures are normal.    Normal PA pressures.    Hyperdynamic cardiac output level.    Significant improvement in PA pressure, PVR and cardiac output when  compared to her last right heart catheterization a year ago.         Hemodynamics    Right sided filling pressures are normal. Left sided filling pressures are normal. Normal PA pressures. Hyperdynamic cardiac output level.     Pressures Phase: Baseline     Time Systolic (mmHg) Diastolic (mmHg) Mean (mmHg) A Wave (mmHg) V Wave (mmHg) EDP (mmHg) Max dp/dt (mmHg/sec) HR (bpm) Content (mL/dL) SAT (%)   RA Pressures 10:09 AM   1    2    -1      67        RV Pressures  9:40 AM       480          10:11 AM 35        2     73        PA Pressures 10:13 AM 35    15    20        68        PCW Pressures 10:12 AM   5    2    2      71        AO Pressures  9:46     64    87        71          Blood Flow Results Phase: Baseline     Time Results Indexed Values (L/min/m2)   QP  9:40 AM 7.55 L/min    4.15      QS  9:40 AM 7.55 L/min    4.15        Blood Oximetry Phase: Baseline     Time Hb SAT(%) PO2 Content (mL/dL) PA Sat (%)   PA  9:40 AM  72.8 %      72.8      Art  9:40 AM  96 %     15.67         Cardiac Output Phase: Baseline     Time TDCO (L/min) TDCI (L/min/m2) Eugenia C.O. (L/min) Eugenia C.I. (L/min/m2) Eugenia HR (bpm)   Cardiac Output Results  9:40 AM 6.03    3.32    7.55    4.15        10:15 AM 6.03            Resistance Results Phase: Baseline     Time PVR SVR TPR TVR PVR/SVR TPR/TVR   Resistance Results (Metric)  9:40 .82 dsc-5    910.59 dsc-5    211.76 dsc-5    921.18 dsc-5    0.17    0.23      Resistance Results (Wood)  9:40 AM 1.99 JENKINS    11.39 JENKINS    2.65 JENKINS    11.52 JENKINS    0.17    0.23        Stoke Volume Results Phase: Baseline     Time RVSW (gm*m) LVSW (gm*m) RVSW-I (gm*m/m2) LVSW-I (gm*m/m2)   Stroke Work Results  9:40 AM 28.7    118.65    15.79    65.25            NYHA Functional Class:  3      Video-Visit Details    Type of service:  Video Visit    Video Start Time: 1329  Video End Time: 1349    An additional 20 minutes was spent today performing chart and history review, pre and post visit documentation,  patient education, and care coordination.      Originating Location (pt. Location): Home    Distant Location (provider location):  On-site    Platform used for Video Visit: JerrodBevvy [phone]      Kelsea Bloom PA-C  [facility]  Pager [phone]      Thank you for allowing me to participate in the care of your patient.      Sincerely,     LUNA Panda     [facility]  cc:   LUNA Panda  [facility]  [address] [place] [id number]

## 2024-02-26 NOTE — PROGRESS NOTES
CARDIOLOGY PH CLINIC VIDEO VISIT    Date of video visit: 02/26/24      Chelsea Flower is a 33 year old female who is being evaluated via a billable video visit.      Patient reported vitals:  BP: N/A  Heart rate: N/A  Weight: N/A      MEDICATIONS:  Current Outpatient Medications   Medication Sig Dispense Refill    acetaminophen (TYLENOL) 325 MG tablet Take 3 tablets (975 mg) by mouth every 6 hours 80 tablet 0    COMPOUNDED NON-CONTROLLED SUBSTANCE (CMPD RX) - PHARMACY TO MIX COMPOUNDED MEDICATION 10% Lidocaine gel. Apply 1-2 grams to affected site 1-2 times per day 8-12 hours apart  [facility] [phone]      digoxin (LANOXIN) 125 MCG tablet Take 1 tablet (125 mcg) by mouth daily 90 tablet 3    furosemide (LASIX) 20 MG tablet Take 1 tablet (20 mg) by mouth daily 90 tablet 3    macitentan (OPSUMIT) 10 MG tablet Take 1 tablet (10 mg) by mouth daily Please make sure you are completing your monthly mandatory labs. 30 tablet 11    Multiple Vitamins-Minerals (WOMENS MULTIVITAMIN) TABS Take 1 tablet by mouth daily      tadalafil, PAH, 20 MG TABS Take 2 tablets (40 mg) by mouth daily 60 tablet 11    traMADol (ULTRAM) 50 MG tablet Take 50 mg by mouth every 6 hours as needed for severe pain      treprostinil (REMODULIN) infusion 10 mg/mL Inject 5,333.58 ng/min Subcutaneous continuous 70.55ng continuously  (goal)  75.6kg dosing weight           Primary PH cardiologist: Dr. Carroll      HPI:  Chelsea Flower is a pleasant 33 year old female with a PMHx including subclinical hypothyroidism and hypertension. She was referred to the PH clinic in January of 2023 from [place] after having a workukp for exertional dyspnea and presyncope shortly after her last pregnancy (3 children total). She had outside testing suggesting severe PAH with a mPAP of 55 and evidence of RV dysfunction. Secondary workup was unrevealing. She then underwent repeat testing here in Feb 2023 to assess for vaso-responsiveness  "(confirmed again severe PAH with mPAP 50mmHg, significantly reduced CO, and negative vasodilator challenge). She was then admitted and placed on IV Remodulin and ultimately tadalafil and macitentan were added shortly after.     When she met with Dr. Carroll in mid November, she was overall doing much better and given her active lifestyle with 3 small children, decision was made for conversion to subcutaneous therapy instead. She was then admitted shortly after and RHC confirmed only mild PAH at that time with normalized cardiac output. She was then successfully transitioned, and kept on a equivalent dose of 70g/kg/min. When I followed up with her virtually in early January she told me she wasn't feeling quite as well with some recurrence of exertional chest discomfort and fatigue, so I referred her back for hemodynamic testing.     Today, we are following back up virtually once again. Her RHC done a few weeks ago showed near normalization of her PA pressures, with preserved cardiac output. She tells me that ultimately she is feeling better, and in retrospect, thinks she wasn't feeling as well due to an issue with her subcutaneous site. Despite the site not being painful/red, or any pump alarms, she discovered that she thinks her catheter was \"clogged.\" She states that it seemed to have some pus but she reports no fevers or chills, and was surprised the site was not erythematous. She tried changing her site to her arm which ultimately caused a significant local reaction with swelling of her arm requiring an ER visit where she was given a brief course of steroids. Now, she has he site on her abdomen once again and things sound to be better. From a breathing standpoint she is doing well and feels back to her normal. She notes some occasional chest pain toward the end of the day, but says she had this awhile ago when she had some back issues as well, and plans to visit with her chiropractor. She denies any new LE " edema, orthopnea, dizziness, or presyncope. 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    Most recent labs were reviewed as below.     CURRENT PULMONARY HYPERTENSION REGIMEN:    PAH Rx: subcutaneous Remodulin 70.55ng/kg/min, Opsumit 10mg daily, tadalafil 40mg daily    Diuretics: Lasix 20mg daily     Oxygen: 2LNC at night, not using during the day    Anticoagulation: None      Assessment/Plan:    Idiopathic pulmonary arterial hypertension.   --Chelsea has previously severe iPAH, and was placed on triple therapy with IV Remodulin, Opsumit, and Adcirca, in early 2023. In Nov 2023, she was electively changed from IV to subcutaneous Remodulin (70.55ng/kg/min without titration) as she had responded well to therapy and given her active lifestyle with 3 children. Repeat hemodynamic testing a few weeks ago shows remarkable improvement with near normalization of her PA pressures. She is doing well currently and RV function remains normal, and remains on digoxin for RV support.    --In regard to etiology of her PH, this is not yet clear. Genetic testing is planned. Additionally, due to a pos CLINTON on initial screening, will request a formal Rheumatology evaluation. She would like to have this done locally if possible. Fatigue remains her most consistent symptom currently (I will also add iron studies to next lab draw).    --As today is a virtual visit I cannot assess her volume status formally but she reports no worsening edema on Lasix 20mg daily which we will continue.   --Repeat 6MWT does show continued desaturation with exertion, but sats now staying mostly 89-90% on room air. She has supplemental oxygen at home, using only rarely during the day.  I believe we are also awaiting an JOVANNY for completeness. [Addendum: Reviewed: JOVANNY does show nocturnal desat on room air and it was recommended that she continue to use 2L at night with  sleep.]   --She is aware of the need for monthly pregnancy testing while on ERA.     Follow up plan: Return in 3 months to see Dr Carroll (virtually ok) with local labs prior.  I asked the patient to call sooner with any new concerns.         Testing/labs:    Most recent labs:    Latest Reference Range & Units 02/16/24 07:21   Sodium 135 - 145 mmol/L 139   Potassium 3.4 - 5.3 mmol/L 3.6   Chloride 98 - 107 mmol/L 107   Carbon Dioxide (CO2) 22 - 29 mmol/L 22   Urea Nitrogen 6.0 - 20.0 mg/dL 9.7   Creatinine 0.51 - 0.95 mg/dL 0.61   GFR Estimate >60 mL/min/1.73m2 >90   Calcium 8.6 - 10.0 mg/dL 8.7   Anion Gap 7 - 15 mmol/L 10   Glucose 70 - 99 mg/dL 91   hCG Quantitative <5 mIU/mL <1   N-Terminal Pro BNP Inpatient 0 - 450 pg/mL <36   WBC 4.0 - 11.0 10e3/uL 6.1   Hemoglobin 11.7 - 15.7 g/dL 12.8   Hematocrit 35.0 - 47.0 % 36.7   Platelet Count 150 - 450 10e3/uL 193   RBC Count 3.80 - 5.20 10e6/uL 4.02   MCV 78 - 100 fL 91   MCH 26.5 - 33.0 pg 31.8   MCHC 31.5 - 36.5 g/dL 34.9   RDW 10.0 - 15.0 % 13.0   % Neutrophils % 77   % Lymphocytes % 19   % Monocytes % 0   % Eosinophils % 4   % Basophils % 0   Absolute Basophils 0.0 - 0.2 10e3/uL 0.0       Other most recent pertinent testing:      Echo 2/16/24  Interpretation Summary  Global and regional left ventricular function is normal with an EF of 55-60%.  RVFWLS -20.5%. RV4CLS -17.9%.  RVEDv 183.8ml. RVESv 101.2ml.RV EF 44.9%.  The right ventricular systolic pressure is 36mmHg above the right atrial  pressure.  PV AT 102ms.  IVC diameter <2.1 cm collapsing >50% with sniff suggests a normal RA pressure  of 3 mmHg.  No pericardial effusion is present.      RHC 2/16/24    Conclusion         Right sided filling pressures are normal.    Left sided filling pressures are normal.    Normal PA pressures.    Hyperdynamic cardiac output level.    Significant improvement in PA pressure, PVR and cardiac output when compared to her last right heart catheterization a year ago.          Hemodynamics    Right sided filling pressures are normal. Left sided filling pressures are normal. Normal PA pressures. Hyperdynamic cardiac output level.     Pressures Phase: Baseline     Time Systolic (mmHg) Diastolic (mmHg) Mean (mmHg) A Wave (mmHg) V Wave (mmHg) EDP (mmHg) Max dp/dt (mmHg/sec) HR (bpm) Content (mL/dL) SAT (%)   RA Pressures 10:09 AM   1    2    -1      67        RV Pressures  9:40 AM       480          10:11 AM 35        2     73        PA Pressures 10:13 AM 35    15    20        68        PCW Pressures 10:12 AM   5    2    2      71        AO Pressures  9:46     64    87        71          Blood Flow Results Phase: Baseline     Time Results Indexed Values (L/min/m2)   QP  9:40 AM 7.55 L/min    4.15      QS  9:40 AM 7.55 L/min    4.15        Blood Oximetry Phase: Baseline     Time Hb SAT(%) PO2 Content (mL/dL) PA Sat (%)   PA  9:40 AM  72.8 %      72.8      Art  9:40 AM  96 %     15.67         Cardiac Output Phase: Baseline     Time TDCO (L/min) TDCI (L/min/m2) Eugenia C.O. (L/min) Eugenia C.I. (L/min/m2) Eugenia HR (bpm)   Cardiac Output Results  9:40 AM 6.03    3.32    7.55    4.15        10:15 AM 6.03            Resistance Results Phase: Baseline     Time PVR SVR TPR TVR PVR/SVR TPR/TVR   Resistance Results (Metric)  9:40 .82 dsc-5    910.59 dsc-5    211.76 dsc-5    921.18 dsc-5    0.17    0.23      Resistance Results (Wood)  9:40 AM 1.99 JENKINS    11.39 JENKINS    2.65 JENKINS    11.52 JENKINS    0.17    0.23        Stoke Volume Results Phase: Baseline     Time RVSW (gm*m) LVSW (gm*m) RVSW-I (gm*m/m2) LVSW-I (gm*m/m2)   Stroke Work Results  9:40 AM 28.7    118.65    15.79    65.25            NYHA Functional Class:  3      Video-Visit Details    Type of service:  Video Visit    Video Start Time: 1329  Video End Time: 1349    An additional 20 minutes was spent today performing chart and history review, pre and post visit documentation, patient education, and care coordination.      Originating Location (pt.  Location): Home    Distant Location (provider location):  On-site    Platform used for Video Visit: Avectra [phone]      Kelsea Bloom PA-C  [facility]  Pager [phone]

## 2024-03-07 DIAGNOSIS — I27.20 PULMONARY HYPERTENSION (H): ICD-10-CM

## 2024-03-07 RX ORDER — DIGOXIN 125 MCG
125 TABLET ORAL DAILY
Qty: 90 TABLET | Refills: 3 | Status: SHIPPED | OUTPATIENT
Start: 2024-03-07 | End: 2024-05-14

## 2024-03-10 ENCOUNTER — HEALTH MAINTENANCE LETTER (OUTPATIENT)
Age: 34
End: 2024-03-10

## 2024-03-25 ENCOUNTER — EXTERNAL ORDER RESULTS (OUTPATIENT)
Dept: CARDIOLOGY | Facility: CLINIC | Age: 34
End: 2024-03-25
Payer: COMMERCIAL

## 2024-03-25 LAB — HCG UR QL: NEGATIVE

## 2024-04-08 ENCOUNTER — TELEPHONE (OUTPATIENT)
Dept: CARDIOLOGY | Facility: CLINIC | Age: 34
End: 2024-04-08
Payer: COMMERCIAL

## 2024-04-08 NOTE — TELEPHONE ENCOUNTER
Patient called and is struggling with site pain management. She has OTC: benadryl, zyrtec, tylenol, arnicream. Uses PLO Gell. She trials 48hour dry site prior to initiation of new site and uses ice. She does use the tramadol for day 3-4 are the worst. However she is noticing that her site wakes up or inflames around day 10-12 and she has been having to trial pushing through or changing her site due to pain. She does reports the tramadol does not do anything. She is following a mediterranean low inflammatory diet as well. Overall patient is trialing everything she can for site pain. She is wondering if she can try CBD/THC products and or try and have something stronger for the miserable days with new cassette changes.       Call placed to Dr. Carly Do RN on 4/8/2024 at 1:29 PM

## 2024-04-12 NOTE — TELEPHONE ENCOUNTER
Dr. Carroll is OK with THC or CBD product. Topically for site pain. Is not willing to prescribe anything else for pain.     Called and updated patient.     Ora Do RN on 4/12/2024 at 9:48 AM

## 2024-05-09 ENCOUNTER — EXTERNAL ORDER RESULTS (OUTPATIENT)
Dept: CARDIOLOGY | Facility: CLINIC | Age: 34
End: 2024-05-09
Payer: COMMERCIAL

## 2024-05-09 LAB
HCG UR QL: NEGATIVE
HCG UR QL: NEGATIVE

## 2024-05-10 ENCOUNTER — TRANSFERRED RECORDS (OUTPATIENT)
Dept: HEALTH INFORMATION MANAGEMENT | Facility: CLINIC | Age: 34
End: 2024-05-10
Payer: COMMERCIAL

## 2024-05-14 ENCOUNTER — VIRTUAL VISIT (OUTPATIENT)
Dept: CARDIOLOGY | Facility: CLINIC | Age: 34
End: 2024-05-14
Attending: INTERNAL MEDICINE
Payer: COMMERCIAL

## 2024-05-14 VITALS — BODY MASS INDEX: 30.12 KG/M2 | HEIGHT: 63 IN | WEIGHT: 170 LBS

## 2024-05-14 DIAGNOSIS — I27.20 PULMONARY HTN (H): ICD-10-CM

## 2024-05-14 PROCEDURE — 99214 OFFICE O/P EST MOD 30 MIN: CPT | Mod: 95 | Performed by: INTERNAL MEDICINE

## 2024-05-14 ASSESSMENT — PAIN SCALES - GENERAL: PAINLEVEL: NO PAIN (0)

## 2024-05-14 NOTE — PATIENT INSTRUCTIONS
You were seen today in the Pulmonary Hypertension Clinic at the [facility].     Cardiology Provider you saw during your visit:    Dr. Carroll    Medication Changes:  - None    Results:   Component      Latest Ref Rng 5/10/2024  1:28 PM   WBC Count (External)      3.50 - 10.30 x10^3/mcL 6.69 (E)   RBC Count (External)      3.65 - 5.25 x10^6/mcL 4.51 (E)   Hemoglobin (External)      11.5 - 15.3 g/dL 13.9 (E)   Hematocrit (External)      33.6 - 45.2 % 41.6 (E)   MCV (External)      80.4 - 98.0 FL 92.2 (E)   MCH (External)      28.0 - 33.0 pg 30.8 (E)   MCHC (External)      32.0 - 36.0 g/dL 33.4 (E)   RDW (External)      12.9 - 14.9 % 13.1 (E)   Platelet Count (External)      125 - 359 x10^3/mcL 201 (E)   % Neutrophils (External)      44.8 - 76.4 % 73.9 (E)   Absolute Neutrophils (External)      1.70 - 7.50 x10^3/mcL 4.94 (E)   % Lymphocytes (External)      15.9 - 49.5 % 18.4 (E)   Absolute Lymphocytes (External)      1.10 - 3.30 x10^3/mcL 1.23 (E)   % Monocytes (External)      0.7 - 13.6 % 5.1 (E)   Absolute Monocytes (External)      0.30 - 0.90 x10^3/mcL 0.34 (E)   % Eosinophils (External)      0.1 - 7.2 % 2.4 (E)   Absolute Eosinophils (External)      0.00 - 0.70 x10^3/mcL 0.16 (E)   % Basophils (External)      0.0 - 2.2 % 0.2 (E)   Absolute Basophils (External)      0.00 - 0.20 x10^3/mcL 0.01 (E)   Method (External) Auto diff (E)   Glucose (External)      74 - 106 mg/dL 92 (E)   Calcium (External)      8.4 - 10.2 mg/dL 8.9 (E)   Urea Nitrogen (External)      7 - 17 mg/dL 12 (E)   Creatinine (External)      0.52 - 1.04 mg/dL 0.60 (E)   GFR Estimated (External)      >=90 ml/min/1.73m2 >90 (E)   Sodium (External)      137 - 145 mmol/L 140 (E)   Potassium (External)      3.5 - 5.1 mmol/L 3.9 (E)   Chloride (External)      98 - 107 mmol/L 108 (H) (E)   CO2 (External)      22 - 30 mmol/L 23 (E)   Anion Gap (External)      2 - 11 mmol/L 9 (E)   B Natriuretic Peptide (External)      0.0 - 100.0 pg/mL <5.0 (E)       Legend:  (H) High  (E) External lab result    Recommendations:   - Talk to your family about potentially making the switch from SubQ to Uptravi. We would wean you off the Remodulin and up on the Uptravi    Follow-up:   - Telephone call with Dr. Carroll next week     Please call us immediately if you have any syncope (fainting or passing out), chest pain, edema (swelling or weight gain), or decline in your functional status (general decline in how you are feeling).    If you have emergent concerns after hours or on the weekend, please call our on-call Cardiologist at [phone], option [phone]. For emergencies call [phone].     Thank you for allowing us to be a part of your care here at the [facility]    If you have questions or concerns please contact us at:    Ora Do RN (P: [phone])    Nurse Coordinator       Pulmonary Hypertension     [facility]         JESUS Brown   (Prior Auths & Pt Assistance)   ()  Clinic   Clinic   Pulmonary Hypertension   Pulmonary Hypertension  [facility]  (P)[phone]    (P) [phone]  (F) [phone]

## 2024-05-14 NOTE — PROGRESS NOTES
Virtual Visit Details    30 minute visit   1. Exertional shortness of breath   2. Pulmonary hypertension.   3. Childbirth x 3 by   4.  Hypertension treated with recently started diltiazem  5.  Hypothyroidism    Normalized pressures and echocardiographic appearance.  Discussed risks and benefits of scaled weaning of subcutaneous remodulin in favor of Uptravi.  Discussed risks and benefits and general process and asked her to think about this and discuss with significant other and will talk again next week.  40 minutes telephone     Name: RONALD LEBLANC  MRN: [id number]  : 1990  Study Date: 2024 07:26 AM  Age: 33 yrs  Gender: Female  Patient Location: [facility]  Reason For Study: Pulmonary HTN  Ordering Physician: JOSI BERUMEN  Referring Physician: JOSI BERUMEN  Performed By: Baylee Freeman RDCS     BSA: 1.8 m2  Height: 63 in  Weight: 170 lb  HR: 75  BP: 116/61 mmHg  ______________________________________________________________________________  Procedure  Echocardiogram with two-dimensional, color and spectral Doppler performed.  ______________________________________________________________________________  Interpretation Summary  Global and regional left ventricular function is normal with an EF of 55-60%.  RVFWLS -20.5%. RV4CLS -17.9%.  RVEDv 183.8ml. RVESv 101.2ml.RV EF 44.9%.  The right ventricular systolic pressure is 36mmHg above the right atrial  pressure.  PV AT 102ms.  IVC diameter <2.1 cm collapsing >50% with sniff suggests a normal RA pressure  of 3 mmHg.  No pericardial effusion is present.  ______________________________________________________________________________  Left Ventricle  Global and regional left ventricular function is normal with an EF of 55-60%.  Left ventricular wall thickness is normal. Left ventricular size is normal.  Left ventricular diastolic function is normal. No regional wall motion  abnormalities are seen.     Right Ventricle  RVFWLS -20.5%. RV4CLS  -17.9%.  RVEDv 183.8ml. RVESv 101.2ml.RV EF 44.9%.     Atria  Both atria appear normal.     Mitral Valve  The mitral valve is normal.     Aortic Valve  Aortic valve is normal in structure and function.     Tricuspid Valve  The tricuspid valve is normal. Trace tricuspid insufficiency is present. The  right ventricular systolic pressure is 36mmHg above the right atrial pressure.     Pulmonic Valve  The pulmonic valve is normal. PV AT 102ms. Trace pulmonic insufficiency is  present.     Vessels  The aorta root is normal. MPA 3cms. IVC diameter <2.1 cm collapsing >50% with  sniff suggests a normal RA pressure of 3 mmHg.     Pericardium  No pericardial effusion is present.     ______________________________________________________________________________  MMode/2D Measurements & Calculations  RVDd: 4.6 cm  IVSd: 0.90 cm  LVIDd: 5.2 cm  LVIDs: 3.4 cm  LVPWd: 0.83 cm  FS: 35.1 %  LV mass(C)d: 163.3 grams  LV mass(C)dI: 90.5 grams/m2     asc Aorta Diam: 2.5 cm  LVOT diam: 2.1 cm  LVOT area: 3.5 cm2  Asc Ao diam index BSA (cm/m2): 1.4  Asc Ao diam index Ht(cm/m): 1.6  RWT: 0.32  TAPSE: 2.0 cm     Doppler Measurements & Calculations  MV E max jose antonio: 94.8 cm/sec  MV A max jose antonio: 57.7 cm/sec  MV E/A: 1.6  MV dec slope: 489.8 cm/sec2  MV dec time: 0.19 sec  PA acc time: 0.10 sec  TR max jose antonio: 298.8 cm/sec  TR max P.7 mmHg  E/E' av.0  Lateral E/e': 5.4  Medial E/e': 8.6  RV S Jose Antonio: 14.3 cm/sec     Measurements from QLAB  EDV (RV HM): 183.8 ml  EF (RV HM): 44.9 %  ESV (RV HM): 101.2 ml  FAC (RV HM): 28.9 %  RV 4C LS (AS): -17.9 %  RV Free Wall LS (AS): -20.5 %  RVDd base (RVD1) (RV HM): 45.0 mm  RVDd mid (RVD2) (RV HM): 40.2 mm  RVLd (RVD3) (RV HM): 87.5 mm  RVLS (Freewall) (RV HM): -23.4 %  RVLS (Septum) (RV HM): -13.3 %     [facility] Reference Range & Units 05/10/24 13:28   Glucose (External) 74 - 106 mg/dL 92 (E)   WBC Count (External) 3.50 - 10.30 x10^3/mcL 6.69 (E)   Hemoglobin (External) 11.5 - 15.3 g/dL 13.9 (E)    Hematocrit (External) 33.6 - 45.2 % 41.6 (E)   Platelet Count (External) 125 - 359 x10^3/mcL 201 (E)   RBC Count (External) 3.65 - 5.25 x10^6/mcL 4.51 (E)   MCV (External) 80.4 - 98.0 FL 92.2 (E)   MCH (External) 28.0 - 33.0 pg 30.8 (E)   MCHC (External) 32.0 - 36.0 g/dL 33.4 (E)   RDW (External) 12.9 - 14.9 % 13.1 (E)   Method (External)  Auto diff (E)   % Neutrophils (External) 44.8 - 76.4 % 73.9 (E)   % Lymphocytes (External) 15.9 - 49.5 % 18.4 (E)   % Monocytes (External) 0.7 - 13.6 % 5.1 (E)   % Eosinophils (External) 0.1 - 7.2 % 2.4 (E)   % Basophils (External) 0.0 - 2.2 % 0.2 (E)   Absolute Basophils (External) 0.00 - 0.20 x10^3/mcL 0.01 (E)   Absolute Eosinophils (External) 0.00 - 0.70 x10^3/mcL 0.16 (E)   Absolute Lymphocytes (External) 1.10 - 3.30 x10^3/mcL 1.23 (E)   Absolute Monocytes (External) 0.30 - 0.90 x10^3/mcL 0.34 (E)   Absolute Neutrophils (External) 1.70 - 7.50 x10^3/mcL 4.94 (E)   (E): External lab result       Current Outpatient Medications   Medication Sig Dispense Refill    acetaminophen (TYLENOL) 325 MG tablet Take 3 tablets (975 mg) by mouth every 6 hours 80 tablet 0    digoxin (LANOXIN) 125 MCG tablet Take 1 tablet (125 mcg) by mouth daily 90 tablet 3    furosemide (LASIX) 20 MG tablet Take 1 tablet (20 mg) by mouth daily 90 tablet 3    macitentan (OPSUMIT) 10 MG tablet Take 1 tablet (10 mg) by mouth daily Please make sure you are completing your monthly mandatory labs. 30 tablet 11    Multiple Vitamins-Minerals (WOMENS MULTIVITAMIN) TABS Take 1 tablet by mouth daily      tadalafil, PAH, 20 MG TABS Take 2 tablets (40 mg) by mouth daily 60 tablet 11    traMADol (ULTRAM) 50 MG tablet Take 50 mg by mouth every 6 hours as needed for severe pain      treprostinil (REMODULIN) infusion 10 mg/mL Inject 5,333.58 ng/min Subcutaneous continuous 70.55ng continuously  (goal)  75.6kg dosing weight      COMPOUNDED NON-CONTROLLED SUBSTANCE (CMPD RX) - PHARMACY TO MIX COMPOUNDED MEDICATION 10%  Lidocaine gel. Apply 1-2 grams to affected site 1-2 times per day 8-12 hours apart  [facility] [phone]       No current facility-administered medications for this visit.             Right sided filling pressures are normal.    Left sided filling pressures are normal.    Normal PA pressures.    Hyperdynamic cardiac output level.    Significant improvement in PA pressure, PVR and cardiac output when compared to her last right heart catheterization a year ago.         Hemodynamics    Right sided filling pressures are normal. Left sided filling pressures are normal. Normal PA pressures. Hyperdynamic cardiac output level.     Pressures Phase: Baseline     Time Systolic (mmHg) Diastolic (mmHg) Mean (mmHg) A Wave (mmHg) V Wave (mmHg) EDP (mmHg) Max dp/dt (mmHg/sec) HR (bpm) Content (mL/dL) SAT (%)   RA Pressures 10:09 AM   1    2    -1      67        RV Pressures  9:40 AM       480          10:11 AM 35        2     73        PA Pressures 10:13 AM 35    15    20        68        PCW Pressures 10:12 AM   5    2    2      71        AO Pressures  9:46     64    87        71          Blood Flow Results Phase: Baseline     Time Results Indexed Values (L/min/m2)   QP  9:40 AM 7.55 L/min    4.15      QS  9:40 AM 7.55 L/min    4.15        Blood Oximetry Phase: Baseline     Time Hb SAT(%) PO2 Content (mL/dL) PA Sat (%)   PA  9:40 AM  72.8 %      72.8      Art  9:40 AM  96 %     15.67         Cardiac Output Phase: Baseline     Time TDCO (L/min) TDCI (L/min/m2) Eugenia C.O. (L/min) Eugenia C.I. (L/min/m2) Eugenia HR (bpm)   Cardiac Output Results  9:40 AM 6.03    3.32    7.55    4.15        10:15 AM 6.03            Resistance Results Phase: Baseline     Time PVR SVR TPR TVR PVR/SVR TPR/TVR   Resistance Results (Metric)  9:40 .82 dsc-5    910.59 dsc-5    211.76 dsc-5    921.18 dsc-5    0.17    0.23      Resistance Results (Wood)  9:40 AM 1.99 JENKINS    11.39 JENKINS    2.65 JENKINS    11.52 JENKINS    0.17    0.23        Stoke Volume  Results Phase: Baseline     Time RVSW (gm*m) LVSW (gm*m) RVSW-I (gm*m/m2) LVSW-I (gm*m/m2)   Stroke Work Results  9:40 AM 28.7    118.65    15.79    65.25

## 2024-05-14 NOTE — LETTER
2024      RE: Ronald Leblanc  [address]  [facility] [address]       Dear Colleague,    Thank you for the opportunity to participate in the care of your patient, Ronald Leblanc, at the [facility] [place] at [facility]. Please see a copy of my visit note below.    Virtual Visit Details    30 minute visit   1. Exertional shortness of breath   2. Pulmonary hypertension.   3. Childbirth x 3 by   4.  Hypertension treated with recently started diltiazem  5.  Hypothyroidism    Normalized pressures and echocardiographic appearance.  Discussed risks and benefits of scaled weaning of subcutaneous remodulin in favor of Uptravi.  Discussed risks and benefits and general process and asked her to think about this and discuss with significant other and will talk again next week.  40 minutes telephone     Name: RONALD LEBLANC  MRN: [id number]  : 1990  Study Date: 2024 07:26 AM  Age: 33 yrs  Gender: Female  Patient Location: [facility]  Reason For Study: Pulmonary HTN  Ordering Physician: JOSI BERUMEN  Referring Physician: JOSI BERUMEN  Performed By: Baylee Freeman RDCS     BSA: 1.8 m2  Height: 63 in  Weight: 170 lb  HR: 75  BP: 116/61 mmHg  ______________________________________________________________________________  Procedure  Echocardiogram with two-dimensional, color and spectral Doppler performed.  ______________________________________________________________________________  Interpretation Summary  Global and regional left ventricular function is normal with an EF of 55-60%.  RVFWLS -20.5%. RV4CLS -17.9%.  RVEDv 183.8ml. RVESv 101.2ml.RV EF 44.9%.  The right ventricular systolic pressure is 36mmHg above the right atrial  pressure.  PV AT 102ms.  IVC diameter <2.1 cm collapsing >50% with sniff suggests a normal RA pressure  of 3 mmHg.  No pericardial effusion is  present.  ______________________________________________________________________________  Left Ventricle  Global and regional left ventricular function is normal with an EF of 55-60%.  Left ventricular wall thickness is normal. Left ventricular size is normal.  Left ventricular diastolic function is normal. No regional wall motion  abnormalities are seen.     Right Ventricle  RVFWLS -20.5%. RV4CLS -17.9%.  RVEDv 183.8ml. RVESv 101.2ml.RV EF 44.9%.     Atria  Both atria appear normal.     Mitral Valve  The mitral valve is normal.     Aortic Valve  Aortic valve is normal in structure and function.     Tricuspid Valve  The tricuspid valve is normal. Trace tricuspid insufficiency is present. The  right ventricular systolic pressure is 36mmHg above the right atrial pressure.     Pulmonic Valve  The pulmonic valve is normal. PV AT 102ms. Trace pulmonic insufficiency is  present.     Vessels  The aorta root is normal. MPA 3cms. IVC diameter <2.1 cm collapsing >50% with  sniff suggests a normal RA pressure of 3 mmHg.     Pericardium  No pericardial effusion is present.     ______________________________________________________________________________  MMode/2D Measurements & Calculations  RVDd: 4.6 cm  IVSd: 0.90 cm  LVIDd: 5.2 cm  LVIDs: 3.4 cm  LVPWd: 0.83 cm  FS: 35.1 %  LV mass(C)d: 163.3 grams  LV mass(C)dI: 90.5 grams/m2     asc Aorta Diam: 2.5 cm  LVOT diam: 2.1 cm  LVOT area: 3.5 cm2  Asc Ao diam index BSA (cm/m2): 1.4  Asc Ao diam index Ht(cm/m): 1.6  RWT: 0.32  TAPSE: 2.0 cm     Doppler Measurements & Calculations  MV E max jose antonio: 94.8 cm/sec  MV A max jose antonio: 57.7 cm/sec  MV E/A: 1.6  MV dec slope: 489.8 cm/sec2  MV dec time: 0.19 sec  PA acc time: 0.10 sec  TR max jose antonio: 298.8 cm/sec  TR max P.7 mmHg  E/E' av.0  Lateral E/e': 5.4  Medial E/e': 8.6  RV S Jose Antonio: 14.3 cm/sec     Measurements from QLAB  EDV (RV HM): 183.8 ml  EF (RV HM): 44.9 %  ESV (RV HM): 101.2 ml  FAC (RV HM): 28.9 %  RV 4C LS (AS): -17.9 %  RV  Free Wall LS (AS): -20.5 %  RVDd base (RVD1) (RV HM): 45.0 mm  RVDd mid (RVD2) (RV HM): 40.2 mm  RVLd (RVD3) (RV HM): 87.5 mm  RVLS (Freewall) (RV HM): -23.4 %  RVLS (Septum) (RV HM): -13.3 %     [facility] Reference Range & Units 05/10/24 13:28   Glucose (External) 74 - 106 mg/dL 92 (E)   WBC Count (External) 3.50 - 10.30 x10^3/mcL 6.69 (E)   Hemoglobin (External) 11.5 - 15.3 g/dL 13.9 (E)   Hematocrit (External) 33.6 - 45.2 % 41.6 (E)   Platelet Count (External) 125 - 359 x10^3/mcL 201 (E)   RBC Count (External) 3.65 - 5.25 x10^6/mcL 4.51 (E)   MCV (External) 80.4 - 98.0 FL 92.2 (E)   MCH (External) 28.0 - 33.0 pg 30.8 (E)   MCHC (External) 32.0 - 36.0 g/dL 33.4 (E)   RDW (External) 12.9 - 14.9 % 13.1 (E)   Method (External)  Auto diff (E)   % Neutrophils (External) 44.8 - 76.4 % 73.9 (E)   % Lymphocytes (External) 15.9 - 49.5 % 18.4 (E)   % Monocytes (External) 0.7 - 13.6 % 5.1 (E)   % Eosinophils (External) 0.1 - 7.2 % 2.4 (E)   % Basophils (External) 0.0 - 2.2 % 0.2 (E)   Absolute Basophils (External) 0.00 - 0.20 x10^3/mcL 0.01 (E)   Absolute Eosinophils (External) 0.00 - 0.70 x10^3/mcL 0.16 (E)   Absolute Lymphocytes (External) 1.10 - 3.30 x10^3/mcL 1.23 (E)   Absolute Monocytes (External) 0.30 - 0.90 x10^3/mcL 0.34 (E)   Absolute Neutrophils (External) 1.70 - 7.50 x10^3/mcL 4.94 (E)   (E): External lab result       Current Outpatient Medications   Medication Sig Dispense Refill     acetaminophen (TYLENOL) 325 MG tablet Take 3 tablets (975 mg) by mouth every 6 hours 80 tablet 0     digoxin (LANOXIN) 125 MCG tablet Take 1 tablet (125 mcg) by mouth daily 90 tablet 3     furosemide (LASIX) 20 MG tablet Take 1 tablet (20 mg) by mouth daily 90 tablet 3     macitentan (OPSUMIT) 10 MG tablet Take 1 tablet (10 mg) by mouth daily Please make sure you are completing your monthly mandatory labs. 30 tablet 11     Multiple Vitamins-Minerals (WOMENS MULTIVITAMIN) TABS Take 1 tablet by mouth daily       tadalafil, PAH, 20 MG  TABS Take 2 tablets (40 mg) by mouth daily 60 tablet 11     traMADol (ULTRAM) 50 MG tablet Take 50 mg by mouth every 6 hours as needed for severe pain       treprostinil (REMODULIN) infusion 10 mg/mL Inject 5,333.58 ng/min Subcutaneous continuous 70.55ng continuously  (goal)  75.6kg dosing weight       COMPOUNDED NON-CONTROLLED SUBSTANCE (CMPD RX) - PHARMACY TO MIX COMPOUNDED MEDICATION 10% Lidocaine gel. Apply 1-2 grams to affected site 1-2 times per day 8-12 hours apart  [facility] [phone]       No current facility-administered medications for this visit.              Right sided filling pressures are normal.     Left sided filling pressures are normal.     Normal PA pressures.     Hyperdynamic cardiac output level.     Significant improvement in PA pressure, PVR and cardiac output when compared to her last right heart catheterization a year ago.         Hemodynamics    Right sided filling pressures are normal. Left sided filling pressures are normal. Normal PA pressures. Hyperdynamic cardiac output level.     Pressures Phase: Baseline     Time Systolic (mmHg) Diastolic (mmHg) Mean (mmHg) A Wave (mmHg) V Wave (mmHg) EDP (mmHg) Max dp/dt (mmHg/sec) HR (bpm) Content (mL/dL) SAT (%)   RA Pressures 10:09 AM   1    2    -1      67        RV Pressures  9:40 AM       480          10:11 AM 35        2     73        PA Pressures 10:13 AM 35    15    20        68        PCW Pressures 10:12 AM   5    2    2      71        AO Pressures  9:46     64    87        71          Blood Flow Results Phase: Baseline     Time Results Indexed Values (L/min/m2)   QP  9:40 AM 7.55 L/min    4.15      QS  9:40 AM 7.55 L/min    4.15        Blood Oximetry Phase: Baseline     Time Hb SAT(%) PO2 Content (mL/dL) PA Sat (%)   PA  9:40 AM  72.8 %      72.8      Art  9:40 AM  96 %     15.67         Cardiac Output Phase: Baseline     Time TDCO (L/min) TDCI (L/min/m2) Eugenia C.O. (L/min) Eugenia C.I. (L/min/m2) Eugenia HR (bpm)   Cardiac  Output Results  9:40 AM 6.03    3.32    7.55    4.15        10:15 AM 6.03            Resistance Results Phase: Baseline     Time PVR SVR TPR TVR PVR/SVR TPR/TVR   Resistance Results (Metric)  9:40 .82 dsc-5    910.59 dsc-5    211.76 dsc-5    921.18 dsc-5    0.17    0.23      Resistance Results (Wood)  9:40 AM 1.99 JENKINS    11.39 JENKINS    2.65 JENKINS    11.52 JENKINS    0.17    0.23        Stoke Volume Results Phase: Baseline     Time RVSW (gm*m) LVSW (gm*m) RVSW-I (gm*m/m2) LVSW-I (gm*m/m2)   Stroke Work Results  9:40 AM 28.7    118.65    15.79    65.25          Please do not hesitate to contact me if you have any questions/concerns.     Sincerely,     Alex Carroll MD

## 2024-05-14 NOTE — NURSING NOTE
Is the patient currently in the state of [place]? YES    Visit mode:VIDEO Doximity     If the visit is dropped, the patient can be reconnected by: VIDEO VISIT: Text to cell phone:   Telephone Information:   Mobile [phone]       Will anyone else be joining the visit? NO  (If patient encounters technical issues they should call [phone]    How would you like to obtain your AVS? MyChart    Are changes needed to the allergy or medication list? No    Are refills needed on medications prescribed by this physician? NO    Reason for visit: RECHECK    No other vitals to report per pt    Olga Lidia WILLAMSF

## 2024-05-20 DIAGNOSIS — I27.20 PULMONARY HYPERTENSION (H): ICD-10-CM

## 2024-05-21 ENCOUNTER — TELEPHONE (OUTPATIENT)
Dept: CARDIOLOGY | Facility: CLINIC | Age: 34
End: 2024-05-21
Payer: COMMERCIAL

## 2024-05-21 NOTE — TELEPHONE ENCOUNTER
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

## 2024-05-21 NOTE — TELEPHONE ENCOUNTER
5/21/2024  @ 12:14 PM -  Uptravi 200/800 mcg (200/28) - new start [Transition from remodulin to uptravi- complete a RHC half way through transition ]    PA Initiation  Medication: Uptravi 200/800 mcg (200/28) - new start [Transition from remodulin to uptravi- complete a RHC half way through transition ]  Insurance Company: [employer] - Phone [phone] Fax [phone]  Ph[facility] Filling the [facility] [address]  Filling Pharmacy Phone:    Filling Pharmacy Fax:    Start Date: 5/21/2024  via CM, key [id number], w/ RHC dated : 8/1/23; Dx Code: I27.0 idiopathic          ----------------------------  Insurance: ETP PPO RETAIL/ [employer]   BIN: [id number]  PCN: [id number]  RX GRP#: [id number]  ID#: [id number]          Gilma ARGUETA CMA- Prior Auths  Cardiology/Pulmonary Hypertension       5/22/2024  @ 3:26 PM -  UPTRAVI PSMN, UPTRAVI PA APPROVAL FAXED TO [employer] VIA RIGHT FAX:   [PAH CONSENT SHOULD BE ON FILE WITH [employer] B/C PT IS ALSO ON OPSUMIT]      Gilma ARGUETA CMA- Prior Auths  Cardiology/Pulmonary Hypertension

## 2024-05-21 NOTE — TELEPHONE ENCOUNTER
5/21/2024  @ 1:37 PM -  Uptravi 200/800 mcg (200/28) -  Your request has been approved  Your PA request has been approved. Additional information will be provided in the approval communication. (Message [id number])    [No letter received with [facility] approval alert as of this note.]       Prior Authorization Approval    Medication: UPTRAVI TITRATION 200 & 800 MCG PO TBPK  Authorization Effective Date: 5/21/2024  Authorization Expiration Date: 5/21/2025  Approved Dose/Quantity: 200/28  Reference #: [id number]   Insurance Company: [employer] - Phone [phone] Fax [phone]  Expected CoPay: $    CoPay Card Available:      Financial Assistance Needed: *Undetermined as of the date of this note   Which [facility] filling the [facility] - [address] [place] [address] [place]  Pharmacy Notified: *  Patient Notified: y    Updated Snapshot, added to PA Calendar; enrollment, PAH Consent and PA approval faxed to:   [employer] (enrollments) : FX: [phone]  PH: [phone]       Rcvd 5/22/24:         Gilma ARGUETA CMA- Prior Auths  Cardiology/Pulmonary Hypertension

## 2024-05-27 RX ORDER — DIGOXIN 125 MCG
125 TABLET ORAL DAILY
Qty: 90 TABLET | Refills: 3 | OUTPATIENT
Start: 2024-05-27

## 2024-05-27 NOTE — TELEPHONE ENCOUNTER
Digoxin 125MCG Oral Tablet          Will file in chart as: digoxin (LANOXIN) 125 MCG tablet    The original prescription was discontinued on 5/14/2024 by Mary Guerrero RN. Renewing this prescription may not be appropriate.   Refused refill sent to pharmacy    Magalys Pichardo RN  [facility] Red Flag Triage/MRT

## 2024-05-28 ENCOUNTER — TELEPHONE (OUTPATIENT)
Dept: CARDIOLOGY | Facility: CLINIC | Age: 34
End: 2024-05-28
Payer: COMMERCIAL

## 2024-05-28 NOTE — TELEPHONE ENCOUNTER
Remodulin dose guide      Plan is to repeat RHC half way through transition. Patient will update cardiology

## 2024-05-30 ENCOUNTER — TELEPHONE (OUTPATIENT)
Dept: CARDIOLOGY | Facility: CLINIC | Age: 34
End: 2024-05-30
Payer: COMMERCIAL

## 2024-05-30 NOTE — TELEPHONE ENCOUNTER
In recent patient has an intolerable site pain with her remodulin. Plan was to switch to uptravi slowly while down titrating on the remodulin. Patient is very compliant on site pain management and is trying everything possible. Unfortunately she has had to change her site 3 times in the last week because they become infected. She works with her PCP for antibiotics. Patient is really struggling with this and is wondering if there is an expedited option. Next step would be to admit for transition for safe transition.     Update sent to Dr. Carroll on next steps    Ora Do RN on 5/30/2024 at 9:51 AM

## 2024-05-31 NOTE — TELEPHONE ENCOUNTER
Updated plan per Dr. Carroll.     Admit while Dr. Frazier is on services followed by Dr. Doan. Transition from subcutaneous remodulin. Will need PICC placed for IV transition. Titrate off remodulin and onto goal dose of uptravi 1600mcg BID    Previous plan for at home transition. This will be expedited inpatient and patient will need a right heart catheterization while inpatient    Patient will be a scheduled admit on 6/15 at 3PM. She drives 8 hours to be here and will schedule a hotel stay on the 15th in the event she is not admitted that day.         Ora Do RN on 5/31/2024 at 10:22 AM

## 2024-06-12 NOTE — TELEPHONE ENCOUNTER
"6/12/2024  @ 9:20 AM -  Rcvd email  from PHNurse:  Ora Do RN on 6/12/2024 -  \"Hello - I wanted to update - Chelsea Flower [birth year] will be transitioning from SQ remodulin to uptravi in the hospital. Admit date is this saturday the 15th. I wanted to make sure her prescription for uptravi is good to go. We will call with updated goal dose closer to discharge. We are aiming for 1600mcg goal dose.  Thanks Ora \"    LRR email response: \"I called [employer] and Rep/Bessy confirmed test claims went through for: Month 1 - 200 mcg (140/28), Month 2 - 200/800 mcg (200/28), All other strengths :  60/30, Call reference #:   [id number]    Thanks, Gilma ARGUETA CMA\"    Gilma ARGUETA CMA- Prior Auths  Cardiology/Pulmonary Hypertension     "

## 2024-06-13 NOTE — TELEPHONE ENCOUNTER
"6/13/2024  @ 2:35 PM -      -  235 PM -   Called [facility] [employer] Prior Auths -  [phone] - Rep / Domenica ANDREA -     - rcvd email from [facility] \"Ezio Dillard,  We still need the approval for Uptravi 200 mcg #240/30ds.   I reached out to our pharmacists to see if we can use the titration packs and/or other strengths to make up the doses, but unfortunately in this case, we are not able to do that. This is non-standard titrations, and based on the transition dose guide, the first 4 weeks of Uptravi will need #147-200mcg tablets, so standard approval will not work for this.        Can you please try calling the plan at [phone] and explain that patient will need loose tablets to allow titration?  Thank you!  Michelle [facility] \"    -235 pm -  Called [facility] @ [phone] - spoke to /Domenica ANDREA And explained the issue above.     - Rep is getting paid claim for #240/ 30 days;   not getting a rejection for it.    - She also ran 200 mcg for #200 for 30 is also going thru as a paid claim.     Nothing wrong with the Prior Auth, maybe something wrong with the prescription?  Rep put me on hold and is contacting Pharmacist directly at [facility].     -  email rcvd from [place]/[facility] SP @ 234 pm  -  \"Good afternoon!  Chelsea Flower [birth year]  -  We got paid claims for all strengths now, thank you so much, benefits have been cleared,  Thank you!   [facility]\"     Gilma ARGUETA CMA- Prior Auths  Cardiology/Pulmonary Hypertension     "

## 2024-06-15 ENCOUNTER — HOSPITAL ENCOUNTER (INPATIENT)
Facility: CLINIC | Age: 34
LOS: 9 days | Discharge: HOME | End: 2024-06-24
Attending: INTERNAL MEDICINE | Admitting: INTERNAL MEDICINE
Payer: COMMERCIAL

## 2024-06-15 DIAGNOSIS — I27.20 PULMONARY HYPERTENSION (H): Primary | ICD-10-CM

## 2024-06-15 DIAGNOSIS — I27.21 PULMONARY ARTERIAL HYPERTENSION (H): ICD-10-CM

## 2024-06-15 LAB
ALBUMIN SERPL BCG-MCNC: 3.8 G/DL (ref 3.5–5.2)
ALP SERPL-CCNC: 53 U/L (ref 40–150)
ALT SERPL W P-5'-P-CCNC: 11 U/L (ref 0–50)
ANION GAP SERPL CALCULATED.3IONS-SCNC: 12 MMOL/L (ref 7–15)
AST SERPL W P-5'-P-CCNC: 14 U/L (ref 0–45)
BILIRUB SERPL-MCNC: 0.3 MG/DL
BUN SERPL-MCNC: 8 MG/DL (ref 6–20)
CALCIUM SERPL-MCNC: 8.9 MG/DL (ref 8.6–10)
CHLORIDE SERPL-SCNC: 107 MMOL/L (ref 98–107)
CREAT SERPL-MCNC: 0.53 MG/DL (ref 0.51–0.95)
DEPRECATED HCO3 PLAS-SCNC: 22 MMOL/L (ref 22–29)
EGFRCR SERPLBLD CKD-EPI 2021: >90 ML/MIN/1.73M2
ERYTHROCYTE [DISTWIDTH] IN BLOOD BY AUTOMATED COUNT: 12.8 % (ref 10–15)
GLUCOSE SERPL-MCNC: 107 MG/DL (ref 70–99)
HCT VFR BLD AUTO: 33.3 % (ref 35–47)
HGB BLD-MCNC: 11.6 G/DL (ref 11.7–15.7)
MCH RBC QN AUTO: 31.4 PG (ref 26.5–33)
MCHC RBC AUTO-ENTMCNC: 34.8 G/DL (ref 31.5–36.5)
MCV RBC AUTO: 90 FL (ref 78–100)
PLATELET # BLD AUTO: 196 10E3/UL (ref 150–450)
POTASSIUM SERPL-SCNC: 2.7 MMOL/L (ref 3.4–5.3)
POTASSIUM SERPL-SCNC: 3.7 MMOL/L (ref 3.4–5.3)
PROT SERPL-MCNC: 6.3 G/DL (ref 6.4–8.3)
RBC # BLD AUTO: 3.69 10E6/UL (ref 3.8–5.2)
SODIUM SERPL-SCNC: 141 MMOL/L (ref 135–145)
WBC # BLD AUTO: 5.6 10E3/UL (ref 4–11)

## 2024-06-15 PROCEDURE — 250N000013 HC RX MED GY IP 250 OP 250 PS 637

## 2024-06-15 PROCEDURE — 84132 ASSAY OF SERUM POTASSIUM: CPT

## 2024-06-15 PROCEDURE — 250N000011 HC RX IP 250 OP 636: Mod: JZ

## 2024-06-15 PROCEDURE — 36415 COLL VENOUS BLD VENIPUNCTURE: CPT

## 2024-06-15 PROCEDURE — 85014 HEMATOCRIT: CPT

## 2024-06-15 PROCEDURE — 80053 COMPREHEN METABOLIC PANEL: CPT

## 2024-06-15 PROCEDURE — 999N000127 HC STATISTIC PERIPHERAL IV START W US GUIDANCE

## 2024-06-15 PROCEDURE — 120N000005 HC R&B MS OVERFLOW UMMC

## 2024-06-15 PROCEDURE — 99223 1ST HOSP IP/OBS HIGH 75: CPT | Mod: GC | Performed by: INTERNAL MEDICINE

## 2024-06-15 RX ORDER — ACETAMINOPHEN 325 MG/1
975 TABLET ORAL EVERY 6 HOURS PRN
Status: DISCONTINUED | OUTPATIENT
Start: 2024-06-15 | End: 2024-06-24 | Stop reason: HOSPADM

## 2024-06-15 RX ORDER — AMOXICILLIN 250 MG
2 CAPSULE ORAL 2 TIMES DAILY PRN
Status: DISCONTINUED | OUTPATIENT
Start: 2024-06-15 | End: 2024-06-24 | Stop reason: HOSPADM

## 2024-06-15 RX ORDER — CALCIUM CARBONATE 500 MG/1
1000 TABLET, CHEWABLE ORAL 4 TIMES DAILY PRN
Status: DISCONTINUED | OUTPATIENT
Start: 2024-06-15 | End: 2024-06-24 | Stop reason: HOSPADM

## 2024-06-15 RX ORDER — NALOXONE HYDROCHLORIDE 0.4 MG/ML
0.4 INJECTION, SOLUTION INTRAMUSCULAR; INTRAVENOUS; SUBCUTANEOUS
Status: DISCONTINUED | OUTPATIENT
Start: 2024-06-15 | End: 2024-06-24 | Stop reason: HOSPADM

## 2024-06-15 RX ORDER — LIDOCAINE 40 MG/G
CREAM TOPICAL
Status: ACTIVE | OUTPATIENT
Start: 2024-06-15 | End: 2024-06-18

## 2024-06-15 RX ORDER — TADALAFIL 20 MG/1
40 TABLET ORAL DAILY
Status: DISCONTINUED | OUTPATIENT
Start: 2024-06-16 | End: 2024-06-24 | Stop reason: HOSPADM

## 2024-06-15 RX ORDER — POTASSIUM CHLORIDE 20MEQ/15ML
60 LIQUID (ML) ORAL ONCE
Status: DISCONTINUED | OUTPATIENT
Start: 2024-06-15 | End: 2024-06-15 | Stop reason: ALTCHOICE

## 2024-06-15 RX ORDER — POTASSIUM CHLORIDE 1.5 G/1.58G
60 POWDER, FOR SOLUTION ORAL ONCE
Status: COMPLETED | OUTPATIENT
Start: 2024-06-15 | End: 2024-06-15

## 2024-06-15 RX ORDER — TRAMADOL HYDROCHLORIDE 50 MG/1
50 TABLET ORAL EVERY 6 HOURS PRN
Status: DISCONTINUED | OUTPATIENT
Start: 2024-06-15 | End: 2024-06-24 | Stop reason: HOSPADM

## 2024-06-15 RX ORDER — NALOXONE HYDROCHLORIDE 0.4 MG/ML
0.2 INJECTION, SOLUTION INTRAMUSCULAR; INTRAVENOUS; SUBCUTANEOUS
Status: DISCONTINUED | OUTPATIENT
Start: 2024-06-15 | End: 2024-06-24 | Stop reason: HOSPADM

## 2024-06-15 RX ORDER — POTASSIUM CHLORIDE 7.45 MG/ML
10 INJECTION INTRAVENOUS ONCE
Qty: 100 ML | Refills: 0 | Status: COMPLETED | OUTPATIENT
Start: 2024-06-15 | End: 2024-06-15

## 2024-06-15 RX ORDER — TREPROSTINIL 10 MG/ML
70.55 INJECTION, SOLUTION INTRAVENOUS; SUBCUTANEOUS CONTINUOUS
Status: ACTIVE | OUTPATIENT
Start: 2024-06-15 | End: 2024-06-15

## 2024-06-15 RX ORDER — AMOXICILLIN 250 MG
1 CAPSULE ORAL 2 TIMES DAILY PRN
Status: DISCONTINUED | OUTPATIENT
Start: 2024-06-15 | End: 2024-06-24 | Stop reason: HOSPADM

## 2024-06-15 RX ORDER — ACETAMINOPHEN 325 MG/1
975 TABLET ORAL EVERY 6 HOURS
Status: DISCONTINUED | OUTPATIENT
Start: 2024-06-15 | End: 2024-06-15

## 2024-06-15 RX ORDER — TREPROSTINIL 10 MG/ML
70.55 INJECTION, SOLUTION INTRAVENOUS; SUBCUTANEOUS CONTINUOUS
Status: DISCONTINUED | OUTPATIENT
Start: 2024-06-15 | End: 2024-06-22

## 2024-06-15 RX ORDER — FUROSEMIDE 20 MG
20 TABLET ORAL DAILY
Status: DISCONTINUED | OUTPATIENT
Start: 2024-06-15 | End: 2024-06-16

## 2024-06-15 RX ORDER — POTASSIUM CHLORIDE 1500 MG/1
60 TABLET, EXTENDED RELEASE ORAL ONCE
Status: DISCONTINUED | OUTPATIENT
Start: 2024-06-15 | End: 2024-06-15 | Stop reason: ALTCHOICE

## 2024-06-15 RX ORDER — LIDOCAINE 40 MG/G
CREAM TOPICAL
Status: DISCONTINUED | OUTPATIENT
Start: 2024-06-15 | End: 2024-06-24 | Stop reason: HOSPADM

## 2024-06-15 RX ADMIN — POTASSIUM CHLORIDE 10 MEQ: 7.46 INJECTION, SOLUTION INTRAVENOUS at 20:48

## 2024-06-15 RX ADMIN — POTASSIUM CHLORIDE 60 MEQ: 1.5 POWDER, FOR SOLUTION ORAL at 18:23

## 2024-06-15 ASSESSMENT — ACTIVITIES OF DAILY LIVING (ADL)
ADLS_ACUITY_SCORE: 22
ADLS_ACUITY_SCORE: 22
ADLS_ACUITY_SCORE: 37
ADLS_ACUITY_SCORE: 22

## 2024-06-15 NOTE — H&P
[facility]    Cardiology History and Physical - Cardiology         Date of Admission:  6/15/2024    Assessment & Plan: S    Chelsearomero Flower is a 33 year old female admitted on 6/15/2024 PMH  idiopathic pulmonary hypertension currently on macitentan, tadafil, subcutaneous treprostinil, subclinical hypothyroidism, hypertension who presents with severe pain at subcutaneous remodulin site, planned tapering remodulin and starting selexipag.     Idiopathic pulmonary hypertension   Subcutaneous remodulin site pain   Patient has been having pain at site of subcutaneous remodulin for the last month. She went through 6 sites in this period of time. Site would get tender, red, and eventually would drain pus. Exam revealed erythema at catheter site, mild warmth, no discharge. Plan transition remodulin to IV and downtitrate, while uptitrating uptravi daily. Monitor for nausea, diarrhea, headache, hypoxia, pulmonary edema, SOB, CP.     - continue PTA macitentan and tadalafil  - continue lasix 20 mg daily  - PICC line placement  - pain control: tylenol, tramadol prn, CMPD topical   - pharm consult for conversion of subcutaneous to IV remodulin  - plan titrate down remodulin while uptitrating on uptravi; plan start 6/16      Remodulin  Selexipag (Uptravi)  Day 1  60 ng/kg/min  200 mcg BID  Day 2  50 ng/kg/min  400 mcg BID  Day 3  40 ng/kg/min  600 mcg BID  Day 4  30 ng/kg/min  800 mcg BID  Day 5  20 ng/kg/min  1000 mcg BID  Day 6  10 ng/kg/min  1200 mcg BID  Day 7  0 ng/kg/min  1400 mcg BID  Day 8  0 ng/kg/min  1600 mcg BID    Chronic issues  Subclinical hypothyroid - CTM as outpatient   Hypertension - /68 on admission, CTM        Diet:  regular diet  DVT Prophylaxis: Low Risk/Ambulatory with no VTE prophylaxis indicated  Valerio Catheter: Not present  Cardiac Monitoring: None  Code Status: Full Code          Clinically Significant Risk Factors Present on Admission                                       Disposition Plan   Expected discharge: > 7 days, recommended to  home  once  pulmonary hypertension medication management complete .    Entered: Danna Hitchcock MD 06/15/2024, 5:01 PM   The patient's care was discussed with the Attending Physician, Dr. Frazier .      Danna Hitchcock MD  [facility]    ______________________________________________________________________    Chief Complaint   Pain at Remodulin infusion site    History is obtained from the patient    History of Present Illness   Cheslea Flower is a 33 year old female with idiopathic pulmonary hypertension currently on macitentan, tadafil, subcutaneous treprostinil, subclinical hypothyroidism, hypertension who presents with severe pain at subcutaneous remodulin site, planned tapering remodulin and starting selexipag.     Patient has been having pain at site of subcutaneous remodulin for the last month. She went through 6 sites in the period of time. Site would get tender, red, and eventually would drain pus.     Last week, she had SOB, fever, vomiting, diarrhea in which she went to outside ED for evaluation and was prescribed Augmentin and steroid taper pack. She finished the medication this morning. Denied any SOB/CP at this time. Has been wearing oxygen 3 LPM cannula at night. Reported that she has not been compliant to lasix 20 mg but weight has been stable at 160-165 lbs. Denied feeling congested/bloated.       Past Medical History    Past Medical History:   Diagnosis Date    Hypertension     Pulmonary hypertension (H) 02/2023       Past Surgical History   Past Surgical History:   Procedure Laterality Date    CV RIGHT HEART CATH MEASUREMENTS RECORDED N/A 2/8/2023    Procedure: Right Heart Catheterization;  Surgeon: Karin Frazier MD;  Location:  HEART CARDIAC CATH LAB    CV RIGHT HEART CATH MEASUREMENTS RECORDED N/A 8/1/2023    Procedure:  Heart Cath Right Heart Cath;  Surgeon: Ahmet Lovell MD;  Location:  HEART CARDIAC CATH LAB    CV RIGHT HEART CATH MEASUREMENTS RECORDED N/A 11/15/2023    Procedure: Heart Cath Right Heart Cath;  Surgeon: Ahmet Lovell MD;  Location:  HEART CARDIAC CATH LAB    CV RIGHT HEART CATH MEASUREMENTS RECORDED N/A 2/16/2024    Procedure: Heart Cath Right Heart Cath;  Surgeon: Karin Frazier MD;  Location:  HEART CARDIAC CATH LAB    IR CVC TUNNEL PLACEMENT > 5 YRS OF AGE  2/14/2023    IR CVC TUNNEL PLACEMENT > 5 YRS OF AGE  8/8/2023    IR CVC TUNNEL REMOVAL LEFT  11/16/2023    IR PICC PLACEMENT > 5 YRS OF AGE  8/5/2023    PICC SINGLE LUMEN PLACEMENT Right 02/08/2023    Basilic Vein 4F SL 43 cm, 4 cm out       Prior to Admission Medications   Prior to Admission Medications   Prescriptions Last Dose Informant Patient Reported? Taking?   COMPOUNDED NON-CONTROLLED SUBSTANCE (CMPD RX) - PHARMACY TO MIX COMPOUNDED MEDICATION   Yes No   Sig: 10% Lidocaine gel. Apply 1-2 grams to affected site 1-2 times per day 8-12 hours apart  [facility] [phone]   Multiple Vitamins-Minerals (WOMENS MULTIVITAMIN) TABS   Yes No   Sig: Take 1 tablet by mouth daily   acetaminophen (TYLENOL) 325 MG tablet   No No   Sig: Take 3 tablets (975 mg) by mouth every 6 hours   furosemide (LASIX) 20 MG tablet   No No   Sig: Take 1 tablet (20 mg) by mouth daily   macitentan (OPSUMIT) 10 MG tablet   No No   Sig: Take 1 tablet (10 mg) by mouth daily Please make sure you are completing your monthly mandatory labs.   tadalafil, PAH, 20 MG TABS   No No   Sig: Take 2 tablets (40 mg) by mouth daily   traMADol (ULTRAM) 50 MG tablet   Yes No   Sig: Take 50 mg by mouth every 6 hours as needed for severe pain   treprostinil (REMODULIN) infusion 10 mg/mL   Yes No   Sig: Inject 5,333.58 ng/min Subcutaneous continuous 70.55ng continuously  (goal)  75.6kg dosing weight      Facility-Administered Medications: None           Physical Exam    Vital Signs: Temp: 98.2  F (36.8  C) Temp src: Oral BP: 133/68 Pulse: 98     SpO2: 95 % O2 Device: None (Room air)    Weight: 0 lbs 0 oz    General Appearance: Alert, oriented, cooperative, comfortable  Respiratory: on RA, clear bilaterally  Cardiovascular: JVP unclear, regular, loud S2, no murmur  GI: soft, not tender  Skin: catheter in place, infusing, erythema at catheter site, mild warmth, no discharge  Other: 1+ pitting edema both legs     Medical Decision Making             Data

## 2024-06-15 NOTE — PHARMACY-CONSULT NOTE
Parenteral Prostacyclin Therapy Continuation     This patient has been admitted to the hospital while receiving outpatient Treprostinil (Remodulin) therapy for the treatment of pulmonary hypertension. Plans for taper during this admission pending.    [facility] has been contacted at [phone] to verify outpatient prostacyclin regimen. Based on the latest records, the following applies to this patient's  prostacyclin therapy.    1. Type of ambulatory pump used: REMUNITY pump   2.  Treprostinil (Remodulin)  Dosing Weight= 75.6 kg  3.  Prostacyclin Concentration= 10 milligram/mL  4.  Prostacyclin Dose = 70.55 Nanograms/kg/min  5. Ambulatory Pump Rate = 32 microliter/hr    Patient currently draws up ~2.8 mL of 10mg/mL concentration vial every 70 hours on current home dosing. Remunity pumps utilized UNDILUTED drug.    Plan:  The patient will be switched over to our hospital syringe pump for the duration of their admission.  We have entered the order into [employer] based on the patient s most recent dose.  Please note,  MS3/Crono 5 pump patients switching to our syringe pump will necessitate calculating a new concentration/rate to deliver the patient s correct dose.          ADDENDUM: PICC unable to be placed this evening - HELD IV order reflecting home dosing switch to instead continue subcutaneous home pump until tomorrow AM when PICC can be placed and switch to IV made. Requested new starting dose as per taper plan to be re-entered as a pharmacy consult to signify when switch to be made and IV infusion orders to be verified.    Tawana Jaimes, HughD

## 2024-06-15 NOTE — PROGRESS NOTES
Admission          6/15/2024  3:56 PM  -----------------------------------------------------------  Diagnosis: Pulmonary HTN    Admitted from: home  Report given from: N/A, chart review completed  Via: car (self)  Accompanied by: N/A  Family Aware of Admission: Yes  Belongings: Yes, remain with patient  Admission Profile: Complete  Teaching: Orientation to unit, call don't fall, use of call light, meal times, visiting hours,  when to call for the RN (angina/sob/dizzyness, etc.), and enforced importance of safety.  Access: Awaiting PIV orders from Cards 2 team  Telemetry: Placed on pt  Ht./Wt.: Complete  2 RN skin assessment: Completed by  and Kelton VIEIRA

## 2024-06-16 ENCOUNTER — APPOINTMENT (OUTPATIENT)
Dept: GENERAL RADIOLOGY | Facility: CLINIC | Age: 34
End: 2024-06-16
Attending: INTERNAL MEDICINE
Payer: COMMERCIAL

## 2024-06-16 LAB
ALBUMIN SERPL BCG-MCNC: 3.4 G/DL (ref 3.5–5.2)
ALP SERPL-CCNC: 48 U/L (ref 40–150)
ALT SERPL W P-5'-P-CCNC: 11 U/L (ref 0–50)
ANION GAP SERPL CALCULATED.3IONS-SCNC: 9 MMOL/L (ref 7–15)
AST SERPL W P-5'-P-CCNC: 15 U/L (ref 0–45)
BILIRUB SERPL-MCNC: 0.2 MG/DL
BUN SERPL-MCNC: 8.2 MG/DL (ref 6–20)
CALCIUM SERPL-MCNC: 8.2 MG/DL (ref 8.6–10)
CHLORIDE SERPL-SCNC: 112 MMOL/L (ref 98–107)
CREAT SERPL-MCNC: 0.61 MG/DL (ref 0.51–0.95)
DEPRECATED HCO3 PLAS-SCNC: 21 MMOL/L (ref 22–29)
EGFRCR SERPLBLD CKD-EPI 2021: >90 ML/MIN/1.73M2
ERYTHROCYTE [DISTWIDTH] IN BLOOD BY AUTOMATED COUNT: 13 % (ref 10–15)
GLUCOSE SERPL-MCNC: 86 MG/DL (ref 70–99)
HCT VFR BLD AUTO: 34.2 % (ref 35–47)
HGB BLD-MCNC: 11.7 G/DL (ref 11.7–15.7)
MCH RBC QN AUTO: 30.6 PG (ref 26.5–33)
MCHC RBC AUTO-ENTMCNC: 34.2 G/DL (ref 31.5–36.5)
MCV RBC AUTO: 90 FL (ref 78–100)
PLATELET # BLD AUTO: 211 10E3/UL (ref 150–450)
POTASSIUM SERPL-SCNC: 3.6 MMOL/L (ref 3.4–5.3)
PROT SERPL-MCNC: 5.7 G/DL (ref 6.4–8.3)
RBC # BLD AUTO: 3.82 10E6/UL (ref 3.8–5.2)
SODIUM SERPL-SCNC: 142 MMOL/L (ref 135–145)
WBC # BLD AUTO: 5.4 10E3/UL (ref 4–11)

## 2024-06-16 PROCEDURE — 80053 COMPREHEN METABOLIC PANEL: CPT

## 2024-06-16 PROCEDURE — 120N000005 HC R&B MS OVERFLOW UMMC

## 2024-06-16 PROCEDURE — 272N000450 HC KIT 4FR POWER PICC SINGLE LUMEN

## 2024-06-16 PROCEDURE — 93005 ELECTROCARDIOGRAM TRACING: CPT

## 2024-06-16 PROCEDURE — 93010 ELECTROCARDIOGRAM REPORT: CPT | Mod: XU | Performed by: INTERNAL MEDICINE

## 2024-06-16 PROCEDURE — 71045 X-RAY EXAM CHEST 1 VIEW: CPT | Mod: 26 | Performed by: RADIOLOGY

## 2024-06-16 PROCEDURE — 250N000009 HC RX 250

## 2024-06-16 PROCEDURE — 36415 COLL VENOUS BLD VENIPUNCTURE: CPT

## 2024-06-16 PROCEDURE — 250N000013 HC RX MED GY IP 250 OP 250 PS 637

## 2024-06-16 PROCEDURE — 999N000065 XR CHEST PORT 1 VIEW

## 2024-06-16 PROCEDURE — 36569 INSJ PICC 5 YR+ W/O IMAGING: CPT

## 2024-06-16 PROCEDURE — 99233 SBSQ HOSP IP/OBS HIGH 50: CPT | Mod: GC | Performed by: INTERNAL MEDICINE

## 2024-06-16 PROCEDURE — 85027 COMPLETE CBC AUTOMATED: CPT

## 2024-06-16 RX ORDER — POTASSIUM CHLORIDE 750 MG/1
20 TABLET, EXTENDED RELEASE ORAL ONCE
Status: COMPLETED | OUTPATIENT
Start: 2024-06-16 | End: 2024-06-16

## 2024-06-16 RX ORDER — FUROSEMIDE 20 MG
20 TABLET ORAL DAILY PRN
Status: DISCONTINUED | OUTPATIENT
Start: 2024-06-16 | End: 2024-06-24 | Stop reason: HOSPADM

## 2024-06-16 RX ORDER — POTASSIUM CHLORIDE 20MEQ/15ML
60 LIQUID (ML) ORAL ONCE
Qty: 45 ML | Refills: 0 | Status: COMPLETED | OUTPATIENT
Start: 2024-06-16 | End: 2024-06-16

## 2024-06-16 RX ADMIN — LIDOCAINE HYDROCHLORIDE 3 ML: 10 INJECTION, SOLUTION EPIDURAL; INFILTRATION; INTRACAUDAL; PERINEURAL at 10:36

## 2024-06-16 RX ADMIN — TADALAFIL 40 MG: 20 TABLET, FILM COATED ORAL at 08:39

## 2024-06-16 RX ADMIN — POTASSIUM CHLORIDE 20 MEQ: 750 TABLET, EXTENDED RELEASE ORAL at 02:17

## 2024-06-16 RX ADMIN — MACITENTAN 10 MG: 10 TABLET, FILM COATED ORAL at 08:38

## 2024-06-16 RX ADMIN — SELEXIPAG 200 MCG: 200 TABLET, COATED ORAL at 12:11

## 2024-06-16 RX ADMIN — FUROSEMIDE 20 MG: 20 TABLET ORAL at 08:39

## 2024-06-16 RX ADMIN — POTASSIUM CHLORIDE 60 MEQ: 1.5 SOLUTION ORAL at 08:38

## 2024-06-16 ASSESSMENT — ACTIVITIES OF DAILY LIVING (ADL)
ADLS_ACUITY_SCORE: 22

## 2024-06-16 NOTE — UTILIZATION REVIEW
[facility] Utilization Review  Admission Status; Secondary Review Determination     Admission Date: 6/15/2024  3:56 PM      Under the authority of the Utilization Management Committee, the utilization review process indicated a secondary review on the above patient.  The review outcome is based on review of the medical records, discussions with staff, and applying clinical experience noted on the date of the review.        (X)      Inpatient Status Appropriate - This patient's medical care is consistent with medical management for inpatient care and reasonable inpatient medical practice.          RATIONALE FOR DETERMINATION   33-year-old female with history of idiopathic pulmonary hypertension on macitentan, tadalafil, subcutaneous treprostinil, subclinical hypothyroidism, hypertension, admitted with severe pain at subcutaneous Remodulin site.  Patient went through 6 sites in the last month, with tenderness and draining pus, exam revealed erythema at catheter site, mild warmth, no discharge, transitioning Remodulin to IV and down titrate while uptitrating Uptravi daily.  PICC line placement, pain control, with Tylenol, tramadol and CMPD local.  Complex patient who needs conversion from subcutaneous to IV Remodulin with line placement, and down titration while uptitrating Uptravi, needs few days to monitor for symptoms and lab work, patient is at risk of acute decompensation and conversion cannot be accomplished as outpatient, with close monitoring requiring hospital stay, anticipated length of stay more than 2 midnights, recommend continue inpatient status    The severity of illness, intensity of service provided, expected LOS and risk for adverse outcome make the care complex, high risk and appropriate for hospital admission.The patient requires hospital based medical care which is anticipated to require a stay of 2 or more midnights.        The information on this document is developed by the  utilization review team in order for the business office to ensure compliance.  This only denotes the appropriateness of proper admission status and does not reflect the quality of care rendered.              Sincerely,       Felicia Early MD  Physician Advisor  Utilization Review-[place]    Phone: [phone]

## 2024-06-16 NOTE — PLAN OF CARE
D:  admitted on 6/15/2024 PMH  idiopathic pulmonary hypertension currently on macitentan, tadafil, subcutaneous treprostinil, subclinical hypothyroidism, hypertension who presents with severe pain at subcutaneous remodulin site, planned tapering remodulin and starting selexipag.       I: Monitored vitals and assessed pt status. Encouraged activities.    IV Access: PIV L SL  Tele: SR  O2: 1L NC   Mobility: Independent in room  Skin: a S/C remodulin site is pink and denies pain during the shift     A: A&Ox4. VSS. Afebrile. Pt is on regular diet. Pt denies pain. HR was 54 at 0645. Notified providers     P: Continue to monitor pt status and report changes to treatment team.

## 2024-06-16 NOTE — PROCEDURES
[facility]    Single Lumen PICC Placement    Date/Time: 6/16/2024 10:47 AM    Performed by: ISHA Oliveira  Authorized by: Navya Richmond MD  Indications: vascular access      UNIVERSAL PROTOCOL   Site Marked: Yes  Prior Images Obtained and Reviewed:  Yes  Required items: Required blood products, implants, devices and special equipment available    Patient identity confirmed:  Verbally with patient, arm band, provided demographic data, hospital-assigned identification number and anonymous protocol, patient vented/unresponsive  Patient was reevaluated immediately before administering moderate or deep sedation or anesthesia  Confirmation Checklist:  Patient's identity using two indicators, relevant allergies, procedure was appropriate and matched the consent or emergent situation and correct equipment/implants were available  Time out: Immediately prior to the procedure a time out was called    Universal Protocol: the Joint Commission Universal Protocol was followed    Preparation: Patient was prepped and draped in usual sterile fashion       ANESTHESIA    Anesthesia:  See MAR for details  Local Anesthetic:  Lidocaine 1% without epinephrine  Anesthetic Total (mL):  3      SEDATION    Patient Sedated: No        Preparation: skin prepped with Betadine and skin prepped with alcohol  Skin prep agent: skin prep agent completely dried prior to procedure  Sterile barriers: maximum sterile barriers were used: cap, mask, sterile gown, sterile gloves, and large sterile sheet  Hand hygiene: hand hygiene performed prior to central venous catheter insertion  Type of line used: PICC  Catheter type: single lumen  Lumen type: non-valved and power PICC  Catheter size: 4 Fr  Brand: Bard  Placement method: venipuncture, MST, ultrasound and tip navigation system  Number of attempts: 1  Difficulty threading catheter: no  Successful placement: yes  Orientation: right    Location:  basilic vein  Tip Location: SVC  Arm circumference: adults 10 cm  Extremity circumference: 30  Visible catheter length: 1  Total catheter length: 45  Dressing and securement: chlorhexidine patch applied, dressing applied, line secured, statlock, sterile dressing applied and transparent dressing  Post procedure assessment: blood return through all ports, free fluid flow and placement verified by x-ray  PROCEDURE   Patient Tolerance:  Patient tolerated the procedure well with no immediate complicationsDescribe Procedure: Picc ok to use  Disposal: sharps and needle count correct at the end of procedure, needles and guidewire disposed in sharps container

## 2024-06-16 NOTE — PHARMACY-ADMISSION MEDICATION HISTORY
Pharmacist Admission Medication History    Admission medication history is complete. The information provided in this note is only as accurate as the sources available at the time of the update.    Information Source(s): Patient via in-person    Pertinent Information: Spoke with patient at bedside.Confirmed remodulin order and subcutaneous home pump settings via [facility] (see note). No additional medications, vitamins, or supplements reported at this time.    Changes made to PTA medication list:  Added: None  Deleted: multivitamin  Changed: APAP to prn    Allergies reviewed with patient and updates made in EHR:  not assessed    Medication History Completed By: Tawana Jaimes RPH 6/15/2024 8:30 PM    PTA Med List   Medication Sig Note Last Dose    acetaminophen (TYLENOL) 325 MG tablet Take 3 tablets (975 mg) by mouth every 6 hours (Patient taking differently: Take 975 mg by mouth every 6 hours as needed for pain)  Past Week    COMPOUNDED NON-CONTROLLED SUBSTANCE (CMPD RX) - PHARMACY TO MIX COMPOUNDED MEDICATION 10% Lidocaine gel. Apply 1-2 grams to affected site 1-2 times per day 8-12 hours apart  [facility] [phone] 6/15/2024: Uses with site changes - has not used for a couple weeks. Past Month    furosemide (LASIX) 20 MG tablet Take 1 tablet (20 mg) by mouth daily  6/14/2024    macitentan (OPSUMIT) 10 MG tablet Take 1 tablet (10 mg) by mouth daily Please make sure you are completing your monthly mandatory labs.  6/15/2024    tadalafil, PAH, 20 MG TABS Take 2 tablets (40 mg) by mouth daily  6/15/2024    traMADol (ULTRAM) 50 MG tablet Take 50 mg by mouth every 6 hours as needed for severe pain      treprostinil (REMODULIN) infusion 10 mg/mL Inject 5,333.58 ng/min Subcutaneous continuous 70.55ng continuously  (goal)  75.6kg dosing weight  6/15/2024

## 2024-06-16 NOTE — PLAN OF CARE
"/79 (BP Location: Left arm)   Pulse 84   Temp 98.1  F (36.7  C) (Oral)   Resp 18   Ht 1.6 m (5' 3\")   Wt 75.8 kg (167 lb)   SpO2 98%   BMI 29.58 kg/m      Shift: 6271-6870    Status: admitted on 6/15/2024 PMH idiopathic pulmonary hypertension currently on macitentan, tadafil, subcutaneous treprostinil, subclinical hypothyroidism, hypertension who presents with severe pain at subcutaneous remodulin site, planned tapering remodulin and starting selexipag     Neuro: A&O x4. Able to make needs known   Respiratory: 1L HS, RA during day, sating mid 90s. MALAVE   Cardiac: SR, HR 80s. Denies cardiac chest pain   GI/: Voids w/out difficulty, LBM today   Diet: Regular   Pain: Denies  Skin: No new deficits  IV Access: R SL PICC infusing remodulin at 60 ng/kg/min, R PIV SL   Labs: Reviewed  Activity: Up ad jess     New changes this shift: IV remodulin started this afternoon and subcutaneous remodulin, removed and stopped by pt. First dose of oral selexipag administered. No other acute changes   Plan: Taper down on remodulin per order. Continue w/ POC and notify Cards 2 w/ any changes or concerns       "

## 2024-06-16 NOTE — PROGRESS NOTES
[facility]    Cardiology Progress Note- Cardiology        Date of Admission:  6/15/2024     Assessment & Plan: S   Chelsearomero Flower is a 33 year old female admitted on 6/15/2024 [facility]  idiopathic pulmonary hypertension currently on macitentan, tadafil, subcutaneous treprostinil, subclinical hypothyroidism, hypertension who presents with severe pain at subcutaneous remodulin site, planned tapering remodulin and starting selexipag.     Today:  - down titrate remodulin from 70 to 60  - start uptravi 200 mg BID  - lasix 20 mg po changed to prn for congestion    Idiopathic pulmonary hypertension   Subcutaneous remodulin site pain   Patient has been having pain at site of subcutaneous remodulin for the last month. She went through 6 sites in this period of time. Site would get tender, red, and eventually would drain pus. Exam revealed erythema at catheter site, mild warmth, no discharge. Plan transition remodulin to IV and downtitrate, while uptitrating uptravi daily. Monitor for nausea, diarrhea, headache, hypoxia, pulmonary edema, SOB, CP.     - continue PTA macitentan and tadalafil  - continue lasix 20 mg daily  - PICC line placement  - pain control: tylenol, tramadol prn, CMPD topical   - pharm consult for conversion of subcutaneous to IV remodulin  - plan titrate down remodulin while uptitrating on uptravi; plan start 6/16      Remodulin  Selexipag (Uptravi)  Day 1  60 ng/kg/min  200 mcg BID  Day 2  50 ng/kg/min  400 mcg BID  Day 3  40 ng/kg/min  600 mcg BID  Day 4  30 ng/kg/min  800 mcg BID  Day 5  20 ng/kg/min  1000 mcg BID  Day 6  10 ng/kg/min  1200 mcg BID  Day 7  0 ng/kg/min  1400 mcg BID  Day 8  0 ng/kg/min  1600 mcg BID    Chronic issues  Subclinical hypothyroid - CTM as outpatient   Hypertension - /68 on admission, CTM      Diet: Regular Diet Adult    DVT Prophylaxis: Low Risk/Ambulatory with no VTE prophylaxis indicated  Valerio Catheter: Not  "present  Cardiac Monitoring: None  Code Status: Full Code          Clinically Significant Risk Factors Present on Admission        # Hypokalemia: Lowest K = 2.7 mmol/L in last 2 days, will replace as needed   # Hypocalcemia: Lowest Ca = 8.2 mg/dL in last 2 days, will monitor and replace as appropriate     # Hypoalbuminemia: Lowest albumin = 3.4 g/dL at 6/16/2024  6:14 AM, will monitor as appropriate                 # Overweight: Estimated body mass index is 29.58 kg/m  as calculated from the following:    Height as of this encounter: 1.6 m (5' 3\").    Weight as of this encounter: 75.8 kg (167 lb).              Disposition Plan   Expected discharge: > 7 days, recommended to prior living arrangement once  medication therapy plan in place .    Entered: Danna Hitchcock MD 06/16/2024, 1:53 PM   The patient's care was discussed with the Attending Physician, Dr. Frazier .      Danna Hitchcock MD  [facility]  ______________________________________________________________________    Interval History   NAEO. Still having minimal diarrhea/nausea/cough with phlegm.     Physical Exam   Vital Signs: Temp: 97.8  F (36.6  C) Temp src: Oral BP: 119/75 Pulse: 82   Resp: 20 SpO2: 95 % O2 Device: None (Room air) Oxygen Delivery: 1 LPM  Weight: 167 lbs 0 oz    General Appearance:  Alert, oriented, cooperative, comfortable  Respiratory: on RA, clear bilaterally  Cardiovascular: JVP unclear, regular, loud S2, no murmur  GI: soft, not tender  Skin: catheter in place, erythema at catheter site, mild warmth, no discharge  Other:  no pitting edema both legs       Medical Decision Making             Data          "

## 2024-06-17 LAB
ALBUMIN SERPL BCG-MCNC: 3.9 G/DL (ref 3.5–5.2)
ALP SERPL-CCNC: 57 U/L (ref 40–150)
ALT SERPL W P-5'-P-CCNC: 12 U/L (ref 0–50)
ANION GAP SERPL CALCULATED.3IONS-SCNC: 11 MMOL/L (ref 7–15)
AST SERPL W P-5'-P-CCNC: 20 U/L (ref 0–45)
ATRIAL RATE - MUSE: 70 BPM
BASOPHILS # BLD AUTO: NORMAL 10*3/UL
BASOPHILS # BLD MANUAL: 0 10E3/UL (ref 0–0.2)
BASOPHILS NFR BLD AUTO: NORMAL %
BASOPHILS NFR BLD MANUAL: 0 %
BILIRUB DIRECT SERPL-MCNC: <0.2 MG/DL (ref 0–0.3)
BILIRUB SERPL-MCNC: 0.3 MG/DL
BUN SERPL-MCNC: 6.5 MG/DL (ref 6–20)
CALCIUM SERPL-MCNC: 9.1 MG/DL (ref 8.6–10)
CHLORIDE SERPL-SCNC: 106 MMOL/L (ref 98–107)
CREAT SERPL-MCNC: 0.62 MG/DL (ref 0.51–0.95)
DEPRECATED HCO3 PLAS-SCNC: 22 MMOL/L (ref 22–29)
DIASTOLIC BLOOD PRESSURE - MUSE: NORMAL MMHG
EGFRCR SERPLBLD CKD-EPI 2021: >90 ML/MIN/1.73M2
EOSINOPHIL # BLD AUTO: NORMAL 10*3/UL
EOSINOPHIL # BLD MANUAL: 0.2 10E3/UL (ref 0–0.7)
EOSINOPHIL NFR BLD AUTO: NORMAL %
EOSINOPHIL NFR BLD MANUAL: 3 %
ERYTHROCYTE [DISTWIDTH] IN BLOOD BY AUTOMATED COUNT: 13.2 % (ref 10–15)
GLUCOSE SERPL-MCNC: 85 MG/DL (ref 70–99)
HCT VFR BLD AUTO: 37.9 % (ref 35–47)
HGB BLD-MCNC: 13.1 G/DL (ref 11.7–15.7)
IMM GRANULOCYTES # BLD: NORMAL 10*3/UL
IMM GRANULOCYTES NFR BLD: NORMAL %
INTERPRETATION ECG - MUSE: NORMAL
LYMPHOCYTES # BLD AUTO: NORMAL 10*3/UL
LYMPHOCYTES # BLD MANUAL: 1.7 10E3/UL (ref 0.8–5.3)
LYMPHOCYTES NFR BLD AUTO: NORMAL %
LYMPHOCYTES NFR BLD MANUAL: 26 %
MAGNESIUM SERPL-MCNC: 2.2 MG/DL (ref 1.7–2.3)
MCH RBC QN AUTO: 31.3 PG (ref 26.5–33)
MCHC RBC AUTO-ENTMCNC: 34.6 G/DL (ref 31.5–36.5)
MCV RBC AUTO: 91 FL (ref 78–100)
MONOCYTES # BLD AUTO: NORMAL 10*3/UL
MONOCYTES # BLD MANUAL: 0.4 10E3/UL (ref 0–1.3)
MONOCYTES NFR BLD AUTO: NORMAL %
MONOCYTES NFR BLD MANUAL: 6 %
MYELOCYTES # BLD MANUAL: 0.1 10E3/UL
MYELOCYTES NFR BLD MANUAL: 2 %
NEUTROPHILS # BLD AUTO: NORMAL 10*3/UL
NEUTROPHILS # BLD MANUAL: 4.2 10E3/UL (ref 1.6–8.3)
NEUTROPHILS NFR BLD AUTO: NORMAL %
NEUTROPHILS NFR BLD MANUAL: 63 %
NRBC # BLD AUTO: 0 10E3/UL
NRBC BLD AUTO-RTO: 0 /100
P AXIS - MUSE: 47 DEGREES
PHOSPHATE SERPL-MCNC: 4.1 MG/DL (ref 2.5–4.5)
PLAT MORPH BLD: ABNORMAL
PLATELET # BLD AUTO: 173 10E3/UL (ref 150–450)
POTASSIUM SERPL-SCNC: 4 MMOL/L (ref 3.4–5.3)
PR INTERVAL - MUSE: 128 MS
PROT SERPL-MCNC: 6.3 G/DL (ref 6.4–8.3)
QRS DURATION - MUSE: 98 MS
QT - MUSE: 398 MS
QTC - MUSE: 429 MS
R AXIS - MUSE: 28 DEGREES
RBC # BLD AUTO: 4.19 10E6/UL (ref 3.8–5.2)
RBC MORPH BLD: ABNORMAL
SODIUM SERPL-SCNC: 139 MMOL/L (ref 135–145)
SYSTOLIC BLOOD PRESSURE - MUSE: NORMAL MMHG
T AXIS - MUSE: 33 DEGREES
VENTRICULAR RATE- MUSE: 70 BPM
WBC # BLD AUTO: 6.7 10E3/UL (ref 4–11)

## 2024-06-17 PROCEDURE — 250N000013 HC RX MED GY IP 250 OP 250 PS 637

## 2024-06-17 PROCEDURE — 120N000005 HC R&B MS OVERFLOW UMMC

## 2024-06-17 PROCEDURE — 85007 BL SMEAR W/DIFF WBC COUNT: CPT | Performed by: STUDENT IN AN ORGANIZED HEALTH CARE EDUCATION/TRAINING PROGRAM

## 2024-06-17 PROCEDURE — 99233 SBSQ HOSP IP/OBS HIGH 50: CPT | Mod: GC | Performed by: INTERNAL MEDICINE

## 2024-06-17 PROCEDURE — 84100 ASSAY OF PHOSPHORUS: CPT | Performed by: STUDENT IN AN ORGANIZED HEALTH CARE EDUCATION/TRAINING PROGRAM

## 2024-06-17 PROCEDURE — 36415 COLL VENOUS BLD VENIPUNCTURE: CPT

## 2024-06-17 PROCEDURE — 85027 COMPLETE CBC AUTOMATED: CPT | Performed by: STUDENT IN AN ORGANIZED HEALTH CARE EDUCATION/TRAINING PROGRAM

## 2024-06-17 PROCEDURE — 80053 COMPREHEN METABOLIC PANEL: CPT

## 2024-06-17 PROCEDURE — 272N000004 HC RX 272: Performed by: INTERNAL MEDICINE

## 2024-06-17 PROCEDURE — 82248 BILIRUBIN DIRECT: CPT | Performed by: STUDENT IN AN ORGANIZED HEALTH CARE EDUCATION/TRAINING PROGRAM

## 2024-06-17 PROCEDURE — 250N000011 HC RX IP 250 OP 636: Performed by: INTERNAL MEDICINE

## 2024-06-17 PROCEDURE — 999N000007 HC SITE CHECK

## 2024-06-17 PROCEDURE — 83735 ASSAY OF MAGNESIUM: CPT | Performed by: STUDENT IN AN ORGANIZED HEALTH CARE EDUCATION/TRAINING PROGRAM

## 2024-06-17 RX ADMIN — WATER 50 NG/KG/MIN: 1 INJECTION, SOLUTION INTRAVENOUS at 13:18

## 2024-06-17 RX ADMIN — MACITENTAN 10 MG: 10 TABLET, FILM COATED ORAL at 08:20

## 2024-06-17 RX ADMIN — WATER 60 NG/KG/MIN: 1 INJECTION, SOLUTION INTRAVENOUS at 00:11

## 2024-06-17 RX ADMIN — TADALAFIL 40 MG: 20 TABLET, FILM COATED ORAL at 08:20

## 2024-06-17 RX ADMIN — SELEXIPAG 400 MCG: 200 TABLET, COATED ORAL at 20:20

## 2024-06-17 RX ADMIN — SELEXIPAG 400 MCG: 200 TABLET, COATED ORAL at 08:19

## 2024-06-17 RX ADMIN — SELEXIPAG 200 MCG: 200 TABLET, COATED ORAL at 00:04

## 2024-06-17 ASSESSMENT — ACTIVITIES OF DAILY LIVING (ADL)
ADLS_ACUITY_SCORE: 22

## 2024-06-17 NOTE — PROGRESS NOTES
"Blood pressure 121/70, pulse 86, temperature 97.4  F (36.3  C), temperature source Oral, resp. rate 17, height 1.6 m (5' 3\"), weight 75.8 kg (167 lb), SpO2 93%.     Neuro: A&Ox4.   Cardiac: Afebrile, SR   Respiratory: RA during the day and 2L NC at night  GI/: Voiding spontaneously.1 BM this shift.   Diet/appetite: Tolerating regular diet. Denies nausea   Activity: Up independently   Pain: Denies   Skin: No new deficits noted.  Lines: R PICC Infusing Remodulin at 50ng/kg/min.   Drains: None  Replacement: None    Remodulin being tapered down daily by 10 ng at noon. (Per order)  Oral Selexipag dosage being increased every morning (Per order).      "

## 2024-06-17 NOTE — PROGRESS NOTES
[facility]    Cardiology Progress Note- Cardiology        Date of Admission:  6/15/2024     Assessment & Plan: SLUCA Huaromero Flower is a 33 year old female admitted on 6/15/2024 PMH  idiopathic pulmonary hypertension currently on macitentan, tadafil, subcutaneous treprostinil, subclinical hypothyroidism, hypertension who presents with severe pain at subcutaneous remodulin site, planned tapering remodulin and starting selexipag.     Today:  - down titrate remodulin from 60->50  - Uptravi now at 400mg BID,   - no lasix needed today    Idiopathic pulmonary hypertension   Subcutaneous remodulin site pain   Patient has been having pain at site of subcutaneous remodulin for the last month. She went through 6 sites in this period of time. Site would get tender, red, and eventually would drain pus. Exam revealed erythema at catheter site, mild warmth, no discharge. Plan transition remodulin to IV and downtitrate, while uptitrating uptravi daily. Monitor for nausea, diarrhea, headache, hypoxia, pulmonary edema, SOB, CP.     - continue PTA macitentan and tadalafil  - continue lasix 20 mg daily  - PICC line placement  - pain control: tylenol, tramadol prn, CMPD topical   - pharm consult for conversion of subcutaneous to IV remodulin  - plan titrate down remodulin while uptitrating on uptravi; plan start 6/16      Remodulin  Selexipag (Uptravi)  Day 1  60 ng/kg/min  200 mcg BID  Day 2  50 ng/kg/min  400 mcg BID  Day 3  40 ng/kg/min  600 mcg BID  Day 4  30 ng/kg/min  800 mcg BID  Day 5  20 ng/kg/min  1000 mcg BID  Day 6  10 ng/kg/min  1200 mcg BID  Day 7  0 ng/kg/min  1400 mcg BID  Day 8  0 ng/kg/min  1600 mcg BID    Chronic issues  Subclinical hypothyroid - CTM as outpatient   Hypertension - /68 on admission, CTM      Diet: Regular Diet Adult    DVT Prophylaxis: Low Risk/Ambulatory with no VTE prophylaxis indicated  Valerio Catheter: Not present  Cardiac  "Monitoring: None  Code Status: Full Code          Clinically Significant Risk Factors        # Hypokalemia: Lowest K = 2.7 mmol/L in last 2 days, will replace as needed   # Hypocalcemia: Lowest Ca = 8.2 mg/dL in last 2 days, will monitor and replace as appropriate     # Hypoalbuminemia: Lowest albumin = 3.4 g/dL at 6/16/2024  6:14 AM, will monitor as appropriate                    # Overweight: Estimated body mass index is 29.58 kg/m  as calculated from the following:    Height as of this encounter: 1.6 m (5' 3\").    Weight as of this encounter: 75.8 kg (167 lb)., PRESENT ON ADMISSION            Disposition Plan   Expected discharge: > 7 days, recommended to prior living arrangement once  medication therapy plan in place .    Entered: Bc Gill MD 06/17/2024, 8:22 AM   The patient's care was discussed with the Attending Physician, Dr. Frazier .      Bc Gill MD  [facility]  ______________________________________________________________________    Interval History   NAEO, patient HDS. Patient feeling well without any SOB. No nausea, vomiting, diarrhea, jaw pain, headaches    Physical Exam   Vital Signs: Temp: 97.7  F (36.5  C) Temp src: Oral BP: 95/53 Pulse: 66   Resp: 16 SpO2: 95 % O2 Device: Nasal cannula Oxygen Delivery: 1 LPM  Weight: 167 lbs 0 oz    General Appearance:  Alert, oriented, cooperative, comfortable  Respiratory: on RA, clear bilaterally  Cardiovascular: normal JVD, RRR, no MRG  GI: soft, not tender  Skin: Right lower back lesion improved from yesterday.  Other:  no pitting edema both legs       Medical Decision Making             Data          "

## 2024-06-17 NOTE — PLAN OF CARE
D AVSS with sat's 98% on 1-3L of oxygen overnight. Heart regular sinus and lungs decreased throughout all fields. Voiced no c/o pain or nausea overnight and slept well. Opsumit started yesterday which is being titrated up as her Romodulin IV is being titrated down. Wanted to wait to get her wait. Up independently in room and reports that she's voiding good amounts.   I Vital's, assessment and med's per order.  A Resting in bed with call light in reach.  P Continue to monitor and update MD with changes.

## 2024-06-18 LAB
ALBUMIN SERPL BCG-MCNC: 3.7 G/DL (ref 3.5–5.2)
ALP SERPL-CCNC: 55 U/L (ref 40–150)
ALT SERPL W P-5'-P-CCNC: 17 U/L (ref 0–50)
ANION GAP SERPL CALCULATED.3IONS-SCNC: 9 MMOL/L (ref 7–15)
AST SERPL W P-5'-P-CCNC: 23 U/L (ref 0–45)
BILIRUB DIRECT SERPL-MCNC: <0.2 MG/DL (ref 0–0.3)
BILIRUB SERPL-MCNC: 0.3 MG/DL
BUN SERPL-MCNC: 5.1 MG/DL (ref 6–20)
CALCIUM SERPL-MCNC: 8.7 MG/DL (ref 8.6–10)
CHLORIDE SERPL-SCNC: 108 MMOL/L (ref 98–107)
CREAT SERPL-MCNC: 0.62 MG/DL (ref 0.51–0.95)
DEPRECATED HCO3 PLAS-SCNC: 23 MMOL/L (ref 22–29)
EGFRCR SERPLBLD CKD-EPI 2021: >90 ML/MIN/1.73M2
ERYTHROCYTE [DISTWIDTH] IN BLOOD BY AUTOMATED COUNT: 13.2 % (ref 10–15)
GLUCOSE SERPL-MCNC: 84 MG/DL (ref 70–99)
HCT VFR BLD AUTO: 34.2 % (ref 35–47)
HGB BLD-MCNC: 12.4 G/DL (ref 11.7–15.7)
MAGNESIUM SERPL-MCNC: 2.3 MG/DL (ref 1.7–2.3)
MCH RBC QN AUTO: 34 PG (ref 26.5–33)
MCHC RBC AUTO-ENTMCNC: 36.3 G/DL (ref 31.5–36.5)
MCV RBC AUTO: 94 FL (ref 78–100)
PHOSPHATE SERPL-MCNC: 4.2 MG/DL (ref 2.5–4.5)
PLATELET # BLD AUTO: 143 10E3/UL (ref 150–450)
POTASSIUM SERPL-SCNC: 3.9 MMOL/L (ref 3.4–5.3)
PROT SERPL-MCNC: 6 G/DL (ref 6.4–8.3)
RBC # BLD AUTO: 3.65 10E6/UL (ref 3.8–5.2)
SODIUM SERPL-SCNC: 140 MMOL/L (ref 135–145)
WBC # BLD AUTO: 6.5 10E3/UL (ref 4–11)

## 2024-06-18 PROCEDURE — 82247 BILIRUBIN TOTAL: CPT | Performed by: STUDENT IN AN ORGANIZED HEALTH CARE EDUCATION/TRAINING PROGRAM

## 2024-06-18 PROCEDURE — 272N000004 HC RX 272: Performed by: INTERNAL MEDICINE

## 2024-06-18 PROCEDURE — 120N000005 HC R&B MS OVERFLOW UMMC

## 2024-06-18 PROCEDURE — 250N000011 HC RX IP 250 OP 636: Performed by: INTERNAL MEDICINE

## 2024-06-18 PROCEDURE — 84100 ASSAY OF PHOSPHORUS: CPT | Performed by: STUDENT IN AN ORGANIZED HEALTH CARE EDUCATION/TRAINING PROGRAM

## 2024-06-18 PROCEDURE — 36415 COLL VENOUS BLD VENIPUNCTURE: CPT

## 2024-06-18 PROCEDURE — 80048 BASIC METABOLIC PNL TOTAL CA: CPT

## 2024-06-18 PROCEDURE — 85027 COMPLETE CBC AUTOMATED: CPT

## 2024-06-18 PROCEDURE — 250N000013 HC RX MED GY IP 250 OP 250 PS 637: Performed by: INTERNAL MEDICINE

## 2024-06-18 PROCEDURE — 83735 ASSAY OF MAGNESIUM: CPT | Performed by: STUDENT IN AN ORGANIZED HEALTH CARE EDUCATION/TRAINING PROGRAM

## 2024-06-18 PROCEDURE — 99233 SBSQ HOSP IP/OBS HIGH 50: CPT | Mod: GC | Performed by: INTERNAL MEDICINE

## 2024-06-18 PROCEDURE — 250N000013 HC RX MED GY IP 250 OP 250 PS 637

## 2024-06-18 PROCEDURE — 84155 ASSAY OF PROTEIN SERUM: CPT | Performed by: STUDENT IN AN ORGANIZED HEALTH CARE EDUCATION/TRAINING PROGRAM

## 2024-06-18 RX ADMIN — SELEXIPAG 600 MCG: 600 TABLET, COATED ORAL at 08:38

## 2024-06-18 RX ADMIN — WATER 50 NG/KG/MIN: 1 INJECTION, SOLUTION INTRAVENOUS at 03:54

## 2024-06-18 RX ADMIN — WATER 40 NG/KG/MIN: 1 INJECTION, SOLUTION INTRAVENOUS at 20:33

## 2024-06-18 RX ADMIN — SELEXIPAG 600 MCG: 600 TABLET, COATED ORAL at 19:55

## 2024-06-18 RX ADMIN — TADALAFIL 40 MG: 20 TABLET, FILM COATED ORAL at 08:34

## 2024-06-18 RX ADMIN — MACITENTAN 10 MG: 10 TABLET, FILM COATED ORAL at 08:38

## 2024-06-18 ASSESSMENT — ACTIVITIES OF DAILY LIVING (ADL)
ADLS_ACUITY_SCORE: 22

## 2024-06-18 NOTE — PROGRESS NOTES
"Blood pressure 109/67, pulse 95, temperature 98.1  F (36.7  C), temperature source Oral, resp. rate 18, height 1.6 m (5' 3\"), weight 74 kg (163 lb 1.6 oz), SpO2 91%.        Neuro: A&Ox4.   Cardiac: Afebrile, SR. Pt became a little Tachy  when she went walking around the unit.  Respiratory: RA during the day and 1-2L NC at night  GI/: Voiding spontaneously.1 BM this shift.   Diet/appetite: Tolerating regular diet. Denies nausea   Activity: Up independently   Pain: Denies   Skin: No new deficits noted.  Lines: R PICC Infusing Remodulin at 40ng/kg/min.   Drains: None  Replacement: None     Remodulin being tapered down daily by 10 ng at noon. (Per order)  Oral Selexipag dosage being increased every morning (Per order).  "

## 2024-06-18 NOTE — PROGRESS NOTES
[facility]    Cardiology Progress Note- Cardiology        Date of Admission:  6/15/2024     Assessment & Plan: S   Chelsearomero Flower is a 33 year old female admitted on 6/15/2024 PMH  idiopathic pulmonary hypertension currently on macitentan, tadafil, subcutaneous treprostinil, subclinical hypothyroidism, hypertension who presents with severe pain at subcutaneous remodulin site, planned tapering remodulin and starting selexipag.     Today:  - down titrate remodulin from 50->40  - Uptravi now at 600mg BID    Idiopathic pulmonary hypertension   Subcutaneous remodulin site pain   Patient has been having pain at site of subcutaneous remodulin for the last month. She went through 6 sites in this period of time. Site would get tender, red, and eventually would drain pus. Exam revealed erythema at catheter site, mild warmth, no discharge. Plan transition remodulin to IV and downtitrate, while uptitrating uptravi daily. Monitor for nausea, diarrhea, headache, hypoxia, pulmonary edema, SOB, CP.     - continue PTA macitentan and tadalafil  - continue lasix 20 mg daily prn  - PICC line placement  - pain control: tylenol, tramadol prn, CMPD topical   - pharm consult for conversion of subcutaneous to IV remodulin  - plan titrate down remodulin while uptitrating on uptravi; start 6/16      Remodulin  Selexipag (Uptravi)  Day 1  60 ng/kg/min  200 mcg BID  Day 2  50 ng/kg/min  400 mcg BID  Day 3  40 ng/kg/min  600 mcg BID  Day 4  30 ng/kg/min  800 mcg BID  Day 5  20 ng/kg/min  1000 mcg BID  Day 6  10 ng/kg/min  1200 mcg BID  Day 7  0 ng/kg/min  1400 mcg BID  Day 8  0 ng/kg/min  1600 mcg BID    Chronic issues  Subclinical hypothyroid - CTM as outpatient   Hypertension - /68 on admission, CTM      Diet: Regular Diet Adult    DVT Prophylaxis: Low Risk/Ambulatory with no VTE prophylaxis indicated  Valerio Catheter: Not present  Cardiac Monitoring: None  Code Status: Full  "Code          Clinically Significant Risk Factors              # Hypoalbuminemia: Lowest albumin = 3.4 g/dL at 6/16/2024  6:14 AM, will monitor as appropriate                    # Overweight: Estimated body mass index is 28.89 kg/m  as calculated from the following:    Height as of this encounter: 1.6 m (5' 3\").    Weight as of this encounter: 74 kg (163 lb 1.6 oz)., PRESENT ON ADMISSION            Disposition Plan   Expected discharge: > 7 days, recommended to prior living arrangement once  medication therapy plan in place .    Entered: Danna Hitchcock MD 06/18/2024, 3:46 PM   The patient's care was discussed with the Attending Physician, Dr. Frazier .      Danna Hitchcock MD  [facility]  ______________________________________________________________________    Interval History   NAEO. Patient feeling well without any SOB. No nausea, vomiting, diarrhea, jaw pain, headaches.    Physical Exam   Vital Signs: Temp: 97.8  F (36.6  C) Temp src: Oral BP: 105/63 Pulse: 90   Resp: 18 SpO2: 97 % O2 Device: Nasal cannula Oxygen Delivery: 1.5 LPM  Weight: 163 lbs 1.6 oz    General Appearance:  Alert, oriented, cooperative, comfortable  Respiratory: on RA, clear bilaterally  Cardiovascular: normal JVD, RRR, no MRG  GI: soft, not tender  Other:  no pitting edema both legs       Medical Decision Making             Data          "

## 2024-06-18 NOTE — PLAN OF CARE
"Goal Outcome Evaluation:    Neuro: A&Ox4. Calls appropriately  Cardiac: Afebrile, VSS. SR 60s-70s   Respiratory: Sating appropriately on RA when awake. 1.5L NC when asleep  GI/: Voiding spontaneously. No BM this shift.  Diet/appetite: Tolerating regular diet. Denies nausea.  Activity: Up independently    Pain: Denies   Skin: No new deficits noted.  Lines: R PIV SL. R SL PICC infusing remodulin @ 50ng/kg/min. Decrease rate to 40ng/kg/min @ noon      BP 93/47 (BP Location: Left arm)   Pulse 79   Temp 97.9  F (36.6  C) (Oral)   Resp 18   Ht 1.6 m (5' 3\")   Wt 75.8 kg (167 lb)   SpO2 94%   BMI 29.58 kg/m       Continue with POC and notify team of any changes       "

## 2024-06-19 LAB
ALBUMIN SERPL BCG-MCNC: 3.6 G/DL (ref 3.5–5.2)
ALP SERPL-CCNC: 57 U/L (ref 40–150)
ALT SERPL W P-5'-P-CCNC: 17 U/L (ref 0–50)
ANION GAP SERPL CALCULATED.3IONS-SCNC: 9 MMOL/L (ref 7–15)
AST SERPL W P-5'-P-CCNC: 23 U/L (ref 0–45)
BILIRUB DIRECT SERPL-MCNC: ABNORMAL MG/DL
BILIRUB SERPL-MCNC: 0.2 MG/DL
BUN SERPL-MCNC: 7.7 MG/DL (ref 6–20)
CALCIUM SERPL-MCNC: 8.4 MG/DL (ref 8.6–10)
CHLORIDE SERPL-SCNC: 108 MMOL/L (ref 98–107)
CREAT SERPL-MCNC: 0.62 MG/DL (ref 0.51–0.95)
DEPRECATED HCO3 PLAS-SCNC: 23 MMOL/L (ref 22–29)
EGFRCR SERPLBLD CKD-EPI 2021: >90 ML/MIN/1.73M2
GLUCOSE SERPL-MCNC: 91 MG/DL (ref 70–99)
HOLD SPECIMEN: NORMAL
POTASSIUM SERPL-SCNC: 4.3 MMOL/L (ref 3.4–5.3)
PROT SERPL-MCNC: 6 G/DL (ref 6.4–8.3)
SODIUM SERPL-SCNC: 140 MMOL/L (ref 135–145)

## 2024-06-19 PROCEDURE — 250N000013 HC RX MED GY IP 250 OP 250 PS 637

## 2024-06-19 PROCEDURE — 272N000004 HC RX 272: Performed by: INTERNAL MEDICINE

## 2024-06-19 PROCEDURE — 80048 BASIC METABOLIC PNL TOTAL CA: CPT

## 2024-06-19 PROCEDURE — 250N000011 HC RX IP 250 OP 636: Performed by: INTERNAL MEDICINE

## 2024-06-19 PROCEDURE — 99233 SBSQ HOSP IP/OBS HIGH 50: CPT | Mod: GC | Performed by: INTERNAL MEDICINE

## 2024-06-19 PROCEDURE — 36415 COLL VENOUS BLD VENIPUNCTURE: CPT

## 2024-06-19 PROCEDURE — 250N000013 HC RX MED GY IP 250 OP 250 PS 637: Performed by: INTERNAL MEDICINE

## 2024-06-19 PROCEDURE — 120N000005 HC R&B MS OVERFLOW UMMC

## 2024-06-19 RX ADMIN — SELEXIPAG 800 MCG: 800 TABLET, COATED ORAL at 09:42

## 2024-06-19 RX ADMIN — ACETAMINOPHEN 975 MG: 325 TABLET, FILM COATED ORAL at 13:24

## 2024-06-19 RX ADMIN — TADALAFIL 40 MG: 20 TABLET, FILM COATED ORAL at 09:42

## 2024-06-19 RX ADMIN — SELEXIPAG 800 MCG: 800 TABLET, COATED ORAL at 19:43

## 2024-06-19 RX ADMIN — WATER 30 NG/KG/MIN: 1 INJECTION, SOLUTION INTRAVENOUS at 15:41

## 2024-06-19 RX ADMIN — MACITENTAN 10 MG: 10 TABLET, FILM COATED ORAL at 09:42

## 2024-06-19 ASSESSMENT — ACTIVITIES OF DAILY LIVING (ADL)
ADLS_ACUITY_SCORE: 22

## 2024-06-19 NOTE — PROGRESS NOTES
[facility]    Cardiology Progress Note- Cardiology        Date of Admission:  6/15/2024     Assessment & Plan: DAWNA Huaromero Flower is a 33 year old female admitted on 6/15/2024 PMH  idiopathic pulmonary hypertension currently on macitentan, tadafil, subcutaneous treprostinil, subclinical hypothyroidism, hypertension who presents with severe pain at subcutaneous remodulin site, planned tapering remodulin and starting selexipag.     Today:  - down titrate remodulin from 40->30  - Uptravi now at 800mg BID  - schedule melatonin  - plan RHC and echo on Mon after titration finished  - coordinated with PH team to deliver Uptravi to pt's house    Idiopathic pulmonary hypertension   Subcutaneous remodulin site pain   Patient has been having pain at site of subcutaneous remodulin for the last month. She went through 6 sites in this period of time. Site would get tender, red, and eventually would drain pus. Exam revealed erythema at catheter site, mild warmth, no discharge. Plan transition remodulin to IV and downtitrate, while uptitrating uptravi daily. Monitor for nausea, diarrhea, headache, hypoxia, pulmonary edema, SOB, CP.     - continue PTA macitentan and tadalafil  - continue lasix 20 mg daily prn  - PICC line placement  - pain control: tylenol, tramadol prn, CMPD topical   - pharm consult for conversion of subcutaneous to IV remodulin  - plan titrate down remodulin while uptitrating on uptravi; start 6/16  - plan RHC and echo on Mon after titration finished       Remodulin  Selexipag (Uptravi)  Day 1  60 ng/kg/min  200 mcg BID  Day 2  50 ng/kg/min  400 mcg BID  Day 3  40 ng/kg/min  600 mcg BID  Day 4  30 ng/kg/min  800 mcg BID  Day 5  20 ng/kg/min  1000 mcg BID  Day 6  10 ng/kg/min  1200 mcg BID  Day 7  0 ng/kg/min  1400 mcg BID  Day 8  0 ng/kg/min  1600 mcg BID    Chronic issues  Subclinical hypothyroid - CTM as outpatient   Hypertension - /68 on  "admission, CTM      Diet: Regular Diet Adult    DVT Prophylaxis: Low Risk/Ambulatory with no VTE prophylaxis indicated  Valerio Catheter: Not present  Cardiac Monitoring: None  Code Status: Full Code          Clinically Significant Risk Factors          # Hypocalcemia: Lowest Ca = 8.4 mg/dL in last 2 days, will monitor and replace as appropriate     # Hypoalbuminemia: Lowest albumin = 3.4 g/dL at 6/16/2024  6:14 AM, will monitor as appropriate                    # Overweight: Estimated body mass index is 29.18 kg/m  as calculated from the following:    Height as of this encounter: 1.6 m (5' 3\").    Weight as of this encounter: 74.7 kg (164 lb 11.2 oz)., PRESENT ON ADMISSION            Disposition Plan   Expected discharge: 4 - 7 days, recommended to prior living arrangement once  medication therapy plan in place .    Entered: Danna Hitchcock MD 06/19/2024, 2:44 PM   The patient's care was discussed with the Attending Physician, Dr. Frazier .      Danna Hitchcock MD  [facility]  ______________________________________________________________________    Interval History   NAEO. Patient feeling well without any SOB. No nausea, vomiting, diarrhea, jaw pain, headaches. Had some trouble falling asleep yesterday. Can hear pulsation in ears. Took a 30 min walk yesterday.     Physical Exam   Vital Signs: Temp: 97.7  F (36.5  C) Temp src: Oral BP: 112/72 Pulse: 87   Resp: 16 SpO2: 91 % O2 Device: None (Room air) Oxygen Delivery: 1.5 LPM  Weight: 164 lbs 11.2 oz    General Appearance:  Alert, oriented, cooperative, comfortable  Respiratory: on RA, clear bilaterally  Cardiovascular: normal JVD, RRR, no MRG  GI: soft, not tender  Skin: small pale pink papule at previous subcutaneous infusion site   Other:  no pitting edema both legs       Medical Decision Making             Data          "

## 2024-06-19 NOTE — TELEPHONE ENCOUNTER
"6/19/2024  @ 11:54 AM -  [facility] email  from [facility]:  Ora Do RN on 6/19/2024 -  \"Hi Team, Patient Chelsea ramírez 1990  she will be tentatively discharging over the weekend. She is currently at 800mcg BID uptravi. Can you call and ship out month 2 titration pack. That way we have some wiggle room if she discharges and is not tolerating at home. Our goal is 1600mcg BID prior to discharge.  Tentative discharge is 6/23  Let me know if this does not work  Thanks  Ora Do RN\"    Gilma ARGUETA CMA- Prior Auths  Cardiology/Pulmonary Hypertension     "

## 2024-06-19 NOTE — PLAN OF CARE
Goal Outcome Evaluation  [id number]:  HX:33 year old female with history of idiopathic pulmonary hypertension currently on macitentan, tadafil, subcutaneous treprostinil, subclinical hypothyroidism, hypertension who presents with severe pain at subcutaneous remodulin site, planned tapering remodulin and starting selexipag.     Cardiac:SR 80s  VS:VSS  IV:PIV-SL, SL PICC w/remodulin at 30ng/kg/min-decreased from 40 ng at 1330.   Tubes:NA  Neuro:A/Ox4  Resp:States breathing is OK (not worse than at admit, but not perfect). Wears O2 at night when sleeping. Sats 91% on RA when sitting in bed. Encouraged to call RN is needs O2.   GI/:No BM this shift, Good UO.   Nutrition:Regular diet with good PO.  Labs:NA  Endo:NA  Skin:Intact.   Activity:Up ad jess, took shower independently.  Pain:C/O slight headache beginning,treated with tylenol to keep it from getting worse.   Social:No visitors present, has cell phone at bedside.   Plan:Increase Uptravi dosing while weaning Remodulin to off. Monitor signs and symptoms and VS during titration. Assess and treat pain as indicated. Continue current cares and notify providers with questions and concerns.        Plan of Care Reviewed With: patient    Overall Patient Progress: improvingOverall Patient Progress: improving

## 2024-06-19 NOTE — PLAN OF CARE
"Goal Outcome Evaluation:    Neuro: A&Ox4. Calls appropriately  Cardiac: Afebrile, VSS. SR 60s-70s    Respiratory: Sating appropriately on RA when awake. 1.5L NC when asleep  GI/: Voiding spontaneously. No BM this shift.  Diet/appetite: Tolerating regular diet. Denies nausea.  Activity: Up independently    Pain: Denies   Skin: No new deficits noted.  Lines: R PIV SL. R SL PICC infusing remodulin @ 40ng/kg/min. Decrease rate to 30ng/kg/min @ noon on 6/19     /54   Pulse 81   Temp 97.7  F (36.5  C) (Oral)   Resp 18   Ht 1.6 m (5' 3\")   Wt 74 kg (163 lb 1.6 oz)   SpO2 97%   BMI 28.89 kg/m        Continue with POC and notify team of any changes      "

## 2024-06-20 LAB
ERYTHROCYTE [DISTWIDTH] IN BLOOD BY AUTOMATED COUNT: 13.5 % (ref 10–15)
HCT VFR BLD AUTO: 32.3 % (ref 35–47)
HGB BLD-MCNC: 11.8 G/DL (ref 11.7–15.7)
MCH RBC QN AUTO: 34.9 PG (ref 26.5–33)
MCHC RBC AUTO-ENTMCNC: 36.5 G/DL (ref 31.5–36.5)
MCV RBC AUTO: 96 FL (ref 78–100)
PLATELET # BLD AUTO: 152 10E3/UL (ref 150–450)
RBC # BLD AUTO: 3.38 10E6/UL (ref 3.8–5.2)
WBC # BLD AUTO: 6.4 10E3/UL (ref 4–11)

## 2024-06-20 PROCEDURE — 250N000011 HC RX IP 250 OP 636

## 2024-06-20 PROCEDURE — 272N000004 HC RX 272

## 2024-06-20 PROCEDURE — 250N000013 HC RX MED GY IP 250 OP 250 PS 637: Performed by: INTERNAL MEDICINE

## 2024-06-20 PROCEDURE — 99233 SBSQ HOSP IP/OBS HIGH 50: CPT | Mod: GC | Performed by: INTERNAL MEDICINE

## 2024-06-20 PROCEDURE — 85027 COMPLETE CBC AUTOMATED: CPT

## 2024-06-20 PROCEDURE — 36415 COLL VENOUS BLD VENIPUNCTURE: CPT

## 2024-06-20 PROCEDURE — 120N000003 HC R&B IMCU UMMC

## 2024-06-20 PROCEDURE — 250N000013 HC RX MED GY IP 250 OP 250 PS 637

## 2024-06-20 RX ADMIN — SELEXIPAG 1000 MCG: 800 TABLET, COATED ORAL at 08:17

## 2024-06-20 RX ADMIN — SELEXIPAG 1000 MCG: 800 TABLET, COATED ORAL at 21:27

## 2024-06-20 RX ADMIN — WATER 20 NG/KG/MIN: 1 INJECTION, SOLUTION INTRAVENOUS at 12:50

## 2024-06-20 RX ADMIN — MACITENTAN 10 MG: 10 TABLET, FILM COATED ORAL at 08:17

## 2024-06-20 RX ADMIN — Medication 5 MG: at 21:24

## 2024-06-20 RX ADMIN — WATER 20 NG/KG/MIN: 1 INJECTION, SOLUTION INTRAVENOUS at 21:41

## 2024-06-20 RX ADMIN — TADALAFIL 40 MG: 20 TABLET, FILM COATED ORAL at 08:17

## 2024-06-20 ASSESSMENT — ACTIVITIES OF DAILY LIVING (ADL)
ADLS_ACUITY_SCORE: 22

## 2024-06-20 NOTE — PLAN OF CARE
Goal Outcome Evaluation:      Plan of Care Reviewed With: patient      mansi Flower is a 33 year old female admitted on 6/15/2024 PMH idiopathic pulmonary hypertension currently on macitentan, tadafil, subcutaneous treprostinil, subclinical hypothyroidism, hypertension who presents with severe pain at subcutaneous remodulin site, planned tapering remodulin and starting selexipag.      Patient continues with her titiration off Remodulin and titrating up of Slexixpag. Patient denies an side effects at this time.

## 2024-06-20 NOTE — PHARMACY-CONSULT NOTE
Parenteral Prostacyclin Therapy Concentration Change    This patient was initiated on a continuous IV prostacyclin infusion (treprostinil) as a plan to covert to Selexipag oral  for the treatment of idiopathic pulmonary hypertension.     The dose is being actively titrated down by 10 ng/kg/min every 24 hours.   The patient is currently receiving a dose of 30 ng/kg/min.   The current concentration ordered is 70 mcg/mL, resulting in a rate of 1.94 mL/hr.      To better accommodate the current dose and future titrations, the concentration of the syringes needs to be changed.   The pharmacist is updating the Treprostinil order to a concentration of 20 mcg/mL.   New syringes will be dispensed by central pharmacy and any old syringes will be removed from the unit.   The pharmacist communicated with the patient's nurse regarding this plan. The nurse is aware of the process for changing over to a new concentration as outlined in our Parenteral Prostacyclin Policy.     Jeovany Stinson, HughD

## 2024-06-20 NOTE — PROGRESS NOTES
[facility]    Cardiology Progress Note- Cardiology        Date of Admission:  6/15/2024     Assessment & Plan: SLUCA Huaromero Flower is a 33 year old female admitted on 6/15/2024 PMH  idiopathic pulmonary hypertension currently on macitentan, tadafil, subcutaneous treprostinil, subclinical hypothyroidism, hypertension who presents with severe pain at subcutaneous remodulin site, planned tapering remodulin and starting selexipag.     Today:  - down titrate remodulin from 30->20  - Uptravi now at 1000mg BID    Idiopathic pulmonary hypertension   Subcutaneous remodulin site pain   Patient has been having pain at site of subcutaneous remodulin for the last month. She went through 6 sites in this period of time. Site would get tender, red, and eventually would drain pus. Exam revealed erythema at catheter site, mild warmth, no discharge. Plan transition remodulin to IV and downtitrate, while uptitrating uptravi daily. Monitor for nausea, diarrhea, headache, hypoxia, pulmonary edema, SOB, CP.     - continue PTA macitentan and tadalafil  - continue lasix 20 mg daily prn  - PICC line placement  - pain control: tylenol, tramadol prn, CMPD topical   - titrate down remodulin while uptitrating on uptravi; start 6/16  - plan RHC and echo on Mon after titration finished       Remodulin  Selexipag (Uptravi)  Day 1  60 ng/kg/min  200 mcg BID  Day 2  50 ng/kg/min  400 mcg BID  Day 3  40 ng/kg/min  600 mcg BID  Day 4  30 ng/kg/min  800 mcg BID  Day 5  20 ng/kg/min  1000 mcg BID  Day 6  10 ng/kg/min  1200 mcg BID  Day 7  0 ng/kg/min  1400 mcg BID  Day 8  0 ng/kg/min  1600 mcg BID    Chronic issues  Subclinical hypothyroid - CTM as outpatient   Hypertension - /68 on admission, CTM      Diet: Regular Diet Adult    DVT Prophylaxis: Low Risk/Ambulatory with no VTE prophylaxis indicated  Valerio Catheter: Not present  Cardiac Monitoring: None  Code Status: Full Code         "  Clinically Significant Risk Factors          # Hypocalcemia: Lowest Ca = 8.4 mg/dL in last 2 days, will monitor and replace as appropriate     # Hypoalbuminemia: Lowest albumin = 3.4 g/dL at 6/16/2024  6:14 AM, will monitor as appropriate                    # Overweight: Estimated body mass index is 29.02 kg/m  as calculated from the following:    Height as of this encounter: 1.6 m (5' 3\").    Weight as of this encounter: 74.3 kg (163 lb 12.8 oz).             Disposition Plan   Expected discharge: 4 - 7 days, recommended to prior living arrangement once  medication therapy plan in place .    Entered: Danna Hitchcock MD 06/20/2024, 1:32 PM   The patient's care was discussed with the Attending Physician, Dr. Frazier .      Danna Hitchcock MD  [facility]  ______________________________________________________________________    Interval History   NAEO. Patient feeling well without any SOB. No nausea, vomiting, diarrhea, jaw pain, headaches. Had some trouble falling asleep yesterday. Wt stable.     Physical Exam   Vital Signs: Temp: 97.5  F (36.4  C) Temp src: Oral BP: 110/60 Pulse: 81   Resp: 18 SpO2: 95 % O2 Device: Nasal cannula Oxygen Delivery: 1.5 LPM  Weight: 163 lbs 12.8 oz    General Appearance:  Alert, oriented, cooperative, comfortable  Respiratory: on RA, clear bilaterally  Cardiovascular: normal JVD, RRR, no MRG  GI: soft, not tender  Skin: small pale pink papule at previous subcutaneous infusion site   Other:  no pitting edema both legs       Medical Decision Making             Data          "

## 2024-06-20 NOTE — PLAN OF CARE
Goal Outcome Evaluation  [id number]:  HX:33 year old female with history of idiopathic pulmonary hypertension currently on macitentan, tadafil, subcutaneous treprostinil, subclinical hypothyroidism, hypertension who presents with severe pain at subcutaneous remodulin site, planned tapering remodulin and starting selexipag.      Cardiac:SR 80s  VS:VSS  IV:PIV-SL, SL PICC w/remodulin at 20ng/kg/min-decreased from 30 ng at 1245.   Tubes:NA  Neuro:A/Ox4. Looking forward to getting back home.  Resp:States breathing is good. Sats 91% on RA when sitting in bed. Encouraged to call RN if needs O2.   GI/:Voiding, not saving; states bowel function is normal (usually has looser stools due to Remodulin).   Nutrition:Regular diet with good PO.  Labs:NA  Endo:NA  Skin:Intact.   Activity:Up ad jess.  Pain:Denies pain.   Social:No visitors present, has cell phone at bedside.   Plan:Increase Uptravi dosing while weaning Remodulin to off. Monitor signs and symptoms and VS during titration. Assess and treat pain as indicated. Continue current cares and notify providers with questions and concerns.      Plan of Care Reviewed With: patient    Overall Patient Progress: improvingOverall Patient Progress: improving

## 2024-06-20 NOTE — PLAN OF CARE
Goal Outcome Evaluation:      Plan of Care Reviewed With: patient    Overall Patient Progress: improvingOverall Patient Progress: improving    Plan: Continue remodulin titration down & selexipag titration up.     Neuro: AOX4  Cardiac: Sinus rhythm, occ sinus arrhythmia - variable RR  Respiratory: 1.5 L NC O2 95%. RA when awake. LS clear, dim, equal bilat.   GI/: Last BM 6/19 per pt. Voids spontaneously.   Activity: Up ad jess  Pain: Denies pain  LDA: R PIV - SL. R single lumen picc running remodulin @ 30 ng/kg/min  Skin: No deficits noted.

## 2024-06-21 LAB
ALBUMIN SERPL BCG-MCNC: 3.8 G/DL (ref 3.5–5.2)
ALP SERPL-CCNC: 66 U/L (ref 40–150)
ALT SERPL W P-5'-P-CCNC: 11 U/L (ref 0–50)
ANION GAP SERPL CALCULATED.3IONS-SCNC: 8 MMOL/L (ref 7–15)
AST SERPL W P-5'-P-CCNC: 17 U/L (ref 0–45)
BILIRUB DIRECT SERPL-MCNC: <0.2 MG/DL (ref 0–0.3)
BILIRUB SERPL-MCNC: 0.3 MG/DL
BUN SERPL-MCNC: 7.4 MG/DL (ref 6–20)
CALCIUM SERPL-MCNC: 9 MG/DL (ref 8.6–10)
CHLORIDE SERPL-SCNC: 106 MMOL/L (ref 98–107)
CREAT SERPL-MCNC: 0.63 MG/DL (ref 0.51–0.95)
DEPRECATED HCO3 PLAS-SCNC: 24 MMOL/L (ref 22–29)
EGFRCR SERPLBLD CKD-EPI 2021: >90 ML/MIN/1.73M2
GLUCOSE SERPL-MCNC: 86 MG/DL (ref 70–99)
HOLD SPECIMEN: NORMAL
POTASSIUM SERPL-SCNC: 4.1 MMOL/L (ref 3.4–5.3)
PROT SERPL-MCNC: 6.3 G/DL (ref 6.4–8.3)
SODIUM SERPL-SCNC: 138 MMOL/L (ref 135–145)

## 2024-06-21 PROCEDURE — 250N000011 HC RX IP 250 OP 636

## 2024-06-21 PROCEDURE — 250N000013 HC RX MED GY IP 250 OP 250 PS 637

## 2024-06-21 PROCEDURE — 99232 SBSQ HOSP IP/OBS MODERATE 35: CPT | Mod: GC | Performed by: STUDENT IN AN ORGANIZED HEALTH CARE EDUCATION/TRAINING PROGRAM

## 2024-06-21 PROCEDURE — 250N000013 HC RX MED GY IP 250 OP 250 PS 637: Performed by: INTERNAL MEDICINE

## 2024-06-21 PROCEDURE — 36415 COLL VENOUS BLD VENIPUNCTURE: CPT

## 2024-06-21 PROCEDURE — 120N000003 HC R&B IMCU UMMC

## 2024-06-21 PROCEDURE — 82248 BILIRUBIN DIRECT: CPT

## 2024-06-21 PROCEDURE — 272N000004 HC RX 272

## 2024-06-21 PROCEDURE — 80053 COMPREHEN METABOLIC PANEL: CPT

## 2024-06-21 RX ADMIN — TADALAFIL 40 MG: 20 TABLET, FILM COATED ORAL at 10:04

## 2024-06-21 RX ADMIN — MACITENTAN 10 MG: 10 TABLET, FILM COATED ORAL at 10:04

## 2024-06-21 RX ADMIN — SELEXIPAG 1200 MCG: 600 TABLET, COATED ORAL at 10:04

## 2024-06-21 RX ADMIN — WATER 10 NG/KG/MIN: 1 INJECTION, SOLUTION INTRAVENOUS at 22:37

## 2024-06-21 RX ADMIN — WATER 20 NG/KG/MIN: 1 INJECTION, SOLUTION INTRAVENOUS at 06:45

## 2024-06-21 RX ADMIN — SELEXIPAG 1200 MCG: 600 TABLET, COATED ORAL at 23:02

## 2024-06-21 ASSESSMENT — ACTIVITIES OF DAILY LIVING (ADL)
ADLS_ACUITY_SCORE: 22

## 2024-06-21 NOTE — PLAN OF CARE
Chelsea Flower is a 33 year old female admitted on 6/15/2024 PMH idiopathic pulmonary hypertension currently on macitentan, tadafil, subcutaneous treprostinil, subclinical hypothyroidism, hypertension who presents with severe pain at subcutaneous remodulin site, planned tapering remodulin and starting selexipag       Temp: 97.8  F (36.6  C) Temp src: Oral BP: 100/56 Pulse: 83   Resp: 18 SpO2: 91 %       Neuro: AOx4  Cardiac: SR 70's-80's  Resp: RA during day and 1-2 L NC while sleeping  Diet: regular  GI/: voids spontaneously, last BM yesterday  Activity: Up ad jess  Pain: denies  LDA's: R PIV running remodulin @ 10mcg/kg/min  Changes/Events: Titrated remodulin down from 20mcg/kg/min to 10 mcg/kg/min and increased selexipag to 1200mg  Plan: Continue with POC and contact Cards 2 with any changes/concerns

## 2024-06-21 NOTE — PLAN OF CARE
"Goal Outcome Evaluation:      Plan of Care Reviewed With: patient    Overall Patient Progress: improvingOverall Patient Progress: improving    Shift: 1028-6273    VS: /59 (BP Location: Left arm)   Pulse 82   Temp 97.9  F (36.6  C) (Oral)   Resp 15   Ht 1.6 m (5' 3\")   Wt 74.4 kg (164 lb)   SpO2 91%   BMI 29.05 kg/m      Pain: Denies pain.  Neuro: A&Ox4  Cardiac: Denies chest pain. SR. HR in 70-80s. -123.   Respiratory: Denies SOB. Sating on RA.   Diet/Appetite: Tolerating regular diet.  GI/: No BM this shift. Voiding spontaneously without difficulty.    LDA's: Right single PICC infusing Remodulin. Right PIV saline locked.    Skin: No new deficit noted.   Activity: Up ad jess        "

## 2024-06-21 NOTE — PROGRESS NOTES
[facility]    Cardiology Progress Note- Cardiology        Date of Admission:  6/15/2024     Assessment & Plan: SLUCA Huaromero Flower is a 33 year old female admitted on 6/15/2024 PMH  idiopathic pulmonary hypertension currently on macitentan, tadafil, subcutaneous treprostinil, subclinical hypothyroidism, hypertension who presents with severe pain at subcutaneous remodulin site, planned tapering remodulin and starting selexipag.     Today:  - down titrate remodulin from 20->10  - Uptravi now at 1200 mg BID    Idiopathic pulmonary hypertension   Subcutaneous remodulin site pain   Patient has been having pain at site of subcutaneous remodulin for the last month. She went through 6 sites in this period of time. Site would get tender, red, and eventually would drain pus. Exam revealed erythema at catheter site, mild warmth, no discharge. Plan transition remodulin to IV and downtitrate, while uptitrating uptravi daily. Monitor for nausea, diarrhea, headache, hypoxia, pulmonary edema, SOB, CP.     - continue PTA macitentan and tadalafil  - continue lasix 20 mg daily prn  - PICC line placement  - pain control: tylenol, tramadol prn, CMPD topical   - titrate down remodulin while uptitrating on uptravi; start 6/16  - plan RHC and echo on Mon after titration finished       Remodulin  Selexipag (Uptravi)  Day 1  60 ng/kg/min  200 mcg BID  Day 2  50 ng/kg/min  400 mcg BID  Day 3  40 ng/kg/min  600 mcg BID  Day 4  30 ng/kg/min  800 mcg BID  Day 5  20 ng/kg/min  1000 mcg BID  Day 6  10 ng/kg/min  1200 mcg BID  Day 7  0 ng/kg/min  1400 mcg BID  Day 8  0 ng/kg/min  1600 mcg BID    Chronic issues  Subclinical hypothyroid - CTM as outpatient   Hypertension - /68 on admission, CTM      Diet: Regular Diet Adult    DVT Prophylaxis: Low Risk/Ambulatory with no VTE prophylaxis indicated  Valerio Catheter: Not present  Cardiac Monitoring: None  Code Status: Full Code         "  Clinically Significant Risk Factors              # Hypoalbuminemia: Lowest albumin = 3.4 g/dL at 6/16/2024  6:14 AM, will monitor as appropriate                    # Overweight: Estimated body mass index is 29.05 kg/m  as calculated from the following:    Height as of this encounter: 1.6 m (5' 3\").    Weight as of this encounter: 74.4 kg (164 lb).             Disposition Plan   Expected discharge: 4 - 7 days, recommended to prior living arrangement once  medication therapy plan in place .    Entered: Danna Hitchcock MD 06/21/2024, 1:44 PM   The patient's care was discussed with the Attending Physician, Dr. Mckeon .      Danna Hitchcock MD  [facility]  ______________________________________________________________________    Interval History   NAEO. Patient feeling well without any SOB. No nausea, vomiting, diarrhea, jaw pain, headaches. Has been taking 30 min walks. First package of uptravi arrived at her house.     Physical Exam   Vital Signs: Temp: 97.8  F (36.6  C) Temp src: Oral BP: 100/56 Pulse: 83   Resp: 18 SpO2: 91 % O2 Device: Nasal cannula Oxygen Delivery: 1.5 LPM  Weight: 164 lbs 0 oz    General Appearance:  Alert, oriented, cooperative, comfortable  Respiratory: on RA, clear bilaterally  Cardiovascular: normal JVD, RRR, no MRG  GI: soft, not tender  Skin: small pale pink papule at previous subcutaneous infusion site   Other:  no pitting edema both legs       Medical Decision Making             Data          "

## 2024-06-22 LAB
ERYTHROCYTE [DISTWIDTH] IN BLOOD BY AUTOMATED COUNT: 13.7 % (ref 10–15)
HCT VFR BLD AUTO: 33.8 % (ref 35–47)
HGB BLD-MCNC: 11.9 G/DL (ref 11.7–15.7)
HOLD SPECIMEN: NORMAL
MCH RBC QN AUTO: 33.4 PG (ref 26.5–33)
MCHC RBC AUTO-ENTMCNC: 35.2 G/DL (ref 31.5–36.5)
MCV RBC AUTO: 95 FL (ref 78–100)
PLATELET # BLD AUTO: 134 10E3/UL (ref 150–450)
RBC # BLD AUTO: 3.56 10E6/UL (ref 3.8–5.2)
WBC # BLD AUTO: 5.9 10E3/UL (ref 4–11)

## 2024-06-22 PROCEDURE — 999N000044 HC STATISTIC CVC DRESSING CHANGE

## 2024-06-22 PROCEDURE — 120N000003 HC R&B IMCU UMMC

## 2024-06-22 PROCEDURE — 85027 COMPLETE CBC AUTOMATED: CPT

## 2024-06-22 PROCEDURE — 250N000013 HC RX MED GY IP 250 OP 250 PS 637

## 2024-06-22 PROCEDURE — 36415 COLL VENOUS BLD VENIPUNCTURE: CPT

## 2024-06-22 PROCEDURE — 250N000013 HC RX MED GY IP 250 OP 250 PS 637: Performed by: INTERNAL MEDICINE

## 2024-06-22 PROCEDURE — 99231 SBSQ HOSP IP/OBS SF/LOW 25: CPT | Mod: GC | Performed by: STUDENT IN AN ORGANIZED HEALTH CARE EDUCATION/TRAINING PROGRAM

## 2024-06-22 RX ADMIN — SELEXIPAG 1400 MCG: 800 TABLET, COATED ORAL at 08:33

## 2024-06-22 RX ADMIN — SELEXIPAG 1400 MCG: 800 TABLET, COATED ORAL at 19:49

## 2024-06-22 RX ADMIN — MACITENTAN 10 MG: 10 TABLET, FILM COATED ORAL at 08:33

## 2024-06-22 RX ADMIN — TADALAFIL 40 MG: 20 TABLET, FILM COATED ORAL at 08:33

## 2024-06-22 ASSESSMENT — ACTIVITIES OF DAILY LIVING (ADL)
ADLS_ACUITY_SCORE: 22

## 2024-06-22 NOTE — PLAN OF CARE
D: Chelsealatisha Flower is a 33 year old female with idiopathic pulmonary hypertension currently on macitentan, tadafil, subcutaneous treprostinil, subclinical hypothyroidism, hypertension who presents with severe pain at subcutaneous remodulin site, planned tapering remodulin and starting selexipag.       A:   Neuro: A/Ox4. Calls appropriately. Pleasant and cooperative. Denies headache, dizziness, and lightheadedness.  Cardiac/Tele: VSS. SR. Denies chest pain and palpitations. Afebrile.  Respiratory: Sats >95% on room air. Denies SOB. MALAVE.   GI/: Continent. Denies nausea.  Diet/Appetite: regular.   Skin: No new deficits noted.   LDAs: R PIV - SL. R SLP infusing remodulin @ 2.27mL / hr.  Activity: independent.  Pain: Denies pain this shift.      P: Continue to monitor and notify team with changes.    Shift: [id number]

## 2024-06-22 NOTE — PROGRESS NOTES
[facility]    Cardiology Progress Note- Cardiology        Date of Admission:  6/15/2024     Assessment & Plan: THERESA Huaromero Flower is a 33 year old female admitted on 6/15/2024 PMH  idiopathic pulmonary hypertension currently on macitentan, tadafil, subcutaneous treprostinil, subclinical hypothyroidism, hypertension who presents with severe pain at subcutaneous remodulin site, planned tapering remodulin and starting selexipag.     Today:  - down titrate remodulin from 10->0  - Uptravi now at 1400 mg BID    Idiopathic pulmonary hypertension   Subcutaneous remodulin site pain   Patient has been having pain at site of subcutaneous remodulin for the last month. She went through 6 sites in this period of time. Site would get tender, red, and eventually would drain pus. Exam revealed erythema at catheter site, mild warmth, no discharge. Plan transition remodulin to IV and downtitrate, while uptitrating uptravi daily. Monitor for nausea, diarrhea, headache, hypoxia, pulmonary edema, SOB, CP.     - continue PTA macitentan and tadalafil  - continue lasix 20 mg daily prn  - PICC line placement  - pain control: tylenol, tramadol prn, CMPD topical   - titrate down remodulin while uptitrating on uptravi; start 6/16  - plan RHC and echo on Mon after titration finished       Remodulin  Selexipag (Uptravi)  Day 1  60 ng/kg/min  200 mcg BID  Day 2  50 ng/kg/min  400 mcg BID  Day 3  40 ng/kg/min  600 mcg BID  Day 4  30 ng/kg/min  800 mcg BID  Day 5  20 ng/kg/min  1000 mcg BID  Day 6  10 ng/kg/min  1200 mcg BID  Day 7  0 ng/kg/min  1400 mcg BID  Day 8  0 ng/kg/min  1600 mcg BID    Chronic issues  Subclinical hypothyroid - CTM as outpatient   Hypertension - /68 on admission, CTM      Diet: Regular Diet Adult  NPO per Anesthesia Guidelines for Procedure/Surgery Except for: Meds    DVT Prophylaxis: Low Risk/Ambulatory with no VTE prophylaxis indicated  Valerio Catheter: Not  "present  Cardiac Monitoring: None  Code Status: Full Code          Clinically Significant Risk Factors              # Hypoalbuminemia: Lowest albumin = 3.4 g/dL at 6/16/2024  6:14 AM, will monitor as appropriate                    # Overweight: Estimated body mass index is 28.59 kg/m  as calculated from the following:    Height as of this encounter: 1.6 m (5' 3\").    Weight as of this encounter: 73.2 kg (161 lb 6.4 oz).             Disposition Plan   Expected discharge: 2 - 3 days, recommended to prior living arrangement once  medication therapy plan in place .    Entered: Danna Hitchcock MD 06/22/2024, 3:59 PM   The patient's care was discussed with the Attending Physician, Dr. Mckeon .      Danna Hitchcock MD  [facility]  ______________________________________________________________________    Interval History   NAEO. Patient feeling well without any SOB. No nausea, vomiting, diarrhea, jaw pain, headaches. Wt stable.     Physical Exam   Vital Signs: Temp: 98.4  F (36.9  C) Temp src: Oral BP: 104/63 Pulse: 75   Resp: 18 SpO2: 96 % O2 Device: None (Room air)    Weight: 161 lbs 6.4 oz    General Appearance:  Alert, oriented, cooperative, comfortable  Respiratory: on RA, clear bilaterally  Cardiovascular: normal JVD, RRR, no MRG  GI: soft, not tender  Skin: small pale pink papule at previous subcutaneous infusion site   Other:  no pitting edema both legs       Medical Decision Making             Data          "

## 2024-06-23 LAB
ALBUMIN SERPL BCG-MCNC: 4.2 G/DL (ref 3.5–5.2)
ALP SERPL-CCNC: 60 U/L (ref 40–150)
ALT SERPL W P-5'-P-CCNC: 17 U/L (ref 0–50)
ANION GAP SERPL CALCULATED.3IONS-SCNC: 9 MMOL/L (ref 7–15)
AST SERPL W P-5'-P-CCNC: 17 U/L (ref 0–45)
BILIRUB DIRECT SERPL-MCNC: <0.2 MG/DL (ref 0–0.3)
BILIRUB SERPL-MCNC: 0.3 MG/DL
BUN SERPL-MCNC: 14.8 MG/DL (ref 6–20)
CALCIUM SERPL-MCNC: 9 MG/DL (ref 8.6–10)
CHLORIDE SERPL-SCNC: 105 MMOL/L (ref 98–107)
CREAT SERPL-MCNC: 0.61 MG/DL (ref 0.51–0.95)
DEPRECATED HCO3 PLAS-SCNC: 25 MMOL/L (ref 22–29)
EGFRCR SERPLBLD CKD-EPI 2021: >90 ML/MIN/1.73M2
GLUCOSE SERPL-MCNC: 89 MG/DL (ref 70–99)
POTASSIUM SERPL-SCNC: 4.2 MMOL/L (ref 3.4–5.3)
PROT SERPL-MCNC: 6.7 G/DL (ref 6.4–8.3)
SODIUM SERPL-SCNC: 139 MMOL/L (ref 135–145)
TSH SERPL DL<=0.005 MIU/L-ACNC: 3.72 UIU/ML (ref 0.3–4.2)

## 2024-06-23 PROCEDURE — 80053 COMPREHEN METABOLIC PANEL: CPT

## 2024-06-23 PROCEDURE — 250N000013 HC RX MED GY IP 250 OP 250 PS 637: Performed by: INTERNAL MEDICINE

## 2024-06-23 PROCEDURE — 120N000003 HC R&B IMCU UMMC

## 2024-06-23 PROCEDURE — 99232 SBSQ HOSP IP/OBS MODERATE 35: CPT | Mod: GC | Performed by: STUDENT IN AN ORGANIZED HEALTH CARE EDUCATION/TRAINING PROGRAM

## 2024-06-23 PROCEDURE — 250N000013 HC RX MED GY IP 250 OP 250 PS 637

## 2024-06-23 PROCEDURE — 84443 ASSAY THYROID STIM HORMONE: CPT

## 2024-06-23 RX ADMIN — SELEXIPAG 1600 MCG: 800 TABLET, COATED ORAL at 19:38

## 2024-06-23 RX ADMIN — TADALAFIL 40 MG: 20 TABLET, FILM COATED ORAL at 08:51

## 2024-06-23 RX ADMIN — Medication 5 MG: at 21:33

## 2024-06-23 RX ADMIN — SELEXIPAG 1600 MCG: 800 TABLET, COATED ORAL at 08:52

## 2024-06-23 RX ADMIN — MACITENTAN 10 MG: 10 TABLET, FILM COATED ORAL at 10:20

## 2024-06-23 ASSESSMENT — ACTIVITIES OF DAILY LIVING (ADL)
ADLS_ACUITY_SCORE: 22

## 2024-06-23 NOTE — DISCHARGE SUMMARY
[facility]  Cardiology II Service / Advanced Heart Failure  Discharge Summary       Date of Admission:  6/15/2024   Date of Discharge:  6/24/2024  Discharging Provider: Dr. Fiorella Mckeon  Discharge Service: Cardiology 2 Inpatient Service    Discharge Diagnoses:   Primary:  Idiopathic pulmonary hypertension   Subcutaneous remodulin site pain     Secondary:  Subclinical hypothyroid  Hypertension    Follow-up Planning:     Medication changes: stopped remodulin, start selexipag 1600 mcg BID  Follow-up appointments: Pulm HTN clinic  Pending diagnostics: none     Unresulted Labs Ordered in the Past 30 Days of this Admission       No orders found from 5/16/2024 to 6/16/2024.            Hospital Course   Chelsea Flower was admitted for pulmonary hypertension medication titration due to subcutaneous remodulin site pain.  Please see H&P from 6/15/2024 for full details of presentation. The following problems were addressed during hospitalization:    Idiopathic pulmonary hypertension   Subcutaneous remodulin site pain   Patient has been having pain at site of subcutaneous remodulin for the last month. She went through 6 sites in this period of time. Site would get tender, red, and eventually would drain pus. Exam revealed erythema at catheter site, mild warmth, no discharge. Remodulin subcutaneous was switched to IV and downtitrated to 0. Uptravi was uptitrated daily to 1600 mcg BID. She did not have nausea, diarrhea, headache, hypoxia, pulmonary edema, SOB, CP. Blood pressure has been around  and MAP has been 65-80. She was discharged feeling well and with resolution of the transient symptoms which occurred during titration of medications. At day of discharge no chest pain, light headedness, dizziness, nausea, vomiting, or shortness of breath.   RHC 06/24/2024  RA 5, RV 43/5, PA 43/19/29, PCWP 11, CI 3.5, PVR 2.9     - continue PTA macitentan 10 mg oral daily and  "tadalafil 40 mg PO daily  - Started selexipag 1600 mcg BID  - Stopped IV/subcutaneous pulmonary vasodilators   - continue lasix 20 mg daily prn  - Follow up in clinic with pulmonary hypertension team at time to be determined through outpatient coordination     Subclinical hypothyroid - Chelsea reported feeling \"run down\" while in the hospital. She had a history of subclinical hypothyroid. TSH 6/23/24 normal.  Hypertension - /68 on admission, however during hospitalization, SBP has been around  and MAP has been 65-80.     RA 5, RV 43/5, PA 43/19/29, PCWP 11, CI 3.5, PVR 2.9      RHC 06/24/2024:   Conclusion         Right sided filling pressures are normal.    Left sided filling pressures are normal.    Mild elevated pulmonary hypertension.    Normal cardiac output level.     Normal bilateral filling pressures with preserved cardiac output and mild PH.  Mean  pulmonary pressures increased as compared to the study from 2/2024          Recommendations    General Recommendations:  - Patient given specific instructions regarding care of arteriotomy site, activity restrictions, signs and symptoms of cardiac or vascular complications and to seek immediate medical evaluation should they occur.   - Recommended follow up with doctor Dr. Carroll.     Hemodynamics    RIGHT HEART CATHETERIZATION:    === Baseline ====    BSA 1.77 m2  /56/81 mmHg  RA mean of 5 mmHg with V wave of 8 mmHg  RV 43/5 mmHg  PA 43/19/29 mmHg  PCWP 11 mmHg with V wave of 14 mmhg  [place] CO/CI 6.2/3.5   PVR 2.9 JENKINS   DPR 8  TPR 18   dynes/sec/cm-5  PA sat 72%   Hgb 12.2 g/dL    Right sided filling pressures are normal. Left sided filling pressures are normal. Mild elevated pulmonary hypertension. Normal cardiac output level.       Discharge Disposition   Discharged to home  Condition at discharge: Stable    Code Status on Discharge   Full Code      The patient was discussed on day of discharge w/ Dr. Mckeon.    Note written in " "collaboration with additional resident physician, Dr. Hitchcock.     Eliel Shahid MD  PGY-2, Internal Medicine   Cardiology 2 Service  ==================================    Discharge Physical Exam   Vital Signs: BP 95/55 (BP Location: Left arm)   Pulse 78   Temp 98.4  F (36.9  C) (Oral)   Resp 17   Ht 1.6 m (5' 3\")   Wt 73 kg (161 lb)   SpO2 91%   BMI 28.52 kg/m    Weight: 161 lbs 0 oz  General Appearance:  Alert, oriented, cooperative, comfortable  Respiratory: on RA, CTAB  Cardiovascular: normal JVD, RRR, no MRG  GI: soft, not tender  Skin: small pale pink papule at previous subcutaneous infusion site   Other:  no pitting edema both legs     Strength symmetric in upper and lower extremities, 5/5 at time of discharge. Coordination and tone appear normal.     Significant Results and Procedures     Results for orders placed or performed during the hospital encounter of 06/15/24   XR Chest Port 1 View    Narrative    EXAM: XR CHEST PORT 1 VIEW 6/16/2024 11:42 AM    INDICATION: picc placement    COMPARISON: Chest radiograph 2/11/2023, CT 8/1/2023    TECHNIQUE: Single frontal semiupright AP view of the chest.    FINDINGS:   Right upper extremity PICC with tip in the low SVC. Stable diffuse  interstitial opacities throughout both lungs, unchanged from  2/11/2023. Cardiac silhouette within normal limits. Trachea is  midline. No pneumothorax, focal consolidation or pleural effusion. No  abnormal calcifications or acute osseous abnormality. Limited upper  abdomen within normal limits.      Impression    IMPRESSION:   Right upper extremity PICC with tip in the low SVC. No acute airspace  disease.    I have personally reviewed the examination and initial interpretation  and I agree with the findings.    SWAPNA FOX MD         SYSTEM ID:  [id number]       Consultations this Admission   VASCULAR ACCESS FOR PICC PLACEMENT ADULT IP CONSULT  PHARMACY IP CONSULT  VASCULAR ACCESS FOR PICC PLACEMENT ADULT IP " CONSULT  NURSING TO CONSULT FOR VASCULAR ACCESS CARE IP CONSULT  PHARMACY IP CONSULT  PHARMACY IP CONSULT  PHARMACY IP CONSULT    Discharge Orders   No discharge procedures on file.    Discharge Medications     Current Discharge Medication List        CONTINUE these medications which have NOT CHANGED    Details   acetaminophen (TYLENOL) 325 MG tablet Take 3 tablets (975 mg) by mouth every 6 hours  Qty: 80 tablet, Refills: 0    Associated Diagnoses: Pulmonary hypertension (H)      COMPOUNDED NON-CONTROLLED SUBSTANCE (CMPD RX) - PHARMACY TO MIX COMPOUNDED MEDICATION 10% Lidocaine gel. Apply 1-2 grams to affected site 1-2 times per day 8-12 hours apart  [facility] [phone]      furosemide (LASIX) 20 MG tablet Take 1 tablet (20 mg) by mouth daily  Qty: 90 tablet, Refills: 3    Associated Diagnoses: Pulmonary hypertension (H)      macitentan (OPSUMIT) 10 MG tablet Take 1 tablet (10 mg) by mouth daily Please make sure you are completing your monthly mandatory labs.  Qty: 30 tablet, Refills: 11    Associated Diagnoses: Pulmonary HTN (H)      tadalafil, PAH, 20 MG TABS Take 2 tablets (40 mg) by mouth daily  Qty: 60 tablet, Refills: 11    Associated Diagnoses: Pulmonary HTN (H)      traMADol (ULTRAM) 50 MG tablet Take 50 mg by mouth every 6 hours as needed for severe pain      treprostinil (REMODULIN) infusion 10 mg/mL Inject 5,333.58 ng/min Subcutaneous continuous 70.55ng continuously  (goal)  75.6kg dosing weight    Associated Diagnoses: Pulmonary arterial hypertension (H)              Primary Care Physician   Kirk Aguilar

## 2024-06-23 NOTE — PROGRESS NOTES
"[facility]    Cardiology Progress Note- Cardiology        Date of Admission:  6/15/2024     Assessment & Plan: SLUCA Huaromero Flower is a 33 year old female admitted on 6/15/2024 PMH  idiopathic pulmonary hypertension currently on macitentan, tadafil, subcutaneous treprostinil, subclinical hypothyroidism, hypertension who presents with severe pain at subcutaneous remodulin site, planned tapering remodulin and starting selexipag.     Today:  - Uptravi now at 1600 mg BID  - BP q 4 hr, goal MAP>60     Idiopathic pulmonary hypertension   Subcutaneous remodulin site pain   Patient has been having pain at site of subcutaneous remodulin for the last month. She went through 6 sites in this period of time. Site would get tender, red, and eventually would drain pus. Exam revealed erythema at catheter site, mild warmth, no discharge. Plan transition remodulin to IV and downtitrate, while uptitrating uptravi daily. Monitor for nausea, diarrhea, headache, hypoxia, pulmonary edema, SOB, CP.     - continue PTA macitentan and tadalafil  - continue lasix 20 mg daily prn  - PICC line placement  - pain control: tylenol, tramadol prn, CMPD topical   - titrate down remodulin while uptitrating on uptravi; start 6/16  - plan RHC and echo on Mon after titration finished       Remodulin  Selexipag (Uptravi)  Day 1  60 ng/kg/min  200 mcg BID  Day 2  50 ng/kg/min  400 mcg BID  Day 3  40 ng/kg/min  600 mcg BID  Day 4  30 ng/kg/min  800 mcg BID  Day 5  20 ng/kg/min  1000 mcg BID  Day 6  10 ng/kg/min  1200 mcg BID  Day 7  0 ng/kg/min  1400 mcg BID  Day 8  0 ng/kg/min  1600 mcg BID    Chronic issues  Subclinical hypothyroid - Reported feeling \"run down\". TSH normal, CTM as outpatient   Hypertension - /68 on admission, however during hospitalization, SBP has been around  and MAP has been 65-80.        Diet: Regular Diet Adult  NPO per Anesthesia Guidelines for Procedure/Surgery " "Except for: Meds    DVT Prophylaxis: Low Risk/Ambulatory with no VTE prophylaxis indicated  Valerio Catheter: Not present  Cardiac Monitoring: None  Code Status: Full Code          Clinically Significant Risk Factors              # Hypoalbuminemia: Lowest albumin = 3.4 g/dL at 6/16/2024  6:14 AM, will monitor as appropriate                    # Overweight: Estimated body mass index is 28.52 kg/m  as calculated from the following:    Height as of this encounter: 1.6 m (5' 3\").    Weight as of this encounter: 73 kg (161 lb).             Disposition Plan   Expected discharge: Tomorrow, recommended to prior living arrangement once  medication therapy plan in place .    Entered: Danna Hitchcock MD 06/23/2024, 1:10 PM   The patient's care was discussed with the Attending Physician, Dr. Mckeon .      Danna Hitchcock MD  [facility]  ______________________________________________________________________    Interval History   NAEO. Patient feeling a little run down without any SOB. Was able to walk for 30 min yesterday but had to take a nap. Had some issues falling asleep last night. No nausea, vomiting, diarrhea, jaw pain, headaches. Wt stable. Denied fever/chills.     Physical Exam   Vital Signs: Temp: 98.4  F (36.9  C) Temp src: Oral BP: 95/55 Pulse: 78   Resp: 17 SpO2: 91 % O2 Device: None (Room air)    Weight: 161 lbs 0 oz    General Appearance:  Alert, oriented, cooperative, comfortable  Respiratory: on RA, clear bilaterally  Cardiovascular: normal JVD, RRR, no MRG  GI: soft, not tender  Skin: small pale pink papule at previous subcutaneous infusion site   Other:  no pitting edema both legs       Medical Decision Making             Data          "

## 2024-06-23 NOTE — PLAN OF CARE
Chelsea Flower is a 33 year old female admitted on 6/15/2024 PMH idiopathic pulmonary hypertension currently on macitentan, tadafil, subcutaneous treprostinil, subclinical hypothyroidism, hypertension who presents with severe pain at subcutaneous remodulin site, planned tapering remodulin and starting selexipag           Temp: 98.3  F (36.8  C) Temp src: Oral BP: 104/67 Pulse: 78   Resp: 16 SpO2: 91 % O2 Device: None (Room air)         Neuro: AOx4  Cardiac: SR  Resp: RA, denies SOB  Diet: Regular  GI/: Voids spontaneously, Had BM yesterday  Activity: Up ad jess  Pain: denies  LDA's: R PIV SL, R SL PICC saline locked  Changes/Events: stopped remodulin, pt tolerated well  Plan: Continue with POC and contact teamwith any changes concerns

## 2024-06-23 NOTE — PLAN OF CARE
D: Chelsea Flower is a 33 year old female with idiopathic pulmonary hypertension currently on macitentan, tadafil, subcutaneous treprostinil, subclinical hypothyroidism, hypertension who presents with severe pain at subcutaneous remodulin site, planned tapering remodulin and starting selexipag.         A:   Neuro: A/Ox4. Calls appropriately. Pleasant and cooperative. Denies headache, dizziness, and lightheadedness.  Cardiac/Tele: VSS. SR. Denies chest pain and palpitations. Afebrile.  Respiratory: Sats >95% on room air. Denies SOB. MALAVE. Infrequent cough.   GI/: Continent.   Diet/Appetite: regular.   Skin: No new deficits noted.   LDAs: R PIV - SL. R SLP SL.  Activity: independent.  Pain: Denies pain this shift.      P: Continue to monitor and notify team with changes.     Shift: 1900-0730

## 2024-06-24 ENCOUNTER — APPOINTMENT (OUTPATIENT)
Dept: CARDIOLOGY | Facility: CLINIC | Age: 34
End: 2024-06-24
Attending: INTERNAL MEDICINE
Payer: COMMERCIAL

## 2024-06-24 VITALS
HEART RATE: 73 BPM | OXYGEN SATURATION: 94 % | TEMPERATURE: 98.2 F | BODY MASS INDEX: 28.79 KG/M2 | HEIGHT: 63 IN | WEIGHT: 162.5 LBS | DIASTOLIC BLOOD PRESSURE: 52 MMHG | RESPIRATION RATE: 16 BRPM | SYSTOLIC BLOOD PRESSURE: 111 MMHG

## 2024-06-24 LAB
ALBUMIN SERPL BCG-MCNC: 4.2 G/DL (ref 3.5–5.2)
ALP SERPL-CCNC: 69 U/L (ref 40–150)
ALT SERPL W P-5'-P-CCNC: 16 U/L (ref 0–50)
ANION GAP SERPL CALCULATED.3IONS-SCNC: 9 MMOL/L (ref 7–15)
AST SERPL W P-5'-P-CCNC: 16 U/L (ref 0–45)
BILIRUB SERPL-MCNC: 0.3 MG/DL
BUN SERPL-MCNC: 15.9 MG/DL (ref 6–20)
CALCIUM SERPL-MCNC: 9 MG/DL (ref 8.6–10)
CHLORIDE SERPL-SCNC: 106 MMOL/L (ref 98–107)
CREAT SERPL-MCNC: 0.57 MG/DL (ref 0.51–0.95)
DEPRECATED HCO3 PLAS-SCNC: 23 MMOL/L (ref 22–29)
EGFRCR SERPLBLD CKD-EPI 2021: >90 ML/MIN/1.73M2
ERYTHROCYTE [DISTWIDTH] IN BLOOD BY AUTOMATED COUNT: 14.1 % (ref 10–15)
GLUCOSE SERPL-MCNC: 91 MG/DL (ref 70–99)
HCT VFR BLD AUTO: 36.2 % (ref 35–47)
HGB BLD-MCNC: 11.9 G/DL (ref 11.7–15.7)
HGB BLD-MCNC: 12.2 G/DL (ref 11.7–15.7)
LVEF ECHO: NORMAL
MCH RBC QN AUTO: 31.1 PG (ref 26.5–33)
MCHC RBC AUTO-ENTMCNC: 32.9 G/DL (ref 31.5–36.5)
MCV RBC AUTO: 95 FL (ref 78–100)
PLATELET # BLD AUTO: 178 10E3/UL (ref 150–450)
POTASSIUM SERPL-SCNC: 4 MMOL/L (ref 3.4–5.3)
PROT SERPL-MCNC: 6.9 G/DL (ref 6.4–8.3)
RBC # BLD AUTO: 3.83 10E6/UL (ref 3.8–5.2)
SODIUM SERPL-SCNC: 138 MMOL/L (ref 135–145)
WBC # BLD AUTO: 7.6 10E3/UL (ref 4–11)

## 2024-06-24 PROCEDURE — 93451 RIGHT HEART CATH: CPT | Mod: 26 | Performed by: INTERNAL MEDICINE

## 2024-06-24 PROCEDURE — 85018 HEMOGLOBIN: CPT

## 2024-06-24 PROCEDURE — C1751 CATH, INF, PER/CENT/MIDLINE: HCPCS | Performed by: INTERNAL MEDICINE

## 2024-06-24 PROCEDURE — 93306 TTE W/DOPPLER COMPLETE: CPT | Mod: 26 | Performed by: INTERNAL MEDICINE

## 2024-06-24 PROCEDURE — 4A023N6 MEASUREMENT OF CARDIAC SAMPLING AND PRESSURE, RIGHT HEART, PERCUTANEOUS APPROACH: ICD-10-PCS | Performed by: INTERNAL MEDICINE

## 2024-06-24 PROCEDURE — 80053 COMPREHEN METABOLIC PANEL: CPT

## 2024-06-24 PROCEDURE — 272N000001 HC OR GENERAL SUPPLY STERILE: Performed by: INTERNAL MEDICINE

## 2024-06-24 PROCEDURE — 93451 RIGHT HEART CATH: CPT | Performed by: INTERNAL MEDICINE

## 2024-06-24 PROCEDURE — 85027 COMPLETE CBC AUTOMATED: CPT

## 2024-06-24 PROCEDURE — 250N000009 HC RX 250: Performed by: INTERNAL MEDICINE

## 2024-06-24 PROCEDURE — 99239 HOSP IP/OBS DSCHRG MGMT >30: CPT | Mod: GC | Performed by: STUDENT IN AN ORGANIZED HEALTH CARE EDUCATION/TRAINING PROGRAM

## 2024-06-24 PROCEDURE — 250N000013 HC RX MED GY IP 250 OP 250 PS 637: Performed by: INTERNAL MEDICINE

## 2024-06-24 PROCEDURE — 93306 TTE W/DOPPLER COMPLETE: CPT

## 2024-06-24 PROCEDURE — 250N000013 HC RX MED GY IP 250 OP 250 PS 637

## 2024-06-24 RX ADMIN — SELEXIPAG 1600 MCG: 800 TABLET, COATED ORAL at 07:52

## 2024-06-24 RX ADMIN — MACITENTAN 10 MG: 10 TABLET, FILM COATED ORAL at 07:52

## 2024-06-24 RX ADMIN — TADALAFIL 40 MG: 20 TABLET, FILM COATED ORAL at 07:52

## 2024-06-24 ASSESSMENT — ACTIVITIES OF DAILY LIVING (ADL)
ADLS_ACUITY_SCORE: 22

## 2024-06-24 NOTE — PLAN OF CARE
Chelsea Flower is a 33 year old female admitted on 6/15/2024 PMH idiopathic pulmonary hypertension currently on macitentan, tadafil, subcutaneous treprostinil, subclinical hypothyroidism, hypertension who presents with severe pain at subcutaneous remodulin site, planned tapering remodulin and starting selexipag       Temp: 98.3  F (36.8  C) Temp src: Oral BP: 116/63 Pulse: 86   Resp: 16             Neuro: Aox4  Cardiac: SR  Resp: RA during day and 1 L NC at night. Denies SOB  Diet: Regular, NPO at 0000 for RHC  GI/: voids spontaneously and had BM today  Activity: up ad jess  Pain: denies  LDA's: R SL PICC, R PIV both saline locked  Changes/Events: nothing to note  Plan: Continue with POC and contact Cards 2 with any changes/concerns

## 2024-06-24 NOTE — PLAN OF CARE
Discharge  Discharged to: Home  Via: Ambulatory. Patient walking to her car and driving herself home. No sedation today.   Accompanied by: Self  Belongings: All belongings with patient. New meds already shipped and arrived at home. No valuables in security.   Teaching: Reviewed all discharge instructions with patient including medication changes, signs to watch for, follow-up appts, and when to call the MD if needed.   Clinic appointment: Reviewed with patient who verbalized how to get in touch with coordinator and arrange follow-up.   Tele and IV: Removed    VSS. RHC and echo completed. Patient stable and discharging to home. PICC and PIV removed per orders.

## 2024-06-24 NOTE — DISCHARGE INSTRUCTIONS
Dear Ms. Flower,     You were in the hospital for adjustment of your medications. We were able to get you switched from an IV formulation to an oral formulation of your pulmonary hypertension medication. We will have you take both your previous medications and the new oral medication with a plan to follow up as directed by the outpatient team.     Please return to the [facility] Emergency Department if you have new or worsening symptoms such as shortness of breath, light headedness, dizziness, nausea, vomiting, chest pain, or other things that feel emergently concerning to you.     Thank You,     Your [facility] Heart Team

## 2024-06-24 NOTE — PLAN OF CARE
D: Chelsea Flower is a 33 year old female with idiopathic pulmonary hypertension currently on macitentan, tadafil, subcutaneous treprostinil, subclinical hypothyroidism, hypertension who presents with severe pain at subcutaneous remodulin site, planned tapering remodulin and starting selexipag.         A:   Neuro: A/Ox4. Calls appropriately. Pleasant and cooperative. Denies headache, dizziness, and lightheadedness.  Cardiac/Tele: VSS. SR. Denies chest pain and palpitations. Afebrile.  Respiratory: Sats >95% on room air. Denies SOB. MALAVE. Infrequent cough.   GI/: Continent.   Diet/Appetite: regular.   Skin: No new deficits noted.   LDAs: R PIV - SL. R SLP SL.  Activity: independent.  Pain: Denies pain this shift.      P: Plan for RHC today and possible discharge home.      Shift: 1900-0730         To get better and follow your care plan as instructed.

## 2024-06-25 ENCOUNTER — TELEPHONE (OUTPATIENT)
Dept: CARDIOLOGY | Facility: CLINIC | Age: 34
End: 2024-06-25
Payer: COMMERCIAL

## 2024-06-25 DIAGNOSIS — I27.21 PULMONARY ARTERIAL HYPERTENSION (H): ICD-10-CM

## 2024-06-25 NOTE — TELEPHONE ENCOUNTER
Patient reports doing well at home, no new concerns. Scheduled for VV follow-up following hospital stay for subcutaneous remodulin to uptravi transition    Ora Do RN on 6/25/2024 at 1:27 PM

## 2024-06-26 ENCOUNTER — PATIENT OUTREACH (OUTPATIENT)
Dept: CARE COORDINATION | Facility: CLINIC | Age: 34
End: 2024-06-26
Payer: COMMERCIAL

## 2024-06-26 NOTE — PROGRESS NOTES
[facility]    Background: Transitional Care Management program identified per system criteria and reviewed by [facility] team for possible outreach.    Assessment:  Upon chart review, patient is in communication with a clinic team member, nurse or provider within [facility] for reason of discussing hospital follow up plan of care and answering questions patient may have related to discharge instructions. [facility] Team member will update episode status of Transitional Care Management program to enrolled per system workflows.     [facility] made first outreach attempt on 6/25/24 to reach patient for hospital follow up and left message and that time.    Plan: [facility] will make no additional outreach attempts to minimize duplicative efforts and reduce potential confusion for patient.      ROSLYN Wright  [facility], [facility]    *Connected Care Resource Team does NOT follow patient ongoing. Referrals are identified based on internal discharge reports and the outreach is to ensure patient has an understanding of their discharge instructions.

## 2024-07-04 NOTE — PROGRESS NOTES
Virtual Visit Details    Type of service:  Video Visit     Originating Location (pt. Location): Home            CARDIOLOGY PH CLINIC VIDEO VISIT    Date of video visit: 07/09/24      Chelsea Flower is a 33 year old female who is being evaluated via a billable video visit.        Self reported vitals:  Weight: 160 lb  BP not reported      MEDICATIONS:  Current Outpatient Medications   Medication Sig Dispense Refill    acetaminophen (TYLENOL) 325 MG tablet Take 3 tablets (975 mg) by mouth every 6 hours 80 tablet 0    COMPOUNDED NON-CONTROLLED SUBSTANCE (CMPD RX) - PHARMACY TO MIX COMPOUNDED MEDICATION 10% Lidocaine gel. Apply 1-2 grams to affected site 1-2 times per day 8-12 hours apart  [facility] [phone]      furosemide (LASIX) 20 MG tablet Take 1 tablet (20 mg) by mouth daily 90 tablet 3    macitentan (OPSUMIT) 10 MG tablet Take 1 tablet (10 mg) by mouth daily Please make sure you are completing your monthly mandatory labs. 30 tablet 11    selexipag (UPTRAVI) 1600 MCG tablet Take 1 tablet (1,600 mcg) by mouth every 12 hours 60 tablet 11    tadalafil, PAH, 20 MG TABS Take 2 tablets (40 mg) by mouth daily 60 tablet 11    traMADol (ULTRAM) 50 MG tablet Take 50 mg by mouth every 6 hours as needed for severe pain         Primary PH cardiologist: Dr. Carroll        HPI:  Chelsea Flower is a pleasant 33 year old female with a PMHx including subclinical hypothyroidism and hypertension. She was referred to the PH clinic in January of 2023 from [place] after having a workukp for exertional dyspnea and presyncope shortly after her last pregnancy (now with 3 children total). She had outside testing suggesting severe PAH, and she was ultimately placed on triple combination therapy with IV Remodulin, tadalafil, and macitentan. She responded well to this and given her active lifestyle with 3 children was then changed to subcutaneous Remodulin in November of 2023.    She struggled with a lot of site pain  and follow up hemodynamics showed near normalization of PA pressures with preserved cardiac output. In May of this year, she requested conversion to oral therapy. Chelsea was then admitted in mid June for planned transition to Uptravi. She was admitted from 6/15 to 6/24 and successfully changed to Uptravi 1600mcg BID. RHC and echo on day of discharge showed again just mild PAH with preserved cardiac output.    Today, we are following up virtually. She tells me that overall she is doing well. She went swimming for the first time recently and remains active with her kids. She played 3 softball games last weekend and is not limited by her breathing on day to day activities with the exception of things like playing tag with her kids. She also continues to work full time. She does note days where she doesn't have as much energy but notes that she had this with Remodulin as well. She reports no new dizziness/presyncope and is not having any new swelling.       The patient did not have any new labs performed for our visit today, but most recent available labs were reviewed as below.        CURRENT PULMONARY HYPERTENSION REGIMEN:     PAH Rx: Opsumit 10mg daily, tadalafil 40mg daily, Uptravi 1600mcg BID   (Transitioned off subcutaneous Remodulin due to site pain)     Diuretics: Lasix 20mg daily      Oxygen: 2LNC at night only     Anticoagulation: None        Assessment/Plan:     Idiopathic pulmonary arterial hypertension.              --Chelsea has previously severe iPAH, and was placed on triple therapy with IV Remodulin, Opsumit, and Adcirca, in early 2023. She has done well and in Nov 2023, she was electively changed from IV to subcutaneous Remodulin. More recently, she requested transition to oral therapy and was admitted in mid June for successful transition to Uptravi 1600mcg BID. Clinically, she sounds to be doing well with no new concerns. COMPERA risk score remains low at 1.              --In regard to etiology  of her PH, this is not yet clear. Genetic testing is planned. Additionally, due to a pos CLINTON on initial screening, will request a formal Rheumatology evaluation. She would like to have this done locally if possible. Fatigue remains her most consistent symptom currently, unclear if related.              --As today is a virtual visit I cannot assess her volume status formally but she reports no new edema on Lasix 20mg daily which we will continue. She has not had any new labs since her hospital stay.  --6MWT last done in Feb did continue to show desaturation with exertion, to ~89%. She is using her oxygen 2L just at night as directed.               --She is aware of the need for monthly pregnancy testing while on ERA.        Follow up plan: Local labs in the next few weeks for ongoing monitoring. Then plan return in 6-8 weeks with repeat RHC, echocardiogram, and 6MWT after transition to oral therapy. I asked the patient to call sooner with any new concerns.         Testing/labs:    Most recent labs:    Latest Reference Range & Units 06/24/24 05:51   Sodium 135 - 145 mmol/L 138   Potassium 3.4 - 5.3 mmol/L 4.0   Chloride 98 - 107 mmol/L 106   Carbon Dioxide (CO2) 22 - 29 mmol/L 23   Urea Nitrogen 6.0 - 20.0 mg/dL 15.9   Creatinine 0.51 - 0.95 mg/dL 0.57   GFR Estimate >60 mL/min/1.73m2 >90   Calcium 8.6 - 10.0 mg/dL 9.0   Anion Gap 7 - 15 mmol/L 9   Albumin 3.5 - 5.2 g/dL 4.2   Protein Total 6.4 - 8.3 g/dL 6.9   Alkaline Phosphatase 40 - 150 U/L 69   ALT 0 - 50 U/L 16   AST 0 - 45 U/L 16   Bilirubin Total <=1.2 mg/dL 0.3   Glucose 70 - 99 mg/dL 91   WBC 4.0 - 11.0 10e3/uL 7.6   Hemoglobin 11.7 - 15.7 g/dL 11.9   Hematocrit 35.0 - 47.0 % 36.2   Platelet Count 150 - 450 10e3/uL 178   RBC Count 3.80 - 5.20 10e6/uL 3.83   MCV 78 - 100 fL 95   MCH 26.5 - 33.0 pg 31.1   MCHC 31.5 - 36.5 g/dL 32.9   RDW 10.0 - 15.0 % 14.1         Other most recent pertinent testing:    [facility] 6/24/24  RIGHT HEART CATHETERIZATION:    === Baseline  ====    BSA 1.77 m2  /56/81 mmHg  RA mean of 5 mmHg with V wave of 8 mmHg  RV 43/5 mmHg  PA 43/19/29 mmHg  PCWP 11 mmHg with V wave of 14 mmhg  [place] CO/CI 6.2/3.5   PVR 2.9 JENKINS   DPR 8  TPR 18   dynes/sec/cm-5  PA sat 72%   Hgb 12.2 g/dL    Right sided filling pressures are normal. Left sided filling pressures are normal. Mild elevated pulmonary hypertension. Normal cardiac output level.     Echo 6/24/24  Interpretation Summary  Global and regional left ventricular function is normal with an EF of 60-65%.  Mild right ventricular dilation is present.  Global right ventricular function is mildly reduced.  Pulmonary artery systolic pressure is normal.  PV AT 134ms.  The inferior vena cava is normal.  No pericardial effusion is present.    NYHA Functional Class:  1      Video-Visit Details    Type of service:  Video Visit    Video Start Time: 0830  Video End Time: 0847    An additional 15 minutes was spent today performing chart and history review, pre and post visit documentation, patient education, and care coordination.    The longitudinal plan of care for the diagnosis(es)/condition(s) as documented were addressed during this visit. Due to the added complexity in care, I will continue to support Chelsea in the subsequent management and with ongoing continuity of care.      Originating Location (pt. Location): Home    Distant Location (provider location):  On-site    Platform used for Video Visit: James Bloom PA-C  [facility]  Pager [phone]

## 2024-07-09 ENCOUNTER — VIRTUAL VISIT (OUTPATIENT)
Dept: CARDIOLOGY | Facility: CLINIC | Age: 34
End: 2024-07-09
Attending: PHYSICIAN ASSISTANT
Payer: COMMERCIAL

## 2024-07-09 VITALS — HEIGHT: 63 IN | BODY MASS INDEX: 28.35 KG/M2 | WEIGHT: 160 LBS

## 2024-07-09 DIAGNOSIS — I27.21 PULMONARY ARTERIAL HYPERTENSION (H): Primary | ICD-10-CM

## 2024-07-09 DIAGNOSIS — R06.02 SOB (SHORTNESS OF BREATH): ICD-10-CM

## 2024-07-09 PROCEDURE — 99214 OFFICE O/P EST MOD 30 MIN: CPT | Mod: 95 | Performed by: PHYSICIAN ASSISTANT

## 2024-07-09 RX ORDER — LIDOCAINE 40 MG/G
CREAM TOPICAL
Status: CANCELLED | OUTPATIENT
Start: 2024-07-09

## 2024-07-09 ASSESSMENT — PAIN SCALES - GENERAL: PAINLEVEL: NO PAIN (0)

## 2024-07-09 NOTE — NURSING NOTE
Patient reviewed medications and allergies in Mychart during e-check in and said everything looked correct.      Current patient location:  [place], [place]-at work    Is the patient currently in the state of [place]? YES    Visit mode:VIDEO    If the visit is dropped, the patient can be reconnected by: VIDEO VISIT: Text to cell phone:   Telephone Information:   Mobile [phone]    and VIDEO VISIT: Send to e-mail at: [email or web address]    Will anyone else be joining the visit? NO  (If patient encounters technical issues they should call [phone])    How would you like to obtain your AVS? MyChart    Are changes needed to the allergy or medication list? Pt declined med review and Pt stated no med changes    Are refills needed on medications prescribed by this physician? NO    Reason for visit: RK FINCH

## 2024-07-09 NOTE — PATIENT INSTRUCTIONS
You were seen today in the Pulmonary Hypertension Clinic at the [facility].     Cardiology Provider you saw during your visit:    LUNA Panda    Medication Changes:  None      Recommendations:   Labs in 2 weeks  Rheumatology Referral    Follow-up:   Sept RHC, Echo & 6mwt.  Follow up with Dr. Carroll after testing.      Please call us immediately if you have any syncope (fainting or passing out), chest pain, edema (swelling or weight gain), or decline in your functional status (general decline in how you are feeling).    If you have emergent concerns after hours or on the weekend, please call our on-call Cardiologist at [phone], option 4. For emergencies call [phone].     Thank you for allowing us to be a part of your care here at the [facility]    If you have questions or concerns please contact us at:    Ora Do RN (P: [phone])    Nurse Coordinator       Pulmonary Hypertension     [facility]         JESUS Brown   (Prior Auths & Pt Assistance)   ()  Clinic   Clinic   Pulmonary Hypertension   Pulmonary Hypertension  [facility]  [facility]  (P)[phone]    (P) [phone]  (F) [phone]

## 2024-07-09 NOTE — LETTER
7/9/2024      RE: Chelsea Flower  [address]  [place] [address]       Dear Colleague,    Thank you for the opportunity to participate in the care of your patient, Chelsea Flower, at the [facility]. Please see a copy of my visit note below.    Virtual Visit Details    Type of service:  Video Visit     Originating Location (pt. Location): Home  {PROVIDER LOCATION On-site should be selected for visits conducted from your clinic location or adjoining [facility] hospital, academic office, or other nearby [facility] building. Off-site should be selected for all other provider locations, including home:[id number]}          CARDIOLOGY PH CLINIC VIDEO VISIT    Date of video visit: 07/09/24      Chelsea Flower is a 33 year old female who is being evaluated via a billable video visit.        Self reported vitals:  Weight: 160 lb  BP not reported      MEDICATIONS:  Current Outpatient Medications   Medication Sig Dispense Refill     acetaminophen (TYLENOL) 325 MG tablet Take 3 tablets (975 mg) by mouth every 6 hours 80 tablet 0     COMPOUNDED NON-CONTROLLED SUBSTANCE (CMPD RX) - PHARMACY TO MIX COMPOUNDED MEDICATION 10% Lidocaine gel. Apply 1-2 grams to affected site 1-2 times per day 8-12 hours apart  [facility] [phone]       furosemide (LASIX) 20 MG tablet Take 1 tablet (20 mg) by mouth daily 90 tablet 3     macitentan (OPSUMIT) 10 MG tablet Take 1 tablet (10 mg) by mouth daily Please make sure you are completing your monthly mandatory labs. 30 tablet 11     selexipag (UPTRAVI) 1600 MCG tablet Take 1 tablet (1,600 mcg) by mouth every 12 hours 60 tablet 11     tadalafil, PAH, 20 MG TABS Take 2 tablets (40 mg) by mouth daily 60 tablet 11     traMADol (ULTRAM) 50 MG tablet Take 50 mg by mouth every 6 hours as needed for severe pain         Primary PH cardiologist: Dr. Carroll        HPI:  Chelsea Flower is a pleasant 33 year old  female with a PMHx including subclinical hypothyroidism and hypertension. She was referred to the PH clinic in January of 2023 from [place] after having a workukp for exertional dyspnea and presyncope shortly after her last pregnancy (now with 3 children total). She had outside testing suggesting severe PAH, and she was ultimately placed on triple combination therapy with IV Remodulin, tadalafil, and macitentan. She responded well to this and given her active lifestyle with 3 children was then changed to subcutaneous Remodulin in November of 2023.    She struggled with a lot of site pain and follow up hemodynamics showed near normalization of PA pressures with preserved cardiac output. In May of this year, she requested conversion to oral therapy. Chelsea was then admitted in mid June for planned transition to Uptravi. She was admitted from 6/15 to 6/24 and successfully changed to Uptravi 1600mcg BID. RHC and echo on day of discharge showed again just mild PAH with preserved cardiac output.    Today, we are following up virtually. She tells me that overall she is doing well. She went swimming for the first time recently and remains active with her kids. She played 3 softball games last weekend and is not limited by her breathing on day to day activities with the exception of things like playing tag with her kids. She also continues to work full time. She does note days where she doesn't have as much energy but notes that she had this with Remodulin as well. She reports no new dizziness/presyncope and is not having any new swelling.       The patient did not have any new labs performed for our visit today, but most recent available labs were reviewed as below.        CURRENT PULMONARY HYPERTENSION REGIMEN:     PAH Rx: Opsumit 10mg daily, tadalafil 40mg daily, Uptravi 1600mcg BID   (Transitioned off subcutaneous Remodulin due to site pain)     Diuretics: Lasix 20mg daily      Oxygen: 2LNC at night only      Anticoagulation: None        Assessment/Plan:     Idiopathic pulmonary arterial hypertension.              --Chelsea has previously severe iPAH, and was placed on triple therapy with IV Remodulin, Opsumit, and Adcirca, in early 2023. She has done well and in Nov 2023, she was electively changed from IV to subcutaneous Remodulin. More recently, she requested transition to oral therapy and was admitted in mid June for successful transition to Uptravi 1600mcg BID. Clinically, she sounds to be doing well with no new concerns. COMPERA risk score remains low at 1.              --In regard to etiology of her PH, this is not yet clear. Genetic testing is planned. Additionally, due to a pos CLINT[place] on initial screening, will request a formal Rheumatology evaluation. She would like to have this done locally if possible. Fatigue remains her most consistent symptom currently, unclear if related.              --As today is a virtual visit I cannot assess her volume status formally but she reports no new edema on Lasix 20mg daily which we will continue. She has not had any new labs since her hospital stay.  --6MWT last done in Feb did continue to show desaturation with exertion, to ~89%. She is using her oxygen 2L just at night as directed.               --She is aware of the need for monthly pregnancy testing while on ERA.        Follow up plan: Local labs in the next few weeks for ongoing monitoring. Then plan return in 6-8 weeks with repeat RHC, echocardiogram, and 6MWT after transition to oral therapy. I asked the patient to call sooner with any new concerns.         Testing/labs:    Most recent labs:    Latest Reference Range & Units 06/24/24 05:51   Sodium 135 - 145 mmol/L 138   Potassium 3.4 - 5.3 mmol/L 4.0   Chloride 98 - 107 mmol/L 106   Carbon Dioxide (CO2) 22 - 29 mmol/L 23   Urea Nitrogen 6.0 - 20.0 mg/dL 15.9   Creatinine 0.51 - 0.95 mg/dL 0.57   GFR Estimate >60 mL/min/1.73m2 >90   Calcium 8.6 - 10.0 mg/dL 9.0    Anion Gap 7 - 15 mmol/L 9   Albumin 3.5 - 5.2 g/dL 4.2   Protein Total 6.4 - 8.3 g/dL 6.9   Alkaline Phosphatase 40 - 150 U/L 69   ALT 0 - 50 U/L 16   AST 0 - 45 U/L 16   Bilirubin Total <=1.2 mg/dL 0.3   Glucose 70 - 99 mg/dL 91   WBC 4.0 - 11.0 10e3/uL 7.6   Hemoglobin 11.7 - 15.7 g/dL 11.9   Hematocrit 35.0 - 47.0 % 36.2   Platelet Count 150 - 450 10e3/uL 178   RBC Count 3.80 - 5.20 10e6/uL 3.83   MCV 78 - 100 fL 95   MCH 26.5 - 33.0 pg 31.1   MCHC 31.5 - 36.5 g/dL 32.9   RDW 10.0 - 15.0 % 14.1         Other most recent pertinent testing:    RHC 6/24/24  RIGHT HEART CATHETERIZATION:    === Baseline ====    BSA 1.77 m2  /56/81 mmHg  RA mean of 5 mmHg with V wave of 8 mmHg  RV 43/5 mmHg  PA 43/19/29 mmHg  PCWP 11 mmHg with V wave of 14 mmhg  [place] CO/CI 6.2/3.5   PVR 2.9 JENKINS   DPR 8  TPR 18   dynes/sec/cm-5  PA sat 72%   Hgb 12.2 g/dL    Right sided filling pressures are normal. Left sided filling pressures are normal. Mild elevated pulmonary hypertension. Normal cardiac output level.     Echo 6/24/24  Interpretation Summary  Global and regional left ventricular function is normal with an EF of 60-65%.  Mild right ventricular dilation is present.  Global right ventricular function is mildly reduced.  Pulmonary artery systolic pressure is normal.  PV AT 134ms.  The inferior vena cava is normal.  No pericardial effusion is present.    NYHA Functional Class:  1      Video-Visit Details    Type of service:  Video Visit    Video Start Time: 0830  Video End Time: 0847    An additional 15 minutes was spent today performing chart and history review, pre and post visit documentation, patient education, and care coordination.    The longitudinal plan of care for the diagnosis(es)/condition(s) as documented were addressed during this visit. Due to the added complexity in care, I will continue to support Chelsea in the subsequent management and with ongoing continuity of care.      Originating Location (pt.  Location): Home    Distant Location (provider location):  On-site    Platform used for Video Visit: James Bloom PA-C  [facility]  Pager [phone]      Please do not hesitate to contact me if you have any questions/concerns.     Sincerely,     LUNA Panda

## 2024-07-09 NOTE — NURSING NOTE
"Reviewed Med list  Completed AVS  Follow-up orders placed  Marked chart \"Ready for Checkout\"  Sent staff msg to Crystal Payan RN with plan  Batsheva Guardado RN on 7/9/2024 at 9:14 AM    "

## 2024-07-18 ENCOUNTER — VIRTUAL VISIT (OUTPATIENT)
Dept: CARDIOLOGY | Facility: CLINIC | Age: 34
End: 2024-07-18
Payer: COMMERCIAL

## 2024-07-18 DIAGNOSIS — I27.20 PULMONARY HTN (H): ICD-10-CM

## 2024-07-18 PROCEDURE — 96040 PR GENETIC COUNSELING, EACH 30 MIN: CPT | Mod: GT | Performed by: GENETIC COUNSELOR, MS

## 2024-07-18 PROCEDURE — 99207 PR NO CHARGE LOS: CPT | Mod: 95 | Performed by: GENETIC COUNSELOR, MS

## 2024-07-18 NOTE — PATIENT INSTRUCTIONS
Indication for Genetic Counseling:     Pulmonary hypertension (PH) is an umbrella term describing a type of high blood pressure in the blood vessels that carry blood to your lungs.  There are five types types of PH, with the first category being pulmonary arterial hypertension (PAH), blood vessels in the lungs are narrowed, blocked or destroyed. Symptoms of PH can include high blood pressure in throughout your body, shortness of breath, blue lips or fingernails, fatigue, fast heartbeat, pain in the upper right side of the abdomen, swelling in the legs or ankles, chest pain or pressure, and fainting or dizzy spells.     PAH can be caused by both acquired and genetic causes.  Non-genetic causes include lung conditions (COPD, advanced bronchitis, pulmonary fibrosis, cystic fibrosis), liver disease, blood clots in the lungs, heart failure, sleep apnea, other conditions such as scleroderma, lupus, or sickle cell disease, and some medicines.     In the last decade, there have been major advances in our understanding of the genetics of PAH. Currently 12 genes have been found to be related to PAH. Genetic testing currently identifies mutations in about 25-30% of idiopathic cases.  PAH gene mutations are associated with reduced penetrance and variable expressivity, meaning that individuals who carry a gene mutation may or may not get the disease and onset and severity can vary from one family member to the next. This explains why you may not see symptoms in each generation of a family.      Inheritance:   Humans have over 20,000 genes that instruct our bodies how to function.  We have two copies of each gene because we inherit one from our mother and one from our father.  In most cardiac cases with a genetic component, the condition is inherited in an autosomal dominant (AD) pattern.  This means that in order to have the condition, a person needs to inherit a mutation on one copy of a particular gene.  This mutation or  "pathogenic variant dominates the \"normal\" working copy of the gene.  When an affected individual has children, they can either pass on the \"normal\" copy of the gene or the mutation.  Therefore, children have a 50% chance of inheriting the mutation.  Other family members also have an increased risk but the specific risk depends on the degree of relationship.  Additional inheritance patterns can occur within families and may alter the risk of recurrence.     Briefly reviewed autosomal recessive inheritance associated with EIFA2K gene. With AR conditions, two mutations are necessary to cause disease.  Therefore, children of an affected parent will carry one copy of the mutation but are only at risk for having two mutations if they inherit the second one from their other parent.    Testing Options:   Genetic testing is available to assess a panel of genes known to cause this condition.  This test reads through the DNA (sequencing) of these genes to look for spelling mistakes or mutations that could cause the condition.      There are three types of results you could receive from this test.     -Positive result (mutation/pathogenic variant identified) - confirms diagnosis and provides an answer to why this happened.  In addition, identifying a mutation allows family members to have testing to determine their risk.     -Negative result (mutation not identified) - no genetic changes were identified.  This does not rule out a genetic cause for the condition as the genetic testing only identifies 25-30% of genetic causes for this condition.    -Variant of uncertain significance (VUS) - a genetic change was identified, but there is not enough information to determine whether it is disease-causing or normal human genetic variation.     Although genetic testing may identify a mutation, it cannot provide information about the severity of symptoms or the progression of disease.  We cannot predict age of onset or severity of " symptoms due to reduced penetrance and variable expressivity.    Logistics:   Genetic testing involves collecting a sample of DNA, thru blood, saliva, or cheek cells.    The sample will be sent to a laboratory to extract the DNA and sequence the genes for mutations.  The laboratory will work with your insurance company to determine the out of pocket (OOP) cost and will notify you if the OOP cost is greater than $100.  Remember to ask the lab about financial assistance pricing and self pay options as well.  Sometimes those are much lower than insurance pricing.  When testing is initiated, results take about 2-4 weeks to return. I will contact you over the phone when results are available.     Genetic Information and Nondiscrimination Act:  The Genetic Information and Nondiscrimination Act of 2008 ([place]URENCE) is a federal law that protects individuals from genetic discrimination in health insurance and employment. Genetic discrimination is defined as the misuse of genetic information. This law does not address potential discrimination regarding life insurance or disability insurance.      This is especially relevant for at risk individuals who are considering presymptomatic testing.    Screening Recommendations:  Explained that clinical evaluation is recommended for all first degree relatives (parents, siblings, and children) of an affected individual regardless of decision to pursue genetic testing. Based on the Heart Failure Society of [place] Practice Guidelines (Jessie et al, 2009), clinical evaluation should be performed at least every 3-5 years beginning and childhood and should include history, cardiac exam, ECHO, and EKG.    Resources:  The Pulmonary Hypertension Association (PHA)  - phassociation.org/     General   American Heart Association - americanheart.org  Genetics Home Reference - ghr.nlm.nih.gov  Genetic Information and Nondiscrimination Act - ginahelp.org    Contact Information:  Vaishali Graf  MS  Licensed Genetic Counselor  [facility]  [facility]    Office:  [phone]  Appointments:  [phone]  Fax: [phone]  Email: [email or web address]

## 2024-07-18 NOTE — LETTER
----- Message from Luis Hall MD sent at 8/15/2022  6:46 AM CDT -----  Call labs  1.  CBC normal with normal white count, H&H and platelets.  2.  Sodium potassium and liver tests are normal.  3.  Nonfasting glucose 141 (< 180).  4.  Creatinine elevated 1.84 close to last 3 values 1.72, 1.69 and 1.68 (0.67-1.17).  5.  Albumin a measurement of protein nutrition is a little low at 3.4 improved from 3.2 (3.6-5.1).  6. NT proBNP heart failure test was not able to be perform since specimen was received unspun.  7.  How is Don feeling after increasing furosemide to 40 mg daily for 2 days?    2024    Physician No Ref-Primary  No address on file    RE: Chelsea Flower       Dear Colleague,     I had the pleasure of seeing Chelsea Flower in the [facility].  Here is a copy of the progress note from your recent genetic counseling visit through the [facility] on Date: 2024.  Patient was seen through a virtual visit.    PROGRESS NOTE:Chelsea was referred by Dr. Carroll for genetic counseling due to her history of pulmonary arterial hypertension (PAH).  I had the opportunity to talk with Chelsea today to discuss the genetic component of PAH and testing options available to her .     MEDICAL HISTORY: Chelsea reports that she was in good health until her daughter was born in .  At that time she began noticing increased shortness of breath.  She was diagnosed with asthma and put on inhalers.  Symptoms progressed to the point that she could not even get through a grocery shop.  She was thought to have bronchitis when an X-ray showed an enlarged heart.  She eventually had a right heart cath and was referred to [facility] clinic and was admitted for an extended stay.      Chelsea reports that symptoms are much better and she has been able to swim and play softball this summer.  She still tires easily but is unsure if that is related to PAH or possible lupus.    She reports no history of nose bleeds, telangectasia, or GI issues.    FAMILY HISTORY:A detailed family history was obtained during today's consult.  Family history was significant for the following cardiac history:  Mother (55) in good health but has history of depression and anxiety.  Maternal aunt seems to be in good health.  Maternal grandfather  at 72 from a heart attack and COPD.  Grandmother has history of dementia, mental illness, and drug abuse.  Father has no known history but there has been no contact with him, his sister or parents.  Brother  (30) has cerebral palsy and severe autism.  Son (13) with ADHD, ODD, and OCD.  Son (9) with asthma and allergies.  Daughter (3) in good health.  There is no additional history of PAH, heart attacks, fainting, sudden cardiac death, genetic conditions, or birth defects. (A copy of pedigree may be found under media tab).    DISCUSSION: Pulmonary hypertension (PH) is an umbrella term describing a type of high blood pressure in the arteries going from your heart to your lungs. There are five types types of PH, with the first category being pulmonary arterial hypertension (PAH), blood vessels in the lungs are narrowed, blocked or destroyed.      Reviewed that PH can be caused by both acquired and genetic causes.  Explained that there have been major advances in our understanding of the genetic of PAH in the last decade.  Based on current knowledge, around 25-30% of patients diagnosed with idiopathic PAH have an underlying genetic cause for their PAH.       When PH is genetic, it is often inherited in an autosomal dominant (AD) pattern. Reviewed AD inheritance most commonly associated with PH, including the 50% risk for recurrence. Briefly reviewed autosomal recessive inheritance. Explained that PAH gene mutations are associated with reduced penetrance and variable expressivity, meaning that individuals who carry a gene mutation may or may not get the disease and onset and severity can vary from one family member to the next. This explains why you may not see symptoms in each generation of a family.      Currently 12 genes have been found to be related to PAH, the most common location for gene mutations is in the BMPR2 gene. Genetic testing currently identifies mutations in about 25-30% of idiopathic cases. However, in familial cases, the detection rate may be closer to 75%.  Reviewed capabilities, limitations, and logistics of testing.  DNA sample via saliva or blood is collected and sent to testing lab for evaluation of  selected genes. The results could directly impact care and treatment, as well as predictive testing for family members.     Explained three possible outcomes of genetic testing including: positive identification of a mutation, no mutation identified, and identification of a variant of unknown significance (VUS). If a mutation is identified, presymptomatic testing would be available to at risk family members. If no mutation is identified, it does not rule out the possibility of a genetic component to this disease. Family members could still be at risk for developing the same condition. If a VUS is identified, it is unclear if the mutation is disease causing or just a normal variation. It may take time and possibly additional testing to determine the meaning of a VUS result.      Test results take approximately 2-4 weeks on average. Discussed cost of testing through commercial labs. Explained that the lab works with insurance to determine coverage and will contact patient if out of pocket costs are expected to exceed $100. If so, patient has the option to pay reported price, cancel testing or elect self pay pricing (typcially around $250).      Discussed pros and cons of genetic testing. Explained that results could determine the cause for PAH. If a mutation is identified, presymptomatic testing is available to all at risk relatives. Reviewed possible issues associated with presymptomatic testing including genetic discrimination, current laws to prevent discrimination (ie. LAURENCE), insurance issues, and emotional and psychosocial outcomes of testing.      Explained that clinical evaluation may also be recommended for all first degree relatives (parents, siblings, and children) of an affected individual regardless of decision to pursue genetic testing.     PLAN: Chelsea elected to proceed with genetic testing.  Requisition and consent forms were completed and signed.  DNA will be collected via cheek swab sample and  sent to [facility]  laboratory. I will contact patient when results are available.    TOTAL TIME SPENT IN COUNSELIN minutes    Vaishali Graf MS, [facility]  Licensed, Certified Genetic Counselor  [facility]     Thank you for allowing me to participate in the care of your patient.      Sincerely,     Vaishali Graf GC     [facility]  cc:   Alex Carroll MD  [address] [place] [id number]

## 2024-07-18 NOTE — PROGRESS NOTES
Here is a copy of the progress note from your recent genetic counseling visit through the [facility] on Date: 2024.  Patient was seen through a virtual visit.    PROGRESS NOTE:Chelsea was referred by Dr. Carroll for genetic counseling due to her history of pulmonary arterial hypertension (PAH).  I had the opportunity to talk with Chelsea today to discuss the genetic component of PAH and testing options available to her .     MEDICAL HISTORY: Chelsea reports that she was in good health until her daughter was born in .  At that time she began noticing increased shortness of breath.  She was diagnosed with asthma and put on inhalers.  Symptoms progressed to the point that she could not even get through a grocery shop.  She was thought to have bronchitis when an X-ray showed an enlarged heart.  She eventually had a right heart cath and was referred to [facility] clinic and was admitted for an extended stay.      Chelsea reports that symptoms are much better and she has been able to swim and play softball this summer.  She still tires easily but is unsure if that is related to PAH or possible lupus.    She reports no history of nose bleeds, telangectasia, or GI issues.    FAMILY HISTORY:A detailed family history was obtained during today's consult.  Family history was significant for the following cardiac history:  Mother (55) in good health but has history of depression and anxiety.  Maternal aunt seems to be in good health.  Maternal grandfather  at 72 from a heart attack and COPD.  Grandmother has history of dementia, mental illness, and drug abuse.  Father has no known history but there has been no contact with him, his sister or parents.  Brother (30) has cerebral palsy and severe autism.  Son (13) with ADHD, ODD, and OCD.  Son (9) with asthma and allergies.  Daughter (3) in good health.  There is no additional history of PAH, heart attacks,  fainting, sudden cardiac death, genetic conditions, or birth defects. (A copy of pedigree may be found under media tab).    DISCUSSION: Pulmonary hypertension (PH) is an umbrella term describing a type of high blood pressure in the arteries going from your heart to your lungs. There are five types types of PH, with the first category being pulmonary arterial hypertension (PAH), blood vessels in the lungs are narrowed, blocked or destroyed.      Reviewed that PH can be caused by both acquired and genetic causes.  Explained that there have been major advances in our understanding of the genetic of PAH in the last decade.  Based on current knowledge, around 25-30% of patients diagnosed with idiopathic PAH have an underlying genetic cause for their PAH.       When PH is genetic, it is often inherited in an autosomal dominant (AD) pattern. Reviewed AD inheritance most commonly associated with PH, including the 50% risk for recurrence. Briefly reviewed autosomal recessive inheritance. Explained that PAH gene mutations are associated with reduced penetrance and variable expressivity, meaning that individuals who carry a gene mutation may or may not get the disease and onset and severity can vary from one family member to the next. This explains why you may not see symptoms in each generation of a family.      Currently 12 genes have been found to be related to PAH, the most common location for gene mutations is in the BMPR2 gene. Genetic testing currently identifies mutations in about 25-30% of idiopathic cases. However, in familial cases, the detection rate may be closer to 75%.  Reviewed capabilities, limitations, and logistics of testing.  DNA sample via saliva or blood is collected and sent to testing lab for evaluation of selected genes. The results could directly impact care and treatment, as well as predictive testing for family members.     Explained three possible outcomes of genetic testing including: positive  identification of a mutation, no mutation identified, and identification of a variant of unknown significance (VUS). If a mutation is identified, presymptomatic testing would be available to at risk family members. If no mutation is identified, it does not rule out the possibility of a genetic component to this disease. Family members could still be at risk for developing the same condition. If a VUS is identified, it is unclear if the mutation is disease causing or just a normal variation. It may take time and possibly additional testing to determine the meaning of a VUS result.      Test results take approximately 2-4 weeks on average. Discussed cost of testing through commercial labs. Explained that the lab works with insurance to determine coverage and will contact patient if out of pocket costs are expected to exceed $100. If so, patient has the option to pay reported price, cancel testing or elect self pay pricing (typcially around $250).      Discussed pros and cons of genetic testing. Explained that results could determine the cause for PAH. If a mutation is identified, presymptomatic testing is available to all at risk relatives. Reviewed possible issues associated with presymptomatic testing including genetic discrimination, current laws to prevent discrimination (ie. LAURENCE), insurance issues, and emotional and psychosocial outcomes of testing.      Explained that clinical evaluation may also be recommended for all first degree relatives (parents, siblings, and children) of an affected individual regardless of decision to pursue genetic testing.     PLAN: Chelsea elected to proceed with genetic testing.  Requisition and consent forms were completed and signed.  DNA will be collected via cheek swab sample and sent to [facility]  laboratory. I will contact patient when results are available.    TOTAL TIME SPENT IN COUNSELIN minutes    Vaishali Graf MS, [facility]  Licensed, Certified Genetic Counselor  [facility]  [facility]

## 2024-07-29 ENCOUNTER — TELEPHONE (OUTPATIENT)
Dept: CARDIOLOGY | Facility: CLINIC | Age: 34
End: 2024-07-29
Payer: COMMERCIAL

## 2024-07-31 ENCOUNTER — TELEPHONE (OUTPATIENT)
Dept: CARDIOLOGY | Facility: CLINIC | Age: 34
End: 2024-07-31
Payer: COMMERCIAL

## 2024-07-31 NOTE — TELEPHONE ENCOUNTER
7/31 talked with pt about scheduling testing for dr mcintyre, unable to reach Abr[facility]. Scheduled 9/24 and messaged [facility] to see if we can do the testing 9/23 [facility]

## 2024-07-31 NOTE — TELEPHONE ENCOUNTER
Cath Lab Case Request/Order    Location: [address] [address] [facility] Waiting Room    Procedure: Right Heart Cath (RHC)    Procedure Date: 9/23    Patient Arrival Time: 7 AM    Procedure Time: 0830 (pending emergency)    Ordering Provider: Dr. Alex Carroll    Performing Cardiologist: NEW DOC    Inpatient Bed Needed: No    Post-  Procedure ANKIT appointment scheduled (1 - 2 weeks): N/A, RHC     Date: 9/24     Provider: Dr. Carroll     Communicated Patient Instructions:  NURSE TO COMMUNICATE    Appointment was scheduled: Over the phone    Patient expressed understanding of above instructions and denied further questions at this time.    xochitl Rowell

## 2024-08-05 ENCOUNTER — EXTERNAL ORDER RESULTS (OUTPATIENT)
Dept: CARDIOLOGY | Facility: CLINIC | Age: 34
End: 2024-08-05
Payer: COMMERCIAL

## 2024-08-05 LAB — HCG UR QL: NEGATIVE

## 2024-09-11 NOTE — TELEPHONE ENCOUNTER
"9/11/2024  @ 10:30 AM -  Rcvd phone call  from  [employer] [facility]  on 9/11/2024 -  \"Rcvd 9/11/24 @ 1013 am -  [employer] [facility] -   re: pt of MRP - or auth office contact Gilma Delcid -   RE:  Catalina Flower  1990  -   reviewing shipments records re: [employer], there is old shipment that is showing a 10-day delay from when the expected shipment should have been.  I have been unable to get in touch with pt thusfar, so following up with the office to see if there is any awareness of  reason for delay.   To discuss call:  [phone].   ask for [facility] Collins  Case#: [id number],  available until 5 PM EST\"    Routed to  Nursing :  Ora Do RN  Gilma ARGUETA, [facility]- Prior Auths  Cardiology/Pulmonary Hypertension     "

## 2024-09-23 ENCOUNTER — HOSPITAL ENCOUNTER (OUTPATIENT)
Dept: CARDIOLOGY | Facility: CLINIC | Age: 34
Discharge: HOME | End: 2024-09-23
Attending: PHYSICIAN ASSISTANT | Admitting: HOSPITALIST
Payer: COMMERCIAL

## 2024-09-23 ENCOUNTER — OFFICE VISIT (OUTPATIENT)
Dept: PULMONOLOGY | Facility: CLINIC | Age: 34
End: 2024-09-23
Attending: PHYSICIAN ASSISTANT
Payer: COMMERCIAL

## 2024-09-23 ENCOUNTER — APPOINTMENT (OUTPATIENT)
Dept: LAB | Facility: CLINIC | Age: 34
End: 2024-09-23
Payer: COMMERCIAL

## 2024-09-23 ENCOUNTER — HOSPITAL ENCOUNTER (OUTPATIENT)
Facility: CLINIC | Age: 34
Discharge: HOME | End: 2024-09-23
Admitting: HOSPITALIST
Payer: COMMERCIAL

## 2024-09-23 ENCOUNTER — APPOINTMENT (OUTPATIENT)
Dept: MEDSURG UNIT | Facility: CLINIC | Age: 34
End: 2024-09-23
Payer: COMMERCIAL

## 2024-09-23 VITALS
HEART RATE: 76 BPM | DIASTOLIC BLOOD PRESSURE: 87 MMHG | TEMPERATURE: 98.6 F | OXYGEN SATURATION: 93 % | RESPIRATION RATE: 18 BRPM | SYSTOLIC BLOOD PRESSURE: 130 MMHG

## 2024-09-23 DIAGNOSIS — I27.21 PULMONARY ARTERIAL HYPERTENSION (H): ICD-10-CM

## 2024-09-23 DIAGNOSIS — R06.02 SOB (SHORTNESS OF BREATH): ICD-10-CM

## 2024-09-23 LAB
6 MIN WALK (FT): 1220 FT
6 MIN WALK (M): 372 M
ALBUMIN SERPL BCG-MCNC: 4 G/DL (ref 3.5–5.2)
ALP SERPL-CCNC: 88 U/L (ref 40–150)
ALT SERPL W P-5'-P-CCNC: 12 U/L (ref 0–50)
ANION GAP SERPL CALCULATED.3IONS-SCNC: 11 MMOL/L (ref 7–15)
AST SERPL W P-5'-P-CCNC: 17 U/L (ref 0–45)
BASOPHILS # BLD AUTO: 0.1 10E3/UL (ref 0–0.2)
BASOPHILS NFR BLD AUTO: 1 %
BILIRUB SERPL-MCNC: 0.3 MG/DL
BUN SERPL-MCNC: 15.4 MG/DL (ref 6–20)
CALCIUM SERPL-MCNC: 8.6 MG/DL (ref 8.8–10.4)
CHLORIDE SERPL-SCNC: 110 MMOL/L (ref 98–107)
CREAT SERPL-MCNC: 0.65 MG/DL (ref 0.51–0.95)
EGFRCR SERPLBLD CKD-EPI 2021: >90 ML/MIN/1.73M2
EOSINOPHIL # BLD AUTO: 0.1 10E3/UL (ref 0–0.7)
EOSINOPHIL NFR BLD AUTO: 2 %
ERYTHROCYTE [DISTWIDTH] IN BLOOD BY AUTOMATED COUNT: 12.8 % (ref 10–15)
GLUCOSE SERPL-MCNC: 90 MG/DL (ref 70–99)
HCG INTACT+B SERPL-ACNC: <1 MIU/ML
HCO3 SERPL-SCNC: 19 MMOL/L (ref 22–29)
HCT VFR BLD AUTO: 40.4 % (ref 35–47)
HGB BLD-MCNC: 13.8 G/DL (ref 11.7–15.7)
IMM GRANULOCYTES # BLD: 0.1 10E3/UL
IMM GRANULOCYTES NFR BLD: 1 %
LVEF ECHO: NORMAL
LYMPHOCYTES # BLD AUTO: 1.2 10E3/UL (ref 0.8–5.3)
LYMPHOCYTES NFR BLD AUTO: 17 %
MCH RBC QN AUTO: 32.5 PG (ref 26.5–33)
MCHC RBC AUTO-ENTMCNC: 34.2 G/DL (ref 31.5–36.5)
MCV RBC AUTO: 95 FL (ref 78–100)
MONOCYTES # BLD AUTO: 0.4 10E3/UL (ref 0–1.3)
MONOCYTES NFR BLD AUTO: 6 %
NEUTROPHILS # BLD AUTO: 5.5 10E3/UL (ref 1.6–8.3)
NEUTROPHILS NFR BLD AUTO: 74 %
NRBC # BLD AUTO: 0 10E3/UL
NRBC BLD AUTO-RTO: 0 /100
NT-PROBNP SERPL-MCNC: 146 PG/ML (ref 0–450)
PLATELET # BLD AUTO: 200 10E3/UL (ref 150–450)
POTASSIUM SERPL-SCNC: 4.1 MMOL/L (ref 3.4–5.3)
PROT SERPL-MCNC: 6.6 G/DL (ref 6.4–8.3)
RBC # BLD AUTO: 4.24 10E6/UL (ref 3.8–5.2)
SODIUM SERPL-SCNC: 140 MMOL/L (ref 135–145)
WBC # BLD AUTO: 7.4 10E3/UL (ref 4–11)

## 2024-09-23 PROCEDURE — 250N000009 HC RX 250: Performed by: HOSPITALIST

## 2024-09-23 PROCEDURE — 272N000001 HC OR GENERAL SUPPLY STERILE: Performed by: HOSPITALIST

## 2024-09-23 PROCEDURE — 93306 TTE W/DOPPLER COMPLETE: CPT | Mod: 26 | Performed by: INTERNAL MEDICINE

## 2024-09-23 PROCEDURE — 250N000009 HC RX 250: Performed by: PHYSICIAN ASSISTANT

## 2024-09-23 PROCEDURE — 83036 HEMOGLOBIN GLYCOSYLATED A1C: CPT | Performed by: INTERNAL MEDICINE

## 2024-09-23 PROCEDURE — 999N000132 HC STATISTIC PP CARE STAGE 1

## 2024-09-23 PROCEDURE — 84702 CHORIONIC GONADOTROPIN TEST: CPT | Performed by: PHYSICIAN ASSISTANT

## 2024-09-23 PROCEDURE — C1751 CATH, INF, PER/CENT/MIDLINE: HCPCS | Performed by: HOSPITALIST

## 2024-09-23 PROCEDURE — 36415 COLL VENOUS BLD VENIPUNCTURE: CPT | Performed by: PHYSICIAN ASSISTANT

## 2024-09-23 PROCEDURE — C1894 INTRO/SHEATH, NON-LASER: HCPCS | Performed by: HOSPITALIST

## 2024-09-23 PROCEDURE — 83880 ASSAY OF NATRIURETIC PEPTIDE: CPT | Performed by: PHYSICIAN ASSISTANT

## 2024-09-23 PROCEDURE — 250N000013 HC RX MED GY IP 250 OP 250 PS 637: Performed by: PHYSICIAN ASSISTANT

## 2024-09-23 PROCEDURE — 93451 RIGHT HEART CATH: CPT | Mod: 26 | Performed by: HOSPITALIST

## 2024-09-23 PROCEDURE — 85025 COMPLETE CBC W/AUTO DIFF WBC: CPT | Performed by: PHYSICIAN ASSISTANT

## 2024-09-23 PROCEDURE — 94618 PULMONARY STRESS TESTING: CPT | Performed by: INTERNAL MEDICINE

## 2024-09-23 PROCEDURE — 93306 TTE W/DOPPLER COMPLETE: CPT

## 2024-09-23 PROCEDURE — 999N000142 HC STATISTIC PROCEDURE PREP ONLY

## 2024-09-23 PROCEDURE — 80053 COMPREHEN METABOLIC PANEL: CPT | Performed by: PHYSICIAN ASSISTANT

## 2024-09-23 PROCEDURE — 93451 RIGHT HEART CATH: CPT | Performed by: HOSPITALIST

## 2024-09-23 RX ORDER — LIDOCAINE 40 MG/G
CREAM TOPICAL
Status: COMPLETED | OUTPATIENT
Start: 2024-09-23 | End: 2024-09-23

## 2024-09-23 RX ORDER — ACETAMINOPHEN 325 MG/1
650 TABLET ORAL EVERY 4 HOURS PRN
Status: DISCONTINUED | OUTPATIENT
Start: 2024-09-23 | End: 2024-09-23 | Stop reason: HOSPADM

## 2024-09-23 RX ADMIN — LIDOCAINE: 40 CREAM TOPICAL at 07:58

## 2024-09-23 RX ADMIN — ACETAMINOPHEN 650 MG: 325 TABLET ORAL at 08:36

## 2024-09-23 ASSESSMENT — ACTIVITIES OF DAILY LIVING (ADL)
ADLS_ACUITY_SCORE: 37

## 2024-09-23 NOTE — PRE-PROCEDURE
IP PRE-PROCEDURE - ASSESSMENT    Consenting/Education for Cardiology Procedure: Right heart catheterization     Patient understands we would like to perform the listed procedure(s) due to iPAH.    The patient understands the following:     The procedure was described to the patient in detail.    No sedation is planned for this procedure. Patient understands risks and complications of the procedure which include but are not limited to bruising/swelling around the incision site, infection, bleeding, allergic reaction to local anesthetic, air embolism, arterial puncture, stroke, heart attack, need for emergency heart surgery, death.       Patient verbalized understanding of risks and benefits and has elected to proceed with the procedure or procedures listed above.    Wilmer Schwartz, ROE  Interventional Cardiology

## 2024-09-23 NOTE — PROGRESS NOTES
S/p RHC. Right neck site c/d/I- scant bloody drainage on drsg. Denies pain. VSS. Pt eating and drinking. Reviewed discharge instructions with pt. Pt verbalized understanding. Will ambulate to back to [facility] waiting to check in for ECHO.

## 2024-09-23 NOTE — DISCHARGE INSTRUCTIONS
[facility]                        Interventional Cardiology  Discharge Instructions   Post Right Heart Cath and/or Heart Biopsy      AFTER YOU GO HOME:  DO drink plenty of fluids  DO resume your regular diet and medications unless otherwise instructed by your Primary Physician  Do Not scrub the procedure site vigorously  No lotion or powder to the puncture site for 3 days    CALL YOUR PRIMARY PHYSICIAN IF: You may resume all normal activity.  Monitor neck site for bleeding, swelling, or voice changes. If you notice bleeding or swelling immediately apply pressure to the site and call number below to speak with Cardiology Fellow.  If you experience any changes in your breathing you should call your doctor immediately or come to the closest Emergency Department.  Do not drive yourself.    ADDITIONAL INSTRUCTIONS: Medications: You are to resume all home medications including anticoagulation therapy unless otherwise advised by your primary cardiologist or nurse coordinator.    Follow Up: Per your primary cardiology team    If you have any questions or concerns regarding your procedure site please call [phone] at anytime and ask for Cardiology Fellow on call.  They are available 24 hours a day.  You may also contact the Cardiology Clinic after hours number at [phone].                                                       Telephone Numbers [phone] Monday-Friday 8:00 am to 4:30 pm    [phone] After 4:30 pm Monday-Friday, Weekends & Holidays  Ask for Interventional Cardiologist on call. Someone is on call 24 hours/day   [facility] toll free number [phone] Monday-Friday 8:00 am to 4:30 pm   [facility] Emergency Dept [phone]

## 2024-09-24 ENCOUNTER — TELEPHONE (OUTPATIENT)
Dept: CARDIOLOGY | Facility: CLINIC | Age: 34
End: 2024-09-24
Payer: COMMERCIAL

## 2024-09-24 ENCOUNTER — OFFICE VISIT (OUTPATIENT)
Dept: CARDIOLOGY | Facility: CLINIC | Age: 34
End: 2024-09-24
Attending: INTERNAL MEDICINE
Payer: COMMERCIAL

## 2024-09-24 VITALS
HEART RATE: 83 BPM | BODY MASS INDEX: 32.68 KG/M2 | SYSTOLIC BLOOD PRESSURE: 119 MMHG | WEIGHT: 184.5 LBS | DIASTOLIC BLOOD PRESSURE: 76 MMHG | OXYGEN SATURATION: 94 %

## 2024-09-24 DIAGNOSIS — I27.21 PULMONARY ARTERIAL HYPERTENSION (H): ICD-10-CM

## 2024-09-24 DIAGNOSIS — R06.02 SOB (SHORTNESS OF BREATH): ICD-10-CM

## 2024-09-24 LAB
EST. AVERAGE GLUCOSE BLD GHB EST-MCNC: 94 MG/DL
FIO2-PRE: 28 %
HBA1C MFR BLD: 4.9 %

## 2024-09-24 PROCEDURE — 99213 OFFICE O/P EST LOW 20 MIN: CPT | Performed by: INTERNAL MEDICINE

## 2024-09-24 PROCEDURE — 99214 OFFICE O/P EST MOD 30 MIN: CPT | Performed by: INTERNAL MEDICINE

## 2024-09-24 RX ORDER — SOTATERCEPT-CSRK 60 MG
0.7 KIT SUBCUTANEOUS
COMMUNITY
Start: 2024-09-24

## 2024-09-24 RX ORDER — LIDOCAINE 40 MG/G
CREAM TOPICAL
OUTPATIENT
Start: 2024-09-24

## 2024-09-24 RX ORDER — SOTATERCEPT-CSRK 45 MG
0.3 KIT SUBCUTANEOUS
COMMUNITY
Start: 2024-09-24

## 2024-09-24 ASSESSMENT — PAIN SCALES - GENERAL: PAINLEVEL: NO PAIN (0)

## 2024-09-24 NOTE — LETTER
2024      RE: Chelsea Flower  [address]  [place] [address]       Dear Colleague,    Thank you for the opportunity to participate in the care of your patient, Chelsea Flower, at the [facility] [place] [facility]. Please see a copy of my visit note below.    Alex Carroll M.D.  Cardiovascular Medicine    I personally saw and examined Chelsea Flower, discussed care with housestaff and other consultants, reviewed current laboratories and imaging studies, and conveyed impression and diagnostic/therapeutic plan to patient.    Problem List:  1. Exertional shortness of breath   2. Pulmonary hypertension.   3. Childbirth x 3 by   4.  Hypertension treated with recently started diltiazem  5.  Hypothyroidism    History:  Patient presenting for interval follow-up. Since transitioning to selexipag she has noted fatigue, brain fog, and headaches. Her functional status has worsened. She is getting dyspneic with less exertion (particularly with walking more than a block, walking fast, or climbing stairs). She has difficulty walking while carrying her toddler. She also has had chest pressure towards the end of the day, dull and achy. She has not noted edema and no evidence of fluid overload by RHC yesterday, though she has gained 20-25 lbs. She attributes this to being less active. Minimal orthopnea, no PND. No palpitations, lightheadedness, syncope.    Objective:    VITALS  /76 (BP Location: Right arm, Patient Position: Chair, Cuff Size: Adult Large)   Pulse 83   Wt 83.7 kg (184 lb 8 oz)   SpO2 94%   BMI 32.68 kg/m    WEIGHT  Wt Readings from Last 5 Encounters:   24 83.7 kg (184 lb 8 oz)   24 72.6 kg (160 lb)   24 73.7 kg (162 lb 8 oz)   24 77.1 kg (170 lb)   24 78.5 kg (173 lb)     Gen: WDWN, NAD.  CV: RRR, normal S1, S2. No murmur. JVP flat. Trace ankle edema. Symmetric 2+ radial pulse.  Pulm:  CTAB, normal WOB.  Abd: Nondistended.    Meds  Current Outpatient Medications   Medication Sig Dispense Refill     acetaminophen (TYLENOL) 325 MG tablet Take 3 tablets (975 mg) by mouth every 6 hours 80 tablet 0     COMPOUNDED NON-CONTROLLED SUBSTANCE (CMPD RX) - PHARMACY TO MIX COMPOUNDED MEDICATION 10% Lidocaine gel. Apply 1-2 grams to affected site 1-2 times per day 8-12 hours apart  [facility] [phone]       furosemide (LASIX) 20 MG tablet Take 1 tablet (20 mg) by mouth daily 90 tablet 3     macitentan (OPSUMIT) 10 MG tablet Take 1 tablet (10 mg) by mouth daily Please make sure you are completing your monthly mandatory labs. 30 tablet 11     selexipag (UPTRAVI) 1600 MCG tablet Take 1 tablet (1,600 mcg) by mouth every 12 hours 60 tablet 11     tadalafil, PAH, 20 MG TABS Take 2 tablets (40 mg) by mouth daily 60 tablet 11     traMADol (ULTRAM) 50 MG tablet Take 50 mg by mouth every 6 hours as needed for severe pain         LABORATORY RESULTS    CBC Results  Lab Results   Component Value Date    WBC 7.4 09/23/2024    RBC 4.24 09/23/2024    HGB 13.8 09/23/2024    HCT 40.4 09/23/2024    MCV 95 09/23/2024    MCH 32.5 09/23/2024    MCHC 34.2 09/23/2024    RDW 12.8 09/23/2024     09/23/2024       CMP RESULTS:  Lab Results   Component Value Date     09/23/2024    POTASSIUM 4.1 09/23/2024    CHLORIDE 110 (H) 09/23/2024    CHLORIDE 108 (H) 05/10/2024    CO2 19 (L) 09/23/2024    ANIONGAP 11 09/23/2024    GLC 90 09/23/2024    BUN 15.4 09/23/2024    CR 0.65 09/23/2024    GFRESTIMATED >90 09/23/2024    RIGO 8.6 (L) 09/23/2024    BILITOTAL 0.3 09/23/2024    ALBUMIN 4.0 09/23/2024    ALKPHOS 88 09/23/2024    ALT 12 09/23/2024    AST 17 09/23/2024        INR RESULTS:  Lab Results   Component Value Date    INR 1.04 02/16/2024    INR 1.2 (H) 01/04/2023       MAGNESIUM RESULTS  Lab Results   Component Value Date    MAG 2.3 06/18/2024       BNP RESULTS  Lab Results   Component Value Date    NTBNP 195 05/09/2023        IMAGING  RHC 9/23/24:  RA 1, RV 45/5, PA 45/20 (30), PCWP 8  SmvO2 78%, FiCO/CI 6.8/3.9, PVR 3.1    TTE 9/23/24:  Left ventricular size, wall motion and function are normal. The ejection  fraction is 60-65%.  Mild right ventricular dilation is present. Global right ventricular function  is mildly reduced.  The inferior vena cava was normal in size with preserved respiratory  variability. No pericardial effusion is present.    ASSESSMENT/PLAN  Patient presents for interval follow-up. She had a TTE and RHC yesterday which are stable from prior demonstrating mild PAH; however, since transitioning from SC to PO prostacyclin, she has had an interval worsening in symptoms and functional status. While she had initial improvement in 6MWT since establishing, this has plateaued and is still low for age. She is reluctant to go back to SC prostacyclin due to side effects and limitations in her activity. As she is on triple therapy at optimized doses with ongoing limiting symptoms and impaired functional status with prior side effects on SC/IV prostacyclin therapy, we discussed addition of sotatercept to her regimen to which the patient is amenable. Her baseline CBC is normal and she has not had prior bleeding issues.    Plan:  - Start sotatercept  - Continue selexipag, macitentan, tadalafil  - Continue diuretic regimen without change, euvolemic by RHC 9/24  - Follow-up in January 2025 for RHC, TTE, and clinic visit    Sebastian Herbert MD  Cardiovascular Disease Fellow      Please do not hesitate to contact me if you have any questions/concerns.     Sincerely,     Alex Carroll MD

## 2024-09-24 NOTE — NURSING NOTE
Follow Up Plan:  - START Winrevair subcutaneous injections  - January RHC  - F/U after testing    Orders placed and enrollment form emailed to Gilma Guerrero RN on 9/24/2024 at 9:17 AM

## 2024-09-24 NOTE — TELEPHONE ENCOUNTER
9/24/2024  @ 9:36 AM -  Winrevair (sotatercept) 45 mg kit - new start      PA Initiation  Medication: Winrevair (sotatercept) 45 mg kit - new start    Insurance Company: [employer] Special[employer] Prior Auth Dept, phone  [phone], Fax [phone]:  BCBS [facility] ([employer]  Pharmacy Filling the Rx: [facility] - [place], [place] - [address]  Filling Pharmacy Phone:    Filling Pharmacy Fax:    Start Date: 9/24/2024  via [facility], key [id number], w/ RHC dated : 9/23/24, labs 6/2024, progress note dated 9/24/24; Dx Code: I27.0 primary pulmonary hypertension, idiopathic.       ----------------------------  Insurance: ETP PPO RETAIL/ [employer] ([employer]) / BCBS [facility]   BIN: [id number]  PCN: [id number]  RX GRP#: [id number]  ID#: [id number]        Gilma ARGUETA CMA- Prior Auths  Cardiology/Pulmonary Hypertension      Quality 111:Pneumonia Vaccination Status For Older Adults: Pneumococcal Vaccination Previously Received Quality 110: Preventive Care And Screening: Influenza Immunization: Influenza immunization was not ordered or administered, reason not given Quality 128: Preventive Care And Screening: Body Mass Index (Bmi) Screening And Follow-Up Plan: BMI not documented, reason not otherwise specified. Quality 431: Preventive Care And Screening: Unhealthy Alcohol Use - Screening: Patient screened for unhealthy alcohol use using a single question and scores less than 2 times per year Quality 226: Preventive Care And Screening: Tobacco Use: Screening And Cessation Intervention: Patient screened for tobacco use and is an ex/non-smoker Quality 130: Documentation Of Current Medications In The Medical Record: Current Medications Documented Detail Level: Detailed

## 2024-09-24 NOTE — TELEPHONE ENCOUNTER
9/24/2024  @ 9:36 AM -  Winrevair (sotatercept) 45 mg kit - new start - enrollment and consent  - **pending      - 1124 am - Called pt @ [phone] (home)  to obtain physical address, Winrevair cannot be shipped to [address].  Pt provided:   [address] [place], [place] [address]  [place] [id number]                 Gilma ARGUETA CMA- Prior Auths  Cardiology/Pulmonary Hypertension

## 2024-09-24 NOTE — NURSING NOTE
Chief Complaint   Patient presents with    Follow Up     Reason for Visit: follow-up after RHC       Vitals were taken and medications reconciled.    Miah Beltrán, EMT  8:03 AM

## 2024-09-24 NOTE — PATIENT INSTRUCTIONS
You were seen today in the Pulmonary Hypertension Clinic at the [facility].     Cardiology Provider you saw during your visit:    Dr. Carroll    Medication Changes:  - START Winrevair subcutaneous injections, once every 3 weeks. You will need a labs drawn 1 week prior to your first 5 injections    Results:   Component      Latest Ref Rng 9/23/2024  7:35 AM   WBC      4.0 - 11.0 10e3/uL 7.4    RBC Count      3.80 - 5.20 10e6/uL 4.24    Hemoglobin      11.7 - 15.7 g/dL 13.8    Hematocrit      35.0 - 47.0 % 40.4    MCV      78 - 100 fL 95    MCH      26.5 - 33.0 pg 32.5    MCHC      31.5 - 36.5 g/dL 34.2    RDW      10.0 - 15.0 % 12.8    Platelet Count      150 - 450 10e3/uL 200    % Neutrophils      % 74    % Lymphocytes      % 17    % Monocytes      % 6    % Eosinophils      % 2    % Basophils      % 1    % Immature Granulocytes      % 1    NRBCs per 100 WBC      <1 /100 0    Absolute Neutrophils      1.6 - 8.3 10e3/uL 5.5    Absolute Lymphocytes      0.8 - 5.3 10e3/uL 1.2    Absolute Monocytes      0.0 - 1.3 10e3/uL 0.4    Absolute Eosinophils      0.0 - 0.7 10e3/uL 0.1    Absolute Basophils      0.0 - 0.2 10e3/uL 0.1    Absolute Immature Granulocytes      <=0.4 10e3/uL 0.1    Absolute NRBCs      10e3/uL 0.0    Sodium      135 - 145 mmol/L 140    Potassium      3.4 - 5.3 mmol/L 4.1    Carbon Dioxide (CO2)      22 - 29 mmol/L 19 (L)    Anion Gap      7 - 15 mmol/L 11    Urea Nitrogen      6.0 - 20.0 mg/dL 15.4    Creatinine      0.51 - 0.95 mg/dL 0.65    GFR Estimate      >60 mL/min/1.73m2 >90    Calcium      8.8 - 10.4 mg/dL 8.6 (L)    Chloride      98 - 107 mmol/L 110 (H)    Glucose      70 - 99 mg/dL 90    Alkaline Phosphatase      40 - 150 U/L 88    AST      0 - 45 U/L 17    ALT      0 - 50 U/L 12    Protein Total      6.4 - 8.3 g/dL 6.6    Albumin      3.5 - 5.2 g/dL 4.0    Bilirubin Total      <=1.2 mg/dL 0.3    hCG Quantitative      <5 mIU/mL <1    N-Terminal Pro BNP Inpatient      0 - 450 pg/mL  146       Legend:  (L) Low  (H) High    Recommendations:   - Right heart catheterization in January after you've been on Winrevair for 3 months    Pre-procedure instructions - Right Heart Cath and/or Biopsy and/or Pulmonary Angiogram  Patient Education    Your arrival time is TBD.  Location is [facility] - [address] [place] [address] - [facility] Waiting Room  Please plan on being at the hospital all day.  At any time, emergencies and/or urgent cases may come up which could delay the start of your procedure.    Pre-procedure instructions - Right heart catheterization  No solid food for 8 hours prior  Nothing to drink 2 hours prior to arrival time  You can take your morning medications (except diabetic and blood thinners) with sips of water  We recommend you arrange for a ride to drop you off and pick you up, in the instance, you are unable to drive home, however you should be able to function as you normally would after the procedure              Anticoagulation Medication Instructions   NA    You will need to follow up with one of our cardiology APPs 1-2 weeks after your procedure. If you need help scheduling or rescheduling your appointment, please call [phone]      Follow-up:   - Follow up with Dr. Carroll or LUNA Panda after testing    Please call us immediately if you have any syncope (fainting or passing out), chest pain, edema (swelling or weight gain), or decline in your functional status (general decline in how you are feeling).    If you have emergent concerns after hours or on the weekend, please call our on-call Cardiologist at [phone], option [phone]. For emergencies call 148.     Thank you for allowing us to be a part of your care here at the [facility]    If you have questions or concerns please contact us at:    Ora Do RN (P: [phone])    Nurse Coordinator       Pulmonary [facility]  [facility]         JESUS Brown   (Prior Auths & Pt Assistance)   ()  Clinic   Clinic   Pulmonary Hypertension   Pulmonary Hypertension  [facility]  (P)[phone]    (P) [phone]  (F) [phone]

## 2024-09-24 NOTE — PROGRESS NOTES
Started getting fatigue, brain fog, and headaches  Breathing is a little worse, fast walk or carrying things up stairs  Chest pain towards the end of the day, dull achy, above shoulder blade  No edema, gained 20-25 lbs, maybe less active but also retaining fluid  Orthopnea

## 2024-09-24 NOTE — TELEPHONE ENCOUNTER
----- Message from Mary PIKE sent at 9/24/2024  9:23 AM CDT -----  Regarding: F/U Alex Gupta MD would like patient     to start on : Winrevair (sotatercept) -[: [place]] -     >>>>Patient current weight:  83.7 kg on: 09/24/24       >>>>   0.5 ml = 1st dose  (weight x 0.3 mg/kg / 50 mg/ml)  -  : 1x45 mg vial kit      >>>>   1.2 ml = 2nd & subsequent doses  (weight x 0.7 mg/kg / 50 mg/ml)  : 1 x 60 mg vial kit         Dx Code:  I27.0 - Idiopathic/Heritable PAH  WHO Group :   1  FC:  III      Appointments needed :  RHC in January; F/U with Kelsea or Dr. Carroll after testing

## 2024-09-24 NOTE — PROGRESS NOTES
Alex Carroll M.D.  Cardiovascular Medicine    I personally saw and examined Chelsea Flower, discussed care with housestaff and other consultants, reviewed current laboratories and imaging studies, and conveyed impression and diagnostic/therapeutic plan to patient.    Problem List:  1. Exertional shortness of breath   2. Pulmonary hypertension.   3. Childbirth x 3 by   4.  Hypertension treated with recently started diltiazem  5.  Hypothyroidism    History:  Patient presenting for interval follow-up. Since transitioning to selexipag she has noted fatigue, brain fog, and headaches. Her functional status has worsened. She is getting dyspneic with less exertion (particularly with walking more than a block, walking fast, or climbing stairs). She has difficulty walking while carrying her toddler. She also has had chest pressure towards the end of the day, dull and achy. She has not noted edema and no evidence of fluid overload by RHC yesterday, though she has gained 20-25 lbs. She attributes this to being less active. Minimal orthopnea, no PND. No palpitations, lightheadedness, syncope.    Objective:    VITALS  /76 (BP Location: Right arm, Patient Position: Chair, Cuff Size: Adult Large)   Pulse 83   Wt 83.7 kg (184 lb 8 oz)   SpO2 94%   BMI 32.68 kg/m    WEIGHT  Wt Readings from Last 5 Encounters:   24 83.7 kg (184 lb 8 oz)   24 72.6 kg (160 lb)   24 73.7 kg (162 lb 8 oz)   24 77.1 kg (170 lb)   24 78.5 kg (173 lb)     Gen: WDWN, NAD.  CV: RRR, normal S1, S2. No murmur. JVP flat. Trace ankle edema. Symmetric 2+ radial pulse.  Pulm: CTAB, normal WOB.  Abd: Nondistended.    Meds  Current Outpatient Medications   Medication Sig Dispense Refill    acetaminophen (TYLENOL) 325 MG tablet Take 3 tablets (975 mg) by mouth every 6 hours 80 tablet 0    COMPOUNDED NON-CONTROLLED SUBSTANCE (CMPD RX) - PHARMACY TO MIX COMPOUNDED MEDICATION 10% Lidocaine gel. Apply 1-2 grams to  affected site 1-2 times per day 8-12 hours apart  [facility] [phone]      furosemide (LASIX) 20 MG tablet Take 1 tablet (20 mg) by mouth daily 90 tablet 3    macitentan (OPSUMIT) 10 MG tablet Take 1 tablet (10 mg) by mouth daily Please make sure you are completing your monthly mandatory labs. 30 tablet 11    selexipag (UPTRAVI) 1600 MCG tablet Take 1 tablet (1,600 mcg) by mouth every 12 hours 60 tablet 11    tadalafil, PAH, 20 MG TABS Take 2 tablets (40 mg) by mouth daily 60 tablet 11    traMADol (ULTRAM) 50 MG tablet Take 50 mg by mouth every 6 hours as needed for severe pain         LABORATORY RESULTS    CBC Results  Lab Results   Component Value Date    WBC 7.4 09/23/2024    RBC 4.24 09/23/2024    HGB 13.8 09/23/2024    HCT 40.4 09/23/2024    MCV 95 09/23/2024    MCH 32.5 09/23/2024    MCHC 34.2 09/23/2024    RDW 12.8 09/23/2024     09/23/2024       CMP RESULTS:  Lab Results   Component Value Date     09/23/2024    POTASSIUM 4.1 09/23/2024    CHLORIDE 110 (H) 09/23/2024    CHLORIDE 108 (H) 05/10/2024    CO2 19 (L) 09/23/2024    ANIONGAP 11 09/23/2024    GLC 90 09/23/2024    BUN 15.4 09/23/2024    CR 0.65 09/23/2024    GFRESTIMATED >90 09/23/2024    RIGO 8.6 (L) 09/23/2024    BILITOTAL 0.3 09/23/2024    ALBUMIN 4.0 09/23/2024    ALKPHOS 88 09/23/2024    ALT 12 09/23/2024    AST 17 09/23/2024        INR RESULTS:  Lab Results   Component Value Date    INR 1.04 02/16/2024    INR 1.2 (H) 01/04/2023       MAGNESIUM RESULTS  Lab Results   Component Value Date    MAG 2.3 06/18/2024       BNP RESULTS  Lab Results   Component Value Date    NTBNP 195 05/09/2023       IMAGING  RHC 9/23/24:  RA 1, RV 45/5, PA 45/20 (30), PCWP 8  SmvO2 78%, FiCO/CI 6.8/3.9, PVR 3.1    TTE 9/23/24:  Left ventricular size, wall motion and function are normal. The ejection  fraction is 60-65%.  Mild right ventricular dilation is present. Global right ventricular function  is mildly reduced.  The inferior vena cava was  normal in size with preserved respiratory  variability. No pericardial effusion is present.    ASSESSMENT/PLAN  Patient presents for interval follow-up. She had a TTE and RHC yesterday which are stable from prior demonstrating mild PAH; however, since transitioning from SC to PO prostacyclin, she has had an interval worsening in symptoms and functional status. While she had initial improvement in 6MWT since establishing, this has plateaued and is still low for age. She is reluctant to go back to SC prostacyclin due to side effects and limitations in her activity. As she is on triple therapy at optimized doses with ongoing limiting symptoms and impaired functional status with prior side effects on SC/IV prostacyclin therapy, we discussed addition of sotatercept to her regimen to which the patient is amenable. Her baseline CBC is normal and she has not had prior bleeding issues.    Plan:  - Start sotatercept  - Continue selexipag, macitentan, tadalafil  - Continue diuretic regimen without change, euvolemic by RHC 9/24  - Follow-up in January 2025 for RHC, TTE, and clinic visit    Sebastian Herbert MD  Cardiovascular Disease Fellow

## 2024-09-25 NOTE — TELEPHONE ENCOUNTER
"9/25/2024  @ 11:11 AM -  Winrevair (sotatercept) 45 mg kit - Approved on September 24 by [facility] 2017  Your PA request has been approved. Additional information will be provided in the approval communication. (Message 1147)  Authorization Expiration Date: 9/23/2025     - Called [facility] for info on Pt Assistance:  Uptravi copay card, [employer] copay card, has [employer], \"payer of last resort\",  Winrevair offers copay card, pt may qualify. [LRR requested pt be screened on fax cover sheet - by either [facility] or [facility]]      Prior Authorization Approval  Medication: WINREVAIR 45 MG SC KIT  Authorization Effective Date: 9/24/2024  Authorization Expiration Date: 9/23/2025  Approved Dose/Quantity: 1/ 21DAYS  Reference #: PA Case ID #: [id number]   Insurance Company: [facility] Specialty Prior Auth Dept, phone  [phone], Fax [phone]:  [facility]  Expected CoPay: $    CoPay Card Available:      Financial Assistance Needed: *Undetermined as of the date of this note - pt may qualify for untapt Copay Card.     Which Pharmacy is filling the prescription: [facility] - [place], [place] - [address]  Pharmacy Notified: *  Patient Notified: Robin rich message sent       Updated Snapshot, added to PA Calendar; enrollment to be faxed to : [facility] and [facility] with PA approval, demographics, med list, pt consent.             Gilma ARGUETA CMA- Prior Auths  Cardiology/Pulmonary Hypertension     "

## 2024-09-26 ENCOUNTER — TELEPHONE (OUTPATIENT)
Dept: CARDIOLOGY | Facility: CLINIC | Age: 34
End: 2024-09-26

## 2024-09-26 DIAGNOSIS — I27.20 PULMONARY HTN (H): Primary | ICD-10-CM

## 2024-09-26 NOTE — TELEPHONE ENCOUNTER
Patient will start on Winrevair (Sotatercept). Administered via subcutaneous injection once every 3 weeks    Patient plans to complete injections on (Day)   Patient plans to complete labs at : [facility]. Standing CBC and HCG faxed  Filling Pharmacy: [facility]    Initial CBC completed: 24  HB.8  Platelet 200    Dose 1 Date: 10/23/24  CBC completed on: 24  HGB: 16.8  PLT: 290  Updated Dr. Carroll on results- Plan is to hold 1 week and recheck labs. Patient updated. Specialty updated  Called order into [facility] pharmacy: updated [facility]    Repeat labs   HGB: 16.7  PLT: 205  Plan: Talked with Dr. Carroll. HOLD Winrevair and reassess in January with [facility] as scheduled. Updated [facility] to keep profile open    Repeat labs 24  HGB:14.8  PLT:232    Dose 2 date:   CBC to be collected week of:   CBC completed on:   Called order into [facility] pharmacy:     Dose 3 Date:  CBC to be collected week of:   CBC completed on:   Called order into [facility] pharmacy:       Dose 4 Date:   CBC to be collected week of   CBC completed on:   Called order into [facility] pharmacy:       Dose 5 Date:      Dosing plan:      Winrevair (sotatercept) -[: HESIODO] -  Patient current weight:  83.7 kg.    Dosing calculation for: 0.3n.5ml 45mg Kit  Doing calculation for 0.7n.2ml 60mg Kit     Inject 0.3ng/kg subcutaneously once. After 1st dose of 0.3ng/kg if labs are stable. Increase to goal dose of  0.7ng/kg 21 days later. Continue injections subcutaneous every 21 days.    Check complete blood cell count 2 weeks after dose administration (1 week before the next  dose) for the first 5 doses to monitor hemoglobin and platelet count. Consider longer monitoring  if values are unstable and continue to monitor complete blood cell count periodically after the  first 5 doses.    Dosage Modifications Due to Hemoglobin Increase or Platelet Count Decrease  Check Hgb and platelet count before each dose for the first 5 doses, or longer if  values are  unstable. Thereafter, monitor Hgb and platelet count periodically.  Delay treatment for at least 3 weeks if any of the following occur:   Hgb increases >2.0 g/dL from the previous dose and is above ULN.   Hgb increases >4.0 g/dl from baseline.   Hgb increases >2.0 g/dL above ULN.   Platelet count decreases to <50,000/mm3 (<50 x 109/L).  Recheck Hgb and platelet count before reinitiating treatment. For treatment delays lasing >9  weeks, restart treatment at 0.3 mg/kg, and escalate to 0.7 mg/kg after verifying acceptable Hgb  and platelet count.  7. If a dose of Winrevair is missed, administer as soon as possible. If the missed dose is not  administered within 3 days of the scheduled date, adjust the schedule to maintain 3-week  dosing intervals. In case of an overdose, monitor for erythrocytosis.  8. Side effects to monitor for: erythrocytosis, severe thrombocytopenia, serious bleeding (e.g. GI,  intracranial hemorrhage), embryo-fetal toxicity, and impaired fertility. Advise females of  reproductive potential to use an effective method of contraception during treatment and for at  least 4 months after the final dose.  9. Other adverse reactions include headache, epistaxis, rash, telangiectasia, diarrhea, dizziness,  erythema, and increased blood pressure (mean increase from baseline of SBP 2.2/DBP 4.9  mmHg at 24 weeks).

## 2024-10-03 NOTE — TELEPHONE ENCOUNTER
"10/9/2024  @ 10:13 AM -  Rcvd email  from Michelle/ELIANA TUCKER on 10/7/2024 -  \"Ready to be scheduled, pending call back from the patient\"  Sent Robin msg to pt : Call ELIANA.   Gilma ARGUETA CMA- Prior Auths  Cardiology/Pulmonary Hypertension       10/3/2024  @ 2:02 PM -  vd email  from Michelle/ELIANA TUCKER on 10/2/2024 -  \"Winrevair - patient is enrolled with [employer], $0 copay\"    Gilma ARGUETA CMA- Prior Auths  Cardiology/Pulmonary Hypertension     "

## 2024-10-24 ENCOUNTER — TELEPHONE (OUTPATIENT)
Dept: CARDIOLOGY | Facility: CLINIC | Age: 34
End: 2024-10-24
Payer: COMMERCIAL

## 2024-10-24 NOTE — TELEPHONE ENCOUNTER
Cath Lab Case Request/Order    Location: [address] [address] [facility] Waiting Room    Procedure: Right Heart Cath (RHC)    Procedure Date: 1/9    Patient Arrival Time: 7 AM    Procedure Time: 0830 (pending emergency)    Ordering Provider: Dr. Alex Carroll    Performing Cardiologist: LINDA    Inpatient Bed Needed: No    Post-  Procedure ANKIT appointment scheduled (1 - 2 weeks): N/A, RHC     Date: 1/9     Provider: Kelsea Bloom    Communicated Patient Instructions:  NURSE TO COMMUNICATE    Appointment was scheduled: Over the phone    Patient expressed understanding of above instructions and denied further questions at this time.    xochitl Rowell

## 2024-11-06 ENCOUNTER — TRANSFERRED RECORDS (OUTPATIENT)
Dept: HEALTH INFORMATION MANAGEMENT | Facility: CLINIC | Age: 34
End: 2024-11-06
Payer: COMMERCIAL

## 2024-11-13 ENCOUNTER — TRANSFERRED RECORDS (OUTPATIENT)
Dept: HEALTH INFORMATION MANAGEMENT | Facility: CLINIC | Age: 34
End: 2024-11-13
Payer: COMMERCIAL

## 2024-11-15 ENCOUNTER — TELEPHONE (OUTPATIENT)
Dept: CARDIOLOGY | Facility: CLINIC | Age: 34
End: 2024-11-15
Payer: COMMERCIAL

## 2024-11-15 NOTE — TELEPHONE ENCOUNTER
Called patient to review updated plan. After starting winrevair patient hemoglobin increased from 13.8 to 16.8. Repeat labs x1 week while holding winrevair. HGB was 16.7. At this time per Dr. Carroll we will HOLD winrevair and reassess in January as planned. Patient should recheck labs in 1-2 weeks to follow-up on CBC and hemoglobin trends.     Patient verbalized understanding of education/discussion.

## 2024-11-21 ENCOUNTER — TELEPHONE (OUTPATIENT)
Dept: CARDIOLOGY | Facility: CLINIC | Age: 34
End: 2024-11-21
Payer: COMMERCIAL

## 2024-11-21 NOTE — TELEPHONE ENCOUNTER
Called patient to remind her of negative HCG and repeat CBC needed. Left a message    Ora Do RN on 11/21/2024 at 9:52 AM

## 2024-11-26 ENCOUNTER — TRANSFERRED RECORDS (OUTPATIENT)
Dept: HEALTH INFORMATION MANAGEMENT | Facility: CLINIC | Age: 34
End: 2024-11-26
Payer: COMMERCIAL

## 2024-12-17 LAB
HCG UR QL: NEGATIVE
HCG UR QL: NEGATIVE

## 2024-12-19 ENCOUNTER — EXTERNAL ORDER RESULTS (OUTPATIENT)
Dept: CARDIOLOGY | Facility: CLINIC | Age: 34
End: 2024-12-19
Payer: COMMERCIAL

## 2024-12-30 ENCOUNTER — TELEPHONE (OUTPATIENT)
Dept: CARDIOLOGY | Facility: CLINIC | Age: 34
End: 2024-12-30
Payer: COMMERCIAL

## 2024-12-30 NOTE — TELEPHONE ENCOUNTER
12/30/2024  @ 3:12 PM -  Opsumit 10 mg (30/30)  - renew - expires:  1/29/25 - [Rcvd paper/fax form via fax on 12/30/24]   PA Initiation  Medication: Opsumit 10 mg (30/30)  - renew - expires:  1/29/2  Insurance Company: [facility] Prior Auth Dept, phone  [phone], Fax [phone]:  [facility]  Pharmacy Filling the Rx: [facility] - [place], [place] - [address] [place] [address]  Filling Pharmacy Phone:    Filling Pharmacy Fax:    Start Date: 12/30/2024  via PAPER /FAX form, w/ [facility] dated : 8/1/23; Dx Code: I27.0 idiopathic           ----------------------------  Insurance: [employer] [place] [employer] [facility]  BIN: [id number]  PCN: [id number]  RX GRP#: [id number]  ID#: [id number]         Gilma ARGUETA CMA- Prior Auths  Cardiology/Pulmonary Hypertension

## 2024-12-30 NOTE — TELEPHONE ENCOUNTER
12/30/2024  @ 3:23 PM -  tadalafil 20 mg PAH (60/30)  - renew -  1/29/2025    PA Initiation  Medication: tadalafil 20 mg PAH (60/30)  - renew -  1/29/2025  Insurance Company: [employer] Prior Auth Dept, phone  [phone], Fax [phone]:  [employer]  Pharmacy Filling the Rx: [facility] - [place], [place] - [address]  Filling Pharmacy Phone:    Filling Pharmacy Fax:    Start Date: 12/30/2024  via CM, key [id number],  w/ RHC dated : 8/1/23; Dx Code: I27.0     ----------------------------  Insurance: ETP PPO RETAIL/ [email or web address]  BIN: [id number]  PCN: [id number]  RX GRP#: [id number]  ID#: [id number]         Gilma ARGUETA CMA- Prior Auths  Cardiology/Pulmonary Hypertension

## 2025-01-09 ENCOUNTER — SURGERY (OUTPATIENT)
Age: 35
End: 2025-01-09
Payer: COMMERCIAL

## 2025-01-27 DIAGNOSIS — I27.20 PULMONARY HTN (H): ICD-10-CM

## 2025-01-30 DIAGNOSIS — I27.20 PULMONARY HTN (H): ICD-10-CM

## 2025-01-30 RX ORDER — TADALAFIL 20 MG/1
40 TABLET ORAL DAILY
Qty: 60 TABLET | Refills: 11 | Status: SHIPPED | OUTPATIENT
Start: 2025-01-30

## 2025-02-02 RX ORDER — TADALAFIL 20 MG/1
TABLET ORAL
Qty: 60 TABLET | Refills: 6 | OUTPATIENT
Start: 2025-02-02

## 2025-02-04 ENCOUNTER — EXTERNAL ORDER RESULTS (OUTPATIENT)
Dept: CARDIOLOGY | Facility: CLINIC | Age: 35
End: 2025-02-04
Payer: COMMERCIAL

## 2025-02-04 ENCOUNTER — TELEPHONE (OUTPATIENT)
Dept: CARDIOLOGY | Facility: CLINIC | Age: 35
End: 2025-02-04
Payer: COMMERCIAL

## 2025-02-04 LAB — HCG UR QL: NEGATIVE

## 2025-02-04 NOTE — TELEPHONE ENCOUNTER
Cath Lab Case Request/Order    Location: [address] [address] [facility] Waiting Room    Procedure: Right Heart Cath (RHC)    Procedure Date: 4/22    Patient Arrival Time: 9:30 AM     Procedure Time: 4th case to follow    Ordering Provider: Dr. Alex Carroll    Performing Cardiologist: Dr. Ahmet Lovell    Inpatient Bed Needed: No    Post-  Procedure ANKIT appointment scheduled (1 - 2 weeks): N/A, RHC     Date: 4/22     Provider: Kelsea Bloom     Communicated Patient Instructions:  NURSE TO COMMUNICATE    Appointment was scheduled: Over the phone    Patient expressed understanding of above instructions and denied further questions at this time.    xochitl Rowell

## 2025-02-17 DIAGNOSIS — I27.20 PULMONARY HTN (H): ICD-10-CM

## 2025-02-20 RX ORDER — MACITENTAN 10 MG/1
TABLET, FILM COATED ORAL
Qty: 30 TABLET | Refills: 11 | Status: SHIPPED | OUTPATIENT
Start: 2025-02-20

## 2025-02-20 NOTE — TELEPHONE ENCOUNTER
Last Written Prescription:     macitentan (OPSUMIT) 10 MG tablet 30 tablet 11 2/23/2024 -- No   Sig - Route: Take 1 tablet (10 mg) by mouth daily Please make sure you are completing your monthly mandatory labs. - Oral     ----------------------  Last Visit Date: 9/24/24 Mika PARDO  Future Visit Date: 4/22/25 Merle  ----------------------    [x]  Refill decision: Medication unable to be refilled by RN due to: Medication not included in refill protocol policy   macitentan (OPSUMIT) 10 MG tablet        Request from pharmacy:  Requested Prescriptions   Pending Prescriptions Disp Refills    OPSUMIT 10 MG tablet [Pharmacy Med Name: OPSUMIT 10MG] 30 tablet 11     Sig: TAKE 1 TABLET BY MOUTH 1 TIME A DAY. DO NOT HANDLE IF PREGNANT. DO NOT SPLIT, CRUSH, OR CHEW. REVIEW MEDICATION GUIDE       Antihypertensive Agents Failed - 2/20/2025 11:52 AM        Failed - Medication is active on med list        Passed - Most recent blood pressure under 140/90 in past 12 months     BP Readings from Last 3 Encounters:   09/24/24 119/76   09/23/24 130/87   06/24/24 111/52       No data recorded            Passed - Has GFR on file in past 12 months and most recent value is normal        Passed - Medication indicated for associated diagnosis     Medication is associated with one or more of the following diagnoses:     Pulmonary Hypertension          Passed - Recent (12 mo) or future (90 days) visit within the authorizing provider's specialty     The patient must have completed an in-person or virtual visit within the past 12 months or has a future visit scheduled within the next 90 days with the authorizing provider s specialty.  Urgent care and e-visits do not qualify as an office visit for this protocol.          Passed - Patient is age 18 or older        Passed - No active pregnancy on record        Passed - No positive pregnancy test within past 12 months
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
## 2025-03-06 ENCOUNTER — TELEPHONE (OUTPATIENT)
Dept: CARDIOLOGY | Facility: CLINIC | Age: 35
End: 2025-03-06
Payer: COMMERCIAL

## 2025-03-06 NOTE — TELEPHONE ENCOUNTER
Spoke with Chelsea today to review results of genetic testing. She underwent genetic testing on 7/18/24 due to her diagnosis of pulmonary arterial hypertension (PAH).   Genetic testing for the PAH panel thru [facility]  revealed that Chelsea does NOT carry a mutation in the 12 genes analyzed. It is predicted that this panel will identify mutations in 10-15% of idiopathic PAH cases.   Therefore, this negative result decreases but does not completely rule out a genetic basis for the PAH in Chelsea but eliminates the option of presymptomatic genetic testing in at risk family members, at this time. However, since this condition could still be genetic, clinical screening may be recommended in all first degree relatives (children, siblings, parents).   Explained that with continued research and genetic knowledge the detection rate for identifying mutations may increase over time. Therefore, it is worth touching base in the years to come to learn about new testing options.   A summary letter and copy of the results will be sent to patient. All questions answered at this time.    Basia Carrillo MS, [facility]  Licensed, Certified Genetic Counselor  [facility]  [facility]

## 2025-03-16 ENCOUNTER — HEALTH MAINTENANCE LETTER (OUTPATIENT)
Age: 35
End: 2025-03-16

## 2025-04-07 ENCOUNTER — TELEPHONE (OUTPATIENT)
Dept: CARDIOLOGY | Facility: CLINIC | Age: 35
End: 2025-04-07

## 2025-04-07 NOTE — TELEPHONE ENCOUNTER
Hey she called and LVM for me to see if we can get her sooner not sure if appt before the RHC or just everything she said she's not feeling the best and wanted to see if we should get her in sooner. Will forward to nurse to let her know to speak with patient for next steps.    Crystal Sanchez  Clinic    Pulmonary Hypertension   [facility]  (P) [phone]

## 2025-04-08 ENCOUNTER — TELEPHONE (OUTPATIENT)
Dept: CARDIOLOGY | Facility: CLINIC | Age: 35
End: 2025-04-08

## 2025-04-08 ENCOUNTER — TRANSFERRED RECORDS (OUTPATIENT)
Dept: HEALTH INFORMATION MANAGEMENT | Facility: CLINIC | Age: 35
End: 2025-04-08

## 2025-04-08 DIAGNOSIS — R06.09 DOE (DYSPNEA ON EXERTION): ICD-10-CM

## 2025-04-08 DIAGNOSIS — I27.20 PULMONARY HTN (H): Primary | ICD-10-CM

## 2025-04-08 NOTE — TELEPHONE ENCOUNTER
Patient called and updated she was slightly more SOB and fatigued lately. 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 She is not sure if she is over-doing it or if she has worsening PH. Discussed we will fax some labs locally to spot check. If there is concerning changes we will try and push up testing (currently scheduled in May)    Patient verbalized understanding of education/discussion.

## 2025-05-08 ENCOUNTER — TELEPHONE (OUTPATIENT)
Dept: CARDIOLOGY | Facility: CLINIC | Age: 35
End: 2025-05-08
Payer: COMMERCIAL

## 2025-05-13 ENCOUNTER — OFFICE VISIT (OUTPATIENT)
Dept: CARDIOLOGY | Facility: CLINIC | Age: 35
End: 2025-05-13
Attending: INTERNAL MEDICINE
Payer: COMMERCIAL

## 2025-05-13 ENCOUNTER — HOSPITAL ENCOUNTER (OUTPATIENT)
Facility: CLINIC | Age: 35
Discharge: HOME | End: 2025-05-13
Attending: INTERNAL MEDICINE | Admitting: INTERNAL MEDICINE
Payer: COMMERCIAL

## 2025-05-13 ENCOUNTER — APPOINTMENT (OUTPATIENT)
Dept: MEDSURG UNIT | Facility: CLINIC | Age: 35
End: 2025-05-13
Attending: INTERNAL MEDICINE
Payer: COMMERCIAL

## 2025-05-13 ENCOUNTER — APPOINTMENT (OUTPATIENT)
Dept: LAB | Facility: CLINIC | Age: 35
End: 2025-05-13
Attending: INTERNAL MEDICINE
Payer: COMMERCIAL

## 2025-05-13 ENCOUNTER — ALLIED HEALTH/NURSE VISIT (OUTPATIENT)
Dept: CARDIOLOGY | Facility: CLINIC | Age: 35
End: 2025-05-13

## 2025-05-13 VITALS
WEIGHT: 200.5 LBS | DIASTOLIC BLOOD PRESSURE: 80 MMHG | BODY MASS INDEX: 35.52 KG/M2 | OXYGEN SATURATION: 95 % | SYSTOLIC BLOOD PRESSURE: 119 MMHG | HEART RATE: 82 BPM

## 2025-05-13 VITALS
RESPIRATION RATE: 20 BRPM | BODY MASS INDEX: 35.07 KG/M2 | DIASTOLIC BLOOD PRESSURE: 78 MMHG | SYSTOLIC BLOOD PRESSURE: 118 MMHG | WEIGHT: 198 LBS | OXYGEN SATURATION: 98 % | HEART RATE: 88 BPM | TEMPERATURE: 98.2 F

## 2025-05-13 DIAGNOSIS — Z00.6 RESEARCH SUBJECT: Primary | ICD-10-CM

## 2025-05-13 DIAGNOSIS — R06.02 SOB (SHORTNESS OF BREATH): ICD-10-CM

## 2025-05-13 DIAGNOSIS — I27.21 PULMONARY ARTERIAL HYPERTENSION (H): ICD-10-CM

## 2025-05-13 LAB
ALBUMIN SERPL BCG-MCNC: 4.1 G/DL (ref 3.5–5.2)
ALP SERPL-CCNC: 90 U/L (ref 40–150)
ALT SERPL W P-5'-P-CCNC: 8 U/L (ref 0–50)
ANION GAP SERPL CALCULATED.3IONS-SCNC: 13 MMOL/L (ref 7–15)
AST SERPL W P-5'-P-CCNC: 16 U/L (ref 0–45)
BASOPHILS # BLD AUTO: 0.1 10E3/UL (ref 0–0.2)
BASOPHILS NFR BLD AUTO: 1 %
BILIRUB SERPL-MCNC: 0.3 MG/DL
BUN SERPL-MCNC: 13.3 MG/DL (ref 6–20)
CALCIUM SERPL-MCNC: 8.9 MG/DL (ref 8.8–10.4)
CHLORIDE SERPL-SCNC: 109 MMOL/L (ref 98–107)
CREAT SERPL-MCNC: 0.67 MG/DL (ref 0.51–0.95)
EGFRCR SERPLBLD CKD-EPI 2021: >90 ML/MIN/1.73M2
EOSINOPHIL # BLD AUTO: 0.1 10E3/UL (ref 0–0.7)
EOSINOPHIL NFR BLD AUTO: 2 %
ERYTHROCYTE [DISTWIDTH] IN BLOOD BY AUTOMATED COUNT: 12.8 % (ref 10–15)
GLUCOSE SERPL-MCNC: 93 MG/DL (ref 70–99)
HCG INTACT+B SERPL-ACNC: <1 MIU/ML
HCO3 SERPL-SCNC: 18 MMOL/L (ref 22–29)
HCT VFR BLD AUTO: 37.7 % (ref 35–47)
HGB BLD-MCNC: 12.9 G/DL (ref 11.7–15.7)
IMM GRANULOCYTES # BLD: 0 10E3/UL
IMM GRANULOCYTES NFR BLD: 1 %
INR PPP: 1 (ref 0.85–1.15)
LYMPHOCYTES # BLD AUTO: 1.6 10E3/UL (ref 0.8–5.3)
LYMPHOCYTES NFR BLD AUTO: 23 %
MCH RBC QN AUTO: 31.9 PG (ref 26.5–33)
MCHC RBC AUTO-ENTMCNC: 34.2 G/DL (ref 31.5–36.5)
MCV RBC AUTO: 93 FL (ref 78–100)
MONOCYTES # BLD AUTO: 0.4 10E3/UL (ref 0–1.3)
MONOCYTES NFR BLD AUTO: 6 %
NEUTROPHILS # BLD AUTO: 4.7 10E3/UL (ref 1.6–8.3)
NEUTROPHILS NFR BLD AUTO: 67 %
NRBC # BLD AUTO: 0 10E3/UL
NRBC BLD AUTO-RTO: 0 /100
NT-PROBNP SERPL-MCNC: 92 PG/ML (ref 0–178)
PLATELET # BLD AUTO: 211 10E3/UL (ref 150–450)
POTASSIUM SERPL-SCNC: 3.9 MMOL/L (ref 3.4–5.3)
PROT SERPL-MCNC: 6.7 G/DL (ref 6.4–8.3)
PROTHROMBIN TIME: 13.5 SECONDS (ref 11.8–14.8)
RBC # BLD AUTO: 4.04 10E6/UL (ref 3.8–5.2)
SODIUM SERPL-SCNC: 140 MMOL/L (ref 135–145)
WBC # BLD AUTO: 7 10E3/UL (ref 4–11)

## 2025-05-13 PROCEDURE — 3074F SYST BP LT 130 MM HG: CPT | Performed by: INTERNAL MEDICINE

## 2025-05-13 PROCEDURE — 99215 OFFICE O/P EST HI 40 MIN: CPT | Mod: 25 | Performed by: INTERNAL MEDICINE

## 2025-05-13 PROCEDURE — 250N000009 HC RX 250: Performed by: INTERNAL MEDICINE

## 2025-05-13 PROCEDURE — 93451 RIGHT HEART CATH: CPT | Mod: 26 | Performed by: INTERNAL MEDICINE

## 2025-05-13 PROCEDURE — C1894 INTRO/SHEATH, NON-LASER: HCPCS | Performed by: INTERNAL MEDICINE

## 2025-05-13 PROCEDURE — 85004 AUTOMATED DIFF WBC COUNT: CPT | Performed by: INTERNAL MEDICINE

## 2025-05-13 PROCEDURE — 99211 OFF/OP EST MAY X REQ PHY/QHP: CPT | Performed by: INTERNAL MEDICINE

## 2025-05-13 PROCEDURE — C1751 CATH, INF, PER/CENT/MIDLINE: HCPCS | Performed by: INTERNAL MEDICINE

## 2025-05-13 PROCEDURE — 272N000001 HC OR GENERAL SUPPLY STERILE: Performed by: INTERNAL MEDICINE

## 2025-05-13 PROCEDURE — 84155 ASSAY OF PROTEIN SERUM: CPT | Performed by: INTERNAL MEDICINE

## 2025-05-13 PROCEDURE — 3079F DIAST BP 80-89 MM HG: CPT | Performed by: INTERNAL MEDICINE

## 2025-05-13 PROCEDURE — 83880 ASSAY OF NATRIURETIC PEPTIDE: CPT | Performed by: INTERNAL MEDICINE

## 2025-05-13 PROCEDURE — 1126F AMNT PAIN NOTED NONE PRSNT: CPT | Performed by: INTERNAL MEDICINE

## 2025-05-13 PROCEDURE — 999N000142 HC STATISTIC PROCEDURE PREP ONLY

## 2025-05-13 PROCEDURE — 84702 CHORIONIC GONADOTROPIN TEST: CPT | Performed by: INTERNAL MEDICINE

## 2025-05-13 PROCEDURE — 36415 COLL VENOUS BLD VENIPUNCTURE: CPT | Performed by: INTERNAL MEDICINE

## 2025-05-13 PROCEDURE — 85610 PROTHROMBIN TIME: CPT | Performed by: INTERNAL MEDICINE

## 2025-05-13 PROCEDURE — 93451 RIGHT HEART CATH: CPT | Performed by: INTERNAL MEDICINE

## 2025-05-13 PROCEDURE — 999N000132 HC STATISTIC PP CARE STAGE 1

## 2025-05-13 RX ORDER — LIDOCAINE 40 MG/G
CREAM TOPICAL
Status: COMPLETED | OUTPATIENT
Start: 2025-05-13 | End: 2025-05-13

## 2025-05-13 RX ADMIN — LIDOCAINE: 40 CREAM TOPICAL at 07:08

## 2025-05-13 ASSESSMENT — ACTIVITIES OF DAILY LIVING (ADL)
ADLS_ACUITY_SCORE: 58

## 2025-05-13 ASSESSMENT — PAIN SCALES - GENERAL: PAINLEVEL_OUTOF10: NO PAIN (0)

## 2025-05-13 NOTE — PRE-PROCEDURE
Consenting/Education for Cardiology Procedure: Right heart catheterization  and possible vasodilation study    Patient understands we would like to perform the listed procedure(s) due to PH.    The patient understands the following:     The procedure was described to the patient in detail.    No sedation is planned for this procedure. Patient understands risks and complications of the procedure which include but are not limited to bruising/swelling around the incision site, infection, bleeding, allergic reaction to local anesthetic, air embolism, arterial puncture, stroke, heart attack, need for emergency heart surgery, death.       Patient verbalized understanding of risks and benefits and has elected to proceed with the procedure or procedures listed above.    DOLORES Fuentes CNP  Cardiology

## 2025-05-13 NOTE — NURSING NOTE
"Chief Complaint   Patient presents with    Follow Up     : follow-up after [facility]      BP Readings from Last 1 Encounters:   05/13/25 119/80      Pulse Readings from Last 1 Encounters:   05/13/25 82      Ht Readings from Last 2 Encounters:   07/09/24 1.6 m (5' 3\")   06/15/24 1.6 m (5' 3\")      Wt Readings from Last 2 Encounters:   05/13/25 90.9 kg (200 lb 8 oz)   05/13/25 89.8 kg (198 lb)      Body mass index is 35.52 kg/m .    Vitals were taken, medications reconciled.     Hussain Rodarte, Clinic Assistant    11:37 AM    "

## 2025-05-13 NOTE — PATIENT INSTRUCTIONS
You were seen today in the Pulmonary Hypertension Clinic at the [facility].     Cardiology Provider you saw during your visit:    Dr. Carroll      Recommendations:   OK to have gastric surgery    Follow-up:   Follow-up in 4 months virtually with labs locally prior    Results:       Please call us immediately if you have any syncope (fainting or passing out), chest pain, edema (swelling or weight gain), or decline in your functional status (general decline in how you are feeling).    If you have emergent concerns after hours or on the weekend, please call our on-call Cardiologist at [phone], option [phone]. For emergencies call [phone].     Thank you for allowing us to be a part of your care here at the [facility]    Please visit the pulmonary hypertension website for more information and support. [email or web address]    If you have questions or concerns please contact us at:    Ora Do RN (P: [phone])    Nurse Coordinator       Pulmonary Hypertension     [facility]         JESUS Brown   (Prior Auths & Patient Assistance )             ()  Clinic   Clinic   Pulmonary Hypertension   Pulmonary Hypertension  [facility]  [facility]  (P)[phone]    (P) [phone]  (F) [phone]

## 2025-05-13 NOTE — PROGRESS NOTES
34 year old female with previously advanced PAH treated with parenteral agents seen for follow up subsequent to weaning from IV prostacyclin the 3 drug oral therapy.  She is doing well.  She is NYHA Class I.  Right heart catheterization today, essentially normal on 3 drug regimen.  Patient contemplating bariatric surgery in [facility].    There is no specific contraindication to contemplated bariatric surgery, however, patient is at increased surgical risk in view of history.  Medications may not be stopped for more than 24 hous.       Constitutional: weight loss, fever, chills, night sweats  HEENT: without visual changes, swallow difficulties  Pulmonary: without shortness of breath, cough, wheeze, hemoptysis  Cardiac: without chest pain, MALAVE, PND, orthopnea, edema, palpitation, pre-syncope, syncope,  GI: without diarrhea, constipation, jaundice, melena, GERD, hematemesis  : without frequency, urgency, dysuria, hematuria  Skin: rash, bruise, open lesions  Neuro: without TIA, focal neurologic complaints, seizure, trauma  Ortho: without pain, swelling, mobility impairment  Endocrine: diabetes, thyroid, heat/cold intolerance, polyuria, polyphagia, change bowel habits.  Sleep:no ASAD, periodic breathing, fatigue    Constitutional: alert, oriented, normal gait and station, normal mentation.  Oral: moist mucous membrans  Lymph: without pathologic adenopathy  Chest: clear to ausculation and percussion  Cor: No evidence of left or right ventricular activity.  Rhythm is regular.  S1 normal, S2 split physiologically. Murmurs are not present  Abdomen: without tenderness, rebound, guarding, masses, ascites  Extremities: Edema not present  Neuro: no focal defects, normal mentation  Skin: without open lesions  Psych: oriented, verbal, mental status in tact     Allergy:  Chlorhexidine    Surgery:  x 3, nasal septoplasty  Transfusion: negative   ETOH: none    Wt Readings from Last 24 Encounters:   25 90.9 kg (200  lb 8 oz)   05/13/25 89.8 kg (198 lb)   09/24/24 83.7 kg (184 lb 8 oz)   07/09/24 72.6 kg (160 lb)   06/24/24 73.7 kg (162 lb 8 oz)   05/14/24 77.1 kg (170 lb)   02/16/24 78.5 kg (173 lb)   01/09/24 77.1 kg (170 lb)   11/17/23 80.2 kg (176 lb 12.8 oz)   11/15/23 82.1 kg (181 lb)   08/09/23 79.4 kg (175 lb 1.6 oz)   08/01/23 81.6 kg (179 lb 12.8 oz)   08/01/23 80.8 kg (178 lb 3.2 oz)   05/09/23 78.6 kg (173 lb 3.2 oz)   02/17/23 70.4 kg (155 lb 3.2 oz)           Latest Reference Range & Units 05/13/25 06:51   Sodium 135 - 145 mmol/L 140   Potassium 3.4 - 5.3 mmol/L 3.9   Chloride 98 - 107 mmol/L 109 (H)   Carbon Dioxide (CO2) 22 - 29 mmol/L 18 (L)   Urea Nitrogen 6.0 - 20.0 mg/dL 13.3   Creatinine 0.51 - 0.95 mg/dL 0.67   GFR Estimate >60 mL/min/1.73m2 >90   Calcium 8.8 - 10.4 mg/dL 8.9   Anion Gap 7 - 15 mmol/L 13   Albumin 3.5 - 5.2 g/dL 4.1   Protein Total 6.4 - 8.3 g/dL 6.7   Alkaline Phosphatase 40 - 150 U/L 90   ALT 0 - 50 U/L 8   AST 0 - 45 U/L 16   Bilirubin Total <=1.2 mg/dL 0.3   Glucose 70 - 99 mg/dL 93   hCG Quantitative <5 mIU/mL <1   N-Terminal Pro Bnp 0 - 178 pg/mL 92   WBC 4.0 - 11.0 10e3/uL 7.0   Hemoglobin 11.7 - 15.7 g/dL 12.9   Hematocrit 35.0 - 47.0 % 37.7   Platelet Count 150 - 450 10e3/uL 211   RBC Count 3.80 - 5.20 10e6/uL 4.04   MCV 78 - 100 fL 93   MCH 26.5 - 33.0 pg 31.9   MCHC 31.5 - 36.5 g/dL 34.2   RDW 10.0 - 15.0 % 12.8   % Neutrophils % 67   % Lymphocytes % 23   % Monocytes % 6   % Eosinophils % 2   % Basophils % 1   % Immature Granulocytes % 1   NRBC/W <1 /100 0   Absolute Neutrophil 1.6 - 8.3 10e3/uL 4.7   Absolute Lymphocytes 0.8 - 5.3 10e3/uL 1.6   Absolute Monocytes 0.0 - 1.3 10e3/uL 0.4   Absolute Eosinophils 0.0 - 0.7 10e3/uL 0.1   Absolute Basophils 0.0 - 0.2 10e3/uL 0.1   Absolute Immature Granulocytes <=0.4 10e3/uL 0.0   Absolute NRBCs 10e3/uL 0.0   INR 0.85 - 1.15  1.00   PT 11.8 - 14.8 Seconds 13.5       Current Outpatient Medications   Medication Sig Dispense Refill     acetaminophen (TYLENOL) 325 MG tablet Take 3 tablets (975 mg) by mouth every 6 hours 80 tablet 0    OPSUMIT 10 MG tablet TAKE 1 TABLET BY MOUTH 1 TIME A DAY. DO NOT HANDLE IF PREGNANT. DO NOT SPLIT, CRUSH, OR CHEW. REVIEW MEDICATION GUIDE 30 tablet 11    selexipag (UPTRAVI) 1600 MCG tablet Take 1 tablet (1,600 mcg) by mouth every 12 hours 60 tablet 11    tadalafil, PAH, 20 MG TABS Take 2 tablets (40 mg) by mouth daily. 60 tablet 11    COMPOUNDED NON-CONTROLLED SUBSTANCE (CMPD RX) - PHARMACY TO MIX COMPOUNDED MEDICATION 10% Lidocaine gel. Apply 1-2 grams to affected site 1-2 times per day 8-12 hours apart  [facility] [phone] (Patient not taking: Reported on 5/13/2025)      furosemide (LASIX) 20 MG tablet Take 1 tablet (20 mg) by mouth daily (Patient not taking: Reported on 5/13/2025) 90 tablet 3     No current facility-administered medications for this visit.      Catheterization 2025 on all oral agents  Primary Surgeon: Karin Frazier MD   Procedure: Heart Cath Right Heart Cath        Pre Procedure Diagnosis    assess pulmonary HTN           Pressures Phase: Baseline     Time Systolic (mmHg) Diastolic (mmHg) Mean (mmHg) A Wave (mmHg) V Wave (mmHg) EDP (mmHg) Max dp/dt (mmHg/sec) HR (bpm) Content (mL/dL) SAT (%)   RA Pressures  8:44 AM   3    3    8      80        RV Pressures  8:44 AM 43        5     80        PA Pressures  8:45 AM 45    22    20        81        PCW Pressures  8:45 AM   7    7    10      83        AO Pressures  8:22     60    81        77          Blood Flow Results Phase: Baseline     Time Results Indexed Values (L/min/m2)   QP  8:11 AM 8.88 L/min    4.61      QS  8:11 AM 8.88 L/min    4.61        Blood Oximetry Phase: Baseline     Time Hb SAT(%) PO2 Content (mL/dL) PA Sat (%)   PA  8:11 AM  76.4 %      76.4      Art  8:11 AM  94 %     17.26         Cardiac Output Phase: Baseline     Time TDCO (L/min) TDCI (L/min/m2) Eugenia C.O. (L/min) Eugenia C.I. (L/min/m2) Eugenia HR (bpm)    Cardiac Output Results  8:11 AM 8.35    4.34    8.88    4.61         8:48 AM 8.35            Resistance Results Phase: Baseline     Time PVR SVR TPR TVR PVR/SVR TPR/TVR   Resistance Results (Metric)  8:11 .07 dsc-5    702.39 dsc-5    180.1 dsc-5    729.41 dsc-5    0.17    0.25      Resistance Results (Wood)  8:11 AM 1.46 JENKINS    8.78 JENKINS    2.25 JENKINS    9.12 JENKINS    0.17    0.25        Stoke Volume Results Phase: Baseline      Catheterization February 2023    Severely reduced cardiac index with elevated right-sided filling pressure  Normal left-sided filling pressure  No response to acute vasoreactivity testing with inhaled nitric oxide         Pressures Phase: Baseline     Time Systolic (mmHg) Diastolic (mmHg) Mean (mmHg) A Wave (mmHg) V Wave (mmHg) EDP (mmHg) Max dp/dt (mmHg/sec) HR (bpm) Content (mL/dL) SAT (%)   RA Pressures  2:50 PM   12    12    16      65        RV Pressures  2:51 PM 72        14     69        PA Pressures  2:52 PM 74    34    50        64        PCW Pressures  2:52 PM   10        68        Art Pressures  2:34     92         87          Pressures Phase: Nitric Oxide     Time Systolic (mmHg) Diastolic (mmHg) Mean (mmHg) A Wave (mmHg) V Wave (mmHg) EDP (mmHg) Max dp/dt (mmHg/sec) HR (bpm) Content (mL/dL) SAT (%)   RA Pressures  3:10 PM   12    21    17      71        PA Pressures  3:08 PM 70    30    42        70        PCW Pressures  3:08 PM   10        75        AO Pressures  3:17     92    106        69          Blood Flow Results Phase: Baseline     Time Results Indexed Values (L/min/m2)   QP  2:30 PM 2.54 L/min    1.41      QS  2:30 PM 2.54 L/min    1.41        Blood Flow Results Phase: Nitric Oxide     Time Results Indexed Values (L/min/m2)   QP  3:03 PM 2.9 L/min    1.61      QS  3:03 PM 2.9 L/min    1.61        Blood Oximetry Phase: Baseline  Resistance Results Phase: Baseline     Time PVR SVR TPR TVR PVR/SVR TPR/TVR   Resistance Results (Metric)  2:30 PM 1258.19 dsc-5     3019.66 dsc-5    1572.74 dsc-5    3397.12 dsc-5    0.42    0.46      Resistance Results (Wood)  2:30 PM 15.73 JENKINS    37.76 JENKINS    19.66 JENKINS    42.47 JENKINS    0.42    0.46        Resistance Results Phase: Nitric Oxide     Time PVR SVR TPR TVR PVR/SVR TPR/TVR   Resistance Results (Metric)  3:03 .58 dsc-5    2589.63 dsc-5    1157.07 dsc-5    2920.22 dsc-5    0.34    0.4      Resistance Results (Wood)  3:03 PM 11.02 JENKINS    32.38 JENKINS    14.47 JENKINS    36.51 JENKINS    0.34    0.4        Stoke Volume Results Phase: Baseline     Time RVSW (gm*m) LVSW (gm*m) RVSW-I (gm*m/m2) LVSW-I (gm*m/m2)   Stroke Work Results  2:30 PM 20.53     11.4         Stoke Volume Results Phase: Nitric Oxide     Time RVSW (gm*m) LVSW (gm*m) RVSW-I (gm*m/m2) LVSW-I (gm*m/m2)   Stroke Work Results  3:03 PM 16.92    54.93    9.4    30.51         Time Hb SAT(%) PO2 Content (mL/dL) PA Sat (%)   PA  2:30 PM  48.5 %      48.5      Art  2:30 PM  86 %     18.13         Blood Oximetry Phase: Nitric Oxide       Time Hb SAT(%) PO2 Content (mL/dL) PA Sat (%)   PA  3:03 PM  60.4 %      60.4      Art  3:03 PM

## 2025-05-13 NOTE — DISCHARGE INSTRUCTIONS
[facility]                        Interventional Cardiology  Discharge Instructions   Post Right Heart Cath and/or Heart Biopsy      AFTER YOU GO HOME:  DO drink plenty of fluids  DO resume your regular diet and medications unless otherwise instructed by your Primary Physician  Do Not scrub the procedure site vigorously  No lotion or powder to the puncture site for 3 days    ACTIVITY:  Take it easy for the rest of the day. Do not do strenuous exercise and do not lift, pull, or push anything heavy. This lets the catheter site heal.     CARE OF THE CATHETER SITE:    For at least 24 hours, keep the bandage over the spot where the catheter was inserted.   Put ice or a cold pack on the area for 10 to 20 minutes at a time to help with soreness or swelling.   You may shower 24 hours after the procedure. Pat the incision dry.  Don't take a bath for 48 hours    WHEN SHOULD YOU CALL FOR HELP:  Monitor neck site for bleeding, swelling, or voice changes. If you notice bleeding or swelling immediately apply pressure to the site for 15 minutes, and call number below to speak with Cardiology Fellow.  If you experience any changes in your breathing you should call your doctor immediately or come to the closest Emergency Department.  Do not drive yourself.  If you have a fast-growing, painful lump at the catheter site.  If you have symptoms of infection, such as:  Increased pain, swelling, warmth, or redness.  Red streaks leading from the area.  Pus draining from the area.  A fever.    ADDITIONAL INSTRUCTIONS: Medications: You are to resume all home medications including anticoagulation therapy unless otherwise advised by your primary cardiologist or nurse coordinator.    Follow Up: Per your primary cardiology team    If you have any questions or concerns regarding your procedure site please call [phone] at any time & press option 4 to speak to the .  Ask for the Cardiology Fellow on call.  They are  available 24 hours a day.  You may also contact the Cardiology Clinic after hours number at [phone].                                                       Telephone Numbers [phone] Monday-Friday 8:00 am to 4:30 pm    [phone] After 4:30 pm Monday-Friday, Weekends & Holidays  Ask for Interventional Cardiologist on call. Someone is on call 24 hours/day   [facility] toll free number [phone] Monday-Friday 8:00 am to 4:30 pm   [facility] Emergency Dept [phone]

## 2025-05-13 NOTE — Clinical Note
Report called to 2A Yumiko VIEIRA. The procedure and findings are reviewed. The pt is returned to 2A per AMB.

## 2025-05-13 NOTE — PROGRESS NOTES
Research Title: [place] Pulmonary Hypertension Repository Study (MEASURE)  IRB #: [id number]  PI: Karin Frazier MD ([phone])   Study coordinators: Carol Ann Smith ([phone]), Reshma Monge ([phone]), Jossie Joy ([phone])  Estimated duration of study: Until no longer receiving clinical care at [facility]    Patient was approached for possible updated participation in the MEASURE registry.  The current IRB approved consent form was reviewed and discussed at length with the patient, including purpose, nature of the research, risk & benefits. Confidentiality issues and the option for data/specimen sharing were also reviewed. Patient was informed that participation is voluntary and that refusal to participate will involve no penalty or decrease benefits to which the patient is entitled. They were informed that they may discontinue their involvement at any time.    Patient had the opportunity to read the entire written consent form, ask any questions and concerns of the study, and was offered sufficient time to consider the research trial.  All questions and concerns were addressed and the patient was able to clearly state what study participation involved. Patient voluntarily signed the combined consent and HIPAA form prior to any research data collection and/or blood sample collection.  A signed copy of the combined consent form was given to the patient.    Patient was consented on May 13, 2025 at 1200 and opted in the bio-bank sub-study and in the sputum sub-study.

## 2025-05-13 NOTE — Clinical Note
dry, intact, no bleeding and no hematoma. The right internal jugular vein 7 FR  sheath is removed to a manual hold and to a Primapore DSG.

## 2025-05-13 NOTE — LETTER
5/13/2025      RE: Chelsea Flower  [address] [place] [address] [address]  [facility] [address]       Dear Colleague,    Thank you for the opportunity to participate in the care of your patient, Chelsea Flower, at the [facility] [place] [facility]. Please see a copy of my visit note below.        34 year old female with previously advanced PAH treated with parenteral agents seen for follow up subsequent to weaning from IV prostacyclin the 3 drug oral therapy.  She is doing well.  She is NYHA Class I.  Right heart catheterization today, essentially normal on 3 drug regimen.  Patient contemplating bariatric surgery in [facility].    There is no specific contraindication to contemplated bariatric surgery, however, patient is at increased surgical risk in view of history.  Medications may not be stopped for more than 24 hous.       Constitutional: weight loss, fever, chills, night sweats  HEENT: without visual changes, swallow difficulties  Pulmonary: without shortness of breath, cough, wheeze, hemoptysis  Cardiac: without chest pain, MALAVE, PND, orthopnea, edema, palpitation, pre-syncope, syncope,  GI: without diarrhea, constipation, jaundice, melena, GERD, hematemesis  : without frequency, urgency, dysuria, hematuria  Skin: rash, bruise, open lesions  Neuro: without TIA, focal neurologic complaints, seizure, trauma  Ortho: without pain, swelling, mobility impairment  Endocrine: diabetes, thyroid, heat/cold intolerance, polyuria, polyphagia, change bowel habits.  Sleep:no ASAD, periodic breathing, fatigue    Constitutional: alert, oriented, normal gait and station, normal mentation.  Oral: moist mucous membrans  Lymph: without pathologic adenopathy  Chest: clear to ausculation and percussion  Cor: No evidence of left or right ventricular activity.  Rhythm is regular.  S1 normal, S2 split physiologically. Murmurs are not present  Abdomen: without  tenderness, rebound, guarding, masses, ascites  Extremities: Edema not present  Neuro: no focal defects, normal mentation  Skin: without open lesions  Psych: oriented, verbal, mental status in tact     Allergy:  Chlorhexidine    Surgery:  x 3, nasal septoplasty  Transfusion: negative   ETOH: none    Wt Readings from Last 24 Encounters:   25 90.9 kg (200 lb 8 oz)   25 89.8 kg (198 lb)   24 83.7 kg (184 lb 8 oz)   24 72.6 kg (160 lb)   24 73.7 kg (162 lb 8 oz)   24 77.1 kg (170 lb)   24 78.5 kg (173 lb)   24 77.1 kg (170 lb)   23 80.2 kg (176 lb 12.8 oz)   11/15/23 82.1 kg (181 lb)   23 79.4 kg (175 lb 1.6 oz)   23 81.6 kg (179 lb 12.8 oz)   23 80.8 kg (178 lb 3.2 oz)   23 78.6 kg (173 lb 3.2 oz)   23 70.4 kg (155 lb 3.2 oz)           Latest Reference Range & Units 25 06:51   Sodium 135 - 145 mmol/L 140   Potassium 3.4 - 5.3 mmol/L 3.9   Chloride 98 - 107 mmol/L 109 (H)   Carbon Dioxide (CO2) 22 - 29 mmol/L 18 (L)   Urea Nitrogen 6.0 - 20.0 mg/dL 13.3   Creatinine 0.51 - 0.95 mg/dL 0.67   GFR Estimate >60 mL/min/1.73m2 >90   Calcium 8.8 - 10.4 mg/dL 8.9   Anion Gap 7 - 15 mmol/L 13   Albumin 3.5 - 5.2 g/dL 4.1   Protein Total 6.4 - 8.3 g/dL 6.7   Alkaline Phosphatase 40 - 150 U/L 90   ALT 0 - 50 U/L 8   AST 0 - 45 U/L 16   Bilirubin Total <=1.2 mg/dL 0.3   Glucose 70 - 99 mg/dL 93   hCG Quantitative <5 mIU/mL <1   N-Terminal Pro Bnp 0 - 178 pg/mL 92   WBC 4.0 - 11.0 10e3/uL 7.0   Hemoglobin 11.7 - 15.7 g/dL 12.9   Hematocrit 35.0 - 47.0 % 37.7   Platelet Count 150 - 450 10e3/uL 211   RBC Count 3.80 - 5.20 10e6/uL 4.04   MCV 78 - 100 fL 93   MCH 26.5 - 33.0 pg 31.9   MCHC 31.5 - 36.5 g/dL 34.2   RDW 10.0 - 15.0 % 12.8   % Neutrophils % 67   % Lymphocytes % 23   % Monocytes % 6   % Eosinophils % 2   % Basophils % 1   % Immature Granulocytes % 1   NRBC/W <1 /100 0   Absolute Neutrophil 1.6 - 8.3 10e3/uL 4.7   Absolute  Lymphocytes 0.8 - 5.3 10e3/uL 1.6   Absolute Monocytes 0.0 - 1.3 10e3/uL 0.4   Absolute Eosinophils 0.0 - 0.7 10e3/uL 0.1   Absolute Basophils 0.0 - 0.2 10e3/uL 0.1   Absolute Immature Granulocytes <=0.4 10e3/uL 0.0   Absolute NRBCs 10e3/uL 0.0   INR 0.85 - 1.15  1.00   PT 11.8 - 14.8 Seconds 13.5       Current Outpatient Medications   Medication Sig Dispense Refill     acetaminophen (TYLENOL) 325 MG tablet Take 3 tablets (975 mg) by mouth every 6 hours 80 tablet 0     OPSUMIT 10 MG tablet TAKE 1 TABLET BY MOUTH 1 TIME A DAY. DO NOT HANDLE IF PREGNANT. DO NOT SPLIT, CRUSH, OR CHEW. REVIEW MEDICATION GUIDE 30 tablet 11     selexipag (UPTRAVI) 1600 MCG tablet Take 1 tablet (1,600 mcg) by mouth every 12 hours 60 tablet 11     tadalafil, PAH, 20 MG TABS Take 2 tablets (40 mg) by mouth daily. 60 tablet 11     COMPOUNDED NON-CONTROLLED SUBSTANCE (CMPD RX) - PHARMACY TO MIX COMPOUNDED MEDICATION 10% Lidocaine gel. Apply 1-2 grams to affected site 1-2 times per day 8-12 hours apart  [facility] [phone] (Patient not taking: Reported on 5/13/2025)       furosemide (LASIX) 20 MG tablet Take 1 tablet (20 mg) by mouth daily (Patient not taking: Reported on 5/13/2025) 90 tablet 3     No current facility-administered medications for this visit.      Catheterization 2025 on all oral agents  Primary Surgeon: Karin Frazier MD   Procedure: Heart Cath Right Heart Cath        Pre Procedure Diagnosis    assess pulmonary HTN           Pressures Phase: Baseline     Time Systolic (mmHg) Diastolic (mmHg) Mean (mmHg) A Wave (mmHg) V Wave (mmHg) EDP (mmHg) Max dp/dt (mmHg/sec) HR (bpm) Content (mL/dL) SAT (%)   RA Pressures  8:44 AM   3    3    8      80        RV Pressures  8:44 AM 43        5     80        PA Pressures  8:45 AM 45    22    20        81        PCW Pressures  8:45 AM   7    7    10      83        AO Pressures  8:22     60    81        77          Blood Flow Results Phase: Baseline     Time Results  Indexed Values (L/min/m2)   QP  8:11 AM 8.88 L/min    4.61      QS  8:11 AM 8.88 L/min    4.61        Blood Oximetry Phase: Baseline     Time Hb SAT(%) PO2 Content (mL/dL) PA Sat (%)   PA  8:11 AM  76.4 %      76.4      Art  8:11 AM  94 %     17.26         Cardiac Output Phase: Baseline     Time TDCO (L/min) TDCI (L/min/m2) Eugenia C.O. (L/min) Eugenia C.I. (L/min/m2) Eugenia HR (bpm)   Cardiac Output Results  8:11 AM 8.35    4.34    8.88    4.61         8:48 AM 8.35            Resistance Results Phase: Baseline     Time PVR SVR TPR TVR PVR/SVR TPR/TVR   Resistance Results (Metric)  8:11 .07 dsc-5    702.39 dsc-5    180.1 dsc-5    729.41 dsc-5    0.17    0.25      Resistance Results (Wood)  8:11 AM 1.46 JENKINS    8.78 JENKINS    2.25 JENKINS    9.12 JENKINS    0.17    0.25        Stoke Volume Results Phase: Baseline      Catheterization February 2023    Severely reduced cardiac index with elevated right-sided filling pressure  Normal left-sided filling pressure  No response to acute vasoreactivity testing with inhaled nitric oxide         Pressures Phase: Baseline     Time Systolic (mmHg) Diastolic (mmHg) Mean (mmHg) A Wave (mmHg) V Wave (mmHg) EDP (mmHg) Max dp/dt (mmHg/sec) HR (bpm) Content (mL/dL) SAT (%)   RA Pressures  2:50 PM   12    12    16      65        RV Pressures  2:51 PM 72        14     69        PA Pressures  2:52 PM 74    34    50        64        PCW Pressures  2:52 PM   10        68        Art Pressures  2:34     92         87          Pressures Phase: Nitric Oxide     Time Systolic (mmHg) Diastolic (mmHg) Mean (mmHg) A Wave (mmHg) V Wave (mmHg) EDP (mmHg) Max dp/dt (mmHg/sec) HR (bpm) Content (mL/dL) SAT (%)   RA Pressures  3:10 PM   12    21    17      71        PA Pressures  3:08 PM 70    30    42        70        PCW Pressures  3:08 PM   10        75        AO Pressures  3:17     92    106        69          Blood Flow Results Phase: Baseline     Time Results Indexed Values (L/min/m2)   QP  2:30 PM 2.54  L/min    1.41      QS  2:30 PM 2.54 L/min    1.41        Blood Flow Results Phase: Nitric Oxide     Time Results Indexed Values (L/min/m2)   QP  3:03 PM 2.9 L/min    1.61      QS  3:03 PM 2.9 L/min    1.61        Blood Oximetry Phase: Baseline  Resistance Results Phase: Baseline     Time PVR SVR TPR TVR PVR/SVR TPR/TVR   Resistance Results (Metric)  2:30 PM 1258.19 dsc-5    3019.66 dsc-5    1572.74 dsc-5    3397.12 dsc-5    0.42    0.46      Resistance Results (Wood)  2:30 PM 15.73 JENKINS    37.76 JENKINS    19.66 JENKINS    42.47 JENKINS    0.42    0.46        Resistance Results Phase: Nitric Oxide     Time PVR SVR TPR TVR PVR/SVR TPR/TVR   Resistance Results (Metric)  3:03 .58 dsc-5    2589.63 dsc-5    1157.07 dsc-5    2920.22 dsc-5    0.34    0.4      Resistance Results (Wood)  3:03 PM 11.02 JENKINS    32.38 JENKINS    14.47 JENKINS    36.51 JENKINS    0.34    0.4        Stoke Volume Results Phase: Baseline     Time RVSW (gm*m) LVSW (gm*m) RVSW-I (gm*m/m2) LVSW-I (gm*m/m2)   Stroke Work Results  2:30 PM 20.53     11.4         Stoke Volume Results Phase: Nitric Oxide     Time RVSW (gm*m) LVSW (gm*m) RVSW-I (gm*m/m2) LVSW-I (gm*m/m2)   Stroke Work Results  3:03 PM 16.92    54.93    9.4    30.51         Time Hb SAT(%) PO2 Content (mL/dL) PA Sat (%)   PA  2:30 PM  48.5 %      48.5      Art  2:30 PM  86 %     18.13         Blood Oximetry Phase: Nitric Oxide       Time Hb SAT(%) PO2 Content (mL/dL) PA Sat (%)   PA  3:03 PM  60.4 %      60.4      Art  3:03 PM            Please do not hesitate to contact me if you have any questions/concerns.     Sincerely,     Alex Carroll MD

## 2025-05-13 NOTE — PROGRESS NOTES
Patient arrived via cart from [facility] s/p [facility]. VSS. RIJ site CDI, no hematoma. Discharge instructions reviewed with pt.

## 2025-06-30 ENCOUNTER — EXTERNAL ORDER RESULTS (OUTPATIENT)
Dept: CARDIOLOGY | Facility: CLINIC | Age: 35
End: 2025-06-30
Payer: COMMERCIAL

## 2025-06-30 LAB — HCG UR QL: NEGATIVE

## 2025-08-26 ENCOUNTER — TELEPHONE (OUTPATIENT)
Dept: CARDIOLOGY | Facility: CLINIC | Age: 35
End: 2025-08-26
Payer: COMMERCIAL

## (undated) DEVICE — KIT RIGHT HEART CATH 60130719

## (undated) DEVICE — Device

## (undated) DEVICE — INTRO SHEATH 7FRX10CM PINNACLE RSS702

## (undated) DEVICE — PACK HEART RIGHT CUSTOM SAN32RHF18

## (undated) DEVICE — UMMC CONVENIENCE KIT FORMALLY H9656021017160 NEW# 602101716

## (undated) DEVICE — KIT MICROINTRODUCER VASCULAR VSI 4FRX0.018INX40CM 7195X

## (undated) DEVICE — KIT MICROINTRODUCER VASCULAR  4FRX21GAX4CM

## (undated) DEVICE — INTRO SHEATH MICRO PLATINUM TIP 4FRX40CM 7274

## (undated) RX ORDER — LIDOCAINE 40 MG/G
CREAM TOPICAL
Status: DISPENSED
Start: 2025-05-13

## (undated) RX ORDER — LIDOCAINE HYDROCHLORIDE 10 MG/ML
INJECTION, SOLUTION EPIDURAL; INFILTRATION; INTRACAUDAL; PERINEURAL
Status: DISPENSED
Start: 2025-05-13

## (undated) RX ORDER — LIDOCAINE 40 MG/G
CREAM TOPICAL
Status: DISPENSED
Start: 2024-09-23

## (undated) RX ORDER — CEFAZOLIN SODIUM 2 G/100ML
INJECTION, SOLUTION INTRAVENOUS
Status: DISPENSED
Start: 2023-02-14

## (undated) RX ORDER — LIDOCAINE 40 MG/G
CREAM TOPICAL
Status: DISPENSED
Start: 2023-11-15

## (undated) RX ORDER — ACETAMINOPHEN 325 MG/1
TABLET ORAL
Status: DISPENSED
Start: 2024-09-23

## (undated) RX ORDER — HEPARIN SODIUM,PORCINE 10 UNIT/ML
VIAL (ML) INTRAVENOUS
Status: DISPENSED
Start: 2023-08-08

## (undated) RX ORDER — LIDOCAINE HYDROCHLORIDE 10 MG/ML
INJECTION, SOLUTION EPIDURAL; INFILTRATION; INTRACAUDAL; PERINEURAL
Status: DISPENSED
Start: 2023-02-08

## (undated) RX ORDER — HEPARIN SODIUM (PORCINE) LOCK FLUSH IV SOLN 100 UNIT/ML 100 UNIT/ML
SOLUTION INTRAVENOUS
Status: DISPENSED
Start: 2023-02-14

## (undated) RX ORDER — LIDOCAINE HYDROCHLORIDE 10 MG/ML
INJECTION, SOLUTION EPIDURAL; INFILTRATION; INTRACAUDAL; PERINEURAL
Status: DISPENSED
Start: 2023-02-14

## (undated) RX ORDER — LIDOCAINE 40 MG/G
CREAM TOPICAL
Status: DISPENSED
Start: 2023-08-01

## (undated) RX ORDER — LIDOCAINE 40 MG/G
CREAM TOPICAL
Status: DISPENSED
Start: 2024-02-16

## (undated) RX ORDER — LIDOCAINE HYDROCHLORIDE 10 MG/ML
INJECTION, SOLUTION EPIDURAL; INFILTRATION; INTRACAUDAL; PERINEURAL
Status: DISPENSED
Start: 2023-08-05

## (undated) RX ORDER — FENTANYL CITRATE 50 UG/ML
INJECTION, SOLUTION INTRAMUSCULAR; INTRAVENOUS
Status: DISPENSED
Start: 2023-02-14

## (undated) RX ORDER — LIDOCAINE HYDROCHLORIDE 10 MG/ML
INJECTION, SOLUTION EPIDURAL; INFILTRATION; INTRACAUDAL; PERINEURAL
Status: DISPENSED
Start: 2023-08-08

## (undated) RX ORDER — LIDOCAINE HYDROCHLORIDE 10 MG/ML
INJECTION, SOLUTION EPIDURAL; INFILTRATION; INTRACAUDAL; PERINEURAL
Status: DISPENSED
Start: 2023-11-16

## (undated) RX ORDER — FENTANYL CITRATE 50 UG/ML
INJECTION, SOLUTION INTRAMUSCULAR; INTRAVENOUS
Status: DISPENSED
Start: 2023-08-08